# Patient Record
Sex: FEMALE | Race: WHITE | NOT HISPANIC OR LATINO | Employment: FULL TIME | URBAN - METROPOLITAN AREA
[De-identification: names, ages, dates, MRNs, and addresses within clinical notes are randomized per-mention and may not be internally consistent; named-entity substitution may affect disease eponyms.]

---

## 2017-07-11 ENCOUNTER — HOSPITAL ENCOUNTER (EMERGENCY)
Facility: HOSPITAL | Age: 37
Discharge: LEFT BEFORE DISCHARGE | End: 2017-07-11
Attending: EMERGENCY MEDICINE

## 2017-07-11 VITALS
RESPIRATION RATE: 20 BRPM | HEIGHT: 62 IN | SYSTOLIC BLOOD PRESSURE: 161 MMHG | HEART RATE: 86 BPM | WEIGHT: 105 LBS | TEMPERATURE: 98 F | DIASTOLIC BLOOD PRESSURE: 95 MMHG | BODY MASS INDEX: 19.32 KG/M2

## 2017-07-11 DIAGNOSIS — R11.2 NON-INTRACTABLE VOMITING WITH NAUSEA, UNSPECIFIED VOMITING TYPE: Primary | ICD-10-CM

## 2017-07-11 LAB
ALBUMIN SERPL BCP-MCNC: 4.1 G/DL
ALP SERPL-CCNC: 42 U/L
ALT SERPL W/O P-5'-P-CCNC: 15 U/L
ANION GAP SERPL CALC-SCNC: 10 MMOL/L
AST SERPL-CCNC: 18 U/L
B-HCG UR QL: NEGATIVE
BASOPHILS # BLD AUTO: 0.07 K/UL
BASOPHILS NFR BLD: 1.2 %
BILIRUB SERPL-MCNC: 0.4 MG/DL
BILIRUB UR QL STRIP: NEGATIVE
BUN SERPL-MCNC: 10 MG/DL
CALCIUM SERPL-MCNC: 9.5 MG/DL
CHLORIDE SERPL-SCNC: 103 MMOL/L
CLARITY UR: CLEAR
CO2 SERPL-SCNC: 29 MMOL/L
COLOR UR: YELLOW
CREAT SERPL-MCNC: 0.8 MG/DL
DIFFERENTIAL METHOD: ABNORMAL
EOSINOPHIL # BLD AUTO: 0.1 K/UL
EOSINOPHIL NFR BLD: 1 %
ERYTHROCYTE [DISTWIDTH] IN BLOOD BY AUTOMATED COUNT: 12.7 %
EST. GFR  (AFRICAN AMERICAN): >60 ML/MIN/1.73 M^2
EST. GFR  (NON AFRICAN AMERICAN): >60 ML/MIN/1.73 M^2
GLUCOSE SERPL-MCNC: 99 MG/DL
GLUCOSE UR QL STRIP: NEGATIVE
HCT VFR BLD AUTO: 40.1 %
HGB BLD-MCNC: 13.5 G/DL
HGB UR QL STRIP: ABNORMAL
KETONES UR QL STRIP: NEGATIVE
LEUKOCYTE ESTERASE UR QL STRIP: NEGATIVE
LIPASE SERPL-CCNC: 23 U/L
LYMPHOCYTES # BLD AUTO: 2.1 K/UL
LYMPHOCYTES NFR BLD: 35.3 %
MCH RBC QN AUTO: 33.3 PG
MCHC RBC AUTO-ENTMCNC: 33.7 %
MCV RBC AUTO: 99 FL
MONOCYTES # BLD AUTO: 0.6 K/UL
MONOCYTES NFR BLD: 10 %
NEUTROPHILS # BLD AUTO: 3.1 K/UL
NEUTROPHILS NFR BLD: 52.5 %
NITRITE UR QL STRIP: NEGATIVE
PH UR STRIP: 8 [PH] (ref 5–8)
PLATELET # BLD AUTO: 227 K/UL
PMV BLD AUTO: 10.7 FL
POTASSIUM SERPL-SCNC: 3 MMOL/L
PROT SERPL-MCNC: 7 G/DL
PROT UR QL STRIP: NEGATIVE
RBC # BLD AUTO: 4.06 M/UL
SODIUM SERPL-SCNC: 142 MMOL/L
SP GR UR STRIP: 1.01 (ref 1–1.03)
URN SPEC COLLECT METH UR: ABNORMAL
UROBILINOGEN UR STRIP-ACNC: NEGATIVE EU/DL
WBC # BLD AUTO: 5.92 K/UL

## 2017-07-11 PROCEDURE — 81003 URINALYSIS AUTO W/O SCOPE: CPT

## 2017-07-11 PROCEDURE — 99284 EMERGENCY DEPT VISIT MOD MDM: CPT

## 2017-07-11 PROCEDURE — 80053 COMPREHEN METABOLIC PANEL: CPT

## 2017-07-11 PROCEDURE — 85025 COMPLETE CBC W/AUTO DIFF WBC: CPT

## 2017-07-11 PROCEDURE — 25000003 PHARM REV CODE 250: Performed by: EMERGENCY MEDICINE

## 2017-07-11 PROCEDURE — 81025 URINE PREGNANCY TEST: CPT

## 2017-07-11 PROCEDURE — 36415 COLL VENOUS BLD VENIPUNCTURE: CPT

## 2017-07-11 PROCEDURE — 83690 ASSAY OF LIPASE: CPT

## 2017-07-11 RX ORDER — SODIUM CHLORIDE 9 MG/ML
1000 INJECTION, SOLUTION INTRAVENOUS
Status: DISCONTINUED | OUTPATIENT
Start: 2017-07-11 | End: 2017-07-11

## 2017-07-11 RX ORDER — ONDANSETRON 4 MG/1
4 TABLET, ORALLY DISINTEGRATING ORAL
Status: COMPLETED | OUTPATIENT
Start: 2017-07-11 | End: 2017-07-11

## 2017-07-11 RX ORDER — ONDANSETRON 2 MG/ML
4 INJECTION INTRAMUSCULAR; INTRAVENOUS
Status: DISCONTINUED | OUTPATIENT
Start: 2017-07-11 | End: 2017-07-11

## 2017-07-11 RX ADMIN — ONDANSETRON 4 MG: 4 TABLET, ORALLY DISINTEGRATING ORAL at 10:07

## 2017-07-12 NOTE — ED TRIAGE NOTES
Assumed care of 36 y.o. female presents to ER ED 02/ED 02B   Chief Complaint   Patient presents with    Vomiting     Vomiting and stomach cramps since yesterday.    as per triage nurse.  Used Tylenol OTC to try to help the cramping without any relief. Pt given a hospital gown with instructions to remove clothing and/or jewelry and to place gown on and voices understanding. Instructed to call if help is needed and agrees.Pt informed by NPO order placed by MD.  Pt verbalizes understanding.

## 2017-07-12 NOTE — ED PROVIDER NOTES
Encounter Date: 7/11/2017       History     Chief Complaint   Patient presents with    Vomiting     Vomiting and stomach cramps since yesterday.     Patient believe her IUD is misplaced.  States not having an OB/GYN for care      The history is provided by the patient.   GI Problem   The other symptoms of the illness include nausea and vomiting. The other symptoms of the illness do not include fever, fatigue, jaundice, melena, shortness of breath, diarrhea, dysuria, hematemesis, hematochezia, vaginal discharge or vaginal bleeding.   Nausea began today. The nausea is associated with eating. The nausea is exacerbated by stress.   The vomiting began today. Vomiting occurred once. The emesis contains stomach contents.   The patient states that she believes she is currently not pregnant. The patient has not had a change in bowel habit. Symptoms associated with the illness do not include chills, anorexia, diaphoresis, heartburn, constipation, urgency, hematuria, frequency or back pain.     Review of patient's allergies indicates:  No Known Allergies  Past Medical History:   Diagnosis Date    Depression     Heart murmur     Seizures      Past Surgical History:   Procedure Laterality Date    DILATION AND CURETTAGE OF UTERUS       Family History   Problem Relation Age of Onset    Hypertension Mother     Heart disease Father      pacemaker    Diabetes Father     Hypertension Father     Hyperlipidemia Father     Breast cancer Neg Hx     Ovarian cancer Neg Hx     Colon cancer Neg Hx      Social History   Substance Use Topics    Smoking status: Current Every Day Smoker     Packs/day: 2.00     Years: 20.00     Types: Cigarettes    Smokeless tobacco: Never Used      Comment: Informed about classes in smoking cessation. States will call.    Alcohol use Yes      Comment: Socially     Review of Systems   Constitutional: Negative for chills, diaphoresis, fatigue and fever.   HENT: Negative for ear discharge, ear pain  and facial swelling.    Eyes: Negative for pain, redness and itching.   Respiratory: Negative for cough, choking, shortness of breath, wheezing and stridor.    Cardiovascular: Negative for chest pain, palpitations and leg swelling.   Gastrointestinal: Positive for nausea and vomiting. Negative for abdominal pain, anorexia, constipation, diarrhea, heartburn, hematemesis, hematochezia, jaundice and melena.   Genitourinary: Negative for dysuria, frequency, hematuria, urgency, vaginal bleeding and vaginal discharge.   Musculoskeletal: Negative for back pain, neck pain and neck stiffness.   Skin: Negative for color change, pallor, rash and wound.   Neurological: Negative for weakness.       Physical Exam     Initial Vitals [07/11/17 2158]   BP Pulse Resp Temp SpO2   (!) 161/95 86 20 97.9 °F (36.6 °C) --      MAP       117         Physical Exam    Nursing note and vitals reviewed.  Constitutional: She appears well-developed and well-nourished. She is not diaphoretic. No distress.   HENT:   Head: Normocephalic and atraumatic.   Mouth/Throat: No oropharyngeal exudate.   Eyes: Conjunctivae and EOM are normal. Pupils are equal, round, and reactive to light.   Neck: Normal range of motion. Neck supple. No JVD present.   Pulmonary/Chest: Breath sounds normal. No stridor. No respiratory distress. She has no wheezes. She has no rhonchi. She has no rales. She exhibits no tenderness.   Abdominal: Soft. Bowel sounds are normal. She exhibits no distension. There is no tenderness. There is no rebound and no guarding.   Musculoskeletal: Normal range of motion. She exhibits no edema or tenderness.   Neurological: She is alert and oriented to person, place, and time.   Skin: No rash noted.         ED Course   Procedures  Labs Reviewed   COMPREHENSIVE METABOLIC PANEL   CBC W/ AUTO DIFFERENTIAL   LIPASE   URINALYSIS   PREGNANCY TEST, URINE RAPID                               ED Course     Clinical Impression:   The encounter diagnosis was  Non-intractable vomiting with nausea, unspecified vomiting type.    Disposition:   Disposition: AMA  Condition: Stable                        Fahad Cardona MD  07/11/17 1220

## 2017-07-12 NOTE — ED NOTES
Pt given urine speciman cup, yuliana soap towelettewipe, and instructions for [facility]; understanding verbalized

## 2018-04-28 ENCOUNTER — HOSPITAL ENCOUNTER (EMERGENCY)
Facility: HOSPITAL | Age: 38
Discharge: HOME | End: 2018-04-28
Attending: SURGERY

## 2018-04-28 VITALS
DIASTOLIC BLOOD PRESSURE: 80 MMHG | TEMPERATURE: 98 F | RESPIRATION RATE: 17 BRPM | HEART RATE: 80 BPM | WEIGHT: 111 LBS | BODY MASS INDEX: 20.3 KG/M2 | SYSTOLIC BLOOD PRESSURE: 130 MMHG

## 2018-04-28 DIAGNOSIS — M25.512 ACUTE PAIN OF LEFT SHOULDER: Primary | ICD-10-CM

## 2018-04-28 PROCEDURE — 25000003 PHARM REV CODE 250: Performed by: SURGERY

## 2018-04-28 PROCEDURE — 99283 EMERGENCY DEPT VISIT LOW MDM: CPT

## 2018-04-28 RX ORDER — KETOROLAC TROMETHAMINE 10 MG/1
10 TABLET, FILM COATED ORAL EVERY 6 HOURS PRN
Qty: 15 TABLET | Refills: 0 | Status: SHIPPED | OUTPATIENT
Start: 2018-04-28 | End: 2018-09-13

## 2018-04-28 RX ORDER — KETOROLAC TROMETHAMINE 10 MG/1
10 TABLET, FILM COATED ORAL EVERY 6 HOURS PRN
Qty: 15 TABLET | Refills: 0 | Status: SHIPPED | OUTPATIENT
Start: 2018-04-28 | End: 2018-04-28

## 2018-04-28 RX ORDER — CYCLOBENZAPRINE HCL 10 MG
10 TABLET ORAL 3 TIMES DAILY PRN
Qty: 10 TABLET | Refills: 0 | Status: SHIPPED | OUTPATIENT
Start: 2018-04-28 | End: 2018-04-28

## 2018-04-28 RX ORDER — IBUPROFEN 800 MG/1
800 TABLET ORAL
Status: COMPLETED | OUTPATIENT
Start: 2018-04-28 | End: 2018-04-28

## 2018-04-28 RX ORDER — CYCLOBENZAPRINE HCL 10 MG
10 TABLET ORAL 3 TIMES DAILY PRN
Qty: 10 TABLET | Refills: 0 | Status: SHIPPED | OUTPATIENT
Start: 2018-04-28 | End: 2018-05-03

## 2018-04-28 RX ADMIN — IBUPROFEN 800 MG: 800 TABLET ORAL at 08:04

## 2018-04-29 NOTE — ED PROVIDER NOTES
[facility]                                                 Chief Complaint  37 y.o. female with Shoulder Injury    History of Present Illness  Aditi Conway presents to the emergency room with left shoulder pain  Patient states she injured her left shoulder yesterday when a heavy object hit it  Patient on exam has a normal left shoulder without bruising or trauma noted  Patient states she has a hard time moving the shoulder, good range of motion  Pt has good distal pulses and capillary refill with normal tone in the ER exam  Patient's only complaint today is left shoulder pain, stable in ER presentation    The history is provided by the patient  Medical history: Depression, heart murmur and seizures  Surgeries: D&C of the uterus  No Known Allergies     Review of Systems and Physical Exam      Review of Systems  -- Constitution - no fever, denies fatigue, no weakness, no chills  -- Eyes - no tearing or redness, no visual disturbance  -- Ear, Nose - no tinnitus or earache, no nasal congestion or discharge  -- Mouth,Throat - no sore throat, no toothache, normal voice, normal swallowing  -- Respiratory - denies cough and congestion, no shortness of breath, no JENKINS  -- Cardiovascular - denies chest pain, no palpitations, denies claudication  -- Gastrointestinal - denies abdominal pain, nausea, vomiting, or diarrhea  -- Genitourinary - no dysuria, no denies flank pain, no hematuria or frequency   -- Musculoskeletal - left shoulder pain after injury yesterday  -- Neurological - no headache, denies weakness or seizure; no LOC  -- Skin - denies pallor, rash, or changes in skin. no hives or welts noted    /80  Pulse 80   Temp 98 °F (36.7 °C) (Oral)   Resp 17     Physical Exam  -- Nursing note and vitals reviewed  -- Constitutional: Appears well-developed and well-nourished  -- Head: Atraumatic. Normocephalic. No obvious abnormality  -- Eyes: Pupils are equal and reactive to light. Normal  conjunctiva and lids  -- Cardiac: Normal rate, regular rhythm and normal heart sounds  -- Pulmonary: Normal respiratory effort, breath sounds clear to auscultation  -- Abdominal: Soft, no tenderness. Normal bowel sounds. Normal liver edge  -- Musculoskeletal: Normal range of motion, no effusions. Joints stable   -- Neurological: No focal deficits. Showed good interaction with staff  -- Vascular: Posterior tibial, dorsalis pedis and radial pulses 2+ bilaterally    -- Lymphatics: No cervical or peripheral lymphadenopathy. No edema noted    Emergency Room Course      Treatment and Evaluation  -- Preliminary ER x-ray readings showed no evidence of fracture or dislocation  -- All x-rays are reviewed with a final disposition given by the radiologist   -- Sling on the affected limb by the CNA for comfort and decreased pain    --  mg Motrin given in today in the ER     Diagnosis  -- The encounter diagnosis was Acute pain of left shoulder.    Disposition and Plan  -- Disposition: home  -- Condition: stable  -- Follow-up: Patient to follow up with Ana Taylor NP in 1-2 days.  -- I advised the patient that we have found no life threatening condition today  -- At this time, I believe the patient is clinically stable for discharge.   -- The patient acknowledges that close follow up with a MD is required   -- Patient agrees to comply with all instruction and direction given in the ER    This note is dictated on Dragon Natural Speaking word recognition program.  There are word recognition mistakes that are occasionally missed on review.            Brady Johnson MD  04/28/18 2012
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
## 2018-09-13 ENCOUNTER — HOSPITAL ENCOUNTER (EMERGENCY)
Facility: HOSPITAL | Age: 38
Discharge: HOME | End: 2018-09-13
Attending: SURGERY

## 2018-09-13 VITALS
SYSTOLIC BLOOD PRESSURE: 167 MMHG | OXYGEN SATURATION: 98 % | BODY MASS INDEX: 18.46 KG/M2 | WEIGHT: 100.31 LBS | TEMPERATURE: 97 F | HEART RATE: 88 BPM | HEIGHT: 62 IN | RESPIRATION RATE: 18 BRPM | DIASTOLIC BLOOD PRESSURE: 92 MMHG

## 2018-09-13 DIAGNOSIS — M25.512 ACUTE PAIN OF LEFT SHOULDER: Primary | ICD-10-CM

## 2018-09-13 DIAGNOSIS — M79.603 ARM PAIN: ICD-10-CM

## 2018-09-13 PROCEDURE — 99284 EMERGENCY DEPT VISIT MOD MDM: CPT | Mod: 25

## 2018-09-13 PROCEDURE — 96372 THER/PROPH/DIAG INJ SC/IM: CPT

## 2018-09-13 PROCEDURE — 63600175 PHARM REV CODE 636 W HCPCS: Performed by: SURGERY

## 2018-09-13 RX ORDER — KETOROLAC TROMETHAMINE 30 MG/ML
60 INJECTION, SOLUTION INTRAMUSCULAR; INTRAVENOUS
Status: COMPLETED | OUTPATIENT
Start: 2018-09-13 | End: 2018-09-13

## 2018-09-13 RX ORDER — CYCLOBENZAPRINE HCL 10 MG
10 TABLET ORAL 3 TIMES DAILY PRN
Qty: 10 TABLET | Refills: 0 | Status: SHIPPED | OUTPATIENT
Start: 2018-09-13 | End: 2018-09-18

## 2018-09-13 RX ORDER — KETOROLAC TROMETHAMINE 10 MG/1
10 TABLET, FILM COATED ORAL EVERY 6 HOURS PRN
Qty: 15 TABLET | Refills: 0 | Status: SHIPPED | OUTPATIENT
Start: 2018-09-13 | End: 2018-10-07

## 2018-09-13 RX ORDER — METHYLPREDNISOLONE SOD SUCC 125 MG
125 VIAL (EA) INJECTION
Status: COMPLETED | OUTPATIENT
Start: 2018-09-13 | End: 2018-09-13

## 2018-09-13 RX ORDER — ORPHENADRINE CITRATE 30 MG/ML
60 INJECTION INTRAMUSCULAR; INTRAVENOUS
Status: COMPLETED | OUTPATIENT
Start: 2018-09-13 | End: 2018-09-13

## 2018-09-13 RX ORDER — METHYLPREDNISOLONE 4 MG/1
TABLET ORAL
Qty: 1 PACKAGE | Refills: 0 | OUTPATIENT
Start: 2018-09-13 | End: 2020-09-01

## 2018-09-13 RX ADMIN — ORPHENADRINE CITRATE 60 MG: 30 INJECTION INTRAMUSCULAR; INTRAVENOUS at 08:09

## 2018-09-13 RX ADMIN — METHYLPREDNISOLONE SODIUM SUCCINATE 125 MG: 125 INJECTION, POWDER, FOR SOLUTION INTRAMUSCULAR; INTRAVENOUS at 08:09

## 2018-09-13 RX ADMIN — KETOROLAC TROMETHAMINE 60 MG: 30 INJECTION, SOLUTION INTRAMUSCULAR at 08:09
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
## 2018-10-07 ENCOUNTER — HOSPITAL ENCOUNTER (EMERGENCY)
Facility: HOSPITAL | Age: 38
Discharge: HOME | End: 2018-10-07
Attending: SURGERY

## 2018-10-07 VITALS
WEIGHT: 99.63 LBS | HEART RATE: 74 BPM | DIASTOLIC BLOOD PRESSURE: 89 MMHG | SYSTOLIC BLOOD PRESSURE: 158 MMHG | OXYGEN SATURATION: 96 % | BODY MASS INDEX: 18.33 KG/M2 | RESPIRATION RATE: 18 BRPM | TEMPERATURE: 97 F | HEIGHT: 62 IN

## 2018-10-07 DIAGNOSIS — T83.9XXA COMPLICATION OF INTRAUTERINE DEVICE (IUD), UNSPECIFIED COMPLICATION, INITIAL ENCOUNTER: Primary | ICD-10-CM

## 2018-10-07 LAB
ALBUMIN SERPL BCP-MCNC: 4.1 G/DL
ALP SERPL-CCNC: 65 U/L
ALT SERPL W/O P-5'-P-CCNC: 22 U/L
AMPHET+METHAMPHET UR QL: NEGATIVE
ANION GAP SERPL CALC-SCNC: 9 MMOL/L
AST SERPL-CCNC: 20 U/L
B-HCG UR QL: NEGATIVE
BARBITURATES UR QL SCN>200 NG/ML: NEGATIVE
BASOPHILS # BLD AUTO: 0.03 K/UL
BASOPHILS NFR BLD: 0.5 %
BENZODIAZ UR QL SCN>200 NG/ML: NEGATIVE
BILIRUB SERPL-MCNC: 0.3 MG/DL
BILIRUB UR QL STRIP: NEGATIVE
BUN SERPL-MCNC: 7 MG/DL
BZE UR QL SCN: NEGATIVE
CALCIUM SERPL-MCNC: 9.8 MG/DL
CANNABINOIDS UR QL SCN: NEGATIVE
CHLORIDE SERPL-SCNC: 104 MMOL/L
CLARITY UR: CLEAR
CO2 SERPL-SCNC: 29 MMOL/L
COLOR UR: YELLOW
CREAT SERPL-MCNC: 0.6 MG/DL
CREAT UR-MCNC: 95.7 MG/DL
DIFFERENTIAL METHOD: ABNORMAL
EOSINOPHIL # BLD AUTO: 0.1 K/UL
EOSINOPHIL NFR BLD: 1 %
ERYTHROCYTE [DISTWIDTH] IN BLOOD BY AUTOMATED COUNT: 12.3 %
EST. GFR  (AFRICAN AMERICAN): >60 ML/MIN/1.73 M^2
EST. GFR  (NON AFRICAN AMERICAN): >60 ML/MIN/1.73 M^2
GLUCOSE SERPL-MCNC: 118 MG/DL
GLUCOSE UR QL STRIP: NEGATIVE
HCT VFR BLD AUTO: 40.4 %
HGB BLD-MCNC: 13.8 G/DL
HGB UR QL STRIP: ABNORMAL
KETONES UR QL STRIP: NEGATIVE
LEUKOCYTE ESTERASE UR QL STRIP: NEGATIVE
LIPASE SERPL-CCNC: 20 U/L
LYMPHOCYTES # BLD AUTO: 2.2 K/UL
LYMPHOCYTES NFR BLD: 36.3 %
MCH RBC QN AUTO: 31.8 PG
MCHC RBC AUTO-ENTMCNC: 34.2 G/DL
MCV RBC AUTO: 93 FL
METHADONE UR QL SCN>300 NG/ML: NEGATIVE
MONOCYTES # BLD AUTO: 0.6 K/UL
MONOCYTES NFR BLD: 9.9 %
NEUTROPHILS # BLD AUTO: 3.1 K/UL
NEUTROPHILS NFR BLD: 52.3 %
NITRITE UR QL STRIP: NEGATIVE
OPIATES UR QL SCN: NORMAL
PCP UR QL SCN>25 NG/ML: NEGATIVE
PH UR STRIP: 6 [PH] (ref 5–8)
PLATELET # BLD AUTO: 269 K/UL
PMV BLD AUTO: 11 FL
POTASSIUM SERPL-SCNC: 3.3 MMOL/L
PROT SERPL-MCNC: 6.9 G/DL
PROT UR QL STRIP: NEGATIVE
RBC # BLD AUTO: 4.34 M/UL
SODIUM SERPL-SCNC: 142 MMOL/L
SP GR UR STRIP: 1.02 (ref 1–1.03)
TOXICOLOGY INFORMATION: NORMAL
URN SPEC COLLECT METH UR: ABNORMAL
UROBILINOGEN UR STRIP-ACNC: NEGATIVE EU/DL
WBC # BLD AUTO: 5.97 K/UL

## 2018-10-07 PROCEDURE — 81025 URINE PREGNANCY TEST: CPT

## 2018-10-07 PROCEDURE — 81003 URINALYSIS AUTO W/O SCOPE: CPT | Mod: 59

## 2018-10-07 PROCEDURE — 80307 DRUG TEST PRSMV CHEM ANLYZR: CPT

## 2018-10-07 PROCEDURE — 99284 EMERGENCY DEPT VISIT MOD MDM: CPT | Mod: 25

## 2018-10-07 PROCEDURE — 85025 COMPLETE CBC W/AUTO DIFF WBC: CPT

## 2018-10-07 PROCEDURE — 36415 COLL VENOUS BLD VENIPUNCTURE: CPT

## 2018-10-07 PROCEDURE — 80053 COMPREHEN METABOLIC PANEL: CPT

## 2018-10-07 PROCEDURE — 63600175 PHARM REV CODE 636 W HCPCS: Performed by: SURGERY

## 2018-10-07 PROCEDURE — 83690 ASSAY OF LIPASE: CPT

## 2018-10-07 PROCEDURE — 96372 THER/PROPH/DIAG INJ SC/IM: CPT

## 2018-10-07 RX ORDER — IBUPROFEN 800 MG/1
800 TABLET ORAL EVERY 6 HOURS PRN
Qty: 20 TABLET | Refills: 0 | Status: SHIPPED | OUTPATIENT
Start: 2018-10-07 | End: 2019-02-27

## 2018-10-07 RX ORDER — KETOROLAC TROMETHAMINE 30 MG/ML
60 INJECTION, SOLUTION INTRAMUSCULAR; INTRAVENOUS
Status: COMPLETED | OUTPATIENT
Start: 2018-10-07 | End: 2018-10-07

## 2018-10-07 RX ADMIN — KETOROLAC TROMETHAMINE 60 MG: 30 INJECTION, SOLUTION INTRAMUSCULAR at 10:10

## 2018-10-08 NOTE — ED TRIAGE NOTES
Patient reports pelvic pain x 1 year, worsening the last 2 weeks. Patient reports having an IUD that is 2 years past due for removal.

## 2018-10-08 NOTE — ED NOTES
Discharged to home/self care.    - Condition at discharge: Good  - Mode of Discharge: Ambulatory  - The patient left the ED accompanied by self.  - The discharge instructions were discussed with the patient.  - They state an understanding of the discharge instructions.  - Walked pt to the discharge station.

## 2018-10-08 NOTE — ED PROVIDER NOTES
[facility]                                                 Chief Complaint  38 y.o. female with Pelvic Pain    History of Present Illness  Aditi Conway presents to the emergency room with IUD pain  Patient states she is having pain from an IUD placed last year by OBGYN  Patient has a soft abdomen normal bowel sounds in all 4 quadrants today  Patient has no hematuria or dysuria and denies any vaginal discharge now  Patient states that she has no risk for STD or PID, pregnancy test negative  Patient states she has come to the ER this evening to get her IUD removed    The history is provided by the patient   device was not used during this ER visit  Medical history: Depression, heart murmur and seizures  Surgeries: D&C of the uterus  No Known Allergies     Review of Systems and Physical Exam      Review of Systems  -- Constitution - no fever, denies fatigue, no weakness, no chills  -- Eyes - no tearing or redness, no visual disturbance  -- Ear, Nose - no tinnitus or earache, no nasal congestion or discharge  -- Mouth,Throat - no sore throat, no toothache, normal voice, normal swallowing  -- Respiratory - denies cough and congestion, no shortness of breath, no JENKINS  -- Cardiovascular - denies chest pain, no palpitations, denies claudication  -- Gastrointestinal - denies abdominal pain, nausea, vomiting, or diarrhea  -- Genitourinary - IUD related pain with no hematuria dysuria or discharge  -- Musculoskeletal - denies back pain, negative for myalgias and arthralgias   -- Neurological - no headache, denies weakness or seizure; no LOC  -- Skin - denies pallor, rash, or changes in skin. no hives or welts noted  -- Psychiatric - Denies SI or HI, no psychosis or fractured thought noted     Vital Signs  Her oral temperature is 97.9 °F (36.6 °C).   Her blood pressure is 153/89 and her pulse is 100.   Her respiration is 18 and oxygen saturation is 100%.     Physical Exam  --  Nursing note and vitals reviewed  -- Constitutional: Appears well-developed and well-nourished  -- Head: Atraumatic. Normocephalic. No obvious abnormality  -- Eyes: Pupils are equal and reactive to light. Normal conjunctiva and lids  -- Cardiac: Normal rate, regular rhythm and normal heart sounds  -- Pulmonary: Normal respiratory effort, breath sounds clear to auscultation  -- Abdominal: Soft, no tenderness. Normal bowel sounds. Normal liver edge  -- Genitourinary: no flank pain on exam, no suprapubic pain by palpation   -- Pelvic Exam: IUD with string was not visualized, no discharge or complication  -- Musculoskeletal: Normal range of motion, no effusions. Joints stable   -- Neurological: No focal deficits. Showed good interaction with staff  -- Vascular: Posterior tibial, dorsalis pedis and radial pulses 2+ bilaterally      Emergency Room Course      Treatment and Evaluation  -- The electrolytes drawn in the ER today were within normal limits  -- The CBC drawn in the ER today was within normal limits  -- Lipase drawn in the ER today was within normal limits   -- The urine pregnancy test today was negative; patient informed of the results  -- Urinalysis performed during this ER visit showed no signs of infection  -- Abdominal x-ray shows IUD present, nonspecific gas pattern  -- IM 60 mg Toradol given today in the ER    Medical Decision Making  -- Diagnosis management comments: 38 y.o. female with pelvic pain  -- patient wants her IUD removed, I could not visualize the string on exam  -- patient will follow up with SERGE Mason for IUD removal this week    Diagnosis  -- Complication of intrauterine device (IUD)    Disposition and Plan  -- Disposition: home  -- Condition: stable  -- Follow-up: Patient to follow up with OBGYN in 1-2 days.  -- I advised the patient that we have found no life threatening condition today  -- At this time, I believe the patient is clinically stable for discharge.   -- The patient  acknowledges that close follow up with a MD is required   -- Patient agrees to comply with all instruction and direction given in the ER    This note is dictated on Dragon Natural Speaking word recognition program.  There are word recognition mistakes that are occasionally missed on review.             Brady Johnson MD  10/07/18 5141

## 2019-02-27 ENCOUNTER — HOSPITAL ENCOUNTER (EMERGENCY)
Facility: HOSPITAL | Age: 39
Discharge: HOME | End: 2019-02-27
Attending: SURGERY
Payer: COMMERCIAL

## 2019-02-27 VITALS
HEART RATE: 115 BPM | WEIGHT: 102.38 LBS | TEMPERATURE: 98 F | DIASTOLIC BLOOD PRESSURE: 95 MMHG | OXYGEN SATURATION: 99 % | BODY MASS INDEX: 18.73 KG/M2 | SYSTOLIC BLOOD PRESSURE: 170 MMHG | RESPIRATION RATE: 20 BRPM

## 2019-02-27 DIAGNOSIS — G89.29 CHRONIC DENTAL PAIN: Primary | ICD-10-CM

## 2019-02-27 DIAGNOSIS — K08.9 CHRONIC DENTAL PAIN: Primary | ICD-10-CM

## 2019-02-27 PROCEDURE — 99284 EMERGENCY DEPT VISIT MOD MDM: CPT

## 2019-02-27 PROCEDURE — 25000003 PHARM REV CODE 250: Performed by: SURGERY

## 2019-02-27 RX ORDER — IBUPROFEN 800 MG/1
800 TABLET ORAL EVERY 6 HOURS PRN
Qty: 20 TABLET | Refills: 0 | Status: SHIPPED | OUTPATIENT
Start: 2019-02-27 | End: 2019-07-30

## 2019-02-27 RX ORDER — AMOXICILLIN 500 MG/1
1000 CAPSULE ORAL
Status: COMPLETED | OUTPATIENT
Start: 2019-02-27 | End: 2019-02-27

## 2019-02-27 RX ORDER — MORPHINE SULFATE 2 MG/ML
2 INJECTION, SOLUTION INTRAMUSCULAR; INTRAVENOUS
Status: DISCONTINUED | OUTPATIENT
Start: 2019-02-27 | End: 2019-02-27

## 2019-02-27 RX ORDER — HYDROCODONE BITARTRATE AND ACETAMINOPHEN 10; 325 MG/1; MG/1
1 TABLET ORAL
Status: COMPLETED | OUTPATIENT
Start: 2019-02-27 | End: 2019-02-27

## 2019-02-27 RX ORDER — AMOXICILLIN 875 MG/1
875 TABLET, FILM COATED ORAL 2 TIMES DAILY
Qty: 14 TABLET | Refills: 0 | Status: SHIPPED | OUTPATIENT
Start: 2019-02-27 | End: 2019-03-06

## 2019-02-27 RX ORDER — ONDANSETRON 2 MG/ML
4 INJECTION INTRAMUSCULAR; INTRAVENOUS
Status: DISCONTINUED | OUTPATIENT
Start: 2019-02-27 | End: 2019-02-27

## 2019-02-27 RX ADMIN — AMOXICILLIN 1000 MG: 500 CAPSULE ORAL at 07:02

## 2019-02-27 RX ADMIN — HYDROCODONE BITARTRATE AND ACETAMINOPHEN 1 TABLET: 10; 325 TABLET ORAL at 07:02

## 2019-02-28 NOTE — ED TRIAGE NOTES
38 y.o. female presents to ER   Chief Complaint   Patient presents with    Dental Pain   Dental pain and gum swelling for two days. No acute distress noted.

## 2019-02-28 NOTE — ED PROVIDER NOTES
[facility]                                                 Chief Complaint  38 y.o. female with Dental Pain    History of Present Illness  Aditi Conway presents to the emergency room with dental pain  Patient has chronic dental pain issues, worse the last 2-3 weeks per her history  Patient on exam has upper and lower molar cavities with no facial swelling now  Patient has no signs of fever or dental abscess, afebrile with good stable vitals    The history is provided by the patient   device was not used during this ER visit  Medical history: Depression, heart murmur and seizures  Surgeries: D&C of the uterus  No Known Allergies     Review of Systems and Physical Exam      Review of Systems  -- Constitution - no fever, denies fatigue, no weakness, no chills  -- Eyes - no tearing or redness, no visual disturbance  -- Ear, Nose - no tinnitus or earache, no nasal congestion or discharge  -- Mouth,Throat - toothache, normal voice, normal swallowing  -- Respiratory - denies cough and congestion, no shortness of breath, no JENKINS  -- Cardiovascular - denies chest pain, no palpitations, denies claudication  -- Gastrointestinal - denies abdominal pain, nausea, vomiting, or diarrhea  -- Musculoskeletal - denies back pain, negative for trauma or injury  -- Neurological - no headache, denies weakness or seizure; no LOC  -- Skin - denies pallor, rash, or changes in skin. no hives or welts noted    Vital Signs  Her pulse is 115  Her respiration is 20 and oxygen saturation is 99%.     Physical Exam  -- Nursing note and vitals reviewed  -- Constitutional: Appears well-developed and well-nourished  -- Head: Atraumatic. Normocephalic. No obvious abnormality  -- Eyes: Pupils are equal and reactive to light. Normal conjunctiva and lids  -- Nose: Nose normal in appearance, nares grossly normal. No discharge  -- Throat: several upper and lower bilateral molar cavities with no facial  swelling     -- Ears: External ears and TM normal bilaterally. Normal hearing and no drainage  -- Neck: Normal range of motion. Neck supple. No masses, trachea midline  -- Cardiac: Normal rate, regular rhythm and normal heart sounds  -- Pulmonary: Normal respiratory effort, breath sounds clear to auscultation  -- Abdominal: Soft, no tenderness. Normal bowel sounds. Normal liver edge  -- Musculoskeletal: Normal range of motion, no effusions. Joints stable   -- Neurological: No focal deficits. Showed good interaction with staff    Emergency Room Course      Medications Given  morphine injection 2 mg (not administered)   ondansetron injection 4 mg (not administered)   penicillin G benzathine (BICILLIN LA) injection 1.2 Million Units      Diagnosis  -- The encounter diagnosis was Chronic dental pain.    Disposition and Plan  -- Disposition: to dentist ASAP  -- Condition: stable  -- Pt given instructions; take all medications prescribed in the ER as directed.   -- Patient agrees to comply with all instructions- needs appropriate dental care  -- Pt agrees to return to ER if any symptoms reoccur; symptom-free on discharge  -- Patient to follow up with a dentist in 1-2 days. Has been given follow up information  -- The patient acknowledges that dental follow up is required for this issue     This note is dictated on M*Modal word recognition program.  There are word recognition mistakes that are occasionally missed on review.         Brady Johnson MD  02/27/19 1926

## 2019-06-03 ENCOUNTER — HOSPITAL ENCOUNTER (EMERGENCY)
Facility: HOSPITAL | Age: 39
Discharge: HOME | End: 2019-06-03
Attending: SURGERY
Payer: COMMERCIAL

## 2019-06-03 VITALS
RESPIRATION RATE: 20 BRPM | WEIGHT: 105 LBS | HEART RATE: 98 BPM | BODY MASS INDEX: 19.2 KG/M2 | DIASTOLIC BLOOD PRESSURE: 88 MMHG | TEMPERATURE: 98 F | SYSTOLIC BLOOD PRESSURE: 156 MMHG

## 2019-06-03 DIAGNOSIS — S61.209A FINGERTIP AVULSION, INITIAL ENCOUNTER: Primary | ICD-10-CM

## 2019-06-03 PROCEDURE — 25000003 PHARM REV CODE 250: Performed by: NURSE PRACTITIONER

## 2019-06-03 PROCEDURE — 90471 IMMUNIZATION ADMIN: CPT | Performed by: NURSE PRACTITIONER

## 2019-06-03 PROCEDURE — 99284 EMERGENCY DEPT VISIT MOD MDM: CPT

## 2019-06-03 PROCEDURE — 90715 TDAP VACCINE 7 YRS/> IM: CPT | Performed by: NURSE PRACTITIONER

## 2019-06-03 PROCEDURE — 63600175 PHARM REV CODE 636 W HCPCS: Performed by: NURSE PRACTITIONER

## 2019-06-03 RX ORDER — CEPHALEXIN 500 MG/1
500 CAPSULE ORAL 4 TIMES DAILY
Qty: 28 CAPSULE | Refills: 0 | Status: SHIPPED | OUTPATIENT
Start: 2019-06-03 | End: 2019-06-10

## 2019-06-03 RX ORDER — HYDROCODONE BITARTRATE AND ACETAMINOPHEN 5; 325 MG/1; MG/1
1 TABLET ORAL
Status: COMPLETED | OUTPATIENT
Start: 2019-06-03 | End: 2019-06-03

## 2019-06-03 RX ORDER — HYDROCODONE BITARTRATE AND ACETAMINOPHEN 5; 325 MG/1; MG/1
1 TABLET ORAL EVERY 6 HOURS PRN
Qty: 12 TABLET | Refills: 0 | OUTPATIENT
Start: 2019-06-03 | End: 2020-09-01

## 2019-06-03 RX ORDER — MUPIROCIN 20 MG/G
OINTMENT TOPICAL 3 TIMES DAILY
Qty: 15 G | Refills: 0 | Status: ON HOLD | OUTPATIENT
Start: 2019-06-03 | End: 2021-07-12 | Stop reason: HOSPADM

## 2019-06-03 RX ORDER — LIDOCAINE HYDROCHLORIDE 10 MG/ML
10 INJECTION, SOLUTION EPIDURAL; INFILTRATION; INTRACAUDAL; PERINEURAL
Status: COMPLETED | OUTPATIENT
Start: 2019-06-03 | End: 2019-06-03

## 2019-06-03 RX ADMIN — LIDOCAINE HYDROCHLORIDE 100 MG: 10 INJECTION, SOLUTION EPIDURAL; INFILTRATION; INTRACAUDAL; PERINEURAL at 08:06

## 2019-06-03 RX ADMIN — BACITRACIN, NEOMYCIN, POLYMYXIN B 1 EACH: 400; 3.5; 5 OINTMENT TOPICAL at 08:06

## 2019-06-03 RX ADMIN — HYDROCODONE BITARTRATE AND ACETAMINOPHEN 1 TABLET: 5; 325 TABLET ORAL at 08:06

## 2019-06-03 RX ADMIN — CLOSTRIDIUM TETANI TOXOID ANTIGEN (FORMALDEHYDE INACTIVATED), CORYNEBACTERIUM DIPHTHERIAE TOXOID ANTIGEN (FORMALDEHYDE INACTIVATED), BORDETELLA PERTUSSIS TOXOID ANTIGEN (GLUTARALDEHYDE INACTIVATED), BORDETELLA PERTUSSIS FILAMENTOUS HEMAGGLUTININ ANTIGEN (FORMALDEHYDE INACTIVATED), BORDETELLA PERTUSSIS PERTACTIN ANTIGEN, AND BORDETELLA PERTUSSIS FIMBRIAE 2/3 ANTIGEN 0.5 ML: 5; 2; 2.5; 5; 3; 5 INJECTION, SUSPENSION INTRAMUSCULAR at 09:06

## 2019-06-04 NOTE — ED NOTES
The patient is awake, alert and calm, family at bedside. Respirations are spontaneous, normal respiratory effort and rate noted, full ROM in all extremities, no distress noted, resting comfortably. No change from previous assessment. Bed in low, locked position. Pt able to change position independently. Will continue to monitor.

## 2019-06-04 NOTE — DISCHARGE INSTRUCTIONS
**Follow up with PCP in 24-48 hours. Return to ER with worsening of symptoms. Wash area twice a day with antibacterial soap and water. Apply antibiotic ointment three times a day. Keep area clean. Take all antibiotics. Monitor for signs of infection such as redness, swelling, purulent discharge, or fever.     **Our goal in the emergency department is to always give you outstanding care and exceptional service. You may receive a survey by mail or e-mail in the next week regarding your experience in our ED. We would greatly appreciate your completing and returning the survey. Your feedback provides us with a way to recognize our staff who give very good care and it helps us learn how to improve when your experience was below our aspiration of excellence.

## 2019-06-04 NOTE — ED TRIAGE NOTES
38 y.o. female presents to ER Room/bed info not found   Chief Complaint   Patient presents with    Laceration     right ring finger   pt c/o laceration from  at work. No acute distress noted.

## 2019-06-04 NOTE — ED PROVIDER NOTES
Encounter Date: 6/3/2019       History     Chief Complaint   Patient presents with    Laceration     right ring finger     The history is provided by the patient.   Laceration    The incident occurred just prior to arrival. Pain location: R ring finger. Size: avulsion <1cm. Injury mechanism: . She reports no foreign bodies present. Her tetanus status is unknown.     Review of patient's allergies indicates:  No Known Allergies  Past Medical History:   Diagnosis Date    Depression     Heart murmur     Seizures      Past Surgical History:   Procedure Laterality Date    DILATION AND CURETTAGE OF UTERUS       Family History   Problem Relation Age of Onset    Hypertension Mother     Heart disease Father         pacemaker    Diabetes Father     Hypertension Father     Hyperlipidemia Father     Breast cancer Neg Hx     Ovarian cancer Neg Hx     Colon cancer Neg Hx      Social History     Tobacco Use    Smoking status: Current Every Day Smoker     Packs/day: 2.00     Years: 20.00     Pack years: 40.00     Types: Cigarettes    Smokeless tobacco: Never Used    Tobacco comment: Informed about classes in smoking cessation. States will call.   Substance Use Topics    Alcohol use: Yes     Comment: Socially    Drug use: No     Review of Systems   Constitutional: Negative for fever.   HENT: Negative for congestion, ear pain, rhinorrhea, sore throat and trouble swallowing.    Eyes: Negative for pain, discharge, redness and visual disturbance.   Respiratory: Negative for cough, shortness of breath and wheezing.    Cardiovascular: Negative for chest pain and leg swelling.   Gastrointestinal: Negative for abdominal pain, constipation, diarrhea, nausea and vomiting.   Genitourinary: Negative for difficulty urinating, dysuria, flank pain, frequency and urgency.   Musculoskeletal: Negative for arthralgias, back pain, myalgias and neck pain.   Skin: Positive for wound. Negative for rash.   Neurological: Negative  for seizures, weakness and headaches.   Psychiatric/Behavioral: Negative.        Physical Exam     Initial Vitals [06/03/19 2008]   BP Pulse Resp Temp SpO2   (!) 162/110 104 20 98.3 °F (36.8 °C) --      MAP       --         Physical Exam    Nursing note and vitals reviewed.  Constitutional: No distress.   HENT:   Head: Normocephalic and atraumatic.   Right Ear: External ear normal.   Left Ear: External ear normal.   Nose: Nose normal.   Mouth/Throat: Oropharynx is clear and moist.   Eyes: Conjunctivae, EOM and lids are normal. Pupils are equal, round, and reactive to light.   Neck: Neck supple.   Cardiovascular: Normal rate, regular rhythm, S1 normal, S2 normal, normal heart sounds and intact distal pulses.   Pulmonary/Chest: Effort normal and breath sounds normal. No respiratory distress.   Abdominal: Soft. Bowel sounds are normal. There is no tenderness.   Musculoskeletal: Normal range of motion.   Neurological: She is alert and oriented to person, place, and time. She has normal strength. GCS eye subscore is 4. GCS verbal subscore is 5. GCS motor subscore is 6.   Skin: Skin is warm and dry. Capillary refill takes less than 2 seconds. Laceration (R ring finger) noted. No rash noted.   Psychiatric: She has a normal mood and affect. Her speech is normal and behavior is normal.         ED Course   Procedures  Labs Reviewed - No data to display       Imaging Results    None                 Medications   lidocaine (PF) 10 mg/ml (1%) injection 100 mg (has no administration in time range)   Tdap vaccine injection 0.5 mL (has no administration in time range)   neomycin-bacitracnZn-polymyxnB packet 1 each (has no administration in time range)   HYDROcodone-acetaminophen 5-325 mg per tablet 1 tablet (has no administration in time range)           --Plan of care discussed with collaborating provider. Agrees with plan of care today.              Clinical Impression:       ICD-10-CM ICD-9-CM   1. Fingertip avulsion, initial  encounter S61.209A 883.0     New Prescriptions    CEPHALEXIN (KEFLEX) 500 MG CAPSULE    Take 1 capsule (500 mg total) by mouth 4 (four) times daily. for 7 days    HYDROCODONE-ACETAMINOPHEN (NORCO) 5-325 MG PER TABLET    Take 1 tablet by mouth every 6 (six) hours as needed for Pain.    MUPIROCIN (BACTROBAN) 2 % OINTMENT    Apply topically 3 (three) times daily.       Disposition:   Disposition: Discharged  Condition: Stable    The patient acknowledges that close follow up with medical provider is required. Instructed to follow up with PCP within 2 days. Patient was given specific return precautions. The patient agrees to comply with all instruction and directions given in the ER.                         Carine Aguirre NP  06/03/19 [id number]

## 2019-07-30 ENCOUNTER — HOSPITAL ENCOUNTER (EMERGENCY)
Facility: HOSPITAL | Age: 39
Discharge: HOME | End: 2019-07-30
Attending: SURGERY

## 2019-07-30 VITALS
BODY MASS INDEX: 18.75 KG/M2 | DIASTOLIC BLOOD PRESSURE: 78 MMHG | WEIGHT: 102.5 LBS | OXYGEN SATURATION: 98 % | HEART RATE: 90 BPM | RESPIRATION RATE: 16 BRPM | SYSTOLIC BLOOD PRESSURE: 148 MMHG | TEMPERATURE: 99 F

## 2019-07-30 DIAGNOSIS — M25.511 RIGHT SHOULDER PAIN: Primary | ICD-10-CM

## 2019-07-30 DIAGNOSIS — M79.601 RIGHT ARM PAIN: ICD-10-CM

## 2019-07-30 LAB — B-HCG UR QL: NEGATIVE

## 2019-07-30 PROCEDURE — 25000003 PHARM REV CODE 250: Performed by: NURSE PRACTITIONER

## 2019-07-30 PROCEDURE — 63600175 PHARM REV CODE 636 W HCPCS: Performed by: NURSE PRACTITIONER

## 2019-07-30 PROCEDURE — 99284 EMERGENCY DEPT VISIT MOD MDM: CPT | Mod: 25

## 2019-07-30 PROCEDURE — 81025 URINE PREGNANCY TEST: CPT

## 2019-07-30 PROCEDURE — 96372 THER/PROPH/DIAG INJ SC/IM: CPT

## 2019-07-30 RX ORDER — CYCLOBENZAPRINE HCL 10 MG
10 TABLET ORAL 3 TIMES DAILY PRN
Qty: 15 TABLET | Refills: 0 | Status: SHIPPED | OUTPATIENT
Start: 2019-07-30 | End: 2019-08-04

## 2019-07-30 RX ORDER — NAPROXEN 500 MG/1
500 TABLET ORAL 2 TIMES DAILY PRN
Qty: 30 TABLET | Refills: 0 | OUTPATIENT
Start: 2019-07-30 | End: 2020-09-01

## 2019-07-30 RX ORDER — KETOROLAC TROMETHAMINE 30 MG/ML
60 INJECTION, SOLUTION INTRAMUSCULAR; INTRAVENOUS
Status: COMPLETED | OUTPATIENT
Start: 2019-07-30 | End: 2019-07-30

## 2019-07-30 RX ORDER — IBUPROFEN 600 MG/1
600 TABLET ORAL
Status: COMPLETED | OUTPATIENT
Start: 2019-07-30 | End: 2019-07-30

## 2019-07-30 RX ADMIN — KETOROLAC TROMETHAMINE 60 MG: 30 INJECTION, SOLUTION INTRAMUSCULAR at 08:07

## 2019-07-30 RX ADMIN — IBUPROFEN 600 MG: 600 TABLET ORAL at 08:07

## 2019-07-31 NOTE — ED TRIAGE NOTES
38 y.o. female presents to ER ED 01/ED 01A   Chief Complaint   Patient presents with    Shoulder Pain     right    Pain to right shoulder after fall last week. No acute distress noted.

## 2019-07-31 NOTE — ED PROVIDER NOTES
Encounter Date: 7/30/2019       History     Chief Complaint   Patient presents with    Shoulder Pain     right      Aditi Conway is a 38 y.o. Female who presents to the ED with reports of right shoulder and upper arm pain. Patient reports that she hurt arm while at work approximately 1 week ago and has had pain since this time. Pain is described as aching, increases with ROM; currently rated 8/10 on pain scale. She denies numbness or tingling to RUE. She reports taking tylenol at home PTA without relief of symptoms.     The history is provided by the patient.     Review of patient's allergies indicates:  No Known Allergies  Past Medical History:   Diagnosis Date    Depression     Heart murmur     Seizures      Past Surgical History:   Procedure Laterality Date    DILATION AND CURETTAGE OF UTERUS       Family History   Problem Relation Age of Onset    Hypertension Mother     Heart disease Father         pacemaker    Diabetes Father     Hypertension Father     Hyperlipidemia Father     Breast cancer Neg Hx     Ovarian cancer Neg Hx     Colon cancer Neg Hx      Social History     Tobacco Use    Smoking status: Current Every Day Smoker     Packs/day: 2.00     Years: 20.00     Pack years: 40.00     Types: Cigarettes    Smokeless tobacco: Never Used    Tobacco comment: Informed about classes in smoking cessation. States will call.   Substance Use Topics    Alcohol use: Yes     Comment: Socially    Drug use: No     Review of Systems   Constitutional: Negative for activity change, chills and fever.   HENT: Negative.  Negative for congestion, ear discharge, ear pain, postnasal drip, sinus pressure, sinus pain and sore throat.    Eyes: Negative.    Respiratory: Negative.  Negative for cough, chest tightness and shortness of breath.    Cardiovascular: Negative.  Negative for chest pain.   Gastrointestinal: Negative.  Negative for abdominal distention, abdominal pain and nausea.   Endocrine:  Negative.    Genitourinary: Negative.  Negative for dysuria, frequency and urgency.   Musculoskeletal: Positive for arthralgias. Negative for back pain.   Skin: Negative.  Negative for rash.   Allergic/Immunologic: Negative.    Neurological: Negative.  Negative for dizziness, weakness, light-headedness and numbness.   Hematological: Negative.  Does not bruise/bleed easily.   Psychiatric/Behavioral: Negative.        Physical Exam     Initial Vitals [07/30/19 1910]   BP Pulse Resp Temp SpO2   (!) 193/98 97 18 99.3 °F (37.4 °C) 98 %      MAP       --         Physical Exam    Nursing note and vitals reviewed.  Constitutional: She appears well-developed and well-nourished.   HENT:   Head: Normocephalic and atraumatic.   Right Ear: External ear normal.   Left Ear: External ear normal.   Nose: Nose normal.   Mouth/Throat: Oropharynx is clear and moist.   Eyes: Conjunctivae and EOM are normal. Pupils are equal, round, and reactive to light.   Neck: Normal range of motion. Neck supple.   Cardiovascular: Normal rate, regular rhythm, normal heart sounds and intact distal pulses.   Pulmonary/Chest: Effort normal and breath sounds normal. She has no decreased breath sounds. She has no wheezes. She has no rhonchi. She has no rales.   Abdominal: Soft. Normal appearance and bowel sounds are normal. There is no tenderness. There is no rigidity, no rebound, no guarding, no CVA tenderness, no tenderness at McBurney's point and negative Sheppard's sign. No hernia.   Musculoskeletal:        Right shoulder: She exhibits decreased range of motion, tenderness, pain and decreased strength. She exhibits no bony tenderness, no swelling, no effusion, no crepitus, no deformity, no laceration, no spasm and normal pulse.   Neurological: She is alert and oriented to person, place, and time. She has normal strength. She displays normal reflexes. No cranial nerve deficit or sensory deficit.   Skin: Skin is warm and dry. Capillary refill takes less  than 2 seconds. No rash noted.   Psychiatric: She has a normal mood and affect. Her behavior is normal. Judgment and thought content normal.         ED Course   Procedures  Labs Reviewed   PREGNANCY TEST, URINE RAPID          Imaging Results          X-Ray Shoulder Complete 2 View Right (Final result)  Result time 07/30/19 19:53:47    Final result by Felipe North MD (07/30/19 19:53:47)                 Impression:      No acute fracture or dislocation.      Electronically signed by: Felipe North MD  Date:    07/30/2019  Time:    19:53             Narrative:    EXAMINATION:  XR SHOULDER COMPLETE 2 OR MORE VIEWS RIGHT    CLINICAL HISTORY:  XR SHOULDER COMPLETE 2 OR MORE VIEWS RIGHTPain in right shoulder    COMPARISON:  None    FINDINGS:  Three views of the right shoulder were obtained.    No evidence of acute fracture or dislocation.  Bony mineralization is normal.  Soft tissues are unremarkable.   Lungs are clear.                               X-Ray Humerus 2 View Right (Final result)  Result time 07/30/19 19:53:21    Final result by Felipe North MD (07/30/19 19:53:21)                 Impression:      No acute fracture or dislocation.      Electronically signed by: Felipe North MD  Date:    07/30/2019  Time:    19:53             Narrative:    EXAMINATION:  XR HUMERUS 2 VIEW RIGHT    CLINICAL HISTORY:  XR HUMERUS 2 VIEW RIGHTPain in right arm    COMPARISON:  None    FINDINGS:  Two views of the right humerus were obtained.    No evidence of acute fracture or dislocation.  Bony mineralization is normal.  Soft tissues are unremarkable.                                  Medications   ketorolac injection 60 mg (60 mg Intramuscular Not Given 7/30/19 2006)   ibuprofen tablet 600 mg (600 mg Oral Given 7/30/19 2007)      Arm sling applied to right arm.  Ambulatory referral to orthopedist placed.                    Clinical Impression:       ICD-10-CM ICD-9-CM   1. Right shoulder pain M25.511 719.41   2. Right arm pain  M79.601 729.5         Disposition:   Disposition: Discharged  Condition: Stable    New Prescriptions    CYCLOBENZAPRINE (FLEXERIL) 10 MG TABLET    Take 1 tablet (10 mg total) by mouth 3 (three) times daily as needed for Muscle spasms.    NAPROXEN (NAPROSYN) 500 MG TABLET    Take 1 tablet (500 mg total) by mouth 2 (two) times daily as needed.        The patient acknowledges that close follow up with medical provider is required. Instructed to follow up with PCP within 2 days. Patient was given specific return precautions. The patient agrees to comply with all instruction and directions given in the ER.                Velma Garcia NP  07/30/19 9832

## 2019-09-27 ENCOUNTER — HOSPITAL ENCOUNTER (EMERGENCY)
Facility: HOSPITAL | Age: 39
Discharge: HOME | End: 2019-09-27
Attending: PAIN MEDICINE

## 2019-09-27 VITALS
SYSTOLIC BLOOD PRESSURE: 151 MMHG | OXYGEN SATURATION: 99 % | HEART RATE: 84 BPM | HEIGHT: 62 IN | WEIGHT: 102 LBS | BODY MASS INDEX: 18.77 KG/M2 | TEMPERATURE: 99 F | RESPIRATION RATE: 18 BRPM | DIASTOLIC BLOOD PRESSURE: 90 MMHG

## 2019-09-27 DIAGNOSIS — S92.334A NONDISPLACED FRACTURE OF THIRD METATARSAL BONE, RIGHT FOOT, INITIAL ENCOUNTER FOR CLOSED FRACTURE: Primary | ICD-10-CM

## 2019-09-27 DIAGNOSIS — M79.671 ACUTE PAIN OF RIGHT FOOT: ICD-10-CM

## 2019-09-27 DIAGNOSIS — T14.8XXA SPRAIN: ICD-10-CM

## 2019-09-27 LAB — B-HCG UR QL: NEGATIVE

## 2019-09-27 PROCEDURE — 25000003 PHARM REV CODE 250: Performed by: PAIN MEDICINE

## 2019-09-27 PROCEDURE — 81025 URINE PREGNANCY TEST: CPT

## 2019-09-27 PROCEDURE — 99283 EMERGENCY DEPT VISIT LOW MDM: CPT | Mod: 25

## 2019-09-27 RX ORDER — KETOROLAC TROMETHAMINE 10 MG/1
10 TABLET, FILM COATED ORAL EVERY 6 HOURS PRN
Qty: 15 TABLET | Refills: 0 | OUTPATIENT
Start: 2019-09-27 | End: 2020-09-01

## 2019-09-27 RX ORDER — KETOROLAC TROMETHAMINE 10 MG/1
10 TABLET, FILM COATED ORAL
Status: COMPLETED | OUTPATIENT
Start: 2019-09-27 | End: 2019-09-27

## 2019-09-27 RX ADMIN — KETOROLAC TROMETHAMINE 10 MG: 10 TABLET, FILM COATED ORAL at 07:09

## 2019-09-28 NOTE — ED NOTES
The patient is awake, alert, friend at bedside. Normal respiratory effort and rate noted, full ROM in all extremities, no change from previous assessment. Bed in low, locked position. Pt able to change position independently. Will continue to monitor.

## 2019-09-28 NOTE — ED NOTES
The patient is awake, alert, friend at bedside. Normal respiratory effort and rate noted, full ROM in all extremities, resting comfortably. No change from previous assessment.  Bed in low, locked position. Pt able to change position independently. Will continue to monitor.

## 2019-09-28 NOTE — ED PROVIDER NOTES
Encounter Date: 9/27/2019       History     Chief Complaint   Patient presents with    Toe Injury     Right 3rd and 4th toes      Disease 39-year-old white female was moving furniture around her house to stubbed her foot on a planks of of 2 by 4th with planks.  Patient has 2+ dorsalis pedis posterior tibialis she is able to have full range of motion of her ankle hyperextend extend and flex the toes metatarsal bones are nontender no signs of edema or fracture.  Patient is complaining of severe pain upon palpation. Patient denies any past medical history or surgical history patient in a not have any loss consciousness.        Review of patient's allergies indicates:  No Known Allergies  Past Medical History:   Diagnosis Date    Depression     Heart murmur     Seizures      Past Surgical History:   Procedure Laterality Date    DILATION AND CURETTAGE OF UTERUS       Family History   Problem Relation Age of Onset    Hypertension Mother     Heart disease Father         pacemaker    Diabetes Father     Hypertension Father     Hyperlipidemia Father     Breast cancer Neg Hx     Ovarian cancer Neg Hx     Colon cancer Neg Hx      Social History     Tobacco Use    Smoking status: Current Every Day Smoker     Packs/day: 2.00     Years: 20.00     Pack years: 40.00     Types: Cigarettes    Smokeless tobacco: Never Used    Tobacco comment: Informed about classes in smoking cessation. States will call.   Substance Use Topics    Alcohol use: Yes     Comment: Socially    Drug use: No     Review of Systems   Constitutional: Negative for fever.   HENT: Negative for sore throat.    Respiratory: Negative for shortness of breath.    Cardiovascular: Negative for chest pain.   Gastrointestinal: Negative for nausea.   Genitourinary: Negative for dysuria.   Musculoskeletal: Positive for joint swelling. Negative for back pain.   Skin: Negative for rash.   Neurological: Negative for weakness.   Hematological: Does not  bruise/bleed easily.       Physical Exam     Initial Vitals [09/27/19 1934]   BP Pulse Resp Temp SpO2   (!) 159/111 89 18 98.6 °F (37 °C) 99 %      MAP       --         Physical Exam    Nursing note and vitals reviewed.  Constitutional: She appears well-developed.   HENT:   Head: Normocephalic and atraumatic.   Eyes: Pupils are equal, round, and reactive to light.   Neck: Normal range of motion.   Cardiovascular: Normal rate.   Pulmonary/Chest: Breath sounds normal.   Abdominal: Soft.   Neurological: She is alert and oriented to person, place, and time. She has normal strength.   Psychiatric: She has a normal mood and affect.         ED Course   Procedures  Labs Reviewed   PREGNANCY TEST, URINE RAPID   PREGNANCY TEST, URINE RAPID          Imaging Results    None       X-Rays:   Independently Interpreted Readings:   Other Readings:  Right foot 3rd metatarsal fracture                         Clinical Impression:       ICD-10-CM ICD-9-CM   1. Nondisplaced fracture of third metatarsal bone, right foot, initial encounter for closed fracture S92.334A 825.25   2. Acute pain of right foot M79.671 729.5   3. Sprain T14.8XXA 848.9         Disposition:   Disposition: Discharged  Condition: Stable                        Mu Garcia MD  09/27/19 7691

## 2019-09-28 NOTE — ED NOTES
The patient is awake, alert, aware of environment. Airway is open and patent, respirations are spontaneous, normal respiratory effort and rate noted, full ROM in all extremities, no distress noted, resting comfortably. No change from previous assessment. Bed in low, locked position. Pt able to change position independently. Will continue to monitor.

## 2019-09-28 NOTE — ED TRIAGE NOTES
PT presents with C/O injury to right 3rd and 4th toes. Pt states she hit her toes on a stack of wood

## 2019-11-12 PROBLEM — N61.1 BREAST ABSCESS: Status: ACTIVE | Noted: 2019-11-12

## 2020-01-28 ENCOUNTER — TELEPHONE (OUTPATIENT)
Dept: ADMINISTRATIVE | Facility: HOSPITAL | Age: 40
End: 2020-01-28

## 2020-02-04 ENCOUNTER — TELEPHONE (OUTPATIENT)
Dept: ADMINISTRATIVE | Facility: HOSPITAL | Age: 40
End: 2020-02-04

## 2020-09-01 ENCOUNTER — HOSPITAL ENCOUNTER (EMERGENCY)
Facility: HOSPITAL | Age: 40
Discharge: HOME | End: 2020-09-01
Attending: SURGERY
Payer: OTHER GOVERNMENT

## 2020-09-01 VITALS
HEART RATE: 118 BPM | DIASTOLIC BLOOD PRESSURE: 125 MMHG | BODY MASS INDEX: 19.8 KG/M2 | TEMPERATURE: 98 F | SYSTOLIC BLOOD PRESSURE: 194 MMHG | RESPIRATION RATE: 20 BRPM | WEIGHT: 108.25 LBS | OXYGEN SATURATION: 99 %

## 2020-09-01 DIAGNOSIS — K08.89 PAIN, DENTAL: Primary | ICD-10-CM

## 2020-09-01 PROCEDURE — 63600175 PHARM REV CODE 636 W HCPCS: Performed by: SURGERY

## 2020-09-01 PROCEDURE — 96372 THER/PROPH/DIAG INJ SC/IM: CPT

## 2020-09-01 PROCEDURE — 99284 EMERGENCY DEPT VISIT MOD MDM: CPT | Mod: 25

## 2020-09-01 PROCEDURE — 25000003 PHARM REV CODE 250: Performed by: SURGERY

## 2020-09-01 RX ORDER — IBUPROFEN 800 MG/1
800 TABLET ORAL EVERY 6 HOURS PRN
Qty: 20 TABLET | Refills: 0 | Status: SHIPPED | OUTPATIENT
Start: 2020-09-01 | End: 2021-11-04

## 2020-09-01 RX ORDER — CLINDAMYCIN PHOSPHATE 150 MG/ML
600 INJECTION, SOLUTION INTRAVENOUS
Status: COMPLETED | OUTPATIENT
Start: 2020-09-01 | End: 2020-09-01

## 2020-09-01 RX ORDER — ONDANSETRON 2 MG/ML
4 INJECTION INTRAMUSCULAR; INTRAVENOUS
Status: COMPLETED | OUTPATIENT
Start: 2020-09-01 | End: 2020-09-01

## 2020-09-01 RX ORDER — AMOXICILLIN 875 MG/1
875 TABLET, FILM COATED ORAL 2 TIMES DAILY
Qty: 14 TABLET | Refills: 0 | Status: SHIPPED | OUTPATIENT
Start: 2020-09-01 | End: 2020-09-08

## 2020-09-01 RX ORDER — MORPHINE SULFATE 2 MG/ML
2 INJECTION, SOLUTION INTRAMUSCULAR; INTRAVENOUS
Status: COMPLETED | OUTPATIENT
Start: 2020-09-01 | End: 2020-09-01

## 2020-09-01 RX ADMIN — MORPHINE SULFATE 2 MG: 2 INJECTION, SOLUTION INTRAMUSCULAR; INTRAVENOUS at 07:09

## 2020-09-01 RX ADMIN — CLINDAMYCIN PHOSPHATE 600 MG: 150 INJECTION, SOLUTION INTRAMUSCULAR; INTRAVENOUS at 07:09

## 2020-09-01 RX ADMIN — ONDANSETRON 4 MG: 2 INJECTION INTRAMUSCULAR; INTRAVENOUS at 07:09

## 2020-09-02 NOTE — ED PROVIDER NOTES
[facility]                                                 Chief Complaint  40 y.o. female with Dental Pain      History of Present Illness  Aditi Conway presents to the ER with dental pain  Patient on exam has upper and lower molar cavities on evaluation  Patient states 7/10 dental pain with any chewing meals this past wk  Review of systems is negative for fever; no facial swelling noted now  Patient has not had the opportunity to see any dentist for this issue    The history is provided by the patient   device was not used during this ER visit  Medical history: Depression, heart murmur and seizures  Surgeries: D&C of the uterus  No Known Allergies     I have reviewed all of this patient's past medical, surgical, family, and social   histories as well as active allergies and medications documented in the  electronic medical record    Review of Systems and Physical Exam      Review of Systems  -- Constitution - no fever, denies fatigue, no weakness, no chills  -- Eyes - no tearing or redness, no visual disturbance  -- Ear, Nose - no tinnitus or earache, no nasal congestion or discharge  -- Mouth,Throat - toothache, normal voice, normal swallowing  -- Respiratory - denies cough and congestion, no shortness of breath, no JENKINS  -- Cardiovascular - denies chest pain, no palpitations, denies claudication  -- Gastrointestinal - denies abdominal pain, nausea, vomiting, or diarrhea  -- Musculoskeletal - denies back pain, negative for trauma or injury  -- Neurological - no headache, denies weakness or seizure; no LOC  -- Skin - denies pallor, rash, or changes in skin. no hives or welts noted    Vital Signs  Her temporal temperature is 97.6 °F (36.4 °C).   Her blood pressure is 194/125 and her pulse is 118   Her respiration is 18 and oxygen saturation is 99%.     Physical Exam  -- Nursing note and vitals reviewed  -- Constitutional: Appears well-developed and  well-nourished  -- Head: Atraumatic. Normocephalic. No obvious abnormality  -- Eyes: Pupils are equal and reactive to light. Normal conjunctiva and lids  -- Nose: Nose normal in appearance, nares grossly normal. No discharge  -- Throat: several upper and lower bilateral molar cavities with no facial swelling     -- Ears: External ears and TM normal bilaterally. Normal hearing and no drainage  -- Neck: Normal range of motion. Neck supple. No masses, trachea midline  -- Cardiac: Normal rate, regular rhythm and normal heart sounds  -- Pulmonary: Normal respiratory effort, breath sounds clear to auscultation  -- Abdominal: Soft, no tenderness. Normal bowel sounds. Normal liver edge  -- Musculoskeletal: Normal range of motion, no effusions. Joints stable   -- Neurological: No focal deficits. Showed good interaction with staff    Emergency Room Course      Medications Given  morphine injection 2 mg (has no administration in time range)   ondansetron injection 4 mg (has no administration in time range)   clindamycin injection 600 mg (has no administration in time range)     Diagnosis  [K08.89] Pain, dental (Primary)    Disposition and Plan  -- Disposition: to dentist ASAP  -- Condition: stable  -- Pt given instructions; take all medications prescribed in the ER as directed.   -- Patient agrees to comply with all instructions- needs appropriate dental care  -- Pt agrees to return to ER if any symptoms reoccur; symptom-free on discharge  -- Patient to follow up with a dentist in 1-2 days. Has been given follow up information  -- The patient acknowledges that dental follow up is required for this issue     This note is dictated on M*Modal word recognition program.  There are word recognition mistakes that are occasionally missed on review.         Brady Johnson MD  09/01/20 1942

## 2020-09-18 ENCOUNTER — HOSPITAL ENCOUNTER (EMERGENCY)
Facility: HOSPITAL | Age: 40
Discharge: HOME | End: 2020-09-18
Attending: SURGERY
Payer: OTHER GOVERNMENT

## 2020-09-18 VITALS
OXYGEN SATURATION: 98 % | SYSTOLIC BLOOD PRESSURE: 175 MMHG | HEART RATE: 97 BPM | TEMPERATURE: 98 F | DIASTOLIC BLOOD PRESSURE: 93 MMHG | RESPIRATION RATE: 18 BRPM

## 2020-09-18 DIAGNOSIS — L25.9 CONTACT DERMATITIS, UNSPECIFIED CONTACT DERMATITIS TYPE, UNSPECIFIED TRIGGER: Primary | ICD-10-CM

## 2020-09-18 PROCEDURE — 96372 THER/PROPH/DIAG INJ SC/IM: CPT

## 2020-09-18 PROCEDURE — 99284 EMERGENCY DEPT VISIT MOD MDM: CPT | Mod: 25

## 2020-09-18 PROCEDURE — 63600175 PHARM REV CODE 636 W HCPCS: Performed by: SURGERY

## 2020-09-18 RX ORDER — METHYLPREDNISOLONE 4 MG/1
TABLET ORAL
Qty: 1 PACKAGE | Refills: 0 | Status: SHIPPED | OUTPATIENT
Start: 2020-09-18 | End: 2021-11-04

## 2020-09-18 RX ORDER — DIPHENHYDRAMINE HCL 50 MG
50 CAPSULE ORAL EVERY 6 HOURS PRN
Qty: 20 CAPSULE | Refills: 0 | Status: SHIPPED | OUTPATIENT
Start: 2020-09-18 | End: 2021-11-09

## 2020-09-18 RX ORDER — DIPHENHYDRAMINE HYDROCHLORIDE 50 MG/ML
50 INJECTION INTRAMUSCULAR; INTRAVENOUS
Status: COMPLETED | OUTPATIENT
Start: 2020-09-18 | End: 2020-09-18

## 2020-09-18 RX ORDER — TRIAMCINOLONE ACETONIDE 1 MG/G
CREAM TOPICAL 3 TIMES DAILY
Qty: 1 TUBE | Refills: 0 | Status: ON HOLD | OUTPATIENT
Start: 2020-09-18 | End: 2021-07-12 | Stop reason: HOSPADM

## 2020-09-18 RX ORDER — FAMOTIDINE 20 MG/1
40 TABLET, FILM COATED ORAL 2 TIMES DAILY
Qty: 40 TABLET | Refills: 0 | Status: SHIPPED | OUTPATIENT
Start: 2020-09-18 | End: 2021-11-04

## 2020-09-18 RX ORDER — METHYLPREDNISOLONE SOD SUCC 125 MG
125 VIAL (EA) INJECTION
Status: COMPLETED | OUTPATIENT
Start: 2020-09-18 | End: 2020-09-18

## 2020-09-18 RX ADMIN — METHYLPREDNISOLONE SODIUM SUCCINATE 125 MG: 125 INJECTION, POWDER, FOR SOLUTION INTRAMUSCULAR; INTRAVENOUS at 01:09

## 2020-09-18 RX ADMIN — DIPHENHYDRAMINE HYDROCHLORIDE 50 MG: 50 INJECTION INTRAMUSCULAR; INTRAVENOUS at 01:09

## 2020-09-18 NOTE — ED PROVIDER NOTES
[facility]                                                 Chief Complaint  40 y.o. female with Poison Ivy    History of Present Illness  Aditi Conway presents to the emergency room with pruritus  Patient with poison ivy with contact dermatitis on ER evaluation noted today  Patient has contact dermatitis on the face trunk and extremities on evaluation  No angioedema, no signs of gross anaphylaxis on evaluation this afternoon  Patient has no hives or welts, only scratch marks with nonspecific rash now    The history is provided by the patient   device was not used during this ER visit  Medical history: Depression, heart murmur and seizures  Surgeries: D&C of the uterus  No Known Allergies     I have reviewed all of this patient's past medical, surgical, family, and social   histories as well as active allergies and medications documented in the  electronic medical record    Review of Systems and Physical Exam      Review of Systems  -- Constitution - no fever, denies fatigue, no weakness, no chills  -- Eyes - no tearing or redness, no visual disturbance  -- Ear, Nose - no tinnitus or earache, no nasal congestion or discharge  -- Mouth,Throat - no sore throat, no toothache, normal voice, normal swallowing  -- Respiratory - denies cough and congestion, no shortness of breath, no JENKINS  -- Cardiovascular - denies chest pain, no palpitations, denies claudication  -- Gastrointestinal - denies abdominal pain, nausea, vomiting, or diarrhea  -- Genitourinary - no dysuria, denies flank pain, no hematuria, no STD risk  -- Musculoskeletal - denies back pain, negative for trauma or injury  -- Neurological - no headache, denies weakness or seizure; no LOC  -- Skin - contact dermatitis after doing yard work 3 days ago    Physical Exam  -- Nursing note and vitals reviewed  -- Constitutional: Appears well-developed and well-nourished  -- Head: Atraumatic. Normocephalic. No obvious  abnormality  -- Eyes: Pupils are equal and reactive to light. Normal conjunctiva and lids  -- Nose: Nose normal in appearance, nares grossly normal. No discharge  -- Throat: Mucous membranes moist, pharynx normal, normal tonsils. No lesions   -- Ears: External ears and TM normal bilaterally. Normal hearing and no drainage  -- Neck: Normal range of motion. Neck supple. No masses, trachea midline  -- Cardiac: Normal rate, regular rhythm and normal heart sounds  -- Pulmonary: Normal respiratory effort, breath sounds clear to auscultation  -- Abdominal: Soft, no tenderness. Normal bowel sounds. Normal liver edge  -- Musculoskeletal: Normal range of motion, no effusions. Joints stable   -- Neurological: No focal deficits. Showed good interaction with staff  -- Vascular: Posterior tibial, dorsalis pedis and radial pulses 2+ bilaterally    -- Lymphatics: No cervical or peripheral lymphadenopathy. No edema noted  -- Skin: nonspecific rash with scratch marks on the trunk extremities    Emergency Room Course      Treatment and Evaluation  --  mg Solumedrol given today in the ER  -- IM 50 mg Benadryl given today in the ER    ED Physician Management  -- Diagnosis management comments: 40 y.o. female with contact dermatitis  -- patient was doing yard work with residual itching and rash after yard work  -- patient has no signs anaphylaxis, no shortness of breath, no airway issues  -- patient on exam has nonspecific rash, mild periorbital edema noted as well  -- patient has no previous history allergic reaction or rashes per interview  -- patient feels much better after Benadryl and steroids administered in ER  -- patient will be prescribed Benadryl and steroids, Pepcid and cream on DC  -- follow-up with PCP in 48 hours return to ER with any concerning symptoms     Diagnosis  [L25.9] Contact dermatitis    Disposition and Plan  -- Disposition: home  -- Condition: stable  -- Follow-up: Patient to follow up with Primary Doctor  in 1-2 days.  -- I advised the patient that we have found no life threatening condition today  -- At this time, I believe the patient is clinically stable for discharge.   -- The patient acknowledges that close follow up with a MD is required   -- Patient agrees to comply with all instruction and direction given in the ER    This note is dictated on M*Modal word recognition program.  There are word recognition mistakes that are occasionally missed on review.         Brady Johnson MD  09/18/20 9350

## 2020-11-17 ENCOUNTER — HOSPITAL ENCOUNTER (EMERGENCY)
Facility: HOSPITAL | Age: 40
Discharge: HOME | End: 2020-11-17
Attending: SURGERY
Payer: OTHER GOVERNMENT

## 2020-11-17 VITALS
TEMPERATURE: 99 F | SYSTOLIC BLOOD PRESSURE: 169 MMHG | OXYGEN SATURATION: 98 % | RESPIRATION RATE: 20 BRPM | HEART RATE: 107 BPM | DIASTOLIC BLOOD PRESSURE: 92 MMHG

## 2020-11-17 DIAGNOSIS — R45.89 ANXIETY ABOUT HEALTH: ICD-10-CM

## 2020-11-17 DIAGNOSIS — Z91.89 AT INCREASED RISK OF EXPOSURE TO COVID-19 VIRUS: Primary | ICD-10-CM

## 2020-11-17 LAB — SARS-COV-2 RDRP RESP QL NAA+PROBE: NEGATIVE

## 2020-11-17 PROCEDURE — U0002 COVID-19 LAB TEST NON-CDC: HCPCS

## 2020-11-17 PROCEDURE — 99282 EMERGENCY DEPT VISIT SF MDM: CPT

## 2020-11-17 NOTE — ED PROVIDER NOTES
Encounter Date: 11/17/2020       History     Chief Complaint   Patient presents with    COVID-19 Concerns     40-year-old female presents with COVID concerns.  Patient reports that 3 days ago she had a few episodes of vomiting, and now her  is vomiting.  Patient without vomiting in the last 2 days, reports that symptoms have completely resolved.  She has no complaints today and is asymptomatic.  Reports possible COVID exposure at work.  Requesting testing.    The history is provided by the patient.     Review of patient's allergies indicates:  No Known Allergies  Past Medical History:   Diagnosis Date    Depression     Heart murmur     Seizures      Past Surgical History:   Procedure Laterality Date    DILATION AND CURETTAGE OF UTERUS       Family History   Problem Relation Age of Onset    Hypertension Mother     Heart disease Father         pacemaker    Diabetes Father     Hypertension Father     Hyperlipidemia Father     Breast cancer Neg Hx     Ovarian cancer Neg Hx     Colon cancer Neg Hx      Social History     Tobacco Use    Smoking status: Current Every Day Smoker     Packs/day: 2.00     Years: 20.00     Pack years: 40.00     Types: Cigarettes    Smokeless tobacco: Never Used    Tobacco comment: Informed about classes in smoking cessation. States will call.   Substance Use Topics    Alcohol use: Yes     Comment: Socially    Drug use: No     Review of Systems   Constitutional: Negative for fever.   HENT: Negative for congestion, ear pain, rhinorrhea and sore throat.    Respiratory: Negative for cough and shortness of breath.    Cardiovascular: Negative for chest pain.   Gastrointestinal: Negative for abdominal pain, diarrhea, nausea and vomiting (Earlier this week, completely resolved).   Genitourinary: Negative for difficulty urinating and dysuria.   Musculoskeletal: Negative for arthralgias, back pain, myalgias and neck pain.   Skin: Negative for rash and wound.   Neurological:  Negative for weakness and headaches.   Psychiatric/Behavioral: Negative.        Physical Exam     Initial Vitals   BP Pulse Resp Temp SpO2   11/17/20 1408 11/17/20 1408 11/17/20 1405 11/17/20 1408 11/17/20 1408   (!) 169/92 107 16 98.9 °F (37.2 °C) 98 %      MAP       --                Physical Exam    Nursing note and vitals reviewed.  Constitutional: No distress.   HENT:   Head: Normocephalic and atraumatic.   Nose: Nose normal.   Mouth/Throat: Oropharynx is clear and moist and mucous membranes are normal.   Eyes: Conjunctivae, EOM and lids are normal. Right pupil is round. Left pupil is round. Pupils are equal.   Neck: Neck supple.   Cardiovascular: Normal rate, regular rhythm and normal heart sounds.   Pulmonary/Chest: Effort normal and breath sounds normal. No respiratory distress.   Abdominal: Normal appearance.   Musculoskeletal: Normal range of motion.   Neurological: She is alert and oriented to person, place, and time. She has normal strength. GCS eye subscore is 4. GCS verbal subscore is 5. GCS motor subscore is 6.   Skin: Skin is warm and dry. Capillary refill takes less than 2 seconds. No rash noted.   Psychiatric: Her speech is normal and behavior is normal. Thought content normal. Her mood appears anxious.         ED Course   Procedures  Labs Reviewed   SARS-COV-2 RNA AMPLIFICATION, QUAL                                            Clinical Impression:       ICD-10-CM ICD-9-CM   1. At increased risk of exposure to COVID-19 virus  Z91.89 V15.89   2. Anxiety about health  F41.8 300.09                      Disposition:   Disposition: Discharged  Condition: Stable    The patient acknowledges that close follow up with medical provider is required. Instructed to follow up with PCP within 2 days. Patient was given specific return precautions. The patient agrees to comply with all instruction and directions given in the ER.      ED Disposition Condition    Discharge Stable        ED Prescriptions     None         Follow-up Information     Follow up With Specialties Details Why Contact Info    Debi Linn, NP Internal Medicine Schedule an appointment as soon as possible for a visit in 2 days As needed [address] [address]  [phone]                                         Carine Aguirre NP  11/17/20 [id number]

## 2020-11-17 NOTE — DISCHARGE INSTRUCTIONS
**Follow up with PCP as needed. Return to ER with worsening of symptoms.     **Over the counter tylenol or motrin as needed for pain and/or fever as directed on package insert. Drink plenty fluids. Get plenty rest. Wash hands frequently.     **Our goal in the emergency department is to always give you outstanding care and exceptional service. You may receive a survey by mail or e-mail in the next week regarding your experience in our ED. We would greatly appreciate your completing and returning the survey. Your feedback provides us with a way to recognize our staff who give very good care and it helps us learn how to improve when your experience was below our aspiration of excellence.

## 2021-04-28 ENCOUNTER — HOSPITAL ENCOUNTER (EMERGENCY)
Facility: HOSPITAL | Age: 41
Discharge: HOME | End: 2021-04-28
Attending: SURGERY

## 2021-04-28 VITALS
HEART RATE: 72 BPM | OXYGEN SATURATION: 100 % | TEMPERATURE: 98 F | RESPIRATION RATE: 16 BRPM | DIASTOLIC BLOOD PRESSURE: 97 MMHG | BODY MASS INDEX: 20.32 KG/M2 | WEIGHT: 111.13 LBS | SYSTOLIC BLOOD PRESSURE: 143 MMHG

## 2021-04-28 DIAGNOSIS — R51.9 FRONTAL HEADACHE: ICD-10-CM

## 2021-04-28 DIAGNOSIS — J02.9 VIRAL PHARYNGITIS: Primary | ICD-10-CM

## 2021-04-28 PROCEDURE — 99283 EMERGENCY DEPT VISIT LOW MDM: CPT

## 2021-04-28 PROCEDURE — 25000003 PHARM REV CODE 250: Performed by: EMERGENCY MEDICINE

## 2021-04-28 RX ORDER — IBUPROFEN 600 MG/1
600 TABLET ORAL
Status: DISCONTINUED | OUTPATIENT
Start: 2021-04-28 | End: 2021-04-28 | Stop reason: HOSPADM

## 2021-04-28 RX ADMIN — LIDOCAINE HYDROCHLORIDE 50 ML: 20 SOLUTION ORAL; TOPICAL at 06:04

## 2021-08-24 ENCOUNTER — TELEPHONE (OUTPATIENT)
Dept: ADMINISTRATIVE | Facility: HOSPITAL | Age: 41
End: 2021-08-24

## 2021-10-19 ENCOUNTER — TELEPHONE (OUTPATIENT)
Dept: ADMINISTRATIVE | Facility: HOSPITAL | Age: 41
End: 2021-10-19

## 2021-11-03 ENCOUNTER — HOSPITAL ENCOUNTER (OUTPATIENT)
Dept: RADIOLOGY | Facility: HOSPITAL | Age: 41
Discharge: HOME | End: 2021-11-03
Attending: STUDENT IN AN ORGANIZED HEALTH CARE EDUCATION/TRAINING PROGRAM

## 2021-11-03 VITALS — HEIGHT: 62 IN | BODY MASS INDEX: 20.24 KG/M2 | WEIGHT: 110 LBS

## 2021-11-03 DIAGNOSIS — N61.1 BREAST ABSCESS: ICD-10-CM

## 2021-11-03 DIAGNOSIS — N61.1 ABSCESS OF LEFT BREAST: ICD-10-CM

## 2021-11-03 PROCEDURE — 76642 ULTRASOUND BREAST LIMITED: CPT | Mod: TC,LT

## 2021-11-03 PROCEDURE — 77062 BREAST TOMOSYNTHESIS BI: CPT | Mod: TC

## 2021-12-20 ENCOUNTER — HOSPITAL ENCOUNTER (EMERGENCY)
Facility: HOSPITAL | Age: 41
Discharge: HOME | End: 2021-12-20
Attending: SURGERY

## 2021-12-20 VITALS
TEMPERATURE: 99 F | HEART RATE: 91 BPM | DIASTOLIC BLOOD PRESSURE: 97 MMHG | OXYGEN SATURATION: 100 % | WEIGHT: 105.06 LBS | RESPIRATION RATE: 18 BRPM | SYSTOLIC BLOOD PRESSURE: 141 MMHG | BODY MASS INDEX: 19.21 KG/M2

## 2021-12-20 DIAGNOSIS — K04.7 DENTAL ABSCESS: Primary | ICD-10-CM

## 2021-12-20 PROCEDURE — 99284 EMERGENCY DEPT VISIT MOD MDM: CPT

## 2021-12-20 RX ORDER — KETOROLAC TROMETHAMINE 30 MG/ML
60 INJECTION, SOLUTION INTRAMUSCULAR; INTRAVENOUS
Status: DISCONTINUED | OUTPATIENT
Start: 2021-12-20 | End: 2021-12-21 | Stop reason: HOSPADM

## 2021-12-20 RX ORDER — CLINDAMYCIN PHOSPHATE 150 MG/ML
600 INJECTION, SOLUTION INTRAVENOUS
Status: DISCONTINUED | OUTPATIENT
Start: 2021-12-20 | End: 2021-12-21 | Stop reason: HOSPADM

## 2021-12-20 RX ORDER — CLINDAMYCIN HYDROCHLORIDE 300 MG/1
300 CAPSULE ORAL 4 TIMES DAILY
Qty: 28 CAPSULE | Refills: 0 | Status: SHIPPED | OUTPATIENT
Start: 2021-12-20 | End: 2021-12-27

## 2021-12-20 RX ORDER — IBUPROFEN 800 MG/1
800 TABLET ORAL EVERY 6 HOURS PRN
Qty: 20 TABLET | Refills: 0 | OUTPATIENT
Start: 2021-12-20 | End: 2022-06-07

## 2022-08-18 ENCOUNTER — HOSPITAL ENCOUNTER (EMERGENCY)
Facility: HOSPITAL | Age: 42
Discharge: HOME | End: 2022-08-18
Attending: STUDENT IN AN ORGANIZED HEALTH CARE EDUCATION/TRAINING PROGRAM

## 2022-08-18 VITALS
BODY MASS INDEX: 18.55 KG/M2 | HEART RATE: 97 BPM | RESPIRATION RATE: 18 BRPM | OXYGEN SATURATION: 100 % | SYSTOLIC BLOOD PRESSURE: 173 MMHG | WEIGHT: 101.44 LBS | DIASTOLIC BLOOD PRESSURE: 90 MMHG | TEMPERATURE: 97 F

## 2022-08-18 DIAGNOSIS — Z20.822 CLOSE EXPOSURE TO COVID-19 VIRUS: Primary | ICD-10-CM

## 2022-08-18 LAB
GROUP A STREP, MOLECULAR: NEGATIVE
INFLUENZA A, MOLECULAR: NEGATIVE
INFLUENZA B, MOLECULAR: NEGATIVE
SARS-COV-2 RDRP RESP QL NAA+PROBE: NEGATIVE
SPECIMEN SOURCE: NORMAL

## 2022-08-18 PROCEDURE — 99283 EMERGENCY DEPT VISIT LOW MDM: CPT

## 2022-08-18 PROCEDURE — 87502 INFLUENZA DNA AMP PROBE: CPT | Performed by: STUDENT IN AN ORGANIZED HEALTH CARE EDUCATION/TRAINING PROGRAM

## 2022-08-18 PROCEDURE — U0002 COVID-19 LAB TEST NON-CDC: HCPCS | Performed by: STUDENT IN AN ORGANIZED HEALTH CARE EDUCATION/TRAINING PROGRAM

## 2022-08-18 PROCEDURE — 87651 STREP A DNA AMP PROBE: CPT | Performed by: STUDENT IN AN ORGANIZED HEALTH CARE EDUCATION/TRAINING PROGRAM

## 2022-08-19 NOTE — ED PROVIDER NOTES
Encounter Date: 8/18/2022    This document was partially completed using speech recognition software and may contain misspellings, grammatical errors, and/or unexpected word substitutions.       History     Chief Complaint   Patient presents with    COVID-19 Concerns     Patient to ER CC of sore throat and body aches which started 2 days ago      42 year old COVID19 vaccinated female presents to the ED with 2 days of body aches, sore throat, dry cough, congestion. Coworker and her daughter as well as a few family members are COVID19+. No chest pain or shortness of breath.        Review of patient's allergies indicates:  No Known Allergies  Past Medical History:   Diagnosis Date    Depression     Heart murmur     Seizures      Past Surgical History:   Procedure Laterality Date    DEBRIDEMENT, BREAST Left 11/11/2021    Procedure: DEBRIDEMENT,  BREAST;  Surgeon: Yoan Sparks MD;  Location: [facility];  Service: General;  Laterality: Left;  DEBRIDEMENT, WOUND, LEFT BREAST    DILATION AND CURETTAGE OF UTERUS      INCISION AND DRAINAGE Left 7/12/2021    Procedure: INCISION AND DRAINAGE, HEMATOMA, SEROMA, OR FLUID COLLECTION ;  Surgeon: Yoan Sparks MD;  Location: [facility];  Service: General;  Laterality: Left;     Family History   Problem Relation Age of Onset    Hypertension Mother     Heart disease Father         pacemaker    Diabetes Father     Hypertension Father     Hyperlipidemia Father     Breast cancer Neg Hx     Ovarian cancer Neg Hx     Colon cancer Neg Hx      Social History     Tobacco Use    Smoking status: Current Every Day Smoker     Packs/day: 1.00     Years: 20.00     Pack years: 20.00     Types: Cigarettes    Smokeless tobacco: Never Used    Tobacco comment: Informed about classes in smoking cessation. States will call.   Substance Use Topics    Alcohol use: Yes     Comment: Socially    Drug use: No     Review of Systems   Constitutional: Negative for chills and fever.   HENT:  Positive for sore throat. Negative for congestion, rhinorrhea and sneezing.    Eyes: Negative for discharge and redness.   Respiratory: Positive for cough. Negative for shortness of breath.    Cardiovascular: Negative for chest pain and palpitations.   Gastrointestinal: Negative for abdominal pain, diarrhea, nausea and vomiting.   Genitourinary: Negative for dysuria, frequency, vaginal bleeding and vaginal discharge.   Musculoskeletal: Positive for myalgias. Negative for back pain and neck pain.   Skin: Negative for rash and wound.   Neurological: Negative for weakness, numbness and headaches.       Physical Exam     Initial Vitals [08/18/22 1940]   BP Pulse Resp Temp SpO2   (!) 173/90 97 18 97.3 °F (36.3 °C) 100 %      MAP       --         Physical Exam    Nursing note and vitals reviewed.  Constitutional: She appears well-developed. She is not diaphoretic. No distress.   HENT:   Head: Normocephalic and atraumatic.   Right Ear: External ear normal.   Left Ear: External ear normal.   Eyes: Right eye exhibits no discharge. Left eye exhibits no discharge. No scleral icterus.   Neck: Neck supple.   Cardiovascular: Normal rate and regular rhythm.   Pulmonary/Chest: Breath sounds normal. No stridor. No respiratory distress. She has no wheezes. She has no rhonchi. She has no rales.   Abdominal: Abdomen is soft. There is no abdominal tenderness. There is no guarding.   Musculoskeletal:         General: No edema.      Cervical back: Neck supple.     Neurological: She is alert and oriented to person, place, and time.   Skin: Skin is warm and dry. Capillary refill takes less than 2 seconds.   Psychiatric: She has a normal mood and affect.         ED Course   Procedures  Labs Reviewed   INFLUENZA A & B BY MOLECULAR   GROUP A STREP, MOLECULAR   SARS-COV-2 RNA AMPLIFICATION, QUAL          Imaging Results    None          Medications - No data to display  Medical Decision Making:   Differential Diagnosis:   DDx: COVID19, flu,  strep pharyngitis, viral pharyngitis, viral URI, PNA  ED Management:  Based on the patient's evaluation - patient appears well for discharge home. Swabs negative. However, may end up testing positive in a few days given close exposure to known COVID19+ coworkers and family members. Recommend quarantine per CDC guidelines and retesting. Patient is in agreement with the plan.                      Clinical Impression:   Final diagnoses:  [Z20.822] Close exposure to COVID-19 virus (Primary)          ED Disposition Condition    Discharge Stable        ED Prescriptions     None        Follow-up Information     Follow up With Specialties Details Why Contact Info    Your primary care provider  Schedule an appointment as soon as possible for a visit in 1 week As needed            Onur Martinez DO  08/18/22 8299

## 2022-08-19 NOTE — DISCHARGE INSTRUCTIONS
Place Your Order for Free At-Home COVID-19 Tests  Residential households in the [place] are now eligible for another order of free at-home tests on [employer]. Here's what you need to know about your order:    Each order now includes #8 rapid antigen COVID-19 tests.  Your order of #8 tests will come in #2 separate packages (#4 tests in each package), each with its own tracking number.  Packages will ship free.    [email or web address]  If you have a COVID Test PENDING:  Please access [facility] to review the results of your test. Until the results of your COVID test return, you should isolate yourself so as not to potentially spread illness to others.   If your COVID test returns positive, you should isolate yourself so as not to spread illness to others. After five full days, if you are feeling better and you have not had fever for 24 hours, you can return to your typical daily activities, but you must wear a mask around others for an additional 5 days.   If your COVID test returns negative and you are either unvaccinated or more than six months out from your two-dose vaccine and are not yet boosted, you should still quarantine for 5 full days followed by strict mask use for an additional 5 full days.   If your COVID test returns negative and you have received your 2-dose initial vaccine as well as a booster, you should continue strict mask use for 10 full days after the exposure.  For all those exposed, best practice includes a test at day 5 after the exposure. This can be a home test or a test through one of the many testing centers throughout our community.   Masking is always advised to limit the spread of COVID. Cdc.gov is an excellent site to obtain the latest up to date recommendations regarding COVID and COVID testing.     After your evaluation today, we ruled out any emergent condition. However, if you develop shortness of breath, chest pain, or ANY OTHER CONCERNS please return to the emergency  department for further care.      CDC Testing and Quarantine Guidelines for patients with exposure to a known-positive COVID-19 person:  A close exposure is defined as anyone who has had an exposure (masked or unmasked) to a known COVID -19 positive person within 6 feet of someone for a cumulative total of 15 minutes or more over a 24-hour period.   Vaccinated and/or if you recently had a positive covid test within 90 days do NOT need to quarantine after contact with someone who had COVID-19 unless you develop symptoms.   Fully vaccinated people who have not had a positive test within 90 days, should get tested 3-5 days after their exposure, even if they don't have symptoms and wear a mask indoors in public for 14 days following exposure or until their test result is negative.      Unvaccinated and/or NOT had a positive test within 90 days and meet close exposure  You are required by CDC guidelines to quarantine for at least 5 days from time of exposure followed by 5 days of strict masking. It is recommended, but not required to test after 5 days, unless you develop symptoms, in which case you should test at that time.  If you get tested after 5 days and your test is positive, your 5 day period of isolation starts the day of the positive test.    If your exposure does not meet the above definition, you can return to your normal daily activities to include social distancing, wearing a mask and frequent handwashing.      Here is a link to guidance from the CDC:  https://www.cdc.gov/media/releases/2021/s1227-isolation-quarantine-guidance.html      [facility] Testing Sites:  https://ldh.la.gov/page/3934      [facility] website with testing locations and guidance:  [email or web address]

## 2023-06-07 ENCOUNTER — TELEPHONE (OUTPATIENT)
Dept: FAMILY MEDICINE | Facility: CLINIC | Age: 43
End: 2023-06-07
Payer: COMMERCIAL

## 2023-06-07 PROBLEM — N61.1 BREAST ABSCESS: Status: RESOLVED | Noted: 2019-11-12 | Resolved: 2023-06-07

## 2023-06-07 PROBLEM — L03.221 CELLULITIS OF NECK: Status: ACTIVE | Noted: 2023-06-07

## 2023-06-07 NOTE — TELEPHONE ENCOUNTER
Patient's  contacted clinic to try to schedule wife to follow up for a neck infection. Patient advised that there's no current openings at this time for a new patient appt. Patient advised to contact [facility] in [place]. Informed patient that I will take down their number and contact them if an available spot opens. Patient advised to call clinic for any further assistance, patient verbalized understaning.

## 2023-06-08 ENCOUNTER — ANESTHESIA (OUTPATIENT)
Dept: SURGERY | Facility: HOSPITAL | Age: 43
DRG: 501 | End: 2023-06-08
Payer: MEDICAID

## 2023-06-08 ENCOUNTER — ANESTHESIA EVENT (OUTPATIENT)
Dept: SURGERY | Facility: HOSPITAL | Age: 43
DRG: 501 | End: 2023-06-08
Payer: MEDICAID

## 2023-06-08 ENCOUNTER — HOSPITAL ENCOUNTER (INPATIENT)
Facility: HOSPITAL | Age: 43
LOS: 13 days | Discharge: LEFT BEFORE DISCHARGE | DRG: 501 | End: 2023-06-21
Attending: EMERGENCY MEDICINE | Admitting: OTOLARYNGOLOGY
Payer: MEDICAID

## 2023-06-08 DIAGNOSIS — R94.31 QT PROLONGATION: ICD-10-CM

## 2023-06-08 DIAGNOSIS — E87.8 ELECTROLYTE ABNORMALITY: ICD-10-CM

## 2023-06-08 DIAGNOSIS — L03.221 CELLULITIS OF NECK: ICD-10-CM

## 2023-06-08 DIAGNOSIS — E43 SEVERE PROTEIN-CALORIE MALNUTRITION: ICD-10-CM

## 2023-06-08 DIAGNOSIS — E44.1 MALNUTRITION OF MILD DEGREE: ICD-10-CM

## 2023-06-08 DIAGNOSIS — Z91.89 AT HIGH RISK FOR CARDIAC ARRHYTHMIA: ICD-10-CM

## 2023-06-08 DIAGNOSIS — L08.9 NECK INFECTION: ICD-10-CM

## 2023-06-08 DIAGNOSIS — M60.9 MYOSITIS, UNSPECIFIED MYOSITIS TYPE, UNSPECIFIED SITE: Primary | ICD-10-CM

## 2023-06-08 DIAGNOSIS — Z99.11 ENCOUNTER FOR WEANING FROM VENTILATOR: ICD-10-CM

## 2023-06-08 DIAGNOSIS — M72.6 NECROTIZING FASCIITIS: ICD-10-CM

## 2023-06-08 LAB
BASOPHILS # BLD AUTO: 0.04 K/UL (ref 0–0.2)
BASOPHILS NFR BLD: 0.2 % (ref 0–1.9)
DIFFERENTIAL METHOD: ABNORMAL
EOSINOPHIL # BLD AUTO: 0 K/UL (ref 0–0.5)
EOSINOPHIL NFR BLD: 0.2 % (ref 0–8)
ERYTHROCYTE [DISTWIDTH] IN BLOOD BY AUTOMATED COUNT: 12.4 % (ref 11.5–14.5)
HCT VFR BLD AUTO: 31.7 % (ref 37–48.5)
HGB BLD-MCNC: 10.9 G/DL (ref 12–16)
IMM GRANULOCYTES # BLD AUTO: 0.12 K/UL (ref 0–0.04)
IMM GRANULOCYTES NFR BLD AUTO: 0.7 % (ref 0–0.5)
LYMPHOCYTES # BLD AUTO: 0.5 K/UL (ref 1–4.8)
LYMPHOCYTES NFR BLD: 2.9 % (ref 18–48)
MCH RBC QN AUTO: 30.9 PG (ref 27–31)
MCHC RBC AUTO-ENTMCNC: 34.4 G/DL (ref 32–36)
MCV RBC AUTO: 90 FL (ref 82–98)
MONOCYTES # BLD AUTO: 0.8 K/UL (ref 0.3–1)
MONOCYTES NFR BLD: 4.2 % (ref 4–15)
NEUTROPHILS # BLD AUTO: 16.5 K/UL (ref 1.8–7.7)
NEUTROPHILS NFR BLD: 91.8 % (ref 38–73)
NRBC BLD-RTO: 0 /100 WBC
PLATELET # BLD AUTO: 210 K/UL (ref 150–450)
PMV BLD AUTO: 10.4 FL (ref 9.2–12.9)
POCT GLUCOSE: 141 MG/DL (ref 70–110)
RBC # BLD AUTO: 3.53 M/UL (ref 4–5.4)
WBC # BLD AUTO: 18 K/UL (ref 3.9–12.7)

## 2023-06-08 PROCEDURE — 99291 CRITICAL CARE FIRST HOUR: CPT | Mod: ,,, | Performed by: EMERGENCY MEDICINE

## 2023-06-08 PROCEDURE — 63600175 PHARM REV CODE 636 W HCPCS: Performed by: EMERGENCY MEDICINE

## 2023-06-08 PROCEDURE — 99285 EMERGENCY DEPT VISIT HI MDM: CPT | Mod: 25

## 2023-06-08 PROCEDURE — 87070 CULTURE OTHR SPECIMN AEROBIC: CPT | Performed by: OTOLARYNGOLOGY

## 2023-06-08 PROCEDURE — 37000008 HC ANESTHESIA 1ST 15 MINUTES: Performed by: OTOLARYNGOLOGY

## 2023-06-08 PROCEDURE — 80053 COMPREHEN METABOLIC PANEL: CPT

## 2023-06-08 PROCEDURE — 27201423 OPTIME MED/SURG SUP & DEVICES STERILE SUPPLY: Performed by: OTOLARYNGOLOGY

## 2023-06-08 PROCEDURE — 99223 1ST HOSP IP/OBS HIGH 75: CPT | Mod: 57,25,, | Performed by: OTOLARYNGOLOGY

## 2023-06-08 PROCEDURE — 96374 THER/PROPH/DIAG INJ IV PUSH: CPT

## 2023-06-08 PROCEDURE — D9220A PRA ANESTHESIA: Mod: ANES,,, | Performed by: ANESTHESIOLOGY

## 2023-06-08 PROCEDURE — 84100 ASSAY OF PHOSPHORUS: CPT

## 2023-06-08 PROCEDURE — 63600175 PHARM REV CODE 636 W HCPCS

## 2023-06-08 PROCEDURE — 11043 DBRDMT MUSC&/FSCA 1ST 20/<: CPT | Mod: ,,, | Performed by: OTOLARYNGOLOGY

## 2023-06-08 PROCEDURE — D9220A PRA ANESTHESIA: ICD-10-PCS | Mod: ANES,,, | Performed by: ANESTHESIOLOGY

## 2023-06-08 PROCEDURE — 25000003 PHARM REV CODE 250

## 2023-06-08 PROCEDURE — 63600175 PHARM REV CODE 636 W HCPCS: Performed by: NURSE ANESTHETIST, CERTIFIED REGISTERED

## 2023-06-08 PROCEDURE — D9220A PRA ANESTHESIA: ICD-10-PCS | Mod: CRNA,,, | Performed by: NURSE ANESTHETIST, CERTIFIED REGISTERED

## 2023-06-08 PROCEDURE — 87186 SC STD MICRODIL/AGAR DIL: CPT | Performed by: OTOLARYNGOLOGY

## 2023-06-08 PROCEDURE — 99900026 HC AIRWAY MAINTENANCE (STAT)

## 2023-06-08 PROCEDURE — 25000003 PHARM REV CODE 250: Performed by: NURSE ANESTHETIST, CERTIFIED REGISTERED

## 2023-06-08 PROCEDURE — 85730 THROMBOPLASTIN TIME PARTIAL: CPT

## 2023-06-08 PROCEDURE — 11043 PR DEBRIDEMENT, SKIN, SUB-Q TISSUE,MUSCLE,=<20 SQ CM: ICD-10-PCS | Mod: ,,, | Performed by: OTOLARYNGOLOGY

## 2023-06-08 PROCEDURE — 99900035 HC TECH TIME PER 15 MIN (STAT)

## 2023-06-08 PROCEDURE — 20000000 HC ICU ROOM

## 2023-06-08 PROCEDURE — 36600 WITHDRAWAL OF ARTERIAL BLOOD: CPT

## 2023-06-08 PROCEDURE — 87116 MYCOBACTERIA CULTURE: CPT | Performed by: OTOLARYNGOLOGY

## 2023-06-08 PROCEDURE — 87077 CULTURE AEROBIC IDENTIFY: CPT | Performed by: OTOLARYNGOLOGY

## 2023-06-08 PROCEDURE — 94002 VENT MGMT INPAT INIT DAY: CPT

## 2023-06-08 PROCEDURE — 36000706: Performed by: OTOLARYNGOLOGY

## 2023-06-08 PROCEDURE — 99223 PR INITIAL HOSPITAL CARE,LEVL III: ICD-10-PCS | Mod: 57,25,, | Performed by: OTOLARYNGOLOGY

## 2023-06-08 PROCEDURE — D9220A PRA ANESTHESIA: Mod: CRNA,,, | Performed by: NURSE ANESTHETIST, CERTIFIED REGISTERED

## 2023-06-08 PROCEDURE — 27000221 HC OXYGEN, UP TO 24 HOURS

## 2023-06-08 PROCEDURE — 11046 PR DEB MUSC/FASCIA ADD-ON: ICD-10-PCS | Mod: ,,, | Performed by: OTOLARYNGOLOGY

## 2023-06-08 PROCEDURE — 87205 SMEAR GRAM STAIN: CPT | Performed by: OTOLARYNGOLOGY

## 2023-06-08 PROCEDURE — 85025 COMPLETE CBC W/AUTO DIFF WBC: CPT | Mod: 91

## 2023-06-08 PROCEDURE — 99291 PR CRITICAL CARE, E/M 30-74 MINUTES: ICD-10-PCS | Mod: ,,, | Performed by: EMERGENCY MEDICINE

## 2023-06-08 PROCEDURE — 83735 ASSAY OF MAGNESIUM: CPT

## 2023-06-08 PROCEDURE — 87075 CULTR BACTERIA EXCEPT BLOOD: CPT | Performed by: OTOLARYNGOLOGY

## 2023-06-08 PROCEDURE — 87206 SMEAR FLUORESCENT/ACID STAI: CPT | Performed by: OTOLARYNGOLOGY

## 2023-06-08 PROCEDURE — 85610 PROTHROMBIN TIME: CPT | Mod: 91

## 2023-06-08 PROCEDURE — 82803 BLOOD GASES ANY COMBINATION: CPT

## 2023-06-08 PROCEDURE — 96361 HYDRATE IV INFUSION ADD-ON: CPT

## 2023-06-08 PROCEDURE — 11046 DBRDMT MUSC&/FSCA EA ADDL: CPT | Mod: ,,, | Performed by: OTOLARYNGOLOGY

## 2023-06-08 PROCEDURE — 87102 FUNGUS ISOLATION CULTURE: CPT | Performed by: OTOLARYNGOLOGY

## 2023-06-08 PROCEDURE — 87015 SPECIMEN INFECT AGNT CONCNTJ: CPT | Performed by: OTOLARYNGOLOGY

## 2023-06-08 PROCEDURE — 36000707: Performed by: OTOLARYNGOLOGY

## 2023-06-08 PROCEDURE — 37000009 HC ANESTHESIA EA ADD 15 MINS: Performed by: OTOLARYNGOLOGY

## 2023-06-08 RX ORDER — SODIUM,POTASSIUM PHOSPHATES 280-250MG
2 POWDER IN PACKET (EA) ORAL
Status: DISCONTINUED | OUTPATIENT
Start: 2023-06-09 | End: 2023-06-21 | Stop reason: HOSPADM

## 2023-06-08 RX ORDER — DEXMEDETOMIDINE HYDROCHLORIDE 100 UG/ML
INJECTION, SOLUTION INTRAVENOUS
Status: DISCONTINUED | OUTPATIENT
Start: 2023-06-08 | End: 2023-06-08

## 2023-06-08 RX ORDER — DEXAMETHASONE SODIUM PHOSPHATE 4 MG/ML
INJECTION, SOLUTION INTRA-ARTICULAR; INTRALESIONAL; INTRAMUSCULAR; INTRAVENOUS; SOFT TISSUE
Status: DISCONTINUED | OUTPATIENT
Start: 2023-06-08 | End: 2023-06-08

## 2023-06-08 RX ORDER — DROPERIDOL 2.5 MG/ML
0.62 INJECTION, SOLUTION INTRAMUSCULAR; INTRAVENOUS ONCE AS NEEDED
Status: CANCELLED | OUTPATIENT
Start: 2023-06-08 | End: 2034-11-04

## 2023-06-08 RX ORDER — SODIUM CHLORIDE 0.9 % (FLUSH) 0.9 %
10 SYRINGE (ML) INJECTION
Status: DISCONTINUED | OUTPATIENT
Start: 2023-06-08 | End: 2023-06-21 | Stop reason: HOSPADM

## 2023-06-08 RX ORDER — ONDANSETRON 2 MG/ML
4 INJECTION INTRAMUSCULAR; INTRAVENOUS ONCE
Status: COMPLETED | OUTPATIENT
Start: 2023-06-08 | End: 2023-06-08

## 2023-06-08 RX ORDER — DEXMEDETOMIDINE HYDROCHLORIDE 4 UG/ML
INJECTION, SOLUTION INTRAVENOUS
Status: COMPLETED
Start: 2023-06-08 | End: 2023-06-08

## 2023-06-08 RX ORDER — MIDAZOLAM HYDROCHLORIDE 1 MG/ML
INJECTION, SOLUTION INTRAMUSCULAR; INTRAVENOUS
Status: DISCONTINUED | OUTPATIENT
Start: 2023-06-08 | End: 2023-06-08

## 2023-06-08 RX ORDER — NOREPINEPHRINE BITARTRATE/D5W 4MG/250ML
0-3 PLASTIC BAG, INJECTION (ML) INTRAVENOUS CONTINUOUS
Status: DISCONTINUED | OUTPATIENT
Start: 2023-06-08 | End: 2023-06-09

## 2023-06-08 RX ORDER — FAMOTIDINE 10 MG/ML
20 INJECTION INTRAVENOUS 2 TIMES DAILY
Status: DISCONTINUED | OUTPATIENT
Start: 2023-06-08 | End: 2023-06-10

## 2023-06-08 RX ORDER — PROPOFOL 10 MG/ML
VIAL (ML) INTRAVENOUS
Status: DISCONTINUED | OUTPATIENT
Start: 2023-06-08 | End: 2023-06-08

## 2023-06-08 RX ORDER — FENTANYL CITRATE 50 UG/ML
50 INJECTION, SOLUTION INTRAMUSCULAR; INTRAVENOUS
Status: DISCONTINUED | OUTPATIENT
Start: 2023-06-09 | End: 2023-06-09

## 2023-06-08 RX ORDER — SODIUM CHLORIDE 0.9 % (FLUSH) 0.9 %
10 SYRINGE (ML) INJECTION
Status: DISCONTINUED | OUTPATIENT
Start: 2023-06-09 | End: 2023-06-21 | Stop reason: HOSPADM

## 2023-06-08 RX ORDER — PROPOFOL 10 MG/ML
INJECTION, EMULSION INTRAVENOUS
Status: DISPENSED
Start: 2023-06-08 | End: 2023-06-09

## 2023-06-08 RX ORDER — PHENYLEPHRINE HYDROCHLORIDE 10 MG/ML
INJECTION INTRAVENOUS
Status: DISCONTINUED | OUTPATIENT
Start: 2023-06-08 | End: 2023-06-08

## 2023-06-08 RX ORDER — MORPHINE SULFATE 4 MG/ML
4 INJECTION, SOLUTION INTRAMUSCULAR; INTRAVENOUS
Status: COMPLETED | OUTPATIENT
Start: 2023-06-08 | End: 2023-06-08

## 2023-06-08 RX ORDER — PROPOFOL 10 MG/ML
0-50 INJECTION, EMULSION INTRAVENOUS CONTINUOUS
Status: DISCONTINUED | OUTPATIENT
Start: 2023-06-09 | End: 2023-06-15

## 2023-06-08 RX ORDER — NOREPINEPHRINE BITARTRATE/D5W 4MG/250ML
PLASTIC BAG, INJECTION (ML) INTRAVENOUS
Status: DISPENSED
Start: 2023-06-08 | End: 2023-06-09

## 2023-06-08 RX ORDER — ONDANSETRON 2 MG/ML
INJECTION INTRAMUSCULAR; INTRAVENOUS
Status: COMPLETED
Start: 2023-06-08 | End: 2023-06-08

## 2023-06-08 RX ORDER — PROPOFOL 10 MG/ML
0-50 INJECTION, EMULSION INTRAVENOUS CONTINUOUS
Status: DISCONTINUED | OUTPATIENT
Start: 2023-06-08 | End: 2023-06-08 | Stop reason: SDUPTHER

## 2023-06-08 RX ORDER — LIDOCAINE HYDROCHLORIDE 20 MG/ML
INJECTION, SOLUTION EPIDURAL; INFILTRATION; INTRACAUDAL; PERINEURAL
Status: DISCONTINUED | OUTPATIENT
Start: 2023-06-08 | End: 2023-06-08

## 2023-06-08 RX ORDER — HYDROMORPHONE HYDROCHLORIDE 1 MG/ML
0.2 INJECTION, SOLUTION INTRAMUSCULAR; INTRAVENOUS; SUBCUTANEOUS EVERY 5 MIN PRN
Status: CANCELLED | OUTPATIENT
Start: 2023-06-08

## 2023-06-08 RX ORDER — ROCURONIUM BROMIDE 10 MG/ML
INJECTION, SOLUTION INTRAVENOUS
Status: DISCONTINUED | OUTPATIENT
Start: 2023-06-08 | End: 2023-06-08

## 2023-06-08 RX ORDER — DEXMEDETOMIDINE HYDROCHLORIDE 4 UG/ML
0-1.4 INJECTION, SOLUTION INTRAVENOUS CONTINUOUS
Status: DISCONTINUED | OUTPATIENT
Start: 2023-06-09 | End: 2023-06-08

## 2023-06-08 RX ORDER — LANOLIN ALCOHOL/MO/W.PET/CERES
800 CREAM (GRAM) TOPICAL
Status: DISCONTINUED | OUTPATIENT
Start: 2023-06-09 | End: 2023-06-21 | Stop reason: HOSPADM

## 2023-06-08 RX ORDER — SODIUM CHLORIDE, SODIUM LACTATE, POTASSIUM CHLORIDE, CALCIUM CHLORIDE 600; 310; 30; 20 MG/100ML; MG/100ML; MG/100ML; MG/100ML
INJECTION, SOLUTION INTRAVENOUS CONTINUOUS
Status: DISCONTINUED | OUTPATIENT
Start: 2023-06-08 | End: 2023-06-10

## 2023-06-08 RX ORDER — KETAMINE HCL IN 0.9 % NACL 50 MG/5 ML
SYRINGE (ML) INTRAVENOUS
Status: DISCONTINUED | OUTPATIENT
Start: 2023-06-08 | End: 2023-06-08

## 2023-06-08 RX ORDER — CEFAZOLIN SODIUM 1 G/3ML
INJECTION, POWDER, FOR SOLUTION INTRAMUSCULAR; INTRAVENOUS
Status: DISCONTINUED | OUTPATIENT
Start: 2023-06-08 | End: 2023-06-08

## 2023-06-08 RX ORDER — ONDANSETRON 2 MG/ML
INJECTION INTRAMUSCULAR; INTRAVENOUS
Status: DISCONTINUED | OUTPATIENT
Start: 2023-06-08 | End: 2023-06-08

## 2023-06-08 RX ORDER — FENTANYL CITRATE 50 UG/ML
INJECTION, SOLUTION INTRAMUSCULAR; INTRAVENOUS
Status: DISCONTINUED | OUTPATIENT
Start: 2023-06-08 | End: 2023-06-08

## 2023-06-08 RX ORDER — ONDANSETRON 2 MG/ML
4 INJECTION INTRAMUSCULAR; INTRAVENOUS ONCE AS NEEDED
Status: CANCELLED | OUTPATIENT
Start: 2023-06-08 | End: 2034-11-04

## 2023-06-08 RX ADMIN — ONDANSETRON 4 MG: 2 INJECTION INTRAMUSCULAR; INTRAVENOUS at 11:06

## 2023-06-08 RX ADMIN — PHENYLEPHRINE HYDROCHLORIDE 100 MCG: 10 INJECTION INTRAVENOUS at 09:06

## 2023-06-08 RX ADMIN — DEXMEDETOMIDINE HYDROCHLORIDE 10 MCG: 100 INJECTION, SOLUTION INTRAVENOUS at 10:06

## 2023-06-08 RX ADMIN — CEFAZOLIN 2 G: 330 INJECTION, POWDER, FOR SOLUTION INTRAMUSCULAR; INTRAVENOUS at 09:06

## 2023-06-08 RX ADMIN — FENTANYL CITRATE 50 MCG: 50 INJECTION, SOLUTION INTRAMUSCULAR; INTRAVENOUS at 09:06

## 2023-06-08 RX ADMIN — ROCURONIUM BROMIDE 50 MG: 10 INJECTION INTRAVENOUS at 09:06

## 2023-06-08 RX ADMIN — PROPOFOL 150 MG: 10 INJECTION, EMULSION INTRAVENOUS at 09:06

## 2023-06-08 RX ADMIN — LIDOCAINE HYDROCHLORIDE 60 MG: 20 INJECTION, SOLUTION EPIDURAL; INFILTRATION; INTRACAUDAL; PERINEURAL at 09:06

## 2023-06-08 RX ADMIN — MORPHINE SULFATE 4 MG: 4 INJECTION INTRAVENOUS at 09:06

## 2023-06-08 RX ADMIN — DEXMEDETOMIDINE HYDROCHLORIDE 0.2 MCG/KG/HR: 4 INJECTION, SOLUTION INTRAVENOUS at 11:06

## 2023-06-08 RX ADMIN — PHENYLEPHRINE HYDROCHLORIDE 200 MCG: 10 INJECTION INTRAVENOUS at 09:06

## 2023-06-08 RX ADMIN — Medication 10 MG: at 09:06

## 2023-06-08 RX ADMIN — SODIUM CHLORIDE: 0.9 INJECTION, SOLUTION INTRAVENOUS at 10:06

## 2023-06-08 RX ADMIN — FENTANYL CITRATE 100 MCG: 50 INJECTION, SOLUTION INTRAMUSCULAR; INTRAVENOUS at 11:06

## 2023-06-08 RX ADMIN — Medication 20 MG: at 10:06

## 2023-06-08 RX ADMIN — SODIUM CHLORIDE 0.5 MCG/KG/MIN: 9 INJECTION, SOLUTION INTRAVENOUS at 09:06

## 2023-06-08 RX ADMIN — MIDAZOLAM HYDROCHLORIDE 2 MG: 1 INJECTION, SOLUTION INTRAMUSCULAR; INTRAVENOUS at 09:06

## 2023-06-08 RX ADMIN — SODIUM CHLORIDE, POTASSIUM CHLORIDE, SODIUM LACTATE AND CALCIUM CHLORIDE: 600; 310; 30; 20 INJECTION, SOLUTION INTRAVENOUS at 11:06

## 2023-06-08 RX ADMIN — FENTANYL CITRATE 50 MCG: 0.05 INJECTION, SOLUTION INTRAMUSCULAR; INTRAVENOUS at 11:06

## 2023-06-08 RX ADMIN — SODIUM CHLORIDE, POTASSIUM CHLORIDE, SODIUM LACTATE AND CALCIUM CHLORIDE 1000 ML: 600; 310; 30; 20 INJECTION, SOLUTION INTRAVENOUS at 08:06

## 2023-06-08 RX ADMIN — DEXMEDETOMIDINE HYDROCHLORIDE 12 MCG: 100 INJECTION, SOLUTION INTRAVENOUS at 10:06

## 2023-06-08 RX ADMIN — FAMOTIDINE 20 MG: 10 INJECTION, SOLUTION INTRAVENOUS at 11:06

## 2023-06-08 RX ADMIN — DEXAMETHASONE SODIUM PHOSPHATE 8 MG: 4 INJECTION, SOLUTION INTRAMUSCULAR; INTRAVENOUS at 09:06

## 2023-06-08 RX ADMIN — CEFEPIME 2 G: 2 INJECTION, POWDER, FOR SOLUTION INTRAVENOUS at 11:06

## 2023-06-08 RX ADMIN — ONDANSETRON 4 MG: 2 INJECTION INTRAMUSCULAR; INTRAVENOUS at 10:06

## 2023-06-08 RX ADMIN — SODIUM CHLORIDE: 0.9 INJECTION, SOLUTION INTRAVENOUS at 09:06

## 2023-06-09 LAB
ALBUMIN SERPL BCP-MCNC: 2.3 G/DL (ref 3.5–5.2)
ALBUMIN SERPL BCP-MCNC: 2.4 G/DL (ref 3.5–5.2)
ALLENS TEST: ABNORMAL
ALP SERPL-CCNC: 61 U/L (ref 55–135)
ALP SERPL-CCNC: 64 U/L (ref 55–135)
ALT SERPL W/O P-5'-P-CCNC: 29 U/L (ref 10–44)
ALT SERPL W/O P-5'-P-CCNC: 33 U/L (ref 10–44)
ANION GAP SERPL CALC-SCNC: 5 MMOL/L (ref 8–16)
ANION GAP SERPL CALC-SCNC: 9 MMOL/L (ref 8–16)
APTT PPP: 40 SEC (ref 21–32)
AST SERPL-CCNC: 27 U/L (ref 10–40)
AST SERPL-CCNC: 28 U/L (ref 10–40)
BASOPHILS # BLD AUTO: 0.01 K/UL (ref 0–0.2)
BASOPHILS NFR BLD: 0.1 % (ref 0–1.9)
BILIRUB SERPL-MCNC: 0.4 MG/DL (ref 0.1–1)
BILIRUB SERPL-MCNC: 0.5 MG/DL (ref 0.1–1)
BUN SERPL-MCNC: 8 MG/DL (ref 6–20)
BUN SERPL-MCNC: 8 MG/DL (ref 6–20)
CALCIUM SERPL-MCNC: 8.1 MG/DL (ref 8.7–10.5)
CALCIUM SERPL-MCNC: 8.8 MG/DL (ref 8.7–10.5)
CHLORIDE SERPL-SCNC: 103 MMOL/L (ref 95–110)
CHLORIDE SERPL-SCNC: 99 MMOL/L (ref 95–110)
CO2 SERPL-SCNC: 23 MMOL/L (ref 23–29)
CO2 SERPL-SCNC: 26 MMOL/L (ref 23–29)
CREAT SERPL-MCNC: 0.5 MG/DL (ref 0.5–1.4)
CREAT SERPL-MCNC: 0.6 MG/DL (ref 0.5–1.4)
DIFFERENTIAL METHOD: ABNORMAL
EOSINOPHIL # BLD AUTO: 0 K/UL (ref 0–0.5)
EOSINOPHIL NFR BLD: 0.1 % (ref 0–8)
ERYTHROCYTE [DISTWIDTH] IN BLOOD BY AUTOMATED COUNT: 12.5 % (ref 11.5–14.5)
EST. GFR  (NO RACE VARIABLE): >60 ML/MIN/1.73 M^2
EST. GFR  (NO RACE VARIABLE): >60 ML/MIN/1.73 M^2
GLUCOSE SERPL-MCNC: 135 MG/DL (ref 70–110)
GLUCOSE SERPL-MCNC: 201 MG/DL (ref 70–110)
GRAM STN SPEC: NORMAL
GRAM STN SPEC: NORMAL
HCO3 UR-SCNC: 26.2 MMOL/L (ref 24–28)
HCT VFR BLD AUTO: 32 % (ref 37–48.5)
HGB BLD-MCNC: 10.8 G/DL (ref 12–16)
IMM GRANULOCYTES # BLD AUTO: 0.16 K/UL (ref 0–0.04)
IMM GRANULOCYTES NFR BLD AUTO: 0.9 % (ref 0–0.5)
INR PPP: 1.2 (ref 0.8–1.2)
LYMPHOCYTES # BLD AUTO: 0.6 K/UL (ref 1–4.8)
LYMPHOCYTES NFR BLD: 3.6 % (ref 18–48)
MAGNESIUM SERPL-MCNC: 1.6 MG/DL (ref 1.6–2.6)
MAGNESIUM SERPL-MCNC: 1.7 MG/DL (ref 1.6–2.6)
MAGNESIUM SERPL-MCNC: 1.8 MG/DL (ref 1.6–2.6)
MAGNESIUM SERPL-MCNC: 2.1 MG/DL (ref 1.6–2.6)
MCH RBC QN AUTO: 30.4 PG (ref 27–31)
MCHC RBC AUTO-ENTMCNC: 33.8 G/DL (ref 32–36)
MCV RBC AUTO: 90 FL (ref 82–98)
MONOCYTES # BLD AUTO: 0.8 K/UL (ref 0.3–1)
MONOCYTES NFR BLD: 4.4 % (ref 4–15)
NEUTROPHILS # BLD AUTO: 15.8 K/UL (ref 1.8–7.7)
NEUTROPHILS NFR BLD: 90.9 % (ref 38–73)
NRBC BLD-RTO: 0 /100 WBC
PCO2 BLDA: 43.9 MMHG (ref 35–45)
PH SMN: 7.38 [PH] (ref 7.35–7.45)
PHOSPHATE SERPL-MCNC: 2.3 MG/DL (ref 2.7–4.5)
PHOSPHATE SERPL-MCNC: 2.4 MG/DL (ref 2.7–4.5)
PHOSPHATE SERPL-MCNC: 3.2 MG/DL (ref 2.7–4.5)
PHOSPHATE SERPL-MCNC: 3.4 MG/DL (ref 2.7–4.5)
PLATELET # BLD AUTO: 204 K/UL (ref 150–450)
PMV BLD AUTO: 10.8 FL (ref 9.2–12.9)
PO2 BLDA: 493 MMHG (ref 80–100)
POC BE: 1 MMOL/L
POC SATURATED O2: 100 % (ref 95–100)
POC TCO2: 28 MMOL/L (ref 23–27)
POCT GLUCOSE: 174 MG/DL (ref 70–110)
POCT GLUCOSE: >500 MG/DL (ref 70–110)
POTASSIUM SERPL-SCNC: 3.6 MMOL/L (ref 3.5–5.1)
POTASSIUM SERPL-SCNC: 4 MMOL/L (ref 3.5–5.1)
PROT SERPL-MCNC: 5 G/DL (ref 6–8.4)
PROT SERPL-MCNC: 5.3 G/DL (ref 6–8.4)
PROTHROMBIN TIME: 12.9 SEC (ref 9–12.5)
RBC # BLD AUTO: 3.55 M/UL (ref 4–5.4)
SAMPLE: ABNORMAL
SITE: ABNORMAL
SODIUM SERPL-SCNC: 131 MMOL/L (ref 136–145)
SODIUM SERPL-SCNC: 134 MMOL/L (ref 136–145)
WBC # BLD AUTO: 17.35 K/UL (ref 3.9–12.7)

## 2023-06-09 PROCEDURE — 85025 COMPLETE CBC W/AUTO DIFF WBC: CPT | Performed by: OTOLARYNGOLOGY

## 2023-06-09 PROCEDURE — 63600175 PHARM REV CODE 636 W HCPCS: Performed by: OTOLARYNGOLOGY

## 2023-06-09 PROCEDURE — 25000003 PHARM REV CODE 250: Performed by: STUDENT IN AN ORGANIZED HEALTH CARE EDUCATION/TRAINING PROGRAM

## 2023-06-09 PROCEDURE — 25000003 PHARM REV CODE 250

## 2023-06-09 PROCEDURE — 25000003 PHARM REV CODE 250: Performed by: OTOLARYNGOLOGY

## 2023-06-09 PROCEDURE — 88305 TISSUE EXAM BY PATHOLOGIST: CPT | Mod: 26,,, | Performed by: PATHOLOGY

## 2023-06-09 PROCEDURE — 63600175 PHARM REV CODE 636 W HCPCS

## 2023-06-09 PROCEDURE — 99291 CRITICAL CARE FIRST HOUR: CPT | Mod: ,,, | Performed by: STUDENT IN AN ORGANIZED HEALTH CARE EDUCATION/TRAINING PROGRAM

## 2023-06-09 PROCEDURE — C1751 CATH, INF, PER/CENT/MIDLINE: HCPCS

## 2023-06-09 PROCEDURE — A4216 STERILE WATER/SALINE, 10 ML: HCPCS | Performed by: OTOLARYNGOLOGY

## 2023-06-09 PROCEDURE — 20000000 HC ICU ROOM

## 2023-06-09 PROCEDURE — 36573 INSJ PICC RS&I 5 YR+: CPT

## 2023-06-09 PROCEDURE — 83735 ASSAY OF MAGNESIUM: CPT

## 2023-06-09 PROCEDURE — 99900035 HC TECH TIME PER 15 MIN (STAT)

## 2023-06-09 PROCEDURE — 63600175 PHARM REV CODE 636 W HCPCS: Performed by: STUDENT IN AN ORGANIZED HEALTH CARE EDUCATION/TRAINING PROGRAM

## 2023-06-09 PROCEDURE — 80053 COMPREHEN METABOLIC PANEL: CPT

## 2023-06-09 PROCEDURE — 94003 VENT MGMT INPAT SUBQ DAY: CPT

## 2023-06-09 PROCEDURE — 76937 US GUIDE VASCULAR ACCESS: CPT

## 2023-06-09 PROCEDURE — 87040 BLOOD CULTURE FOR BACTERIA: CPT | Mod: 59 | Performed by: STUDENT IN AN ORGANIZED HEALTH CARE EDUCATION/TRAINING PROGRAM

## 2023-06-09 PROCEDURE — 99291 PR CRITICAL CARE, E/M 30-74 MINUTES: ICD-10-PCS | Mod: ,,, | Performed by: STUDENT IN AN ORGANIZED HEALTH CARE EDUCATION/TRAINING PROGRAM

## 2023-06-09 PROCEDURE — 84100 ASSAY OF PHOSPHORUS: CPT | Mod: 91

## 2023-06-09 PROCEDURE — 88305 TISSUE EXAM BY PATHOLOGIST: ICD-10-PCS | Mod: 26,,, | Performed by: PATHOLOGY

## 2023-06-09 PROCEDURE — 99291 CRITICAL CARE FIRST HOUR: CPT | Mod: ,,, | Performed by: INTERNAL MEDICINE

## 2023-06-09 PROCEDURE — 99900026 HC AIRWAY MAINTENANCE (STAT)

## 2023-06-09 PROCEDURE — 88305 TISSUE EXAM BY PATHOLOGIST: CPT | Performed by: PATHOLOGY

## 2023-06-09 PROCEDURE — S0030 INJECTION, METRONIDAZOLE: HCPCS

## 2023-06-09 PROCEDURE — 99291 PR CRITICAL CARE, E/M 30-74 MINUTES: ICD-10-PCS | Mod: ,,, | Performed by: INTERNAL MEDICINE

## 2023-06-09 PROCEDURE — 94761 N-INVAS EAR/PLS OXIMETRY MLT: CPT

## 2023-06-09 PROCEDURE — 27000221 HC OXYGEN, UP TO 24 HOURS

## 2023-06-09 RX ORDER — LINEZOLID 2 MG/ML
600 INJECTION, SOLUTION INTRAVENOUS
Status: DISCONTINUED | OUTPATIENT
Start: 2023-06-09 | End: 2023-06-13

## 2023-06-09 RX ORDER — ENOXAPARIN SODIUM 100 MG/ML
40 INJECTION SUBCUTANEOUS EVERY 24 HOURS
Status: DISCONTINUED | OUTPATIENT
Start: 2023-06-09 | End: 2023-06-21 | Stop reason: HOSPADM

## 2023-06-09 RX ORDER — SODIUM CHLORIDE 0.9 % (FLUSH) 0.9 %
10 SYRINGE (ML) INJECTION
Status: DISCONTINUED | OUTPATIENT
Start: 2023-06-09 | End: 2023-06-21 | Stop reason: HOSPADM

## 2023-06-09 RX ORDER — FENTANYL CITRATE 50 UG/ML
25 INJECTION, SOLUTION INTRAMUSCULAR; INTRAVENOUS
Status: DISCONTINUED | OUTPATIENT
Start: 2023-06-09 | End: 2023-06-09

## 2023-06-09 RX ORDER — MAGNESIUM SULFATE HEPTAHYDRATE 40 MG/ML
2 INJECTION, SOLUTION INTRAVENOUS ONCE
Status: COMPLETED | OUTPATIENT
Start: 2023-06-09 | End: 2023-06-09

## 2023-06-09 RX ORDER — DEXMEDETOMIDINE HYDROCHLORIDE 4 UG/ML
0-1.4 INJECTION, SOLUTION INTRAVENOUS CONTINUOUS
Status: DISCONTINUED | OUTPATIENT
Start: 2023-06-09 | End: 2023-06-11

## 2023-06-09 RX ORDER — OXYCODONE HYDROCHLORIDE 10 MG/1
10 TABLET ORAL EVERY 4 HOURS PRN
Status: CANCELLED | OUTPATIENT
Start: 2023-06-09

## 2023-06-09 RX ORDER — OXYCODONE HYDROCHLORIDE 10 MG/1
10 TABLET ORAL EVERY 4 HOURS PRN
Status: DISCONTINUED | OUTPATIENT
Start: 2023-06-09 | End: 2023-06-09

## 2023-06-09 RX ORDER — ACETAMINOPHEN 650 MG/20.3ML
650 LIQUID ORAL EVERY 6 HOURS PRN
Status: DISCONTINUED | OUTPATIENT
Start: 2023-06-09 | End: 2023-06-15

## 2023-06-09 RX ORDER — FENTANYL CITRATE-0.9 % NACL/PF 10 MCG/ML
0-200 PLASTIC BAG, INJECTION (ML) INTRAVENOUS CONTINUOUS
Status: DISCONTINUED | OUTPATIENT
Start: 2023-06-09 | End: 2023-06-09

## 2023-06-09 RX ORDER — METRONIDAZOLE 500 MG/100ML
500 INJECTION, SOLUTION INTRAVENOUS
Status: DISCONTINUED | OUTPATIENT
Start: 2023-06-09 | End: 2023-06-13

## 2023-06-09 RX ORDER — HYDROMORPHONE HYDROCHLORIDE 1 MG/ML
1 INJECTION, SOLUTION INTRAMUSCULAR; INTRAVENOUS; SUBCUTANEOUS EVERY 4 HOURS PRN
Status: CANCELLED | OUTPATIENT
Start: 2023-06-09

## 2023-06-09 RX ORDER — OXYCODONE HYDROCHLORIDE 10 MG/1
10 TABLET ORAL EVERY 4 HOURS PRN
Status: DISCONTINUED | OUTPATIENT
Start: 2023-06-09 | End: 2023-06-12

## 2023-06-09 RX ORDER — HYDROMORPHONE HYDROCHLORIDE 1 MG/ML
1 INJECTION, SOLUTION INTRAMUSCULAR; INTRAVENOUS; SUBCUTANEOUS EVERY 4 HOURS PRN
Status: DISCONTINUED | OUTPATIENT
Start: 2023-06-09 | End: 2023-06-12

## 2023-06-09 RX ORDER — SODIUM CHLORIDE 0.9 % (FLUSH) 0.9 %
10 SYRINGE (ML) INJECTION EVERY 6 HOURS
Status: DISCONTINUED | OUTPATIENT
Start: 2023-06-09 | End: 2023-06-21 | Stop reason: HOSPADM

## 2023-06-09 RX ADMIN — POTASSIUM BICARBONATE 50 MEQ: 978 TABLET, EFFERVESCENT ORAL at 12:06

## 2023-06-09 RX ADMIN — HYDROMORPHONE HYDROCHLORIDE 1 MG: 1 INJECTION, SOLUTION INTRAMUSCULAR; INTRAVENOUS; SUBCUTANEOUS at 11:06

## 2023-06-09 RX ADMIN — FENTANYL CITRATE 25 MCG: 50 INJECTION, SOLUTION INTRAMUSCULAR; INTRAVENOUS at 07:06

## 2023-06-09 RX ADMIN — CEFEPIME 2 G: 2 INJECTION, POWDER, FOR SOLUTION INTRAVENOUS at 04:06

## 2023-06-09 RX ADMIN — DOXYCYCLINE 100 MG: 100 INJECTION, POWDER, LYOPHILIZED, FOR SOLUTION INTRAVENOUS at 09:06

## 2023-06-09 RX ADMIN — PROPOFOL 45 MCG/KG/MIN: 10 INJECTION, EMULSION INTRAVENOUS at 11:06

## 2023-06-09 RX ADMIN — Medication 800 MG: at 04:06

## 2023-06-09 RX ADMIN — FAMOTIDINE 20 MG: 10 INJECTION, SOLUTION INTRAVENOUS at 09:06

## 2023-06-09 RX ADMIN — MAGNESIUM SULFATE 2 G: 2 INJECTION INTRAVENOUS at 09:06

## 2023-06-09 RX ADMIN — METRONIDAZOLE 500 MG: 500 INJECTION, SOLUTION INTRAVENOUS at 06:06

## 2023-06-09 RX ADMIN — OXYCODONE HYDROCHLORIDE 10 MG: 10 TABLET ORAL at 10:06

## 2023-06-09 RX ADMIN — METRONIDAZOLE 500 MG: 500 INJECTION, SOLUTION INTRAVENOUS at 10:06

## 2023-06-09 RX ADMIN — Medication 800 MG: at 12:06

## 2023-06-09 RX ADMIN — HYDROMORPHONE HYDROCHLORIDE 1 MG: 1 INJECTION, SOLUTION INTRAMUSCULAR; INTRAVENOUS; SUBCUTANEOUS at 04:06

## 2023-06-09 RX ADMIN — FENTANYL CITRATE 50 MCG: 50 INJECTION, SOLUTION INTRAMUSCULAR; INTRAVENOUS at 12:06

## 2023-06-09 RX ADMIN — Medication 25 MCG/HR: at 04:06

## 2023-06-09 RX ADMIN — PROPOFOL 45 MCG/KG/MIN: 10 INJECTION, EMULSION INTRAVENOUS at 04:06

## 2023-06-09 RX ADMIN — CEFEPIME 2 G: 2 INJECTION, POWDER, FOR SOLUTION INTRAVENOUS at 11:06

## 2023-06-09 RX ADMIN — PROPOFOL 50 MCG/KG/MIN: 10 INJECTION, EMULSION INTRAVENOUS at 09:06

## 2023-06-09 RX ADMIN — VANCOMYCIN HYDROCHLORIDE 750 MG: 750 INJECTION, POWDER, LYOPHILIZED, FOR SOLUTION INTRAVENOUS at 12:06

## 2023-06-09 RX ADMIN — DEXMEDETOMIDINE HYDROCHLORIDE 0.5 MCG/KG/HR: 4 INJECTION, SOLUTION INTRAVENOUS at 07:06

## 2023-06-09 RX ADMIN — FENTANYL CITRATE 50 MCG: 0.05 INJECTION, SOLUTION INTRAMUSCULAR; INTRAVENOUS at 04:06

## 2023-06-09 RX ADMIN — SODIUM PHOSPHATE, MONOBASIC, MONOHYDRATE AND SODIUM PHOSPHATE, DIBASIC, ANHYDROUS 15 MMOL: 142; 276 INJECTION, SOLUTION INTRAVENOUS at 12:06

## 2023-06-09 RX ADMIN — Medication 10 ML: at 12:06

## 2023-06-09 RX ADMIN — LINEZOLID 600 MG: 600 INJECTION, SOLUTION INTRAVENOUS at 12:06

## 2023-06-09 RX ADMIN — Medication 10 ML: at 06:06

## 2023-06-09 RX ADMIN — PROPOFOL 50 MCG/KG/MIN: 10 INJECTION, EMULSION INTRAVENOUS at 03:06

## 2023-06-09 RX ADMIN — ENOXAPARIN SODIUM 40 MG: 40 INJECTION SUBCUTANEOUS at 04:06

## 2023-06-09 RX ADMIN — SODIUM CHLORIDE, POTASSIUM CHLORIDE, SODIUM LACTATE AND CALCIUM CHLORIDE: 600; 310; 30; 20 INJECTION, SOLUTION INTRAVENOUS at 07:06

## 2023-06-09 RX ADMIN — CEFEPIME 2 G: 2 INJECTION, POWDER, FOR SOLUTION INTRAVENOUS at 08:06

## 2023-06-09 RX ADMIN — ACETAMINOPHEN 650 MG: 650 SOLUTION ORAL at 10:06

## 2023-06-09 RX ADMIN — FENTANYL CITRATE 50 MCG: 0.05 INJECTION, SOLUTION INTRAMUSCULAR; INTRAVENOUS at 03:06

## 2023-06-09 NOTE — PLAN OF CARE
Problem: Device-Related Complication Risk (Mechanical Ventilation, Invasive)  Goal: Optimal Device Function  Outcome: Ongoing, Progressing  Intervention: Optimize Device Care and Function  Flowsheets (Taken 6/9/2023 1552)  Aspiration Precautions:   oral hygiene care promoted   respiratory status monitored   tube feeding placement verified   NPO pending swallow screening/evaluation   medication route adjusted  Airway Safety Measures:   suction at bedside   mask valve resuscitator at bedside   oxygen flowmeter at bedside  Oral care q2h      Problem: Skin Injury Risk Increased  Goal: Skin Health and Integrity  Outcome: Ongoing, Progressing  Intervention: Optimize Skin Protection  Flowsheets (Taken 6/9/2023 1552)  Pressure Reduction Techniques: (pt turned q2h) weight shift assistance provided  Pressure Reduction Devices:   specialty bed utilized   feet on footrest/footstool   heel offloading device utilized   positioning supports utilized   pressure-redistributing mattress utilized  Skin Protection:   transparent dressing maintained   tubing/devices free from skin contact   skin-to-skin areas padded   skin-to-device areas padded   pulse oximeter probe site changed   skin sealant/moisture barrier applied   silicone foam dressing in place   adhesive use limited  Head of Bed (HOB) Positioning: HOB at 30-45 degrees

## 2023-06-09 NOTE — ASSESSMENT & PLAN NOTE
42F presenting with right neck necrotizing fasciitis with surround cellulitis s/p debridement of multiple levels of right neck. Necrotic skin, soft tissue and muscle removed. Open neck wound packed with saline soaked kerlix. Patient remains intubated.     -admit to SICU  -remain intubated  -BID dressing changes per ENT  -ID consult placed   -Started on vanc and cefepime pending ID recommendations  -will need repeat debridement, likely Monday

## 2023-06-09 NOTE — SUBJECTIVE & OBJECTIVE
Past Medical History:   Diagnosis Date    Depression     Heart murmur     Seizures        Past Surgical History:   Procedure Laterality Date    DEBRIDEMENT, BREAST Left 11/11/2021    Procedure: DEBRIDEMENT,  BREAST;  Surgeon: Yoan Sparks MD;  Location: [facility];  Service: General;  Laterality: Left;  DEBRIDEMENT, WOUND, LEFT BREAST    DILATION AND CURETTAGE OF UTERUS      INCISION AND DRAINAGE Left 7/12/2021    Procedure: INCISION AND DRAINAGE, HEMATOMA, SEROMA, OR FLUID COLLECTION ;  Surgeon: Yoan Sparks MD;  Location: [facility];  Service: General;  Laterality: Left;    NECK EXPLORATION Bilateral 6/8/2023    Procedure: EXPLORATION and debridement, NECK;  Surgeon: Jose Jules MD;  Location: [address];  Service: ENT;  Laterality: Bilateral;       Review of patient's allergies indicates:   Allergen Reactions    Clindamycin        Medications:  Medications Prior to Admission   Medication Sig    HYDROcodone-acetaminophen (NORCO) 5-325 mg per tablet Take 1 tablet by mouth every 4 (four) hours as needed for Pain. (Patient not taking: Reported on 1/18/2023)    HYDROcodone-acetaminophen (NORCO) 5-325 mg per tablet Take 1 tablet by mouth every 8 (eight) hours as needed for Pain.    ibuprofen (ADVIL,MOTRIN) 600 MG tablet Take 1 tablet (600 mg total) by mouth every 6 (six) hours as needed for Pain.    ibuprofen (ADVIL,MOTRIN) 800 MG tablet Take 1 tablet (800 mg total) by mouth every 6 (six) hours as needed for Pain. (Patient not taking: Reported on 1/18/2023)     Antibiotics (From admission, onward)      Start     Stop Route Frequency Ordered    06/09/23 1245  linezolid 600 mg/300 mL IVPB 600 mg         -- IV Every 12 hours (non-standard times) 06/09/23 1142    06/09/23 1000  metronidazole IVPB 500 mg         -- IV Every 8 hours (non-standard times) 06/09/23 0748    06/09/23 0900  doxycycline (VIBRAMYCIN) 100 mg in dextrose 5 % in water (D5W) 5 % 100 mL IVPB (MB+)         -- IV Every 12 hours (non-standard  times) 06/09/23 0748    06/09/23 0000  ceFEPIme (MAXIPIME) 2 g in dextrose 5 % in water (D5W) 5 % 100 mL IVPB (MB+)         -- IV Every 8 hours (non-standard times) 06/08/23 2323          Antifungals (From admission, onward)      None          Antivirals (From admission, onward)      None             Immunization History   Administered Date(s) Administered    COVID-19, vector-nr, rS-Ad26, PF (Ana) 04/07/2021    DTP 03/10/1983, 05/12/1983, 07/07/1983, 02/16/1984, 04/02/1987    MMR 02/16/1984    OPV 03/10/1983, 05/12/1983, 07/07/1983, 02/16/1984, 04/02/1987    Td (Adult), Unspecified Formulation 12/07/2004    Tdap 06/03/2019       Family History       Problem Relation (Age of Onset)    Diabetes Father    Heart disease Father    Hyperlipidemia Father    Hypertension Mother, Father          Social History     Socioeconomic History    Marital status:     Years of education: 9th   Occupational History     Employer: [id number]   Tobacco Use    Smoking status: Every Day     Packs/day: 1.00     Years: 20.00     Pack years: 20.00     Types: Cigarettes    Smokeless tobacco: Never    Tobacco comments:     Informed about classes in smoking cessation. States will call.   Substance and Sexual Activity    Alcohol use: Yes     Comment: Socially    Drug use: No    Sexual activity: Yes     Partners: Male     Birth control/protection: None     Social Determinants of Health     Financial Resource Strain: Low Risk     Difficulty of Paying Living Expenses: Not hard at all   Food Insecurity: No Food Insecurity    Worried About Running Out of Food in the Last Year: Never true    Ran Out of Food in the Last Year: Never true   Transportation Needs: Unknown    Lack of Transportation (Non-Medical): No   Physical Activity: Sufficiently Active    Days of Exercise per Week: 6 days    Minutes of Exercise per Session: 30 min   Stress: No Stress Concern Present    Feeling of Stress : Not at all   Social Connections: Moderately  Isolated    Frequency of Communication with Friends and Family: More than three times a week    Frequency of Social Gatherings with Friends and Family: More than three times a week    Attends Mu-ism Services: Never    Active Member of Clubs or Organizations: No    Attends Club or Organization Meetings: Never    Marital Status:    Housing Stability: Unknown    Unable to Pay for Housing in the Last Year: No    Unstable Housing in the Last Year: No     Review of Systems   Unable to perform ROS: Intubated   Constitutional:  Positive for chills and fever.   Skin:  Positive for wound.   Objective:     Vital Signs (Most Recent):  Temp: (!) 100.9 °F (38.3 °C) (06/09/23 1500)  Pulse: 86 (06/09/23 1555)  Resp: (!) 28 (06/09/23 1626)  BP: 97/61 (06/09/23 1501)  SpO2: 100 % (06/09/23 1501) Vital Signs (24h Range):  Temp:  [98.7 °F (37.1 °C)-101.5 °F (38.6 °C)] 100.9 °F (38.3 °C)  Pulse:  [] 86  Resp:  [14-42] 28  SpO2:  [97 %-100 %] 100 %  BP: ()/(49-96) 97/61     Weight: 48.9 kg (107 lb 12.9 oz)  Body mass index is 19.72 kg/m².    Estimated Creatinine Clearance: 115.5 mL/min (based on SCr of 0.5 mg/dL).     Physical Exam  Constitutional:       Appearance: She is ill-appearing. She is not toxic-appearing or diaphoretic.   HENT:      Head:      Comments: intubated  Eyes:      General: No scleral icterus.        Right eye: No discharge.         Left eye: No discharge.   Neck:      Comments: Erythema/swelling from neck down to upper chest with some crepitus.  Surgical site dressed  Pulmonary:      Comments: Vent Mode: A/C  Oxygen Concentration (%):  [40] 40  Resp Rate Total:  [15 br/min-30 br/min] 22 br/min  Vt Set:  [360 mL] 360 mL  PEEP/CPAP:  [5 cmH20] 5 cmH20  Mean Airway Pressure:  [6.5 cmH20-9.8 cmH20] 9.8 cmH20     Skin:     Coloration: Skin is not jaundiced.      Findings: Erythema present.        Significant Labs: All pertinent labs within the past 24 hours have been reviewed.    Significant Imaging:  I have reviewed all pertinent imaging results/findings within the past 24 hours.

## 2023-06-09 NOTE — ED PROVIDER NOTES
Encounter Date: 6/8/2023       History     Chief Complaint   Patient presents with    ENT Consult     Popped a pimple behind her left ear[place] Noticed area started getting black and swollen, today significantly swollen around neck and behind ear. Per visitor, voice sounds different. Pt does not report any SOB, airway complaints.      42-year-old female with pmhx of depression and seizures presents as a transfer from [facility] Emergency Department for neck cellulitis and myositis starting 4-5 days ago.  It initially started the day prior as a pimple.  She popped it and it drained clear fluid.  The next day she went swimming in brackish water.  By that afternoon she noted swelling to her neck.  She went a [facility] and was diagnosed with an skin infection.  She was given oral antibiotics and discharged home.  She presented to [facility] ED the next day as infection had worsened.  Her symptoms include neck swelling, facial swelling, right ear pain, chest wall pain.  She has associated erythema, significant pain, fever, malaise.  Denies difficulty swallowing. throat swelling or difficulty breathing.        The history is provided by the patient.   Review of patient's allergies indicates:   Allergen Reactions    Clindamycin      Past Medical History:   Diagnosis Date    Depression     Heart murmur     Seizures      Past Surgical History:   Procedure Laterality Date    DEBRIDEMENT, BREAST Left 11/11/2021    Procedure: DEBRIDEMENT,  BREAST;  Surgeon: Yoan Sparks MD;  Location: [facility];  Service: General;  Laterality: Left;  DEBRIDEMENT, WOUND, LEFT BREAST    DILATION AND CURETTAGE OF UTERUS      INCISION AND DRAINAGE Left 7/12/2021    Procedure: INCISION AND DRAINAGE, HEMATOMA, SEROMA, OR FLUID COLLECTION ;  Surgeon: Yoan Sparks MD;  Location: [facility];  Service: General;  Laterality: Left;     Family History   Problem Relation Age of Onset    Hypertension Mother     Heart disease Father          pacemaker    Diabetes Father     Hypertension Father     Hyperlipidemia Father     Breast cancer Neg Hx     Ovarian cancer Neg Hx     Colon cancer Neg Hx      Social History     Tobacco Use    Smoking status: Every Day     Packs/day: 1.00     Years: 20.00     Pack years: 20.00     Types: Cigarettes    Smokeless tobacco: Never    Tobacco comments:     Informed about classes in smoking cessation. States will call.   Substance Use Topics    Alcohol use: Yes     Comment: Socially    Drug use: No     Review of Systems   Constitutional:  Positive for fatigue and fever.   HENT:  Positive for ear pain. Negative for sore throat and trouble swallowing.    Respiratory:  Negative for shortness of breath.    Cardiovascular:  Negative for chest pain.   Gastrointestinal:  Negative for abdominal pain and nausea.   Genitourinary:  Negative for dysuria.   Musculoskeletal:  Positive for myalgias and neck pain.   Skin:  Positive for color change and wound.   Hematological:  Does not bruise/bleed easily.     Physical Exam     Initial Vitals [06/08/23 1817]   BP Pulse Resp Temp SpO2   (!) 155/96 110 20 100.1 °F (37.8 °C) 98 %      MAP       --         Physical Exam    Nursing note and vitals reviewed.  Constitutional: She appears well-developed and well-nourished. She appears ill.   HENT:   Head: Normocephalic and atraumatic.   Right Ear: Tympanic membrane normal.   Left Ear: Tympanic membrane normal.   Nose: Nose normal.   Mouth/Throat: No oropharyngeal exudate or posterior oropharyngeal edema.   Airway intact.  No posterior oropharyngeal edema   Eyes: Conjunctivae and EOM are normal.   Neck: Neck supple.   Cardiovascular:    Tachycardia present.         Pulmonary/Chest: No respiratory distress.   Musculoskeletal:      Cervical back: Neck supple.     Neurological: She is alert and oriented to person, place, and time. Gait normal.   Skin: No rash noted.   Wound noted posterior to right ear along hairline. Swelling and erythema  noted posterior to right ear extending downward into her neck, submandibular/submental area, anterior chest wall.  Significant tenderness without crepitus or fluctuance noted.  Warm to touch.   Psychiatric: She has a normal mood and affect. Thought content normal.       ED Course   Procedures  Labs Reviewed   HIV 1 / 2 ANTIBODY   HEPATITIS C ANTIBODY          Imaging Results    None          Medications   lactated ringers bolus 1,000 mL (1,000 mLs Intravenous New Bag 6/8/23 2001)   morphine injection 4 mg (4 mg Intravenous Given 6/8/23 2108)     Medical Decision Making:   Initial Assessment:   42-year-old female with pmhx of depression and seizures presents as a transfer from [facility] Emergency Department for neck cellulitis and myositis starting 4-5 days ago.  She is ill-appearing.  Significant tenderness to palpation along the right side of her face, neck, chest wall.  Airway intact. ENT consulted for prompt evaluation.  Differential Diagnosis:   Necrotizing fasciitis, Daniel's angina, cellulitis, myositis, abscess  Clinical Tests:   Lab Tests: Reviewed  Radiological Study: Reviewed  ED Management:  ENT at bedside and plans to admit at this time for surgery in further management                        Clinical Impression:   Final diagnoses:  [M60.9] Myositis, unspecified myositis type, unspecified site (Primary)  [L08.9] Neck infection        ED Disposition Condition    Admit Stable                Mago Faria PA-C  06/08/23 2133

## 2023-06-09 NOTE — NURSING
Nurses Note -- 4 Eyes      6/9/2023   2:04 AM      Skin assessed during: Admit      [x] No Altered Skin Integrity Present    [x]Prevention Measures Documented      [] Yes- Altered Skin Integrity Present or Discovered   [] LDA Added if Not in Epic (Describe Wound)   [] New Altered Skin Integrity was Present on Admit and Documented in LDA   [] Wound Image Taken    Wound Care Consulted? No     Attending Nurse:  Elizabeth Kyle RN     Second RN/Staff Member:  Chantale JONES RN     Foams to heels and sacrum place. Heel boots in use as well.

## 2023-06-09 NOTE — ED NOTES
Aditi Conway, a 42 y.o. female presents to the ED w/ complaint of ENT consult. Pt reports popping pimple behind left ear and noted swelling and bruising in area. Pt states that chest reddening is also a new symptom.  Denies SOB.    Triage note:  Chief Complaint   Patient presents with    ENT Consult     Popped a pimple behind her left ear[place] Noticed area started getting black and swollen, today significantly swollen around neck and behind ear. Per visitor, voice sounds different. Pt does not report any SOB, airway complaints.      Review of patient's allergies indicates:   Allergen Reactions    Clindamycin      Past Medical History:   Diagnosis Date    Depression     Heart murmur     Seizures

## 2023-06-09 NOTE — NURSING
Patient admitted to SICU [address]00. Dr. Torres at bedside. Patient connected to Bedside monitoring. VSS. Orders received. ENT Resident to bedside as well. Patient to remain intubated and sedated at this time for airway safety. ENT to do Right Neck DSG BID, ok if saturation present. Restraints placed for safety.

## 2023-06-09 NOTE — CONSULTS
[facility]  Infectious Disease  Consult Note    Patient Name: Aditi Conway  MRN: [id number]  Admission Date: 6/8/2023  Hospital Length of Stay: 1 days  Attending Physician: Gerardo Seymour MD  Primary Care Provider: Primary Doctor No     Isolation Status: No active isolations    Patient information was obtained from ER records and primary team.      Inpatient consult to Infectious Diseases  Consult performed by: Javier Lim MD  Consult ordered by: Walter De La Cruz MD        Assessment/Plan:     ID  * Necrotizing fasciitis  42-year-old female with h/o depression, seizure (no meds), tobacco use emergently transferred to [facility] for necrotizing fasciitis of her neck after swimming in the [facility] 6/4/23 with an open neck wound.  Exposure to brackish water, will need coverage to waterborne pathogens.  Reported clindamycin allergy, can use linezolid, discussed sedation adjustments with primary team.    Recommendations:  -continue cefepime, metronidazole, doxycycline  -can change vancomycin to linezolid  -from ID standpoint ok for PICC line for access  -pending OR takeback by ENT      Discussed with primary team.    Thank you for your consult. I will follow-up with patient. Please contact us if you have any additional questions.    Javier Lim MD  Infectious Disease  [facility]    Subjective:     Principal Problem: Necrotizing fasciitis    HPI: 42-year-old female with h/o depression, seizure (no meds), tobacco use went swimming in the [facility] on 6/4/23 while having an open wound on the right side of her neck.  Subsequently developed fevers, chills, and erythema throughout her neck and upper chest.  Initially presented to [facility] 6/6/23 and was given Bactrim.  Then went to [place] ED 6/7/23 and started on vancomycin/pip-tazo/doxycycline.  Emergently transferred to [facility] after continued rapid spread of infection and acute decompensation.      CT showed  fairly extensive asymmetric soft tissue swelling and subcutaneous stranding involving the right anterior, lateral and posterior neck continuing inferiorly to the anterior chest wall consistent with changes of cellulitis and possible phlegmon without clearly identified abscess formation; associated myositis suspected right sternocleidomastoid and right posterolateral musculature.  6/8/23 s/p wide debridement by ENT.    Consult: Nectrotizing fasciitis management      Past Medical History:   Diagnosis Date    Depression     Heart murmur     Seizures        Past Surgical History:   Procedure Laterality Date    DEBRIDEMENT, BREAST Left 11/11/2021    Procedure: DEBRIDEMENT,  BREAST;  Surgeon: Yoan Sparks MD;  Location: [facility];  Service: General;  Laterality: Left;  DEBRIDEMENT, WOUND, LEFT BREAST    DILATION AND CURETTAGE OF UTERUS      INCISION AND DRAINAGE Left 7/12/2021    Procedure: INCISION AND DRAINAGE, HEMATOMA, SEROMA, OR FLUID COLLECTION ;  Surgeon: Yoan Sparks MD;  Location: [facility];  Service: General;  Laterality: Left;    NECK EXPLORATION Bilateral 6/8/2023    Procedure: EXPLORATION and debridement, NECK;  Surgeon: Jose Jules MD;  Location: [address];  Service: ENT;  Laterality: Bilateral;       Review of patient's allergies indicates:   Allergen Reactions    Clindamycin        Medications:  Medications Prior to Admission   Medication Sig    HYDROcodone-acetaminophen (NORCO) 5-325 mg per tablet Take 1 tablet by mouth every 4 (four) hours as needed for Pain. (Patient not taking: Reported on 1/18/2023)    HYDROcodone-acetaminophen (NORCO) 5-325 mg per tablet Take 1 tablet by mouth every 8 (eight) hours as needed for Pain.    ibuprofen (ADVIL,MOTRIN) 600 MG tablet Take 1 tablet (600 mg total) by mouth every 6 (six) hours as needed for Pain.    ibuprofen (ADVIL,MOTRIN) 800 MG tablet Take 1 tablet (800 mg total) by mouth every 6 (six) hours as needed for Pain. (Patient not taking:  Reported on 1/18/2023)     Antibiotics (From admission, onward)      Start     Stop Route Frequency Ordered    06/09/23 1245  linezolid 600 mg/300 mL IVPB 600 mg         -- IV Every 12 hours (non-standard times) 06/09/23 1142    06/09/23 1000  metronidazole IVPB 500 mg         -- IV Every 8 hours (non-standard times) 06/09/23 0748    06/09/23 0900  doxycycline (VIBRAMYCIN) 100 mg in dextrose 5 % in water (D5W) 5 % 100 mL IVPB (MB+)         -- IV Every 12 hours (non-standard times) 06/09/23 0748    06/09/23 0000  ceFEPIme (MAXIPIME) 2 g in dextrose 5 % in water (D5W) 5 % 100 mL IVPB (MB+)         -- IV Every 8 hours (non-standard times) 06/08/23 2323          Antifungals (From admission, onward)      None          Antivirals (From admission, onward)      None             Immunization History   Administered Date(s) Administered    COVID-19, vector-nr, rS-Ad26, PF (Ana) 04/07/2021    DTP 03/10/1983, 05/12/1983, 07/07/1983, 02/16/1984, 04/02/1987    MMR 02/16/1984    OPV 03/10/1983, 05/12/1983, 07/07/1983, 02/16/1984, 04/02/1987    Td (Adult), Unspecified Formulation 12/07/2004    Tdap 06/03/2019       Family History       Problem Relation (Age of Onset)    Diabetes Father    Heart disease Father    Hyperlipidemia Father    Hypertension Mother, Father          Social History     Socioeconomic History    Marital status:     Years of education: 9th   Occupational History     Employer: [id number]   Tobacco Use    Smoking status: Every Day     Packs/day: 1.00     Years: 20.00     Pack years: 20.00     Types: Cigarettes    Smokeless tobacco: Never    Tobacco comments:     Informed about classes in smoking cessation. States will call.   Substance and Sexual Activity    Alcohol use: Yes     Comment: Socially    Drug use: No    Sexual activity: Yes     Partners: Male     Birth control/protection: None     Social Determinants of Health     Financial Resource Strain: Low Risk     Difficulty of  Paying Living Expenses: Not hard at all   Food Insecurity: No Food Insecurity    Worried About Running Out of Food in the Last Year: Never true    Ran Out of Food in the Last Year: Never true   Transportation Needs: Unknown    Lack of Transportation (Non-Medical): No   Physical Activity: Sufficiently Active    Days of Exercise per Week: 6 days    Minutes of Exercise per Session: 30 min   Stress: No Stress Concern Present    Feeling of Stress : Not at all   Social Connections: Moderately Isolated    Frequency of Communication with Friends and Family: More than three times a week    Frequency of Social Gatherings with Friends and Family: More than three times a week    Attends Hinduism Services: Never    Active Member of Clubs or Organizations: No    Attends Club or Organization Meetings: Never    Marital Status:    Housing Stability: Unknown    Unable to Pay for Housing in the Last Year: No    Unstable Housing in the Last Year: No     Review of Systems   Unable to perform ROS: Intubated   Constitutional:  Positive for chills and fever.   Skin:  Positive for wound.   Objective:     Vital Signs (Most Recent):  Temp: (!) 100.9 °F (38.3 °C) (06/09/23 1500)  Pulse: 86 (06/09/23 1555)  Resp: (!) 28 (06/09/23 1626)  BP: 97/61 (06/09/23 1501)  SpO2: 100 % (06/09/23 1501) Vital Signs (24h Range):  Temp:  [98.7 °F (37.1 °C)-101.5 °F (38.6 °C)] 100.9 °F (38.3 °C)  Pulse:  [] 86  Resp:  [14-42] 28  SpO2:  [97 %-100 %] 100 %  BP: ()/(49-96) 97/61     Weight: 48.9 kg (107 lb 12.9 oz)  Body mass index is 19.72 kg/m².    Estimated Creatinine Clearance: 115.5 mL/min (based on SCr of 0.5 mg/dL).     Physical Exam  Constitutional:       Appearance: She is ill-appearing. She is not toxic-appearing or diaphoretic.   HENT:      Head:      Comments: intubated  Eyes:      General: No scleral icterus.        Right eye: No discharge.         Left eye: No discharge.   Neck:      Comments: Erythema/swelling  from neck down to upper chest with some crepitus.  Surgical site dressed  Pulmonary:      Comments: Vent Mode: A/C  Oxygen Concentration (%):  [40] 40  Resp Rate Total:  [15 br/min-30 br/min] 22 br/min  Vt Set:  [360 mL] 360 mL  PEEP/CPAP:  [5 cmH20] 5 cmH20  Mean Airway Pressure:  [6.5 cmH20-9.8 cmH20] 9.8 cmH20     Skin:     Coloration: Skin is not jaundiced.      Findings: Erythema present.        Significant Labs: All pertinent labs within the past 24 hours have been reviewed.    Significant Imaging: I have reviewed all pertinent imaging results/findings within the past 24 hours.

## 2023-06-09 NOTE — ANESTHESIA PREPROCEDURE EVALUATION
[facility]  Anesthesia Pre-Operative Evaluation         Patient Name: Aditi Conway  YOB: 1980  MRN: [id number]    SUBJECTIVE:     Pre-operative evaluation for Procedure(s) (LRB):  EXPLORATION and debridement, NECK (Bilateral)     06/08/2023    Aditi Conway is a 42 y.o. female w/ a significant PMHx of seizures and tobacco abuse. She presented for evaluation of neck pain and swelling after an injury with marine exposure. Pt febrile w/ severe diffuse cellulitis. ENT consulted for further management.    She has been NPO for food since prior to midnight. She has been drinking sips of water since arriving in the ED.    Patient now presents for the above procedure(s).      LDA:        Peripheral IV - Single Lumen 06/08/23 2000 20 G Right Antecubital (Active)   Number of days: 0            Peripheral IV - Single Lumen 06/08/23 2002 20 G Left Antecubital (Active)   Number of days: 0       Prev airway: None documented.    Drips: None documented.      Patient Active Problem List   Diagnosis    Epilepsy    Elevated BP    Tobacco abuse    Systolic murmur    Dental caries    Pharyngitis    Tooth abscess    Cellulitis of neck       Review of patient's allergies indicates:   Allergen Reactions    Clindamycin        Current Inpatient Medications:   lactated ringers  1,000 mL Intravenous ED 1 Time       No current facility-administered medications on file prior to encounter.     Current Outpatient Medications on File Prior to Encounter   Medication Sig Dispense Refill    HYDROcodone-acetaminophen (NORCO) 5-325 mg per tablet Take 1 tablet by mouth every 4 (four) hours as needed for Pain. (Patient not taking: Reported on 1/18/2023) 10 tablet 0    HYDROcodone-acetaminophen (NORCO) 5-325 mg per tablet Take 1 tablet by mouth every 8 (eight) hours as needed for Pain. 12 tablet 0    ibuprofen (ADVIL,MOTRIN) 600 MG tablet Take 1 tablet (600 mg total) by mouth every 6  (six) hours as needed for Pain. 20 tablet 0    ibuprofen (ADVIL,MOTRIN) 800 MG tablet Take 1 tablet (800 mg total) by mouth every 6 (six) hours as needed for Pain. (Patient not taking: Reported on 1/18/2023) 20 tablet 0       Past Surgical History:   Procedure Laterality Date    DEBRIDEMENT, BREAST Left 11/11/2021    Procedure: DEBRIDEMENT,  BREAST;  Surgeon: Yoan Sparks MD;  Location: [facility] beds at [facility];  Service: General;  Laterality: Left;  DEBRIDEMENT, WOUND, LEFT BREAST    DILATION AND CURETTAGE OF UTERUS      INCISION AND DRAINAGE Left 7/12/2021    Procedure: INCISION AND DRAINAGE, HEMATOMA, SEROMA, OR FLUID COLLECTION ;  Surgeon: Yoan Sparks MD;  Location: [facility] beds at the [facility];  Service: General;  Laterality: Left;       Social History:  Tobacco Use: High Risk    Smoking Tobacco Use: Every Day    Smokeless Tobacco Use: Never    Passive Exposure: Not on file      Alcohol Use: Not on file        OBJECTIVE:     Vital Signs Range (Last 24H):  Temp:  [36.5 °C (97.7 °F)-39.7 °C (103.5 °F)]   Pulse:  []   Resp:  [18-34]   BP: (117-159)/(64-98)   SpO2:  [96 %-100 %]       Significant Labs:  Lab Results   Component Value Date    WBC 15.89 (H) 06/08/2023    HGB 13.7 06/08/2023    HCT 40.4 06/08/2023     06/08/2023    ALT 28 06/07/2023    AST 19 06/07/2023     (L) 06/08/2023    K 3.2 (L) 06/08/2023     06/08/2023    CREATININE 0.6 06/08/2023    BUN 6 06/08/2023    CO2 22 (L) 06/08/2023    INR 1.1 06/08/2023       Diagnostic Studies: No relevant studies.    EKG:   Results for orders placed or performed during the hospital encounter of 06/07/23   EKG 12-lead    Collection Time: 06/07/23  8:29 PM    Narrative    Test Reason : RO7.9    Vent. Rate : 095 BPM     Atrial Rate : 095 BPM     P-R Int : 138 ms          QRS Dur : 084 ms      QT Int : 326 ms       P-R-T Axes : 041 038 043 degrees     QTc Int : 409 ms    Normal sinus rhythm  Within normal limits  No previous ECGs available  Confirmed by Toney BENAVIDEZ,  Germain ([id number]) on 6/8/2023 8:13:05 AM    Referred By: CHRISTIAN   SELF           Confirmed By:Germain Ziegler MD       2D ECHO:  TTE (4/8/2013):    1 - Normal left ventricular function (EF 65%).     2 - Normal diastolic function.     3 - Trivial mitral regurgitation.     4 - Trivial pulmonic regurgitation.     KIKE:  No results found for this or any previous visit.    ASSESSMENT/PLAN:           Pre-op Assessment    I have reviewed the Patient Summary Reports.     I have reviewed the Nursing Notes.    I have reviewed the Medications.     Review of Systems  Anesthesia Hx:  No problems with previous Anesthesia   Denies Personal Hx of Anesthesia complications.   Social:  Smoker    Hematology/Oncology:  Hematology Normal        EENT/Dental:EENT/Dental Normal   Cardiovascular:  Cardiovascular Normal Exercise tolerance: good     Pulmonary:  Pulmonary Normal    Renal/:  Renal/ Normal     Hepatic/GI:  Hepatic/GI Normal    Musculoskeletal:  Musculoskeletal Normal    Neurological:   Seizures    Endocrine:  Endocrine Normal    Psych:   depression          Physical Exam  General: Well nourished, Alert and Oriented    Airway:  Mallampati: II   Mouth Opening: Normal  TM Distance: Normal  Tongue: Normal  Neck ROM: Normal ROM    Dental:  Intact    Chest/Lungs:  Normal Respiratory Rate    Heart:  Rate: Normal  Rhythm: Regular Rhythm        Anesthesia Plan  Type of Anesthesia, risks & benefits discussed:    Anesthesia Type: Gen ETT  Intra-op Monitoring Plan: Standard ASA Monitors  Post Op Pain Control Plan: multimodal analgesia and IV/PO Opioids PRN  Induction:  IV  Airway Plan: Direct, Post-Induction  Informed Consent: Informed consent signed with the Patient and all parties understand the risks and agree with anesthesia plan.  All questions answered.   ASA Score: 2  Day of Surgery Review of History & Physical: H&P Update referred to the surgeon/provider.    Ready For Surgery From Anesthesia Perspective.     .

## 2023-06-09 NOTE — SUBJECTIVE & OBJECTIVE
Medications:  Continuous Infusions:  Scheduled Meds:  PRN Meds:     Current Facility-Administered Medications on File Prior to Encounter   Medication    [COMPLETED] ketorolac injection 15 mg    [COMPLETED] lactated ringers bolus 1,000 mL    [COMPLETED] oxyCODONE immediate release tablet 15 mg    [COMPLETED] potassium bicarbonate disintegrating tablet 60 mEq    [COMPLETED] sodium chloride 0.9% bolus 2,000 mL 2,000 mL    [DISCONTINUED] acetaminophen tablet 650 mg    [DISCONTINUED] aluminum-magnesium hydroxide-simethicone 200-200-20 mg/5 mL suspension 30 mL    [DISCONTINUED] doxycycline tablet 100 mg    [DISCONTINUED] enoxaparin injection 40 mg    [DISCONTINUED] HYDROmorphone (PF) injection 1 mg    [DISCONTINUED] HYDROmorphone (PF) injection 1 mg    [DISCONTINUED] ibuprofen tablet 400 mg    [DISCONTINUED] naloxone 0.4 mg/mL injection 0.02 mg    [DISCONTINUED] nicotine 21 mg/24 hr 1 patch    [DISCONTINUED] ondansetron injection 4 mg    [DISCONTINUED] oxyCODONE immediate release tablet 10 mg    [DISCONTINUED] oxyCODONE immediate release tablet 10 mg    [DISCONTINUED] oxyCODONE immediate release tablet 5 mg    [DISCONTINUED] oxyCODONE immediate release tablet 5 mg    [DISCONTINUED] piperacillin-tazobactam (ZOSYN) 4.5 g in dextrose 5 % in water (D5W) 5 % 100 mL IVPB (MB+)    [DISCONTINUED] prochlorperazine injection Soln 5 mg    [DISCONTINUED] senna-docusate 8.6-50 mg per tablet 1 tablet    [DISCONTINUED] sodium chloride 0.9% flush 10 mL    [DISCONTINUED] vancomycin - pharmacy to dose    [DISCONTINUED] vancomycin 750 mg in dextrose 5 % (D5W) 250 mL IVPB (Vial-Mate)     Current Outpatient Medications on File Prior to Encounter   Medication Sig    HYDROcodone-acetaminophen (NORCO) 5-325 mg per tablet Take 1 tablet by mouth every 4 (four) hours as needed for Pain. (Patient not taking: Reported on 1/18/2023)    HYDROcodone-acetaminophen (NORCO) 5-325 mg per tablet Take 1 tablet by mouth every 8 (eight) hours as needed for  Pain.    ibuprofen (ADVIL,MOTRIN) 600 MG tablet Take 1 tablet (600 mg total) by mouth every 6 (six) hours as needed for Pain.    ibuprofen (ADVIL,MOTRIN) 800 MG tablet Take 1 tablet (800 mg total) by mouth every 6 (six) hours as needed for Pain. (Patient not taking: Reported on 1/18/2023)       Review of patient's allergies indicates:   Allergen Reactions    Clindamycin        Past Medical History:   Diagnosis Date    Depression     Heart murmur     Seizures      Past Surgical History:   Procedure Laterality Date    DEBRIDEMENT, BREAST Left 11/11/2021    Procedure: DEBRIDEMENT,  BREAST;  Surgeon: Yoan Sparks MD;  Location: [facility];  Service: General;  Laterality: Left;  DEBRIDEMENT, WOUND, LEFT BREAST    DILATION AND CURETTAGE OF UTERUS      INCISION AND DRAINAGE Left 7/12/2021    Procedure: INCISION AND DRAINAGE, HEMATOMA, SEROMA, OR FLUID COLLECTION ;  Surgeon: Yoan Sparks MD;  Location: [facility];  Service: General;  Laterality: Left;     Family History       Problem Relation (Age of Onset)    Diabetes Father    Heart disease Father    Hyperlipidemia Father    Hypertension Mother, Father          Tobacco Use    Smoking status: Every Day     Packs/day: 1.00     Years: 20.00     Pack years: 20.00     Types: Cigarettes    Smokeless tobacco: Never    Tobacco comments:     Informed about classes in smoking cessation. States will call.   Substance and Sexual Activity    Alcohol use: Yes     Comment: Socially    Drug use: No    Sexual activity: Yes     Partners: Male     Birth control/protection: None     Review of Systems as above  Objective:     Vital Signs (Most Recent):  Temp: 100.1 °F (37.8 °C) (06/08/23 1945)  Pulse: (!) 128 (06/08/23 1945)  Resp: 18 (06/08/23 2108)  BP: 136/81 (06/08/23 1945)  SpO2: 97 % (06/08/23 1945) Vital Signs (24h Range):  Temp:  [97.7 °F (36.5 °C)-103.4 °F (39.7 °C)] 100.1 °F (37.8 °C)  Pulse:  [] 128  Resp:  [18-34] 18  SpO2:  [96 %-100 %] 97 %  BP: (117-159)/(64-98) 136/81      Weight: 49.9 kg (110 lb)  Body mass index is 20.12 kg/m².    Date 06/08/23 0700 - 06/09/23 0659   Shift 0284-2821 2972-5632 0316-7379 24 Hour Total   INTAKE   IV Piggyback  1000  1000   Shift Total(mL/kg)  1000(20)  1000(20)   OUTPUT   Shift Total(mL/kg)       Weight (kg)  49.9 49.9 49.9        Physical Exam     Wincing in pain  Significant R>L neck edema, erythema extending to superior chest and right shoulder. With area of ecchymosis on right neck. Extremely tender. Irregular borders  No stridor or stertor on room air    Significant Labs:  CBC:   Recent Labs   Lab 06/08/23  0604   WBC 15.89*   RBC 4.47   HGB 13.7   HCT 40.4      MCV 90   MCH 30.6   MCHC 33.9       Significant Diagnostics:  I have reviewed all pertinent imaging results/findings within the past 24 hours.

## 2023-06-09 NOTE — ASSESSMENT & PLAN NOTE
42-year-old female with h/o depression, seizure (no meds), tobacco use emergently transferred to [facility] for necrotizing fasciitis of [place] neck [place] with an open neck wound.  Exposure to brackish water, will need coverage to waterborne pathogens.  Reported clindamycin allergy, can use linezolid, discussed sedation adjustments with primary team.    Recommendations:  -continue cefepime, metronidazole, doxycycline  -can change vancomycin to linezolid  -from ID standpoint ok for PICC line for access  -pending OR takeback by ENT

## 2023-06-09 NOTE — PLAN OF CARE
SICU PLAN OF CARE NOTE    Dx: Necrotizing fasciitis    Shift Events: Patient admitted overnight. NS - sinus tach on monitor. 100% sats on vent 40% 5 peep. Fentanly drip and propofol for sedation and comfort, tolerating well. Plan of care discussed with patient. Reassurance provided when patient waking up and restless.    X2 fentanyl  bolus given via pump after 6am while ENT Mds in room changing neck DSG, given as verbally ordered by Dr. De La Cruz.  Dr. Torres called and updated for PRN order as well.     Goals of Care: MAP >65; sedation and decreased stimuli for protection of carotid.     Neuro: Sedated, Arouses to Voice, Follows Commands, and Moves All Extremities        Respiratory: Ventilator    Diet: NPO    Gtts: Propfol, Fentanyl, MIVF, and Abx    Urine Output: Urinary Catheter 1745 cc/shift    Drains: NG/OG Tube 0 cc /  shift    Restraints In place for safety. Patient very restless and agitated when stimulated, reaching for ET tube. Patient educated on rational and safety of restraints.       Labs/Accuchecks: Daily labs.    Skin: No breakdown noted. Foams in place. Waffle over mattress inflated. ENT to do Dsg changes to neck BID, rounded this AM and Did first since OR.

## 2023-06-09 NOTE — TRANSFER OF CARE
Anesthesia Transfer of Care Note    Patient: Aditi Conway    Procedure(s) Performed: Procedure(s) (LRB):  EXPLORATION and debridement, NECK (Bilateral)    Patient location: ICU    Anesthesia Type: general    Transport from OR: Transported from OR intubated on 100% O2 by AMBU with adequate controlled ventilation. Upon arrival to PACU/ICU, patient attached to ventilator and auscultated to confirm bilateral breath sounds and adequate TV. Continuous ECG monitoring in transport. Continuous SpO2 monitoring in transport    Post pain: adequate analgesia    Post assessment: no apparent anesthetic complications and tolerated procedure well    Post vital signs: stable    Level of consciousness: sedated    Nausea/Vomiting: no nausea/vomiting    Complications: none    Transfer of care protocol was followed      Last vitals:   Visit Vitals  /81   Pulse (!) 128   Temp 37.8 °C (100.1 °F) (Oral)   Resp 18   Wt 49.9 kg (110 lb)   SpO2 97%   BMI 20.12 kg/m²

## 2023-06-09 NOTE — HPI
42F with history of breast abscess presents with acutely worsening neck edema, erythema, and severe pain. It started last Saturday after popping a pimple on her posterior right neck. There was localized pain and swelling in the area that night. [place], and after that her pain, swelling and redness worsened significantly daily. This prompted her to go to the outside ED yesterday and she was admitted to the Medicine service and was started on vanc and zosyn. This morning, the medicine team noticed significant worsening of her exam therefore she was transferred to the [facility] ED for ENT evaluation. Her exam was concerning for necrotizing fasciitis, therefore she was taken to the OR emergently for debridement.

## 2023-06-09 NOTE — H&P
[facility] - [facility] ([facility])  Critical Care - Surgery  History & Physical    Patient Name: Aditi Conway  MRN: [id number]  Admission Date: 6/8/2023  Code Status: Full Code  Attending Physician: Gerardo Seymour MD   Primary Care Provider: Primary Doctor No   Principal Problem: Necrotizing fasciitis    Subjective:     HPI: 42F PMH depression, seizures (no meds), tobacco use, presenting with concern for toshia's angina / nec fasc of nec s/p exploration and debridement with ENT 6/8/23. Patient had open wound on R side neck and went swimming in [facility] on 6/4. Subsequently began experiencing fevers/chills, spreading erythema over chest and neck. Went to [facility] 6/6, given bactrim. On 6/7 presented to ED in [place]. Started on vanc/zosyn/doxy, concern for vibrio infection, cultures from 6/7 NGTD. [place]gently [place] [facility] [place] continued rapid spreading of infection with acute decompensation.     On arrival to SICU patient remains intubated, on minimal settings. Arrived on 0.5 josue, able to be turned off w/o issue. Sedated with propofol gtt and fent pushes prn. Clark, OGT placed. Planning to remain intubated until return to OR latest by Monday per ENT. Wet to dry dressings over R neck incision, spreading erythema to anterior chest and L side of neck but not requiring intervention during OR. Majority sternocleidomastoid removed, overlying muscle superior to IJ but if requires central access will approach via femoral      Follow-up For: Procedure(s) (LRB):  EXPLORATION and debridement, NECK (Bilateral)    Post-Operative Day: Day of Surgery     Past Medical History:   Diagnosis Date    Depression     Heart murmur     Seizures        Past Surgical History:   Procedure Laterality Date    DEBRIDEMENT, BREAST Left 11/11/2021    Procedure: DEBRIDEMENT,  BREAST;  Surgeon: Yoan Sparks MD;  Location: [facility];  Service: General;  Laterality: Left;  DEBRIDEMENT, WOUND, LEFT BREAST    DILATION AND CURETTAGE  OF UTERUS      INCISION AND DRAINAGE Left 7/12/2021    Procedure: INCISION AND DRAINAGE, HEMATOMA, SEROMA, OR FLUID COLLECTION ;  Surgeon: Yoan Sparks MD;  Location: [facility];  Service: General;  Laterality: Left;       Review of patient's allergies indicates:   Allergen Reactions    Clindamycin        Family History       Problem Relation (Age of Onset)    Diabetes Father    Heart disease Father    Hyperlipidemia Father    Hypertension Mother, Father          Tobacco Use    Smoking status: Every Day     Packs/day: 1.00     Years: 20.00     Pack years: 20.00     Types: Cigarettes    Smokeless tobacco: Never    Tobacco comments:     Informed about classes in smoking cessation. States will call.   Substance and Sexual Activity    Alcohol use: Yes     Comment: Socially    Drug use: No    Sexual activity: Yes     Partners: Male     Birth control/protection: None      Review of Systems   Constitutional:  Positive for chills and fever.   Musculoskeletal:  Positive for neck pain.   All other systems reviewed and are negative.  Objective:     Vital Signs (Most Recent):  Temp: 100.1 °F (37.8 °C) (06/08/23 1945)  Pulse: (!) 128 (06/08/23 1945)  Resp: 18 (06/08/23 2108)  BP: 136/81 (06/08/23 1945)  SpO2: 97 % (06/08/23 1945) Vital Signs (24h Range):  Temp:  [97.7 °F (36.5 °C)-103.4 °F (39.7 °C)] 100.1 °F (37.8 °C)  Pulse:  [] 128  Resp:  [18-34] 18  SpO2:  [96 %-100 %] 97 %  BP: (117-159)/(64-98) 136/81     Weight: 49.9 kg (110 lb)  Body mass index is 20.12 kg/m².      Intake/Output Summary (Last 24 hours) at 6/8/2023 2339  Last data filed at 6/8/2023 2324  Gross per 24 hour   Intake 1500 ml   Output --   Net 1500 ml       Physical Exam  Vitals and nursing note reviewed.   Constitutional:       Comments: Sedated, intubated   HENT:      Head: Normocephalic.      Comments: R neck incision open with dressing packed. No strikethrough visible  Spreading erythema to superior aspect chest and L neck  Eyes:      Pupils: Pupils  are equal, round, and reactive to light.   Cardiovascular:      Rate and Rhythm: Tachycardia present.   Pulmonary:      Comments: Intubated on rate control 40/5  Abdominal:      Palpations: Abdomen is soft.   Musculoskeletal:         General: Normal range of motion.      Cervical back: Neck supple.   Neurological:      Comments: sedated   Psychiatric:      Comments: Unable to assess       Vents:       Lines/Drains/Airways       Peripheral Intravenous Line  Duration                  Peripheral IV - Single Lumen 06/08/23 2000 20 G Right Antecubital <1 day         Peripheral IV - Single Lumen 06/08/23 2002 20 G Left Antecubital <1 day                    Significant Labs:    CBC/Anemia Profile:  Recent Labs   Lab 06/07/23 1941 06/08/23  0604   WBC 13.44* 15.89*   HGB 13.5 13.7   HCT 40.2 40.4    242   MCV 91 90   RDW 12.2 12.0        Chemistries:  Recent Labs   Lab 06/07/23 1941 06/08/23  0604   * 135*   K 3.6 3.2*   CL 99 103   CO2 26 22*   BUN 9 6   CREATININE 0.6 0.6   CALCIUM 10.0 8.9   ALBUMIN 3.8  --    PROT 7.3  --    BILITOT 0.4  --    ALKPHOS 79  --    ALT 28  --    AST 19  --        All pertinent labs within the past 24 hours have been reviewed.    Significant Imaging: I have reviewed all pertinent imaging results/findings within the past 24 hours.    Assessment/Plan:     Active Diagnoses:    Diagnosis Date Noted POA    PRINCIPAL PROBLEM:  Necrotizing fasciitis [M72.6] 06/08/2023 Unknown      Problems Resolved During this Admission:       42F PMH depression, seizures (no meds), tobacco use, presenting with concern for toshia's angina / nec fasc of nec s/p exploration and debridement with ENT 6/8/23        Neuro/Psych:   -- Sedation: propofol gtt   -- Pain: fent 50mcg pushes prn             Cards:   -- HDS  -- currently not requiring pressors      Pulm:   -- Goal O2 sat > 90%  -- Intubated on rate control 40/5, wean as able      Renal:  -- Keep murdock for strict I/O  -- BUN/Cr 6/0.6      FEN / GI:    -- Net +1.5L   -- Replace lytes as needed  -- Nutrition: NPO  -- OGT in place  -- LR @ 125cc/hr      ID:   -- Tm: 103.5; WBC 16  -- Abx: Vanc/cefepime  -- Following OR cultures 6/8  -- Blood cultures OSH 6/7 NGTD  -- ID consulted  -- ENT planning to take back to OR at latest Monday      Heme/Onc:   -- H/H stable 13.7/40.4  -- Daily CBC      Endo:   -- Gluc goal 140-180      PPx:   Feeding: NPO  Analgesia/Sedation: adequate  Thromboembolic prevention: lovenox beginning 6/9  HOB >30: Yes  Stress Ulcer ppx: pepcid while intubated  Glucose control: Critical care goal 140-180 g/dl    Lines/Drains/Airway: pIVs, OGT, Clark, ETT      Dispo/Code Status/Palliative:   -- SICU / Full Code        Critical care was time spent personally by me on the following activities: development of treatment plan with patient or surrogate and bedside caregivers, discussions with consultants, evaluation of patient's response to treatment, examination of patient, ordering and performing treatments and interventions, ordering and review of laboratory studies, ordering and review of radiographic studies, pulse oximetry, re-evaluation of patient's condition.  This critical care time did not overlap with that of any other provider or involve time for any procedures.     Clement Torres MD  Critical Care - Surgery  [facility] - Surgery (2nd Fl)

## 2023-06-09 NOTE — PLAN OF CARE
Pt intubated and no family present at bedside. Will continue to follow.    Maggy Chandler RN     Ext [phone]

## 2023-06-09 NOTE — CONSULTS
"  [facility] - Surgical Intensive Care  Adult Nutrition  Consult Note    SUMMARY     Recommendations    If propofol continue at current rate, rec'd Impact peptide @ 35ml/hr to provide 1260 kcal, 79g protein, 647ml FW. Propofol @15ml/hr provide additional 396 kcal    If propofol d/c, rec'd Impact peptide 1.5 @ 45ml/hr to provide 1620 kcal, 101g protein, 832ml FW    When medically able, ADAT Regular diet with texture per SLP    RD to monitor and follow    Goals: Meey % EEN, EPN by RD f/u  Nutrition Goal Status: new  Communication of RD Recs:  (POC)    Assessment and Plan    Nutrition Problem:  Inadequate energy intake     Related to (etiology):   Inability to consume sufficient energy      Signs and Symptoms (as evidenced by):   NPO     Interventions/Recommendations (treatment strategy):  Collaboration with providers  EN     Nutrition Diagnosis Status:   New      Reason for Assessment    Reason For Assessment: consult  Diagnosis:  (necrotizing fasciitis)  Interdisciplinary Rounds: did not attend    General Information Comments:   Pt is intubate/sedated at this time. Plan to intubate until return to OR latest by Monday per ENT. Propofol @ 15ml/hr providing 396 kcal. Wt stable per wt hx. NFPE not warranted at this time. Unable to determine nutritional status.     Nutrition Discharge Planning: Pending medical course    Anthropometrics    Temp: 100.2 °F (37.9 °C)  Height: 5' 2" (157.5 cm)  Height (inches): 62 in  Weight Method: Bed Scale  Weight: 48.9 kg (107 lb 12.9 oz)  Weight (lb): 107.81 lb  Ideal Body Weight (IBW), Female: 110 lb  BMI (Calculated): 19.7     Lab/Procedures/Meds    Pertinent Labs Reviewed: reviewed  Pertinent Labs Comments: P 2.3, Na 134, glucose 201, albumin 2.3  Pertinent Medications Reviewed: reviewed  Pertinent Medications Comments: Na phosphate, famotidine, precedex, propofol, lactated ringers    Estimated/Assessed Needs    Weight Used For Calorie Calculations: 48.5 kg (107 lb)  Energy Calorie " Requirements (kcal): 1508 kcal  Energy Need Method: [facility]  Protein Requirements: 72g (1.5g/kg)  Weight Used For Protein Calculations: 48.5 kg (107 lb)  Fluid Requirements (mL): 1ml/kcal or per MD  Estimated Fluid Requirement Method: RDA Method  RDA Method (mL): 1508     Evaluation of Received Nutrient/Fluid Intake    I/O: + 2.0 L since admit  Energy Calories Required: not meeting needs  Protein Required: not meeting needs    Nutrition Risk    Level of Risk/Frequency of Follow-up:  (1x/week)     Monitor and Evaluation    Food and Nutrient Intake: energy intake, food and beverage intake, enteral nutrition intake, parenteral nutrition intake  Food and Nutrient Adminstration: diet order, enteral and parenteral nutrition administration  Physical Activity and Function: nutrition-related ADLs and IADLs  Anthropometric Measurements: height/length, weight, weight change, body mass index  Biochemical Data, Medical Tests and Procedures: electrolyte and renal panel, glucose/endocrine profile, inflammatory profile, gastrointestinal profile, lipid profile  Nutrition-Focused Physical Findings: overall appearance     Nutrition Follow-Up    RD Follow-up?: Yes

## 2023-06-09 NOTE — CONSULTS
Double lumen PICC to right brachial vein.  35 cm in length, 23 cm arm circumference and 0 cm exposed.   Lot # [id number].

## 2023-06-09 NOTE — PROGRESS NOTES
Pharmacokinetic Initial Assessment: IV Vancomycin    Assessment/Plan:    Ms. Conway received vancomycin with loading dose of 1000 mg once at the OSH.  - She was started on 750 mg Q12H schedule before transferring.  - Will continue a maintenance dose of vancomycin 750 mg IV every 12 hours.  - Ms. Conway's renal function appears stable and at baseline.   - Desired empiric serum trough concentration is 10 to 20 mcg/mL.  - Draw vancomycin trough level 60 min prior to fourth dose on 6/9 at approximately 1100.  - Please draw random level sooner than scheduled trough if renal function changes significantly.    Pharmacy will continue to follow and monitor vancomycin.      Please contact pharmacy at extension [phone] with any questions regarding this assessment.     Thank you for the consult,   Janett Arroyo       Patient brief summary:  Aditi Conway is a 42 y.o. female initiated on antimicrobial therapy with IV Vancomycin for treatment of suspected skin & soft tissue infection    Actual Body Weight:   48.9 kg    Renal Function:   Estimated Creatinine Clearance: 96.2 mL/min (based on SCr of 0.6 mg/dL).    Dialysis Method (if applicable):  N/A

## 2023-06-09 NOTE — HPI
42-year-old female with h/o depression, seizure (no meds), tobacco use went swimming in the [facility] on 6/4/23 while having an open wound on the right side of her neck.  Subsequently developed fevers, chills, and erythema throughout her neck and upper chest.  Initially presented to [facility] 6/6/23 and was given Bactrim.  Then went to [place] ED 6/7/23 and started on vancomycin/pip-tazo/doxycycline.  Emergently transferred to [facility] after continued rapid spread of infection and acute decompensation.      CT showed fairly extensive asymmetric soft tissue swelling and subcutaneous stranding involving the right anterior, lateral and posterior neck continuing inferiorly to the anterior chest wall consistent with changes of cellulitis and possible phlegmon without clearly identified abscess formation; associated myositis suspected right sternocleidomastoid and right posterolateral musculature.  6/8/23 s/p wide debridement by ENT.    Consult: Nectrotizing fasciitis management

## 2023-06-09 NOTE — CONSULTS
Hern[facility] Johnson - Surgery ([facility])  Otorhinolaryngology-Head & Neck Surgery  Consult Note    Patient Name: Aditi Conway  MRN: [id number]  Code Status: Prior  Admission Date: 6/8/2023  Hospital Length of Stay: 0 days  Attending Physician: Gerardo Seymour MD  Primary Care Provider: Primary Doctor No    Patient information was obtained from patient, spouse/SO and past medical records.     Inpatient consult to ENT  Consult performed by: Walter De La Cruz MD  Consult ordered by: Mago Faria PA-C        Subjective:     Chief Complaint/Reason for Admission: neck infection    History of Present Illness: 42F with history of breast abscess presents with acutely worsening neck edema, erythema, and severe pain. It started last Saturday after popping a pimple on her posterior right neck. There was localized pain and swelling in the area that night. She went swimming in the ocean Sunday, and after that her pain, swelling and redness worsened significantly daily. This prompted her to go to the outside ED yesterday and she was admitted to the Medicine service and was started on vanc and zosyn. This morning, the medicine team noticed significant worsening of her exam therefore she was transferred to the [facility] ED for ENT evaluation. Her exam was concerning for necrotizing fasciitis, therefore she was taken to the OR emergently for debridement.       Medications:  Continuous Infusions:  Scheduled Meds:  PRN Meds:     Current Facility-Administered Medications on File Prior to Encounter   Medication    [COMPLETED] ketorolac injection 15 mg    [COMPLETED] lactated ringers bolus 1,000 mL    [COMPLETED] oxyCODONE immediate release tablet 15 mg    [COMPLETED] potassium bicarbonate disintegrating tablet 60 mEq    [COMPLETED] sodium chloride 0.9% bolus 2,000 mL 2,000 mL    [DISCONTINUED] acetaminophen tablet 650 mg    [DISCONTINUED] aluminum-magnesium hydroxide-simethicone 200-200-20 mg/5 mL suspension 30 mL     [DISCONTINUED] doxycycline tablet 100 mg    [DISCONTINUED] enoxaparin injection 40 mg    [DISCONTINUED] HYDROmorphone (PF) injection 1 mg    [DISCONTINUED] HYDROmorphone (PF) injection 1 mg    [DISCONTINUED] ibuprofen tablet 400 mg    [DISCONTINUED] naloxone 0.4 mg/mL injection 0.02 mg    [DISCONTINUED] nicotine 21 mg/24 hr 1 patch    [DISCONTINUED] ondansetron injection 4 mg    [DISCONTINUED] oxyCODONE immediate release tablet 10 mg    [DISCONTINUED] oxyCODONE immediate release tablet 10 mg    [DISCONTINUED] oxyCODONE immediate release tablet 5 mg    [DISCONTINUED] oxyCODONE immediate release tablet 5 mg    [DISCONTINUED] piperacillin-tazobactam (ZOSYN) 4.5 g in dextrose 5 % in water (D5W) 5 % 100 mL IVPB (MB+)    [DISCONTINUED] prochlorperazine injection Soln 5 mg    [DISCONTINUED] senna-docusate 8.6-50 mg per tablet 1 tablet    [DISCONTINUED] sodium chloride 0.9% flush 10 mL    [DISCONTINUED] vancomycin - pharmacy to dose    [DISCONTINUED] vancomycin 750 mg in dextrose 5 % (D5W) 250 mL IVPB (Vial-Mate)     Current Outpatient Medications on File Prior to Encounter   Medication Sig    HYDROcodone-acetaminophen (NORCO) 5-325 mg per tablet Take 1 tablet by mouth every 4 (four) hours as needed for Pain. (Patient not taking: Reported on 1/18/2023)    HYDROcodone-acetaminophen (NORCO) 5-325 mg per tablet Take 1 tablet by mouth every 8 (eight) hours as needed for Pain.    ibuprofen (ADVIL,MOTRIN) 600 MG tablet Take 1 tablet (600 mg total) by mouth every 6 (six) hours as needed for Pain.    ibuprofen (ADVIL,MOTRIN) 800 MG tablet Take 1 tablet (800 mg total) by mouth every 6 (six) hours as needed for Pain. (Patient not taking: Reported on 1/18/2023)       Review of patient's allergies indicates:   Allergen Reactions    Clindamycin        Past Medical History:   Diagnosis Date    Depression     Heart murmur     Seizures      Past Surgical History:   Procedure Laterality Date    DEBRIDEMENT, BREAST  Left 11/11/2021    Procedure: DEBRIDEMENT,  BREAST;  Surgeon: Yoan Sparks MD;  Location: [facility] OR;  Service: General;  Laterality: Left;  DEBRIDEMENT, WOUND, LEFT BREAST    DILATION AND CURETTAGE OF UTERUS      INCISION AND DRAINAGE Left 7/12/2021    Procedure: INCISION AND DRAINAGE, HEMATOMA, SEROMA, OR FLUID COLLECTION ;  Surgeon: Yoan Sparks MD;  Location: [facility] OR;  Service: General;  Laterality: Left;     Family History       Problem Relation (Age of Onset)    Diabetes Father    Heart disease Father    Hyperlipidemia Father    Hypertension Mother, Father          Tobacco Use    Smoking status: Every Day     Packs/day: 1.00     Years: 20.00     Pack years: 20.00     Types: Cigarettes    Smokeless tobacco: Never    Tobacco comments:     Informed about classes in smoking cessation. States will call.   Substance and Sexual Activity    Alcohol use: Yes     Comment: Socially    Drug use: No    Sexual activity: Yes     Partners: Male     Birth control/protection: None     Review of Systems as above  Objective:     Vital Signs (Most Recent):  Temp: 100.1 °F (37.8 °C) (06/08/23 1945)  Pulse: (!) 128 (06/08/23 1945)  Resp: 18 (06/08/23 2108)  BP: 136/81 (06/08/23 1945)  SpO2: 97 % (06/08/23 1945) Vital Signs (24h Range):  Temp:  [97.7 °F (36.5 °C)-103.4 °F (39.7 °C)] 100.1 °F (37.8 °C)  Pulse:  [] 128  Resp:  [18-34] 18  SpO2:  [96 %-100 %] 97 %  BP: (117-159)/(64-98) 136/81     Weight: 49.9 kg (110 lb)  Body mass index is 20.12 kg/m².    Date 06/08/23 0700 - 06/09/23 0659   Shift 3454-0144 4363-3569 6915-0343 24 Hour Total   INTAKE   IV Piggyback  1000  1000   Shift Total(mL/kg)  1000(20)  1000(20)   OUTPUT   Shift Total(mL/kg)       Weight (kg)  49.9 49.9 49.9        Physical Exam     Wincing in pain  Significant R>L neck edema, erythema extending to superior chest and right shoulder. With area of ecchymosis on right neck. Extremely tender. Irregular borders  No stridor or stertor on room  air    Significant Labs:  CBC:   Recent Labs   Lab 06/08/23  0604   WBC 15.89*   RBC 4.47   HGB 13.7   HCT 40.4      MCV 90   MCH 30.6   MCHC 33.9       Significant Diagnostics:  I have reviewed all pertinent imaging results/findings within the past 24 hours.      Assessment/Plan:     Necrotizing fasciitis  42F presenting with right neck necrotizing fasciitis with surround cellulitis s/p debridement of multiple levels of right neck. Necrotic skin, soft tissue and muscle removed. Open neck wound packed with saline soaked kerlix. Patient remains intubated.     -admit to SICU  -remain intubated  -BID dressing changes per ENT  -ID consult placed   -Started on vanc and cefepime pending ID recommendations  -will need repeat debridement, likely Monday      VTE Risk Mitigation (From admission, onward)    None          Thank you for your consult. I will follow-up with patient. Please contact us if you have any additional questions.    Walter De La Cruz MD  Otorhinolaryngology-Head & Neck Surgery  [facility]ern[facility] Johnson - Surgery ([facility])

## 2023-06-09 NOTE — PROCEDURES
"Aditi Conway is a 42 y.o. female patient.    Temp: 100.2 °F (37.9 °C) (06/09/23 0700)  Pulse: 91 (06/09/23 1113)  Resp: 14 (06/09/23 1113)  BP: (!) 100/51 (06/09/23 1113)  SpO2: 100 % (06/09/23 1113)  Weight: 48.9 kg (107 lb 12.9 oz) (06/08/23 2345)  Height: 5' 2" (157.5 cm) (06/09/23 1113)    PICC  Date/Time: 6/9/2023 11:53 AM  Performed by: Kavya Martinez RN  Assisting provider: Jhon Khan RN  Consent Done: Yes  Time out: Immediately prior to procedure a time out was called to verify the correct patient, procedure, equipment, support staff and site/side marked as required  Indications: med administration and vascular access  Anesthesia: local infiltration  Local anesthetic: lidocaine 1% without epinephrine  Anesthetic Total (mL): 3  Description of findings: PICC  Preparation: skin prepped with ChloraPrep  Skin prep agent dried: skin prep agent completely dried prior to procedure  Sterile barriers: all five maximum sterile barriers used - cap, mask, sterile gown, sterile gloves, and large sterile sheet  Hand hygiene: hand hygiene performed prior to central venous catheter insertion  Location details: right brachial  Catheter type: double lumen  Catheter size: 5 Fr  Catheter Length: 35cm    Ultrasound guidance: yes  Vessel Caliber: medium and patent, compressibility normal  Vascular Doppler: not done  Needle advanced into vessel with real time Ultrasound guidance.  Guidewire confirmed in vessel.  Image recorded and saved.  Sterile sheath used.  Number of attempts: 1  Post-procedure: blood return through all ports, chlorhexidine patch and sterile dressing applied  Technical procedures used: 3CG  Specimens: No  Implants: No  Assessment: placement verified by x-ray  Complications: none        Name   6/9/2023    " CHIEF COMPLAINT:  Maria Teresa Mackey with a 1 day history of otalgia in the left ear, patient reports suspected fevers but not measured at home, nasal congestion, post nasal drip, sinus and nasal congestion and sneezing.  Patient did have a suspected fever at home. She denies any ill contacts.  She has seasonal allergies but does not take anything for her symptoms. She denies any ill contacts. She does not smoke and does not have any second hand exposure    Past Medical History:   Diagnosis Date   • Abnormal Pap smear of cervix 3/25/10    LGSIL   • Anxiety    • Depression    • Disorder of bone and cartilage, unspecified 07/08/2010    Just with pregnancy, resolved after delivery       Current Outpatient Prescriptions   Medication Sig Dispense Refill   • acetaminophen (TYLENOL) 325 MG tablet Take 650 mg by mouth every 4 hours as needed for Pain.     • fluoxetine (PROZAC) 40 MG capsule Take 1 capsule by mouth daily. 30 capsule 2   • Multiple Vitamins-Minerals (MULTIVITAMIN GUMMIES ADULT) Chew Tab      • VITAMIN D, ERGOCALCIFEROL, PO Take 2,000 Int'l Units by mouth daily.     • hydrOXYzine (ATARAX) 50 MG tablet Take 1 tablet by mouth 3 times daily as needed for Anxiety. 30 tablet 0   • levonorgestrel-ethinyl estradiol (JOLESSA) 0.15-0.03 MG per tablet Take 1 tablet by mouth daily. 91 tablet 3   • ALPRAZolam (XANAX) 0.25 MG tablet May take 1-2 tablets as needed up to 3 times daily for flight anxiety. 6 tablet 0   • ferrous sulfate 325 (65 FE) MG tablet Take 325 mg by mouth daily (with breakfast).       No current facility-administered medications for this visit.        ALLERGIES:   Allergen Reactions   • Dust    • Seasonal Other (See Comments)       REVIEW OF SYSTEMS:  Patient denies nausea and vomiting, tolerating oral intake  No fevers overnight  No chest pain or shortness of breath    OBJECTIVE:  Visit Vitals  Pulse 78   Temp 97.9 °F (36.6 °C) (Oral)   Resp 18   Ht 5' 3.5\" (1.613 m)   Wt 65.8 kg   SpO2 100%   BMI 25.28  kg/m²     General appearance: Healthy, alert, in no distress, cooperative, audible congestions with frequent sniffling and throat clearing  HEENT: Ears: normal tympanic membrane and external ear canal right ear and abnormal tympanic membrane left ear - retracted and decreased mobility with positive pressure exercise  Nose: no polyps, no sinus tenderness, significant swelling to left nares nasal congestion, clear rhinorrhea, mucosa swollen, pale, and boggy and mouth breathing  Oropharynx: significant cobblestoning with copious clear post nasal drainage.  Lungs: clear throughout , no wheeze  Heart : regular rate and rhythm    ASSESSMENT:   1. Dysfunction of eustachian tube, left        PLAN:  Orders Placed This Encounter   • acetaminophen (TYLENOL) 325 MG tablet       Push fluids, Rest.  May use Tylenol or ibuprofen for pain or fever.   She was encouraged to take Claritin regularly and start nasal saline to rinse nasal passages. She was taught positive pressure exercises and encouraged to check her temperature when she is feeling feverish or chilled.  She reports she is sensitive to Sudafed so she was encouraged to consider flonase.   Explained medications given and side effects.  Patient given written copies of discharge instructions.  All questions answered and patient is agreeable to this plan. Refer to after visit summary.      Bernadine Willis PA-C

## 2023-06-09 NOTE — PLAN OF CARE
Recommendations    If propofol continue at current rate, rec'd Impact peptide @ 35ml/hr to provide 1260 kcal, 79g protein, 647ml FW. Propofol @15ml/hr provide additional 396 kcal    If propofol d/c, rec'd Impact peptide 1.5 @ 45ml/hr to provide 1620 kcal, 101g protein, 832ml FW    When medically able, ADAT Regular diet with texture per SLP    RD to monitor and follow    Goals: Tatyana % EEN, EPN by RD f/u  Nutrition Goal Status: new  Communication of SREE Recs:  (POC)

## 2023-06-09 NOTE — BRIEF OP NOTE
1930: Bedside shift change report given to Dorys Ruano RN (oncoming nurse) by Maria Antonia Arshad RN (offgoing nurse). Report included the following information SBAR, Kardex, Intake/Output, MAR, and Recent Results. 0730: Bedside shift change report given to LONNIE Brenner (oncoming nurse) by Dorys Ruano RN (offgoing nurse). Report included the following information SBAR, Kardex, Intake/Output, MAR and Recent Results. Problem: Falls - Risk of  Goal: *Absence of Falls  Description: Document Darlen Columbus Fall Risk and appropriate interventions in the flowsheet.   Note: Fall Risk Interventions:  Mobility Interventions: Patient to call before getting OOB    Mentation Interventions: Bed/chair exit alarm    Medication Interventions: Teach patient to arise slowly,Patient to call before getting OOB,Bed/chair exit alarm    Elimination Interventions: Bed/chair exit alarm    History of Falls Interventions: Bed/chair exit alarm Hern[facility] Johnson - Surgical Intensive Care  Brief Operative Note    SUMMARY     Surgery Date: 6/8/2023     Surgeon(s) and Role:     * Jose Jules MD - Primary     * Mark Ramirez MD - Resident - Assisting     * Walter De La Cruz MD - Resident - Assisting     * Gerardo Seymour MD        Pre-op Diagnosis:  Myositis, unspecified myositis type, unspecified site [M60.9]  Cellulitis of neck [L03.221]    Post-op Diagnosis:  Post-Op Diagnosis Codes:     * Myositis, unspecified myositis type, unspecified site [M60.9]     * Cellulitis of neck [L03.221]    Procedure(s) (LRB):  EXPLORATION and debridement, NECK (Bilateral)    Anesthesia: General    Operative Findings: nectrotizing fasciitis involving right neck skin, soft tissue, and muscle - predominanetly the SCM. Parts of the SCM were debrided. Open neck wound packed with saline soaked kerlix. Patient remains intubated. See operative note for full details    Estimated Blood Loss: * No values recorded between 6/8/2023  9:48 PM and 6/8/2023 11:15 PM *    Estimated Blood Loss has been documented.         Specimens:   Specimen (24h ago, onward)       Start     Ordered    06/08/23 2252  Specimen to Pathology, Surgery ENT  Once        Comments: Pre-op Diagnosis: Myositis, unspecified myositis type, unspecified site [M60.9]Cellulitis of neck [L03.221]Procedure(s):EXPLORATION and debridement, NECK Number of specimens: 1Name of specimens: 1. Right Neck Soft Tissue- permanent     References:    Click here for ordering Quick Tip   Question Answer Comment   Procedure Type: ENT    Which provider would you like to cc? GERARDO SEYMOUR    Release to patient Immediate        06/08/23 2253                    [id number]

## 2023-06-09 NOTE — OP NOTE
DATE OF OPERATION: 6/8/2023     SURGEON:  Jose Jules MD     ASSISTANT SURGEON:  Gerardo Seymour MD; Mark Ramirez MD; Walter De La Cruz MD     OPERATION:     Wide debridement of infected soft tissue of right neck.     PREOPERATIVE DIAGNOSIS:     Necrotizing fasciitis.     POSTOPERATIVE DIAGNOSIS:     Necrotizing fasciitis.     ANESTHESIA: General.     COMPLICATIONS: None.     ESTIMATED BLOOD LOSS: 50 mL     SPECIMENS: Right neck skin and soft tissue; tissue cultures for bacteria, fungus and AFB     FINDINGS: Focal nonviable soft tissue of the right lateral neck extending from the mastoid to slightly superior to the clavicle.  Deep extension to the superficial deep fascial layer with characteristic dishwater exudate.  Partial involvement of underlying sternocleidomastoid muscles.  Resulting wound measuring 5 cm by 15 cm.     INDICATIONS: 5-day history of localized soft tissue infection with rapid expansion despite broad-spectrum IV antibiotics, transferred from an outside facility with concern for necrotizing fasciitis.  Emergent surgical debridement needed to halt spread of infection and prevent sepsis and/or mortality.     I discussed the risks, benefits and alternatives of the procedure with the patient, as well as the expected postoperative course.  I gave her the opportunity to ask questions and I answered all of them.  Prior to surgery I again met with the patient and reviewed the indications for surgery and she consented to proceed.     DESCRIPTION OF PROCEDURE: The patient was brought into the operating room and placed supine on the operating table. The patient was placed under general anesthesia and intubated. A shoulder roll and a donut were placed under the patient's head. A time-out was called to confirm the proper patient, site and procedure. The patient was prepped and draped in the usual fashion.     A large fusiform area was demarcated extending from the mastoid tip superiorly to the  clavicle inferiorly, centered around a 4-cm area of courtney ecchymosis.  The skin was incised to the superficial layer of the deep investing fascia, where dishwater-like fluid was encountered amid ischemic-appearing tissue.  Several thrombosed veins were encountered and ligated with hemoclips.  A portion of the medial sternocleidomatoid muscle was noted to be ischemic and was resected.  The spinal accessory muscle was within the nonviable tissue and was divided inferiorly.  A portion of the tail of the parotid gland was resected.  Debridement continued until viable bleeding tissue was encountered circumferentially.  The entire devitalized specimen was removed and sent to the pathologist.  A small portion was excised for tissue culture.  Hemostasis was achieved with bipolar cautery.  The final wound measured 5 cm by 15 cm.  The wound was then packed with wet kerlex  gauze and a clean dressing was applied.  The patient tolerated the procedure well.     The patient was turned back toward the anesthesiologist and transferred to the surgical intensive care unit in stable condition.

## 2023-06-09 NOTE — PLAN OF CARE
[facility]  Initial Discharge Assessment       Primary Care Provider: Primary Doctor No    Admission Diagnosis: Cellulitis of neck [L03.221]  Myositis, unspecified myositis type, unspecified site [M60.9]  Neck infection [L08.9]  Necrotizing fasciitis [M72.6]    Admission Date: 6/8/2023  Expected Discharge Date:     Transition of Care Barriers: (P) Underinsured    Payor: MEDICAID / Plan: PENDING MEDICAID / Product Type: Government /     Extended Emergency Contact Information  Primary Emergency Contact: Bienvenido Conway  Mobile Phone: [phone]  Relation: Spouse    Discharge Plan A: (P)  (TBD)  Discharge Plan B: (P)  (TBD)      [facility], [place] - [place] - [address]  [address]  [facility] [address]  Phone: [phone] Fax: [phone]    [id number] - [place] - [address]  [address]  [facility] [address]  Phone: [phone] Fax: [phone]    [facility] - [place] [address]  [facility] [address] [address]  Phone: [phone] Fax: [phone]      Initial Assessment (most recent)       Adult Discharge Assessment - 06/09/23 1149          Discharge Assessment    Assessment Type Discharge Planning Assessment     Confirmed/corrected address, phone number and insurance Yes     Confirmed Demographics Correct on Facesheet     Source of Information family     If unable to respond/provide information was family/caregiver contacted? Yes     Contact Name/Number Bienvenido Conway () [phone]     When was your last doctors appointment? --    states that pt has not had medical care since 2017    Communicated DAMIEN with patient/caregiver Yes     Reason For Admission necrotizing fasciitis     People in Home significant other;child(romi), dependent     Facility Arrived From: [facility] (P)      Do you expect to return to your current living situation? Yes (P)      Do you have help at  home or someone to help you manage your care at home? Yes (P)      Who are your caregiver(s) and their phone number(s)? Bienvenido Conway [phone] (P)      Prior to hospitilization cognitive status: Alert/Oriented (P)      Current cognitive status: Coma/Sedated/Intubated (P)      Home Layout Able to live on 1st floor (P)    pt lives on 1st floor of a two story home    Equipment Currently Used at Home none (P)      Readmission within 30 days? No (P)      Patient currently being followed by outpatient case management? No (P)      Do you currently have service(s) that help you manage your care at home? No (P)      Do you take prescription medications? No (P)      Do you have prescription coverage? No (P)      Do you have any problems affording any of your prescribed medications? TBD (P)     states they do not take medications    Who is going to help you get home at discharge?  (P)      How do you get to doctors appointments? car, drives self;family or friend will provide (P)      Are you on dialysis? No (P)      Do you take coumadin? No (P)      Discharge Plan A -- (P)    TBD    Discharge Plan B -- (P)    TBD    DME Needed Upon Discharge  other (see comments) (P)    TBD    Discharge Plan discussed with: Spouse/sig other (P)      Name(s) and Number(s) Bienvenido Conway [phone] (P)      Transition of Care Barriers Underinsured (P)         Physical Activity    On average, how many days per week do you engage in moderate to strenuous exercise (like a brisk walk)? 6 days (P)      On average, how many minutes do you engage in exercise at this level? 30 min (P)         Financial Resource Strain    How hard is it for you to pay for the very basics like food, housing, medical care, and heating? Not hard at all (P)         Housing Stability    In the last 12 months, was there a time when you were not able to pay the mortgage or rent on time? No (P)      In the last 12 months, was there a time when you did not  have a steady place to sleep or slept in a shelter (including now)? No (P)         Transportation Needs    In the past 12 months, has lack of transportation kept you from medical appointments or from getting medications? -- (P)    pt does not go to any appts    In the past 12 months, has lack of transportation kept you from meetings, work, or from getting things needed for daily living? No (P)         Food Insecurity    Within the past 12 months, you worried that your food would run out before you got the money to buy more. Never true (P)      Within the past 12 months, the food you bought just didn't last and you didn't have money to get more. Never true (P)         Stress    Do you feel stress - tense, restless, nervous, or anxious, or unable to sleep at night because your mind is troubled all the time - these days? Not at all (P)         Social Connections    In a typical week, how many times do you talk on the phone with family, friends, or neighbors? More than three times a week (P)      How often do you get together with friends or relatives? More than three times a week (P)      How often do you attend [employer] or [employer] services? Never (P)      Do you belong to any clubs or organizations such as [employer] groups, unions, fraternal or athletic groups, or school groups? No (P)      How often do you attend meetings of the clubs or organizations you belong to? Never (P)      Are you , , , , never , or living with a partner?  (P)         Alcohol Use    Q1: How often do you have a drink containing alcohol? Patient refused (P)      Q2: How many drinks containing alcohol do you have on a typical day when you are drinking? Patient refused (P)      Q3: How often do you have six or more drinks on one occasion? Patient refused (P)         OTHER    Name(s) of People in Home Bienvenido Conway () and daughter (P)                         Pt is intubated and unable to complete  DPA. Spoke with pt's  Bienvenido via phone call. Questions answered/contact numbers provided. Use BEDSIDE DELIVERY for any necessary medications at time of discharge. The patient is very independent with all ADLs - does not use a walker, is not on HD, BTs or home oxygen.The patient's  Bienvenido will be assisting with help upon discharge and he or a friend will provide transportation home at discharge. Pt does not have a PCP,  states that pt has not received any form of medical care since 2017. Medicaid coverage is pending. Will continue to follow through course of hospitalization.    Maggy Chandler RN     Ext [phone]

## 2023-06-10 LAB
ALBUMIN SERPL BCP-MCNC: 2 G/DL (ref 3.5–5.2)
ALLENS TEST: ABNORMAL
ALP SERPL-CCNC: 78 U/L (ref 55–135)
ALT SERPL W/O P-5'-P-CCNC: 27 U/L (ref 10–44)
ANION GAP SERPL CALC-SCNC: 6 MMOL/L (ref 8–16)
AST SERPL-CCNC: 33 U/L (ref 10–40)
BASOPHILS # BLD AUTO: 0.04 K/UL (ref 0–0.2)
BASOPHILS NFR BLD: 0.4 % (ref 0–1.9)
BILIRUB SERPL-MCNC: 0.3 MG/DL (ref 0.1–1)
BUN SERPL-MCNC: 11 MG/DL (ref 6–20)
CALCIUM SERPL-MCNC: 9.3 MG/DL (ref 8.7–10.5)
CHLORIDE SERPL-SCNC: 105 MMOL/L (ref 95–110)
CO2 SERPL-SCNC: 26 MMOL/L (ref 23–29)
CREAT SERPL-MCNC: 0.5 MG/DL (ref 0.5–1.4)
DIFFERENTIAL METHOD: ABNORMAL
EOSINOPHIL # BLD AUTO: 0 K/UL (ref 0–0.5)
EOSINOPHIL NFR BLD: 0.2 % (ref 0–8)
ERYTHROCYTE [DISTWIDTH] IN BLOOD BY AUTOMATED COUNT: 12.7 % (ref 11.5–14.5)
EST. GFR  (NO RACE VARIABLE): >60 ML/MIN/1.73 M^2
GLUCOSE SERPL-MCNC: 170 MG/DL (ref 70–110)
HCO3 UR-SCNC: 26.9 MMOL/L (ref 24–28)
HCT VFR BLD AUTO: 27.5 % (ref 37–48.5)
HCT VFR BLD CALC: 28 %PCV (ref 36–54)
HCV AB SERPL QL IA: NORMAL
HGB BLD-MCNC: 9.3 G/DL (ref 12–16)
HIV 1+2 AB+HIV1 P24 AG SERPL QL IA: NORMAL
IMM GRANULOCYTES # BLD AUTO: 0.06 K/UL (ref 0–0.04)
IMM GRANULOCYTES NFR BLD AUTO: 0.6 % (ref 0–0.5)
LYMPHOCYTES # BLD AUTO: 1.1 K/UL (ref 1–4.8)
LYMPHOCYTES NFR BLD: 11.6 % (ref 18–48)
MAGNESIUM SERPL-MCNC: 1.9 MG/DL (ref 1.6–2.6)
MAGNESIUM SERPL-MCNC: 1.9 MG/DL (ref 1.6–2.6)
MCH RBC QN AUTO: 30.4 PG (ref 27–31)
MCHC RBC AUTO-ENTMCNC: 33.8 G/DL (ref 32–36)
MCV RBC AUTO: 90 FL (ref 82–98)
MONOCYTES # BLD AUTO: 0.8 K/UL (ref 0.3–1)
MONOCYTES NFR BLD: 7.9 % (ref 4–15)
NEUTROPHILS # BLD AUTO: 7.8 K/UL (ref 1.8–7.7)
NEUTROPHILS NFR BLD: 79.3 % (ref 38–73)
NRBC BLD-RTO: 0 /100 WBC
PCO2 BLDA: 42.8 MMHG (ref 35–45)
PH SMN: 7.41 [PH] (ref 7.35–7.45)
PHOSPHATE SERPL-MCNC: 2.4 MG/DL (ref 2.7–4.5)
PHOSPHATE SERPL-MCNC: 3.1 MG/DL (ref 2.7–4.5)
PLATELET # BLD AUTO: 198 K/UL (ref 150–450)
PMV BLD AUTO: 11.9 FL (ref 9.2–12.9)
PO2 BLDA: 194 MMHG (ref 80–100)
POC BE: 2 MMOL/L
POC IONIZED CALCIUM: 1.33 MMOL/L (ref 1.06–1.42)
POC SATURATED O2: 100 % (ref 95–100)
POC TCO2: 28 MMOL/L (ref 23–27)
POTASSIUM BLD-SCNC: 3.6 MMOL/L (ref 3.5–5.1)
POTASSIUM SERPL-SCNC: 3.8 MMOL/L (ref 3.5–5.1)
PROT SERPL-MCNC: 5.1 G/DL (ref 6–8.4)
RBC # BLD AUTO: 3.06 M/UL (ref 4–5.4)
SAMPLE: ABNORMAL
SITE: ABNORMAL
SODIUM BLD-SCNC: 137 MMOL/L (ref 136–145)
SODIUM SERPL-SCNC: 137 MMOL/L (ref 136–145)
WBC # BLD AUTO: 9.82 K/UL (ref 3.9–12.7)

## 2023-06-10 PROCEDURE — 82803 BLOOD GASES ANY COMBINATION: CPT

## 2023-06-10 PROCEDURE — 20000000 HC ICU ROOM

## 2023-06-10 PROCEDURE — S0030 INJECTION, METRONIDAZOLE: HCPCS

## 2023-06-10 PROCEDURE — 25000003 PHARM REV CODE 250: Performed by: STUDENT IN AN ORGANIZED HEALTH CARE EDUCATION/TRAINING PROGRAM

## 2023-06-10 PROCEDURE — 80053 COMPREHEN METABOLIC PANEL: CPT

## 2023-06-10 PROCEDURE — 63600175 PHARM REV CODE 636 W HCPCS: Performed by: STUDENT IN AN ORGANIZED HEALTH CARE EDUCATION/TRAINING PROGRAM

## 2023-06-10 PROCEDURE — 25000003 PHARM REV CODE 250

## 2023-06-10 PROCEDURE — 99900035 HC TECH TIME PER 15 MIN (STAT)

## 2023-06-10 PROCEDURE — A4216 STERILE WATER/SALINE, 10 ML: HCPCS | Performed by: OTOLARYNGOLOGY

## 2023-06-10 PROCEDURE — 99233 PR SUBSEQUENT HOSPITAL CARE,LEVL III: ICD-10-PCS | Mod: ,,, | Performed by: INTERNAL MEDICINE

## 2023-06-10 PROCEDURE — 99900026 HC AIRWAY MAINTENANCE (STAT)

## 2023-06-10 PROCEDURE — 83735 ASSAY OF MAGNESIUM: CPT

## 2023-06-10 PROCEDURE — 99233 SBSQ HOSP IP/OBS HIGH 50: CPT | Mod: ,,, | Performed by: INTERNAL MEDICINE

## 2023-06-10 PROCEDURE — 85014 HEMATOCRIT: CPT

## 2023-06-10 PROCEDURE — 36600 WITHDRAWAL OF ARTERIAL BLOOD: CPT

## 2023-06-10 PROCEDURE — 86803 HEPATITIS C AB TEST: CPT | Performed by: STUDENT IN AN ORGANIZED HEALTH CARE EDUCATION/TRAINING PROGRAM

## 2023-06-10 PROCEDURE — 82565 ASSAY OF CREATININE: CPT

## 2023-06-10 PROCEDURE — 82800 BLOOD PH: CPT

## 2023-06-10 PROCEDURE — 84132 ASSAY OF SERUM POTASSIUM: CPT

## 2023-06-10 PROCEDURE — 84295 ASSAY OF SERUM SODIUM: CPT

## 2023-06-10 PROCEDURE — 25000003 PHARM REV CODE 250: Performed by: OTOLARYNGOLOGY

## 2023-06-10 PROCEDURE — 27000221 HC OXYGEN, UP TO 24 HOURS

## 2023-06-10 PROCEDURE — 94761 N-INVAS EAR/PLS OXIMETRY MLT: CPT

## 2023-06-10 PROCEDURE — 99291 PR CRITICAL CARE, E/M 30-74 MINUTES: ICD-10-PCS | Mod: ,,, | Performed by: SURGERY

## 2023-06-10 PROCEDURE — 63600175 PHARM REV CODE 636 W HCPCS

## 2023-06-10 PROCEDURE — 94003 VENT MGMT INPAT SUBQ DAY: CPT

## 2023-06-10 PROCEDURE — 83735 ASSAY OF MAGNESIUM: CPT | Mod: 91

## 2023-06-10 PROCEDURE — 84100 ASSAY OF PHOSPHORUS: CPT

## 2023-06-10 PROCEDURE — 99291 CRITICAL CARE FIRST HOUR: CPT | Mod: ,,, | Performed by: SURGERY

## 2023-06-10 PROCEDURE — 85025 COMPLETE CBC W/AUTO DIFF WBC: CPT

## 2023-06-10 PROCEDURE — 82330 ASSAY OF CALCIUM: CPT

## 2023-06-10 PROCEDURE — 84100 ASSAY OF PHOSPHORUS: CPT | Mod: 91

## 2023-06-10 PROCEDURE — 87389 HIV-1 AG W/HIV-1&-2 AB AG IA: CPT | Performed by: STUDENT IN AN ORGANIZED HEALTH CARE EDUCATION/TRAINING PROGRAM

## 2023-06-10 RX ADMIN — PROPOFOL 50 MCG/KG/MIN: 10 INJECTION, EMULSION INTRAVENOUS at 05:06

## 2023-06-10 RX ADMIN — Medication 10 ML: at 12:06

## 2023-06-10 RX ADMIN — SODIUM PHOSPHATE, MONOBASIC, MONOHYDRATE AND SODIUM PHOSPHATE, DIBASIC, ANHYDROUS 20.01 MMOL: 142; 276 INJECTION, SOLUTION INTRAVENOUS at 02:06

## 2023-06-10 RX ADMIN — METRONIDAZOLE 500 MG: 500 INJECTION, SOLUTION INTRAVENOUS at 09:06

## 2023-06-10 RX ADMIN — CEFEPIME 2 G: 2 INJECTION, POWDER, FOR SOLUTION INTRAVENOUS at 04:06

## 2023-06-10 RX ADMIN — DEXMEDETOMIDINE HYDROCHLORIDE 1 MCG/KG/HR: 4 INJECTION, SOLUTION INTRAVENOUS at 12:06

## 2023-06-10 RX ADMIN — OXYCODONE HYDROCHLORIDE 10 MG: 10 TABLET ORAL at 04:06

## 2023-06-10 RX ADMIN — Medication 10 ML: at 06:06

## 2023-06-10 RX ADMIN — LINEZOLID 600 MG: 600 INJECTION, SOLUTION INTRAVENOUS at 12:06

## 2023-06-10 RX ADMIN — SODIUM CHLORIDE, POTASSIUM CHLORIDE, SODIUM LACTATE AND CALCIUM CHLORIDE: 600; 310; 30; 20 INJECTION, SOLUTION INTRAVENOUS at 04:06

## 2023-06-10 RX ADMIN — CEFEPIME 2 G: 2 INJECTION, POWDER, FOR SOLUTION INTRAVENOUS at 11:06

## 2023-06-10 RX ADMIN — PROPOFOL 50 MCG/KG/MIN: 10 INJECTION, EMULSION INTRAVENOUS at 12:06

## 2023-06-10 RX ADMIN — METRONIDAZOLE 500 MG: 500 INJECTION, SOLUTION INTRAVENOUS at 01:06

## 2023-06-10 RX ADMIN — POTASSIUM BICARBONATE 50 MEQ: 978 TABLET, EFFERVESCENT ORAL at 06:06

## 2023-06-10 RX ADMIN — HYDROMORPHONE HYDROCHLORIDE 1 MG: 1 INJECTION, SOLUTION INTRAMUSCULAR; INTRAVENOUS; SUBCUTANEOUS at 11:06

## 2023-06-10 RX ADMIN — CEFEPIME 2 G: 2 INJECTION, POWDER, FOR SOLUTION INTRAVENOUS at 09:06

## 2023-06-10 RX ADMIN — DOXYCYCLINE 100 MG: 100 INJECTION, POWDER, LYOPHILIZED, FOR SOLUTION INTRAVENOUS at 08:06

## 2023-06-10 RX ADMIN — Medication 10 ML: at 05:06

## 2023-06-10 RX ADMIN — DEXMEDETOMIDINE HYDROCHLORIDE 1 MCG/KG/HR: 4 INJECTION, SOLUTION INTRAVENOUS at 05:06

## 2023-06-10 RX ADMIN — HYDROMORPHONE HYDROCHLORIDE 1 MG: 1 INJECTION, SOLUTION INTRAMUSCULAR; INTRAVENOUS; SUBCUTANEOUS at 01:06

## 2023-06-10 RX ADMIN — FAMOTIDINE 20 MG: 10 INJECTION, SOLUTION INTRAVENOUS at 09:06

## 2023-06-10 RX ADMIN — ENOXAPARIN SODIUM 40 MG: 40 INJECTION SUBCUTANEOUS at 04:06

## 2023-06-10 RX ADMIN — METRONIDAZOLE 500 MG: 500 INJECTION, SOLUTION INTRAVENOUS at 05:06

## 2023-06-10 RX ADMIN — HYDROMORPHONE HYDROCHLORIDE 1 MG: 1 INJECTION, SOLUTION INTRAMUSCULAR; INTRAVENOUS; SUBCUTANEOUS at 08:06

## 2023-06-10 RX ADMIN — HYDROMORPHONE HYDROCHLORIDE 1 MG: 1 INJECTION, SOLUTION INTRAMUSCULAR; INTRAVENOUS; SUBCUTANEOUS at 03:06

## 2023-06-10 RX ADMIN — HYDROMORPHONE HYDROCHLORIDE 1 MG: 1 INJECTION, SOLUTION INTRAMUSCULAR; INTRAVENOUS; SUBCUTANEOUS at 06:06

## 2023-06-10 RX ADMIN — DOXYCYCLINE 100 MG: 100 INJECTION, POWDER, LYOPHILIZED, FOR SOLUTION INTRAVENOUS at 09:06

## 2023-06-10 NOTE — PROGRESS NOTES
[facility]  Otorhinolaryngology-Head & Neck Surgery  Progress Note    Subjective:     Post-Op Info:  Procedure(s) (LRB):  EXPLORATION and debridement, NECK (Bilateral)   2 Days Post-Op  Hospital Day: 3     Interval History: NAEON.  at bedside.    Medications:  Continuous Infusions:   dexmedeTOMIDine (Precedex) infusion (titrating) 1 mcg/kg/hr (06/10/23 0800)    lactated ringers 125 mL/hr at 06/10/23 0800    propofoL 50 mcg/kg/min (06/10/23 0800)     Scheduled Meds:   ceFEPime (MAXIPIME) IVPB  2 g Intravenous Q8H    doxycycline (VIBRAMYCIN) IVPB  100 mg Intravenous Q12H    enoxparin  40 mg Subcutaneous Q24H (prophylaxis, 1700)    famotidine (PF)  20 mg Intravenous BID    linezolid  600 mg Intravenous Q12H    metronidazole  500 mg Intravenous Q8H    sodium chloride 0.9%  10 mL Intravenous Q6H     PRN Meds:acetaminophen, HYDROmorphone, magnesium oxide, magnesium oxide, oxyCODONE, potassium bicarbonate, potassium bicarbonate, potassium bicarbonate, potassium, sodium phosphates, potassium, sodium phosphates, potassium, sodium phosphates, sodium chloride 0.9%, sodium chloride 0.9%, Flushing PICC Protocol **AND** sodium chloride 0.9% **AND** sodium chloride 0.9%     Review of patient's allergies indicates:   Allergen Reactions    Clindamycin      Objective:     Vital Signs (24h Range):  Temp:  [97.6 °F (36.4 °C)-101.7 °F (38.7 °C)] 97.7 °F (36.5 °C)  Pulse:  [61-97] 61  Resp:  [14-29] 20  SpO2:  [100 %] 100 %  BP: ()/(50-64) 98/62     Date 06/10/23 0700 - 06/11/23 0659   Shift 6993-8602 0044-4303 6738-1752 24 Hour Total   INTAKE   I.V.(mL/kg) 305.1(6.2)   305.1(6.2)   NG/GT 20   20   IV Piggyback 7.3   7.3   Shift Total(mL/kg) 332.4(6.8)   332.4(6.8)   OUTPUT   Urine(mL/kg/hr) 200   200   Shift Total(mL/kg) 200(4.1)   200(4.1)   Weight (kg) 48.9 48.9 48.9 48.9     Lines/Drains/Airways       Peripherally Inserted Central Catheter Line  Duration             PICC Double Lumen  06/09/23 1152 right brachial <1 day              Drain  Duration                  NG/OG Tube 06/08/23 2343 [facility] mouth 1 day         Urethral Catheter 06/08/23 2343 Latex 1 day              Airway  Duration                  Airway - Non-Surgical 06/08/23 2300 Endotracheal Tube 1 day              Peripheral Intravenous Line  Duration                  Peripheral IV - Single Lumen 06/08/23 2000 20 G Right Antecubital 1 day         Peripheral IV - Single Lumen 06/08/23 2002 20 G Left Antecubital 1 day         Peripheral IV - Single Lumen 06/08/23 2346 20 G Left;Posterior Hand 1 day                   Physical Exam  Intubated and partially sedated  Wincing in pain  Right neck open wound with good granulation tissue, no evidence of necrosis, spanning from mastoid tip to below mandible, covered with saline-soaked Kirlex  Regression of anterior chest cellulitis    Significant Labs:  BMP:   Recent Labs   Lab 06/10/23  0334   *      CO2 26   BUN 11   CREATININE 0.5   CALCIUM 9.3   MG 1.9     CBC:   Recent Labs   Lab 06/10/23  0334 06/10/23  0358   WBC 9.82  --    RBC 3.06*  --    HGB 9.3*  --    HCT 27.5* 28*     --    MCV 90  --    MCH 30.4  --    MCHC 33.8  --      Significant Diagnostics:  I have reviewed all pertinent imaging results/findings within the past 24 hours.    Assessment/Plan:     * Necrotizing fasciitis  42F presenting with right neck necrotizing fasciitis with surround cellulitis s/p debridement of multiple levels of right neck on 6/8. Necrotic skin, soft tissue and muscle removed. Open neck wound packed with saline soaked kerlix. Patient remains intubated.     -- Will plan for repeat right neck wound exploration on Monday  -- Keep intubated until Monday  -- Continue BID dressing changes with saline-soaked Kirlex, ENT-driven  -- Appreciate ID recs regarding antibiotics  -- Please Secure Chat me for any questions, page ENT on-call if unresponsive or urgent        Jhon Horn,  MD  Otorhinolaryngology-Head & Neck Surgery  [facility]

## 2023-06-10 NOTE — PLAN OF CARE
"SICU PLAN OF CARE NOTE    Dx: Necrotizing fasciitis    Goals of Care:  MAP >65    Vital Signs:  BP (!) 93/59 (BP Location: Left arm, Patient Position: Lying)   Pulse 64   Temp 97.6 °F (36.4 °C) (Axillary)   Resp (!) 21   Ht 5' 2" (1.575 m)   Wt 48.9 kg (107 lb 12.9 oz)   SpO2 100%   BMI 19.72 kg/m²     Cardiac:  NSR    Resp:  SpO2 100% on vent, 35%, 5 PEEP    Neuro:  Sedated, Arouses to Voice, Follows Commands, and Moves All Extremities    Gtts:  Propfol and Precedex    Urine Output:  Urinary Catheter 1,585 cc/shift    Drains:  NG/OG Tube 0 cc /  shift    Diet:  NPO and Tube Feeds     Labs/Accuchecks:  daily labs     Skin:  All skin remains free from new injury, neck wound dressing-CDI, ENT to do dressing change to neck wound, patient turned Q2, waffle mattress inflated, ICU bed working correctly.    Shift Events:  See flowsheet for further assessment/details.  Family updated on current condition/plan of care, questions answered, and emotional support provided. MD updated on current condition, vitals, labs, and gtts. No new orders received, will continue to monitor.    "

## 2023-06-10 NOTE — PLAN OF CARE
Problem: Adult Inpatient Plan of Care  Goal: Plan of Care Review  Outcome: Ongoing, Progressing  Goal: Patient-Specific Goal (Individualized)  Outcome: Ongoing, Progressing  Goal: Absence of Hospital-Acquired Illness or Injury  Outcome: Ongoing, Progressing  Goal: Optimal Comfort and Wellbeing  Outcome: Ongoing, Progressing  Goal: Readiness for Transition of Care  Outcome: Ongoing, Progressing     Problem: Fall Injury Risk  Goal: Absence of Fall and Fall-Related Injury  Outcome: Ongoing, Progressing     Problem: Restraint, Nonbehavioral (Nonviolent)  Goal: Absence of Harm or Injury  Outcome: Ongoing, Progressing     Problem: Communication Impairment (Mechanical Ventilation, Invasive)  Goal: Effective Communication  Outcome: Ongoing, Progressing     Problem: Device-Related Complication Risk (Mechanical Ventilation, Invasive)  Goal: Optimal Device Function  Outcome: Ongoing, Progressing     Problem: Inability to Wean (Mechanical Ventilation, Invasive)  Goal: Mechanical Ventilation Liberation  Outcome: Ongoing, Progressing

## 2023-06-10 NOTE — ANESTHESIA POSTPROCEDURE EVALUATION
Anesthesia Post Evaluation    Patient: Aditi Conway    Procedure(s) Performed: Procedure(s) (LRB):  EXPLORATION and debridement, NECK (Bilateral)    Final Anesthesia Type: general      Patient location during evaluation: PACU  Patient participation: Yes- Able to Participate  Level of consciousness: awake  Post-procedure vital signs: reviewed and stable  Pain management: adequate  Airway patency: patent    PONV status at discharge: No PONV  Anesthetic complications: no      Cardiovascular status: blood pressure returned to baseline  Respiratory status: unassisted  Hydration status: euvolemic  Follow-up not needed.          Vitals Value Taken Time   BP 99/55 06/09/23 2016   Temp 38.7 °C (101.7 °F) 06/09/23 1915   Pulse 83 06/09/23 2024   Resp 23 06/09/23 2024   SpO2 100 % 06/09/23 2024   Vitals shown include unvalidated device data.      No case tracking events are documented in the log.      Pain/Oliverio Score: Pain Rating Prior to Med Admin: 8 (6/9/2023  4:26 PM)  Pain Rating Post Med Admin: 0 (6/9/2023  4:56 PM)

## 2023-06-10 NOTE — PROGRESS NOTES
[facility]  Critical Care - Surgery  Progress Note    Patient Name: Aditi Conway  MRN: [id number]  Admission Date: 6/8/2023  Hospital Length of Stay: 2 days  Code Status: Full Code  Attending Provider: Gerardo Seymour MD  Primary Care Provider: Primary Doctor No   Principal Problem: Necrotizing fasciitis    Subjective:     Hospital/ICU Course:  No notes on file    Interval History/Significant Events:   NAEON. Tmax 101.7. HDS. Remains intubated and sedated.  ID saw patient, agree with cefepime, flagyl and doxy. Recommend switching vanc to linezolid  Bcx+ with staph aureus  Will follow up on ENT for any plans of OR    Follow-up For: Procedure(s) (LRB):  EXPLORATION and debridement, NECK (Bilateral)    Post-Operative Day: 2 Days Post-Op    Objective:     Vital Signs (Most Recent):  Temp: 97.7 °F (36.5 °C) (06/10/23 0700)  Pulse: 61 (06/10/23 0838)  Resp: 20 (06/10/23 0838)  BP: 98/62 (06/10/23 0800)  SpO2: 100 % (06/10/23 0838) Vital Signs (24h Range):  Temp:  [97.6 °F (36.4 °C)-101.7 °F (38.7 °C)] 97.7 °F (36.5 °C)  Pulse:  [61-99] 61  Resp:  [14-29] 20  SpO2:  [100 %] 100 %  BP: ()/(50-64) 98/62     Weight: 48.9 kg (107 lb 12.9 oz)  Body mass index is 19.72 kg/m².      Intake/Output Summary (Last 24 hours) at 6/10/2023 0858  Last data filed at 6/10/2023 0800  Gross per 24 hour   Intake 5041.48 ml   Output 2920 ml   Net 2121.48 ml          Physical Exam  Vitals reviewed.   Constitutional:       General: She is not in acute distress.     Appearance: She is ill-appearing.   HENT:      Head: Normocephalic.      Mouth/Throat:      Mouth: Mucous membranes are moist.      Comments: OGT  Neck:      Comments: R neck incision open with dressing packed. No strikethrough visible  Spreading erythema to superior aspect chest and L neck  Cardiovascular:      Rate and Rhythm: Normal rate and regular rhythm.   Pulmonary:      Effort: Pulmonary effort is normal.      Comments: Vent Mode:  A/C  Oxygen Concentration (%):  [35-40] 35  Resp Rate Total:  [16 br/min-44 br/min] 16 br/min  Vt Set:  [360 mL] 360 mL  PEEP/CPAP:  [5 cmH20] 5 cmH20  Mean Airway Pressure:  [6.5 cmH20-9.8 cmH20] 8 cmH20      Abdominal:      General: Abdomen is flat. There is no distension.      Palpations: Abdomen is soft.      Tenderness: There is no guarding.   Genitourinary:     Comments: murdock  Skin:     General: Skin is warm and dry.   Neurological:      Comments: sedated   Psychiatric:      Comments: sedated          Vents:  Vent Mode: A/C (06/10/23 0838)  Set Rate: 15 BPM (06/10/23 0838)  Vt Set: 360 mL (06/10/23 0838)  PEEP/CPAP: 5 cmH20 (06/10/23 0838)  Oxygen Concentration (%): 35 (06/10/23 0838)  Peak Airway Pressure: 18 cmH20 (06/10/23 0838)  Plateau Pressure: 11 cmH20 (06/10/23 0838)  Total Ve: 6.09 L/m (06/10/23 0838)  Negative Inspiratory Force (cm H2O): 0 (06/10/23 0838)  F/VT Ratio<105 (RSBI): (!) 53.48 (06/10/23 0838)    Lines/Drains/Airways       Peripherally Inserted Central Catheter Line  Duration             PICC Double Lumen 06/09/23 1152 right brachial <1 day              Drain  Duration                  NG/OG Tube 06/08/23 2343 [facility] mouth 1 day         Urethral Catheter 06/08/23 2343 Latex 1 day              Airway  Duration                  Airway - Non-Surgical 06/08/23 2300 Endotracheal Tube 1 day              Peripheral Intravenous Line  Duration                  Peripheral IV - Single Lumen 06/08/23 2000 20 G Right Antecubital 1 day         Peripheral IV - Single Lumen 06/08/23 2002 20 G Left Antecubital 1 day         Peripheral IV - Single Lumen 06/08/23 2346 20 G Left;Posterior Hand 1 day                    Significant Labs:    CBC/Anemia Profile:  Recent Labs   Lab 06/08/23  2337 06/09/23  0609 06/10/23  0334 06/10/23  0358   WBC 18.00* 17.35* 9.82  --    HGB 10.9* 10.8* 9.3*  --    HCT 31.7* 32.0* 27.5* 28*    204 198  --    MCV 90 90 90  --    RDW 12.4 12.5 12.7  --          Chemistries:  Recent Labs   Lab 06/08/23  2337 06/09/23  0509 06/09/23  0909 06/09/23  1634 06/10/23  0002 06/10/23  0334   * 134*  --   --   --  137   K 3.6 4.0  --   --   --  3.8   CL 99 103  --   --   --  105   CO2 23 26  --   --   --  26   BUN 8 8  --   --   --  11   CREATININE 0.6 0.5  --   --   --  0.5   CALCIUM 8.1* 8.8  --   --   --  9.3   ALBUMIN 2.4* 2.3*  --   --   --  2.0*   PROT 5.0* 5.3*  --   --   --  5.1*   BILITOT 0.4 0.5  --   --   --  0.3   ALKPHOS 61 64  --   --   --  78   ALT 29 33  --   --   --  27   AST 28 27  --   --   --  33   MG 1.6 1.8   < > 2.1 1.9 1.9   PHOS 3.4 2.4*   < > 3.2 2.4* 3.1    < > = values in this interval not displayed.       ABGs:   Recent Labs   Lab 06/10/23  0358   PH 7.406   PCO2 42.8   HCO3 26.9   POCSATURATED 100   BE 2     Blood Culture:   Recent Labs   Lab 06/09/23  1824 06/09/23 1826   LABBLOO No Growth to date No Growth to date     CMP:   Recent Labs   Lab 06/08/23 2337 06/09/23  0509 06/10/23  0334   * 134* 137   K 3.6 4.0 3.8   CL 99 103 105   CO2 23 26 26   * 201* 170*   BUN 8 8 11   CREATININE 0.6 0.5 0.5   CALCIUM 8.1* 8.8 9.3   PROT 5.0* 5.3* 5.1*   ALBUMIN 2.4* 2.3* 2.0*   BILITOT 0.4 0.5 0.3   ALKPHOS 61 64 78   AST 28 27 33   ALT 29 33 27   ANIONGAP 9 5* 6*     Coagulation:   Recent Labs   Lab 06/08/23 2337   INR 1.2   APTT 40.0*     Lactic Acid: No results for input(s): LACTATE in the last 48 hours.  All pertinent labs within the past 24 hours have been reviewed.    Significant Imaging:  I have reviewed all pertinent imaging results/findings within the past 24 hours.    Assessment/Plan:     ID  * Necrotizing fasciitis  42F PMH depression, seizures (no meds), tobacco use, presenting with concern for toshia's angina / nec fasc of neck s/p exploration and debridement with ENT 6/8/23          Neuro/Psych:   -- Sedation: propofol gtt and precedex ggt, will get her to only one  -- Pain: fent 50mcg pushes prn             Cards:   --  HDS  -- currently not requiring pressors  -- MAP > 65      Pulm:   -- Goal O2 sat > 90%  -- Intubated on rate control 40/5  -- Will remain intubated, plans for OR no later than Monday. Will reassess after  -- Daily SBT, SAT      Renal:  -- Keep murdock for strict I/O  -- BUN/Cr 11/0.5      FEN / GI:   -- Net +2 L   -- Replace lytes as needed  -- Nutrition: NPO. TF advance to goal  -- OGT in place  -- LR @ 125cc/hr      ID:   -- Tm: 101.7; WBC 9.8  -- Abx: linezolid, cefepime, flagyl, doxy  -- ID following  -- Following OR cultures 6/8  -- OR Cx OSH 6/7 + Staph aureus pending susceptibility  -- ID consulted  -- ENT planning to take back to OR at latest Monday      Heme/Onc:   -- H/H stable 9.3/27.5  -- Daily CBC      Endo:   -- Gluc goal 140-180      PPx:   Feeding: NPO, trickle TF, will advance to goal  Analgesia/Sedation: adequate  Thromboembolic prevention: lovenox  HOB >30: Yes  Stress Ulcer ppx: n/a  Glucose control: Critical care goal 140-180 g/dl     Lines/Drains/Airway: pIVs, OGT, Murdock, ETT      Dispo/Code Status/Palliative:   -- SICU / Full Code       Critical care was time spent personally by me on the following activities: development of treatment plan with patient or surrogate and bedside caregivers, discussions with consultants, evaluation of patient's response to treatment, examination of patient, ordering and performing treatments and interventions, ordering and review of laboratory studies, ordering and review of radiographic studies, pulse oximetry, re-evaluation of patient's condition.  This critical care time did not overlap with that of any other provider or involve time for any procedures.     Martinez John MD  Critical Care - Surgery  [facility]

## 2023-06-10 NOTE — SUBJECTIVE & OBJECTIVE
Interval History/Significant Events:   NAEON. Tmax 101.7. HDS. Remains intubated and sedated.  ID saw patient, agree with cefepime, flagyl and doxy. Recommend switching vanc to linezolid  Bcx+ with staph aureus  Will follow up on ENT for any plans of OR    Follow-up For: Procedure(s) (LRB):  EXPLORATION and debridement, NECK (Bilateral)    Post-Operative Day: 2 Days Post-Op    Objective:     Vital Signs (Most Recent):  Temp: 97.7 °F (36.5 °C) (06/10/23 0700)  Pulse: 61 (06/10/23 0838)  Resp: 20 (06/10/23 0838)  BP: 98/62 (06/10/23 0800)  SpO2: 100 % (06/10/23 0838) Vital Signs (24h Range):  Temp:  [97.6 °F (36.4 °C)-101.7 °F (38.7 °C)] 97.7 °F (36.5 °C)  Pulse:  [61-99] 61  Resp:  [14-29] 20  SpO2:  [100 %] 100 %  BP: ()/(50-64) 98/62     Weight: 48.9 kg (107 lb 12.9 oz)  Body mass index is 19.72 kg/m².      Intake/Output Summary (Last 24 hours) at 6/10/2023 0858  Last data filed at 6/10/2023 0800  Gross per 24 hour   Intake 5041.48 ml   Output 2920 ml   Net 2121.48 ml          Physical Exam  Vitals reviewed.   Constitutional:       General: She is not in acute distress.     Appearance: She is ill-appearing.   HENT:      Head: Normocephalic.      Mouth/Throat:      Mouth: Mucous membranes are moist.      Comments: OGT  Neck:      Comments: R neck incision open with dressing packed. No strikethrough visible  Spreading erythema to superior aspect chest and L neck  Cardiovascular:      Rate and Rhythm: Normal rate and regular rhythm.   Pulmonary:      Effort: Pulmonary effort is normal.      Comments: Vent Mode: A/C  Oxygen Concentration (%):  [35-40] 35  Resp Rate Total:  [16 br/min-44 br/min] 16 br/min  Vt Set:  [360 mL] 360 mL  PEEP/CPAP:  [5 cmH20] 5 cmH20  Mean Airway Pressure:  [6.5 cmH20-9.8 cmH20] 8 cmH20      Abdominal:      General: Abdomen is flat. There is no distension.      Palpations: Abdomen is soft.      Tenderness: There is no guarding.   Genitourinary:     Comments: murdock  Skin:     General:  Skin is warm and dry.   Neurological:      Comments: sedated   Psychiatric:      Comments: sedated          Vents:  Vent Mode: A/C (06/10/23 0838)  Set Rate: 15 BPM (06/10/23 0838)  Vt Set: 360 mL (06/10/23 0838)  PEEP/CPAP: 5 cmH20 (06/10/23 0838)  Oxygen Concentration (%): 35 (06/10/23 0838)  Peak Airway Pressure: 18 cmH20 (06/10/23 0838)  Plateau Pressure: 11 cmH20 (06/10/23 0838)  Total Ve: 6.09 L/m (06/10/23 0838)  Negative Inspiratory Force (cm H2O): 0 (06/10/23 0838)  F/VT Ratio<105 (RSBI): (!) 53.48 (06/10/23 0838)    Lines/Drains/Airways       Peripherally Inserted Central Catheter Line  Duration             PICC Double Lumen 06/09/23 1152 right brachial <1 day              Drain  Duration                  NG/OG Tube 06/08/23 2343 [facility] mouth 1 day         Urethral Catheter 06/08/23 2343 Latex 1 day              Airway  Duration                  Airway - Non-Surgical 06/08/23 2300 Endotracheal Tube 1 day              Peripheral Intravenous Line  Duration                  Peripheral IV - Single Lumen 06/08/23 2000 20 G Right Antecubital 1 day         Peripheral IV - Single Lumen 06/08/23 2002 20 G Left Antecubital 1 day         Peripheral IV - Single Lumen 06/08/23 2346 20 G Left;Posterior Hand 1 day                    Significant Labs:    CBC/Anemia Profile:  Recent Labs   Lab 06/08/23 2337 06/09/23  0609 06/10/23  0334 06/10/23  0358   WBC 18.00* 17.35* 9.82  --    HGB 10.9* 10.8* 9.3*  --    HCT 31.7* 32.0* 27.5* 28*    204 198  --    MCV 90 90 90  --    RDW 12.4 12.5 12.7  --         Chemistries:  Recent Labs   Lab 06/08/23 2337 06/09/23  0509 06/09/23  0909 06/09/23  1634 06/10/23  0002 06/10/23  0334   * 134*  --   --   --  137   K 3.6 4.0  --   --   --  3.8   CL 99 103  --   --   --  105   CO2 23 26  --   --   --  26   BUN 8 8  --   --   --  11   CREATININE 0.6 0.5  --   --   --  0.5   CALCIUM 8.1* 8.8  --   --   --  9.3   ALBUMIN 2.4* 2.3*  --   --   --  2.0*   PROT 5.0*  5.3*  --   --   --  5.1*   BILITOT 0.4 0.5  --   --   --  0.3   ALKPHOS 61 64  --   --   --  78   ALT 29 33  --   --   --  27   AST 28 27  --   --   --  33   MG 1.6 1.8   < > 2.1 1.9 1.9   PHOS 3.4 2.4*   < > 3.2 2.4* 3.1    < > = values in this interval not displayed.       ABGs:   Recent Labs   Lab 06/10/23  0358   PH 7.406   PCO2 42.8   HCO3 26.9   POCSATURATED 100   BE 2     Blood Culture:   Recent Labs   Lab 06/09/23  1824 06/09/23  1826   LABBLOO No Growth to date No Growth to date     CMP:   Recent Labs   Lab 06/08/23  2337 06/09/23  0509 06/10/23  0334   * 134* 137   K 3.6 4.0 3.8   CL 99 103 105   CO2 23 26 26   * 201* 170*   BUN 8 8 11   CREATININE 0.6 0.5 0.5   CALCIUM 8.1* 8.8 9.3   PROT 5.0* 5.3* 5.1*   ALBUMIN 2.4* 2.3* 2.0*   BILITOT 0.4 0.5 0.3   ALKPHOS 61 64 78   AST 28 27 33   ALT 29 33 27   ANIONGAP 9 5* 6*     Coagulation:   Recent Labs   Lab 06/08/23 2337   INR 1.2   APTT 40.0*     Lactic Acid: No results for input(s): LACTATE in the last 48 hours.  All pertinent labs within the past 24 hours have been reviewed.    Significant Imaging:  I have reviewed all pertinent imaging results/findings within the past 24 hours.

## 2023-06-10 NOTE — ASSESSMENT & PLAN NOTE
42F PMH depression, seizures (no meds), tobacco use, presenting with concern for toshia's angina / nec fasc of neck s/p exploration and debridement with ENT 6/8/23          Neuro/Psych:   -- Sedation: propofol gtt and precedex ggt, will get her to only one  -- Pain: fent 50mcg pushes prn             Cards:   -- HDS  -- currently not requiring pressors  -- MAP > 65      Pulm:   -- Goal O2 sat > 90%  -- Intubated on rate control 40/5  -- Will remain intubated, plans for OR no later than Monday. Will reassess after  -- Daily SBT, SAT      Renal:  -- Keep murdock for strict I/O  -- BUN/Cr 11/0.5      FEN / GI:   -- Net +2 L   -- Replace lytes as needed  -- Nutrition: NPO. TF advance to goal  -- OGT in place  -- LR @ 125cc/hr      ID:   -- Tm: 101.7; WBC 9.8  -- Abx: linezolid, cefepime, flagyl, doxy  -- ID following  -- Following OR cultures 6/8  -- OR Cx OSH 6/7 + Staph aureus pending susceptibility  -- ID consulted  -- ENT planning to take back to OR at latest Monday      Heme/Onc:   -- H/H stable 9.3/27.5  -- Daily CBC      Endo:   -- Gluc goal 140-180      PPx:   Feeding: NPO, trickle TF, will advance to goal  Analgesia/Sedation: adequate  Thromboembolic prevention: lovenox  HOB >30: Yes  Stress Ulcer ppx: n/a  Glucose control: Critical care goal 140-180 g/dl     Lines/Drains/Airway: pIVs, OGT, Murdock, ETT      Dispo/Code Status/Palliative:   -- SICU / Full Code

## 2023-06-10 NOTE — SUBJECTIVE & OBJECTIVE
Interval History: NAEON.  at bedside.    Medications:  Continuous Infusions:   dexmedeTOMIDine (Precedex) infusion (titrating) 1 mcg/kg/hr (06/10/23 0800)    lactated ringers 125 mL/hr at 06/10/23 0800    propofoL 50 mcg/kg/min (06/10/23 0800)     Scheduled Meds:   ceFEPime (MAXIPIME) IVPB  2 g Intravenous Q8H    doxycycline (VIBRAMYCIN) IVPB  100 mg Intravenous Q12H    enoxparin  40 mg Subcutaneous Q24H (prophylaxis, 1700)    famotidine (PF)  20 mg Intravenous BID    linezolid  600 mg Intravenous Q12H    metronidazole  500 mg Intravenous Q8H    sodium chloride 0.9%  10 mL Intravenous Q6H     PRN Meds:acetaminophen, HYDROmorphone, magnesium oxide, magnesium oxide, oxyCODONE, potassium bicarbonate, potassium bicarbonate, potassium bicarbonate, potassium, sodium phosphates, potassium, sodium phosphates, potassium, sodium phosphates, sodium chloride 0.9%, sodium chloride 0.9%, Flushing PICC Protocol **AND** sodium chloride 0.9% **AND** sodium chloride 0.9%     Review of patient's allergies indicates:   Allergen Reactions    Clindamycin      Objective:     Vital Signs (24h Range):  Temp:  [97.6 °F (36.4 °C)-101.7 °F (38.7 °C)] 97.7 °F (36.5 °C)  Pulse:  [61-97] 61  Resp:  [14-29] 20  SpO2:  [100 %] 100 %  BP: ()/(50-64) 98/62     Date 06/10/23 0700 - 06/11/23 0659   Shift 0554-9640 7510-5807 2459-4780 24 Hour Total   INTAKE   I.V.(mL/kg) 305.1(6.2)   305.1(6.2)   NG/GT 20   20   IV Piggyback 7.3   7.3   Shift Total(mL/kg) 332.4(6.8)   332.4(6.8)   OUTPUT   Urine(mL/kg/hr) 200   200   Shift Total(mL/kg) 200(4.1)   200(4.1)   Weight (kg) 48.9 48.9 48.9 48.9     Lines/Drains/Airways       Peripherally Inserted Central Catheter Line  Duration             PICC Double Lumen 06/09/23 1152 right brachial <1 day              Drain  Duration                  NG/OG Tube 06/08/23 2343 [facility] mouth 1 day         Urethral Catheter 06/08/23 2343 Latex 1 day              Airway  Duration                  Airway  - Non-Surgical 06/08/23 2300 Endotracheal Tube 1 day              Peripheral Intravenous Line  Duration                  Peripheral IV - Single Lumen 06/08/23 2000 20 G Right Antecubital 1 day         Peripheral IV - Single Lumen 06/08/23 2002 20 G Left Antecubital 1 day         Peripheral IV - Single Lumen 06/08/23 2346 20 G Left;Posterior Hand 1 day                   Physical Exam  Intubated and partially sedated  Wincing in pain  Right neck open wound with good granulation tissue, no evidence of necrosis, spanning from mastoid tip to below mandible, covered with saline-soaked Kirlex  Regression of anterior chest cellulitis    Significant Labs:  BMP:   Recent Labs   Lab 06/10/23  0334   *      CO2 26   BUN 11   CREATININE 0.5   CALCIUM 9.3   MG 1.9     CBC:   Recent Labs   Lab 06/10/23  0334 06/10/23  0358   WBC 9.82  --    RBC 3.06*  --    HGB 9.3*  --    HCT 27.5* 28*     --    MCV 90  --    MCH 30.4  --    MCHC 33.8  --      Significant Diagnostics:  I have reviewed all pertinent imaging results/findings within the past 24 hours.

## 2023-06-10 NOTE — ASSESSMENT & PLAN NOTE
42F presenting with right neck necrotizing fasciitis with surround cellulitis s/p debridement of multiple levels of right neck on 6/8. Necrotic skin, soft tissue and muscle removed. Open neck wound packed with saline soaked kerlix. Patient remains intubated.     -- Will plan for repeat right neck wound exploration on Monday  -- Keep intubated until Monday  -- Continue BID dressing changes with saline-soaked Kirlex, ENT-driven  -- Appreciate ID recs regarding antibiotics  -- Please Secure Chat me for any questions, page ENT on-call if unresponsive or urgent

## 2023-06-11 ENCOUNTER — ANESTHESIA EVENT (OUTPATIENT)
Dept: SURGERY | Facility: HOSPITAL | Age: 43
DRG: 501 | End: 2023-06-11
Payer: MEDICAID

## 2023-06-11 LAB
ALBUMIN SERPL BCP-MCNC: 1.9 G/DL (ref 3.5–5.2)
ALP SERPL-CCNC: 129 U/L (ref 55–135)
ALT SERPL W/O P-5'-P-CCNC: 44 U/L (ref 10–44)
ANION GAP SERPL CALC-SCNC: 8 MMOL/L (ref 8–16)
ANISOCYTOSIS BLD QL SMEAR: SLIGHT
AST SERPL-CCNC: 55 U/L (ref 10–40)
BASOPHILS # BLD AUTO: 0.04 K/UL (ref 0–0.2)
BASOPHILS NFR BLD: 0.6 % (ref 0–1.9)
BILIRUB SERPL-MCNC: 0.3 MG/DL (ref 0.1–1)
BUN SERPL-MCNC: 11 MG/DL (ref 6–20)
CALCIUM SERPL-MCNC: 8.4 MG/DL (ref 8.7–10.5)
CHLORIDE SERPL-SCNC: 106 MMOL/L (ref 95–110)
CO2 SERPL-SCNC: 26 MMOL/L (ref 23–29)
CREAT SERPL-MCNC: 0.4 MG/DL (ref 0.5–1.4)
DACRYOCYTES BLD QL SMEAR: ABNORMAL
DIFFERENTIAL METHOD: ABNORMAL
EOSINOPHIL # BLD AUTO: 0 K/UL (ref 0–0.5)
EOSINOPHIL NFR BLD: 0.6 % (ref 0–8)
ERYTHROCYTE [DISTWIDTH] IN BLOOD BY AUTOMATED COUNT: 12.7 % (ref 11.5–14.5)
EST. GFR  (NO RACE VARIABLE): >60 ML/MIN/1.73 M^2
GLUCOSE SERPL-MCNC: 120 MG/DL (ref 70–110)
HCT VFR BLD AUTO: 28.3 % (ref 37–48.5)
HGB BLD-MCNC: 9.6 G/DL (ref 12–16)
IMM GRANULOCYTES # BLD AUTO: 0.05 K/UL (ref 0–0.04)
IMM GRANULOCYTES NFR BLD AUTO: 0.7 % (ref 0–0.5)
LYMPHOCYTES # BLD AUTO: 1.2 K/UL (ref 1–4.8)
LYMPHOCYTES NFR BLD: 16.9 % (ref 18–48)
MAGNESIUM SERPL-MCNC: 1.6 MG/DL (ref 1.6–2.6)
MCH RBC QN AUTO: 31.3 PG (ref 27–31)
MCHC RBC AUTO-ENTMCNC: 33.9 G/DL (ref 32–36)
MCV RBC AUTO: 92 FL (ref 82–98)
MONOCYTES # BLD AUTO: 0.8 K/UL (ref 0.3–1)
MONOCYTES NFR BLD: 10.5 % (ref 4–15)
NEUTROPHILS # BLD AUTO: 5.1 K/UL (ref 1.8–7.7)
NEUTROPHILS NFR BLD: 70.7 % (ref 38–73)
NRBC BLD-RTO: 0 /100 WBC
PHOSPHATE SERPL-MCNC: 3.5 MG/DL (ref 2.7–4.5)
PLATELET # BLD AUTO: 247 K/UL (ref 150–450)
PLATELET BLD QL SMEAR: ABNORMAL
PMV BLD AUTO: 11.8 FL (ref 9.2–12.9)
POIKILOCYTOSIS BLD QL SMEAR: SLIGHT
POTASSIUM SERPL-SCNC: 3.9 MMOL/L (ref 3.5–5.1)
PROT SERPL-MCNC: 4.7 G/DL (ref 6–8.4)
RBC # BLD AUTO: 3.07 M/UL (ref 4–5.4)
SMUDGE CELLS BLD QL SMEAR: PRESENT
SODIUM SERPL-SCNC: 140 MMOL/L (ref 136–145)
WBC # BLD AUTO: 7.14 K/UL (ref 3.9–12.7)

## 2023-06-11 PROCEDURE — 25000003 PHARM REV CODE 250

## 2023-06-11 PROCEDURE — 85025 COMPLETE CBC W/AUTO DIFF WBC: CPT

## 2023-06-11 PROCEDURE — 84100 ASSAY OF PHOSPHORUS: CPT

## 2023-06-11 PROCEDURE — 99291 PR CRITICAL CARE, E/M 30-74 MINUTES: ICD-10-PCS | Mod: ,,, | Performed by: SURGERY

## 2023-06-11 PROCEDURE — 99233 PR SUBSEQUENT HOSPITAL CARE,LEVL III: ICD-10-PCS | Mod: ,,, | Performed by: INTERNAL MEDICINE

## 2023-06-11 PROCEDURE — 25000003 PHARM REV CODE 250: Performed by: STUDENT IN AN ORGANIZED HEALTH CARE EDUCATION/TRAINING PROGRAM

## 2023-06-11 PROCEDURE — 99900035 HC TECH TIME PER 15 MIN (STAT)

## 2023-06-11 PROCEDURE — 25000003 PHARM REV CODE 250: Performed by: OTOLARYNGOLOGY

## 2023-06-11 PROCEDURE — 27000221 HC OXYGEN, UP TO 24 HOURS

## 2023-06-11 PROCEDURE — A4216 STERILE WATER/SALINE, 10 ML: HCPCS | Performed by: OTOLARYNGOLOGY

## 2023-06-11 PROCEDURE — 94003 VENT MGMT INPAT SUBQ DAY: CPT

## 2023-06-11 PROCEDURE — 80053 COMPREHEN METABOLIC PANEL: CPT

## 2023-06-11 PROCEDURE — 99233 SBSQ HOSP IP/OBS HIGH 50: CPT | Mod: ,,, | Performed by: INTERNAL MEDICINE

## 2023-06-11 PROCEDURE — 63600175 PHARM REV CODE 636 W HCPCS: Performed by: STUDENT IN AN ORGANIZED HEALTH CARE EDUCATION/TRAINING PROGRAM

## 2023-06-11 PROCEDURE — 83735 ASSAY OF MAGNESIUM: CPT

## 2023-06-11 PROCEDURE — 94761 N-INVAS EAR/PLS OXIMETRY MLT: CPT

## 2023-06-11 PROCEDURE — 99291 CRITICAL CARE FIRST HOUR: CPT | Mod: ,,, | Performed by: SURGERY

## 2023-06-11 PROCEDURE — S0030 INJECTION, METRONIDAZOLE: HCPCS

## 2023-06-11 PROCEDURE — 99900026 HC AIRWAY MAINTENANCE (STAT)

## 2023-06-11 PROCEDURE — 63600175 PHARM REV CODE 636 W HCPCS

## 2023-06-11 PROCEDURE — 20000000 HC ICU ROOM

## 2023-06-11 RX ORDER — DEXMEDETOMIDINE HYDROCHLORIDE 4 UG/ML
0-1.4 INJECTION, SOLUTION INTRAVENOUS CONTINUOUS
Status: DISCONTINUED | OUTPATIENT
Start: 2023-06-11 | End: 2023-06-12

## 2023-06-11 RX ORDER — FENTANYL CITRATE-0.9 % NACL/PF 10 MCG/ML
0-250 PLASTIC BAG, INJECTION (ML) INTRAVENOUS CONTINUOUS
Status: DISCONTINUED | OUTPATIENT
Start: 2023-06-11 | End: 2023-06-15

## 2023-06-11 RX ORDER — MIDAZOLAM HYDROCHLORIDE 1 MG/ML
INJECTION INTRAMUSCULAR; INTRAVENOUS
Status: COMPLETED
Start: 2023-06-11 | End: 2023-06-11

## 2023-06-11 RX ORDER — MIDAZOLAM HYDROCHLORIDE 1 MG/ML
2 INJECTION INTRAMUSCULAR; INTRAVENOUS ONCE
Status: DISCONTINUED | OUTPATIENT
Start: 2023-06-11 | End: 2023-06-12

## 2023-06-11 RX ADMIN — Medication 10 ML: at 06:06

## 2023-06-11 RX ADMIN — Medication 25 MCG/HR: at 12:06

## 2023-06-11 RX ADMIN — ENOXAPARIN SODIUM 40 MG: 40 INJECTION SUBCUTANEOUS at 05:06

## 2023-06-11 RX ADMIN — PROPOFOL 50 MCG/KG/MIN: 10 INJECTION, EMULSION INTRAVENOUS at 06:06

## 2023-06-11 RX ADMIN — DEXMEDETOMIDINE HYDROCHLORIDE 1.4 MCG/KG/HR: 4 INJECTION, SOLUTION INTRAVENOUS at 12:06

## 2023-06-11 RX ADMIN — DEXMEDETOMIDINE HYDROCHLORIDE 1.4 MCG/KG/HR: 4 INJECTION, SOLUTION INTRAVENOUS at 11:06

## 2023-06-11 RX ADMIN — METRONIDAZOLE 500 MG: 500 INJECTION, SOLUTION INTRAVENOUS at 10:06

## 2023-06-11 RX ADMIN — DOXYCYCLINE 100 MG: 100 INJECTION, POWDER, LYOPHILIZED, FOR SOLUTION INTRAVENOUS at 08:06

## 2023-06-11 RX ADMIN — MIDAZOLAM 2 MG: 1 INJECTION INTRAMUSCULAR; INTRAVENOUS at 12:06

## 2023-06-11 RX ADMIN — DEXMEDETOMIDINE HYDROCHLORIDE 1.4 MCG/KG/HR: 4 INJECTION, SOLUTION INTRAVENOUS at 05:06

## 2023-06-11 RX ADMIN — LINEZOLID 600 MG: 600 INJECTION, SOLUTION INTRAVENOUS at 01:06

## 2023-06-11 RX ADMIN — Medication 10 ML: at 12:06

## 2023-06-11 RX ADMIN — CEFEPIME 2 G: 2 INJECTION, POWDER, FOR SOLUTION INTRAVENOUS at 03:06

## 2023-06-11 RX ADMIN — PROPOFOL 50 MCG/KG/MIN: 10 INJECTION, EMULSION INTRAVENOUS at 11:06

## 2023-06-11 RX ADMIN — METRONIDAZOLE 500 MG: 500 INJECTION, SOLUTION INTRAVENOUS at 05:06

## 2023-06-11 RX ADMIN — PROPOFOL 50 MCG/KG/MIN: 10 INJECTION, EMULSION INTRAVENOUS at 05:06

## 2023-06-11 RX ADMIN — Medication 175 MCG/HR: at 08:06

## 2023-06-11 RX ADMIN — Medication 10 ML: at 11:06

## 2023-06-11 RX ADMIN — HYDROMORPHONE HYDROCHLORIDE 1 MG: 1 INJECTION, SOLUTION INTRAMUSCULAR; INTRAVENOUS; SUBCUTANEOUS at 11:06

## 2023-06-11 RX ADMIN — ACETAMINOPHEN 650 MG: 650 SOLUTION ORAL at 10:06

## 2023-06-11 RX ADMIN — DEXMEDETOMIDINE HYDROCHLORIDE 1.4 MCG/KG/HR: 4 INJECTION, SOLUTION INTRAVENOUS at 10:06

## 2023-06-11 RX ADMIN — LINEZOLID 600 MG: 600 INJECTION, SOLUTION INTRAVENOUS at 12:06

## 2023-06-11 RX ADMIN — METRONIDAZOLE 500 MG: 500 INJECTION, SOLUTION INTRAVENOUS at 01:06

## 2023-06-11 RX ADMIN — Medication 800 MG: at 05:06

## 2023-06-11 RX ADMIN — PROPOFOL 50 MCG/KG/MIN: 10 INJECTION, EMULSION INTRAVENOUS at 12:06

## 2023-06-11 RX ADMIN — Medication 10 ML: at 05:06

## 2023-06-11 RX ADMIN — DEXMEDETOMIDINE HYDROCHLORIDE 1.4 MCG/KG/HR: 4 INJECTION, SOLUTION INTRAVENOUS at 06:06

## 2023-06-11 RX ADMIN — CEFEPIME 2 G: 2 INJECTION, POWDER, FOR SOLUTION INTRAVENOUS at 08:06

## 2023-06-11 RX ADMIN — CEFEPIME 2 G: 2 INJECTION, POWDER, FOR SOLUTION INTRAVENOUS at 11:06

## 2023-06-11 RX ADMIN — Medication 800 MG: at 11:06

## 2023-06-11 NOTE — SUBJECTIVE & OBJECTIVE
Interval History: remains critically ill    Review of Systems   Unable to perform ROS: Acuity of condition   Objective:     Vital Signs (Most Recent):  Temp: 98.4 °F (36.9 °C) (06/10/23 1500)  Pulse: 85 (06/10/23 1900)  Resp: 20 (06/10/23 1900)  BP: (!) 101/57 (06/10/23 1900)  SpO2: 100 % (06/10/23 1900) Vital Signs (24h Range):  Temp:  [97.6 °F (36.4 °C)-101.4 °F (38.6 °C)] 98.4 °F (36.9 °C)  Pulse:  [61-91] 85  Resp:  [15-28] 20  SpO2:  [100 %] 100 %  BP: ()/(52-68) 101/57     Weight: 48.9 kg (107 lb 12.9 oz)  Body mass index is 19.72 kg/m².    Estimated Creatinine Clearance: 115.5 mL/min (based on SCr of 0.5 mg/dL).     Physical Exam  Constitutional:       Appearance: She is ill-appearing. She is not toxic-appearing or diaphoretic.   HENT:      Head:      Comments: intubated  Eyes:      General: No scleral icterus.        Right eye: No discharge.         Left eye: No discharge.   Neck:      Comments: Erythema/swelling from neck down to upper chest with some crepitus.  Surgical site dressed  Pulmonary:      Comments: Vent Mode: A/C  Oxygen Concentration (%):  [40] 40  Resp Rate Total:  [15 br/min-30 br/min] 22 br/min  Vt Set:  [360 mL] 360 mL  PEEP/CPAP:  [5 cmH20] 5 cmH20  Mean Airway Pressure:  [6.5 cmH20-9.8 cmH20] 9.8 cmH20     Skin:     Coloration: Skin is not jaundiced.      Findings: Erythema present.        Significant Labs:   Microbiology Results (last 7 days)       Procedure Component Value Units Date/Time    Culture, Anaerobe [[id number]] Collected: 06/08/23 2239    Order Status: Completed Specimen: Wound from Neck Updated: 06/10/23 1152     Anaerobic Culture Culture in progress    AFB Culture & Smear [[id number]] Collected: 06/08/23 2239    Order Status: Completed Specimen: Wound from Neck Updated: 06/10/23 0927     AFB Culture & Smear Culture in progress     AFB CULTURE STAIN No acid fast bacilli seen.    Aerobic culture [[id number]]  (Abnormal) Collected: 06/08/23 8071    Order Status: Completed  Specimen: Wound from Neck Updated: 06/10/23 0750     Aerobic Bacterial Culture STAPHYLOCOCCUS AUREUS  Moderate  Susceptibility pending      Narrative:      Right Neck Soft Tissue Culture    Blood culture [[id number]] Collected: 06/09/23 1824    Order Status: Completed Specimen: Blood from Peripheral, Wrist, Right Updated: 06/10/23 0115     Blood Culture, Routine No Growth to date    Blood culture [[id number]] Collected: 06/09/23 1826    Order Status: Completed Specimen: Blood from Peripheral, Hand, Right Updated: 06/10/23 0115     Blood Culture, Routine No Growth to date    Gram stain [[id number]] Collected: 06/08/23 2239    Order Status: Completed Specimen: Wound from Neck Updated: 06/09/23 0053     Gram Stain Result Many WBC's      Rare Gram positive cocci    Fungus culture [[id number]] Collected: 06/08/23 2239    Order Status: Sent Specimen: Wound from Neck Updated: 06/08/23 2310            Significant Imaging: I have reviewed all pertinent imaging results/findings within the past 24 hours.

## 2023-06-11 NOTE — PROGRESS NOTES
[facility]  Infectious Disease  Progress Note    Patient Name: Aditi Conway  MRN: [id number]  Admission Date: 6/8/2023  Length of Stay: 2 days  Attending Physician: Gerardo Seymour MD  Primary Care Provider: Primary Doctor No    Isolation Status: No active isolations  Assessment/Plan:      ID  * Necrotizing fasciitis  42-year-old female with h/o depression, seizure (no meds), tobacco use [place] [facility] of her [place] after [place] the [facility] 6/4/23 with an open neck wound.  Exposure to brackish water, will need coverage to waterborne pathogens.   Surgical cultures are positive for Staph aureus with sensitivities pending.    Recommendations:  -continue cefepime, metronidazole, doxycycline, linezolid.  -follow up cultures and narrow antibiotics as needed.        Anticipated Disposition: TBD    Thank you for your consult. I will follow-up with patient. Please contact us if you have any additional questions.    Manoj Bowman MD  Infectious Disease  [facility]    Subjective:     Principal Problem:Necrotizing fasciitis    HPI: 42-year-old female with h/o depression, seizure (no meds), tobacco use went swimming in the [facility] on 6/4/23 while having an open wound on the right side of her neck.  Subsequently developed fevers, chills, and erythema throughout her neck and upper chest.  Initially presented to [facility] 6/6/23 and was given Bactrim.  Then went to [place] [facility] 6/7/23 and started on vancomycin/pip-tazo/doxycycline.  [place]gently [place] [facility] [place] continued rapid spread of infection and acute decompensation.      CT showed fairly extensive asymmetric soft tissue swelling and subcutaneous stranding involving the right anterior, lateral and posterior neck continuing inferiorly to the anterior chest wall consistent with changes of cellulitis and possible phlegmon without clearly identified abscess  formation; associated myositis suspected right sternocleidomastoid and right posterolateral musculature.  6/8/23 s/p wide debridement by ENT.    Consult: Nectrotizing fasciitis management    Interval History: remains critically ill    Review of Systems   Unable to perform ROS: Acuity of condition   Objective:     Vital Signs (Most Recent):  Temp: 98.4 °F (36.9 °C) (06/10/23 1500)  Pulse: 85 (06/10/23 1900)  Resp: 20 (06/10/23 1900)  BP: (!) 101/57 (06/10/23 1900)  SpO2: 100 % (06/10/23 1900) Vital Signs (24h Range):  Temp:  [97.6 °F (36.4 °C)-101.4 °F (38.6 °C)] 98.4 °F (36.9 °C)  Pulse:  [61-91] 85  Resp:  [15-28] 20  SpO2:  [100 %] 100 %  BP: ()/(52-68) 101/57     Weight: 48.9 kg (107 lb 12.9 oz)  Body mass index is 19.72 kg/m².    Estimated Creatinine Clearance: 115.5 mL/min (based on SCr of 0.5 mg/dL).     Physical Exam  Constitutional:       Appearance: She is ill-appearing. She is not toxic-appearing or diaphoretic.   HENT:      Head:      Comments: intubated  Eyes:      General: No scleral icterus.        Right eye: No discharge.         Left eye: No discharge.   Neck:      Comments: Erythema/swelling from neck down to upper chest with some crepitus.  Surgical site dressed  Pulmonary:      Comments: Vent Mode: A/C  Oxygen Concentration (%):  [40] 40  Resp Rate Total:  [15 br/min-30 br/min] 22 br/min  Vt Set:  [360 mL] 360 mL  PEEP/CPAP:  [5 cmH20] 5 cmH20  Mean Airway Pressure:  [6.5 cmH20-9.8 cmH20] 9.8 cmH20     Skin:     Coloration: Skin is not jaundiced.      Findings: Erythema present.        Significant Labs:   Microbiology Results (last 7 days)       Procedure Component Value Units Date/Time    Culture, Anaerobe [[id number]] Collected: 06/08/23 2239    Order Status: Completed Specimen: Wound from Neck Updated: 06/10/23 1152     Anaerobic Culture Culture in progress    AFB Culture & Smear [[id number]] Collected: 06/08/23 2239    Order Status: Completed Specimen: Wound from Neck Updated: 06/10/23  0927     AFB Culture & Smear Culture in progress     AFB CULTURE STAIN No acid fast bacilli seen.    Aerobic culture [[id number]]  (Abnormal) Collected: 06/08/23 2239    Order Status: Completed Specimen: Wound from Neck Updated: 06/10/23 0750     Aerobic Bacterial Culture STAPHYLOCOCCUS AUREUS  Moderate  Susceptibility pending      Narrative:      Right Neck Soft Tissue Culture    Blood culture [[id number]] Collected: 06/09/23 1824    Order Status: Completed Specimen: Blood from Peripheral, Wrist, Right Updated: 06/10/23 0115     Blood Culture, Routine No Growth to date    Blood culture [[id number]] Collected: 06/09/23 1826    Order Status: Completed Specimen: Blood from Peripheral, Hand, Right Updated: 06/10/23 0115     Blood Culture, Routine No Growth to date    Gram stain [[id number]] Collected: 06/08/23 2239    Order Status: Completed Specimen: Wound from Neck Updated: 06/09/23 0053     Gram Stain Result Many WBC's      Rare Gram positive cocci    Fungus culture [[id number]] Collected: 06/08/23 2239    Order Status: Sent Specimen: Wound from Neck Updated: 06/08/23 2310            Significant Imaging: I have reviewed all pertinent imaging results/findings within the past 24 hours.

## 2023-06-11 NOTE — PLAN OF CARE
Problem: Adult Inpatient Plan of Care  Goal: Plan of Care Review  Outcome: Ongoing, Progressing  Goal: Patient-Specific Goal (Individualized)  Outcome: Ongoing, Progressing  Goal: Absence of Hospital-Acquired Illness or Injury  Outcome: Ongoing, Progressing  Goal: Optimal Comfort and Wellbeing  Outcome: Ongoing, Progressing  Goal: Readiness for Transition of Care  Outcome: Ongoing, Progressing     Problem: Fall Injury Risk  Goal: Absence of Fall and Fall-Related Injury  Outcome: Ongoing, Progressing     Problem: Restraint, Nonbehavioral (Nonviolent)  Goal: Absence of Harm or Injury  Outcome: Ongoing, Progressing     Problem: Communication Impairment (Mechanical Ventilation, Invasive)  Goal: Effective Communication  Outcome: Ongoing, Progressing     Problem: Device-Related Complication Risk (Mechanical Ventilation, Invasive)  Goal: Optimal Device Function  Outcome: Ongoing, Progressing

## 2023-06-11 NOTE — NURSING
Mago Block MD called to bedside to assess pt, noted to be restless and agitated in bed, fighting vent. Current sedation plan assessed. 2mg versed ordered and given, fentanyl gtt initiated.

## 2023-06-11 NOTE — ASSESSMENT & PLAN NOTE
42-year-old female with h/o depression, seizure (no meds), tobacco use [place] [facility] [place] [facility] [place] with an open neck wound.  Admitted for necrotizing fasciitis of her neck.  Intraoperative cultures growing staph aureus.  Given exposure to brackish water, covering waterborne pathogens pending final culture results.    Recommendations:  -continue cefepime, metronidazole, doxycycline, linezolid.  -follow up final culture results  -planned for OR 6/12/23

## 2023-06-11 NOTE — ASSESSMENT & PLAN NOTE
42-year-old female with h/o depression, seizure (no meds), tobacco use emergently transferred to [facility] for necrotizing fasciitis of her neck [place] with an open neck wound.  Exposure to brackish water, will need coverage to waterborne pathogens.   Surgical cultures are positive for Staph aureus with sensitivities pending.    Recommendations:  -continue cefepime, metronidazole, doxycycline, linezolid.  -follow up cultures and narrow antibiotics as needed.

## 2023-06-11 NOTE — PROGRESS NOTES
[facility] - Surgical Intensive Care  Otorhinolaryngology-Head & Neck Surgery  Progress Note    Subjective:     Post-Op Info:  Procedure(s) (LRB):  EXPLORATION and debridement, NECK (Bilateral)   3 Days Post-Op  Hospital Day: 4     Interval History: NAEON. Intermittent agitation requiring deeper sedation.    Medications:  Continuous Infusions:   dexmedeTOMIDine (Precedex) infusion (titrating) 1.4 mcg/kg/hr (06/11/23 0900)    fentanyl 150 mcg/hr (06/11/23 0900)    propofoL 50 mcg/kg/min (06/11/23 0900)     Scheduled Meds:   ceFEPime (MAXIPIME) IVPB  2 g Intravenous Q8H    doxycycline (VIBRAMYCIN) IVPB  100 mg Intravenous Q12H    enoxparin  40 mg Subcutaneous Q24H (prophylaxis, 1700)    linezolid  600 mg Intravenous Q12H    metronidazole  500 mg Intravenous Q8H    midazolam  2 mg Intravenous Once    sodium chloride 0.9%  10 mL Intravenous Q6H     PRN Meds:acetaminophen, HYDROmorphone, magnesium oxide, magnesium oxide, oxyCODONE, potassium bicarbonate, potassium bicarbonate, potassium bicarbonate, potassium, sodium phosphates, potassium, sodium phosphates, potassium, sodium phosphates, sodium chloride 0.9%, sodium chloride 0.9%, Flushing PICC Protocol **AND** sodium chloride 0.9% **AND** sodium chloride 0.9%     Review of patient's allergies indicates:   Allergen Reactions    Clindamycin      Objective:     Vital Signs (24h Range):  Temp:  [98.1 °F (36.7 °C)-99.8 °F (37.7 °C)] 99.8 °F (37.7 °C)  Pulse:  [] 73  Resp:  [17-45] 20  SpO2:  [96 %-100 %] 100 %  BP: ()/(51-86) 118/77     Date 06/11/23 0700 - 06/12/23 0659   Shift 6190-1141 8719-0761 9340-0534 24 Hour Total   INTAKE   I.V.(mL/kg) 127.1(2.6)   127.1(2.6)   IV Piggyback 113   113   Shift Total(mL/kg) 240.1(4.9)   240.1(4.9)   OUTPUT   Urine(mL/kg/hr) 90   90   Shift Total(mL/kg) 90(1.8)   90(1.8)   Weight (kg) 48.9 48.9 48.9 48.9     Lines/Drains/Airways       Peripherally Inserted Central Catheter Line  Duration             PICC Double  Lumen 06/09/23 1152 right brachial 1 day              Drain  Duration                  NG/OG Tube 06/08/23 2343 [facility] mouth 2 days         Urethral Catheter 06/08/23 2343 Latex 2 days              Airway  Duration                  Airway - Non-Surgical 06/08/23 2300 Endotracheal Tube 2 days              Peripheral Intravenous Line  Duration                  Peripheral IV - Single Lumen 06/08/23 2000 20 G Right Antecubital 2 days         Peripheral IV - Single Lumen 06/08/23 2002 20 G Left Antecubital 2 days         Peripheral IV - Single Lumen 06/08/23 2346 20 G Left;Posterior Hand 2 days                   Physical Exam  Intubated and partially sedated  Right neck open wound with good granulation tissue, no evidence of necrosis, spanning from mastoid tip to below mandible, covered with saline-soaked Kirlex  Regression of anterior chest cellulitis    Significant Labs:  BMP:   Recent Labs   Lab 06/11/23  0332   *      CO2 26   BUN 11   CREATININE 0.4*   CALCIUM 8.4*   MG 1.6     CBC:   Recent Labs   Lab 06/11/23  0332   WBC 7.14   RBC 3.07*   HGB 9.6*   HCT 28.3*      MCV 92   MCH 31.3*   MCHC 33.9     Significant Diagnostics:  I have reviewed all pertinent imaging results/findings within the past 24 hours.    Assessment/Plan:     * Necrotizing fasciitis  42F presenting with right neck necrotizing fasciitis with surround cellulitis s/p debridement of multiple levels of right neck on 6/8. Necrotic skin, soft tissue, and muscle removed. Open neck wound packed with saline soaked kerlix. Patient remains intubated.     -- To OR on Monday, 6/12 for right neck wound exploration, possible rotational flap, possible wound vac, possible tracheostomy  --  consented yesterday at bedside; witnessed by SICU RN  -- Hold tube feeding at midnight tonight  -- Continue BID dressing changes with saline-soaked Kirlex, ENT-driven  -- Appreciate ID recs regarding antibiotics  -- Please Secure Chat me  for any questions, page ENT on-call if unresponsive or urgent        Jhon Horn MD  Otorhinolaryngology-Head & Neck Surgery  [facility] - Surgical Intensive Care

## 2023-06-11 NOTE — ASSESSMENT & PLAN NOTE
42F presenting with right neck necrotizing fasciitis with surround cellulitis s/p debridement of multiple levels of right neck on 6/8. Necrotic skin, soft tissue, and muscle removed. Open neck wound packed with saline soaked kerlix. Patient remains intubated.     -- To OR on Monday, 6/12 for right neck wound exploration, possible rotational flap, possible wound vac, possible tracheostomy  --  consented yesterday at bedside; witnessed by SICU RN  -- Hold tube feeding at midnight tonight  -- Continue BID dressing changes with saline-soaked Kirlex, ENT-driven  -- Appreciate ID recs regarding antibiotics  -- Please Secure Chat me for any questions, page ENT on-call if unresponsive or urgent

## 2023-06-11 NOTE — ANESTHESIA PREPROCEDURE EVALUATION
[facility]  Anesthesia Pre-Operative Evaluation         Patient Name: Aditi Conway  YOB: 1980  MRN: [id number]    SUBJECTIVE:     Pre-operative evaluation for Procedure(s) (LRB):  RIGHT NECK WOUND EXPLORATION, POSSIBLE WOUND VAC, POSSIBLE TRACHEOSTOMY, POSSIBLE ROTATIONAL FLAP (Right)     06/11/2023    Aditi Conway is a 42 y.o. female w/ a significant PMHx of Epilepsy (on AEDs), tobacco use admitted with necrotizing fascitis of neck s/p debridement of multiple levels of right neck on 6/8.    Patient now presents for the above procedure(s).    Telephone consent obtained from  [facility] [phone]    TTE 4/8/2013:   1 - Normal left ventricular function (EF 65%).     2 - Normal diastolic function.     3 - Trivial mitral regurgitation.     4 - Trivial pulmonic regurgitation.     LDA:  PICC Double Lumen 06/09/23 1152 right brachial (Active)   Line Necessity Review Longterm central access required 06/11/23 1500   Verification by X-ray Yes 06/11/23 1500   Site Assessment No drainage;No redness;No swelling;No warmth 06/11/23 1500   Extremity Assessment Distal to IV No abnormal discoloration 06/11/23 1500   Line Securement Device Secured with sutures 06/11/23 1500   Dressing Type CHG impregnated dressing/sponge;Central line dressing 06/11/23 1500   Dressing Status Clean;Dry;Intact 06/11/23 1500   Dressing Intervention Integrity maintained 06/11/23 1500   Date on Dressing 06/09/23 06/11/23 1500   Dressing Due to be Changed 06/16/23 06/11/23 1500   Distal Patency/Care infusing 06/11/23 1500   Proximal 1 Patency/Care infusing 06/11/23 1500   Current Insertion Depth (cm) 35 cm 06/09/23 1145   Current Exposed Catheter (cm) 0 cm 06/09/23 1145   Extremity Circumference (cm) 23 cm 06/09/23 1145   Waveform Not being transduced 06/11/23 1500   Number of days: 2            Peripheral IV - Single Lumen 06/08/23 2000 20 G Right Antecubital (Active)   Site Assessment  Clean;Dry;Intact 06/11/23 1500   Extremity Assessment Distal to IV No abnormal discoloration 06/11/23 1500   Line Status Infusing 06/11/23 1500   Dressing Status Clean;Dry;Intact 06/11/23 1500   Dressing Intervention Integrity maintained 06/11/23 1500   Dressing Change Due 06/12/23 06/11/23 1500   Site Change Due 06/12/23 06/11/23 1500   Reason Not Rotated Not due 06/11/23 1500   Number of days: 2            Peripheral IV - Single Lumen 06/08/23 2002 20 G Left Antecubital (Active)   Site Assessment Clean;Dry;Intact 06/11/23 1500   Extremity Assessment Distal to IV No abnormal discoloration 06/11/23 1500   Line Status Flushed;Saline locked 06/11/23 1500   Dressing Status Clean;Dry;Intact 06/11/23 1500   Dressing Intervention Integrity maintained 06/11/23 1500   Dressing Change Due 06/12/23 06/11/23 1500   Site Change Due 06/12/23 06/11/23 1500   Reason Not Rotated Not due 06/11/23 1500   Number of days: 2            Peripheral IV - Single Lumen 06/08/23 2346 20 G Left;Posterior Hand (Active)   Site Assessment Clean;Dry;Intact 06/11/23 1500   Extremity Assessment Distal to IV No abnormal discoloration 06/11/23 1500   Line Status Flushed;Saline locked 06/11/23 1500   Dressing Status Clean;Dry;Intact 06/11/23 1500   Dressing Intervention Integrity maintained 06/11/23 1500   Dressing Change Due 06/12/23 06/11/23 1500   Site Change Due 06/12/23 06/11/23 1500   Reason Not Rotated Not due 06/11/23 1500   Number of days: 2            NG/OG Tube 06/08/23 2343 [facility] mouth (Active)   Placement Check placement verified by aspirate characteristics 06/11/23 1500   Tolerance no signs/symptoms of discomfort 06/11/23 1500   Securement secured to commercial device 06/11/23 1500   Clamp Status/Tolerance unclamped;no abdominal distention 06/11/23 1500   Suction Setting/Drainage Method suction at the bedside 06/11/23 1500   Insertion Site Appearance no redness, warmth, tenderness, skin breakdown, drainage 06/11/23 1500  "  Drainage Yellow 06/11/23 0305   Flush/Irrigation flushed w/;water;no resistance met 06/10/23 0305   Feeding Type continuous;by pump 06/11/23 1500   Feeding Action feeding restarted 06/11/23 1500   Current Rate (mL/hr) 25 mL/hr 06/11/23 1500   Goal Rate (mL/hr) 45 mL/hr 06/11/23 1500   Intake (mL) 30 mL 06/09/23 0705   Water Bolus (mL) 50 mL 06/11/23 1100   Tube Output(mL)(Include Discarded Residual) 50 mL 06/11/23 1100   Rate Formula Tube Feeding (mL/hr) 25 mL/hr 06/11/23 1500   Formula Name Impact Peptide 1.5 06/11/23 1500   Intake (mL) - Formula Tube Feeding 25 06/11/23 1500   Number of days: 2            Urethral Catheter 06/08/23 2343 Latex (Active)   Site Assessment Clean;Intact 06/11/23 1500   Collection Container Urimeter 06/11/23 1500   Securement Method secured to top of thigh w/ adhesive device 06/11/23 1500   Catheter Care Performed yes 06/11/23 1500   Reason for Continuing Urinary Catheterization Critically ill in ICU and requiring hourly monitoring of intake/output 06/11/23 1500   CAUTI Prevention Bundle Securement Device in place with 1" slack;Intact seal between catheter & drainage tubing;Sheeting clip in use;Drainage bag/urimeter off the floor;No dependent loops or kinks;Drainage bag/urimeter not overfilled (<2/3 full);Drainage bag/urimeter below bladder 06/11/23 1500   Output (mL) 60 mL 06/11/23 1500   Number of days: 2       Prev airway:   Present Prior to Hospital Arrival?: No; Placement Date: 11/11/21; Placement Time: 1008; Airway Device: Mask; Complications: None; Breath Sounds: Equal Bilateral; Removal Date: 11/11/21;  Removal Time: 1051    Drips:   dexmedeTOMIDine (Precedex) infusion (titrating) 1.4 mcg/kg/hr (06/11/23 1500)    fentanyl 150 mcg/hr (06/11/23 1500)    propofoL 50 mcg/kg/min (06/11/23 1500)       Patient Active Problem List   Diagnosis    Epilepsy    Elevated BP    Tobacco abuse    Systolic murmur    Dental caries    Pharyngitis    Tooth abscess    Cellulitis of neck "    Necrotizing fasciitis       Review of patient's allergies indicates:   Allergen Reactions    Clindamycin        Current Inpatient Medications:   ceFEPime (MAXIPIME) IVPB  2 g Intravenous Q8H    doxycycline (VIBRAMYCIN) IVPB  100 mg Intravenous Q12H    enoxparin  40 mg Subcutaneous Q24H (prophylaxis, 1700)    linezolid  600 mg Intravenous Q12H    metronidazole  500 mg Intravenous Q8H    midazolam  2 mg Intravenous Once    sodium chloride 0.9%  10 mL Intravenous Q6H       No current facility-administered medications on file prior to encounter.     Current Outpatient Medications on File Prior to Encounter   Medication Sig Dispense Refill    HYDROcodone-acetaminophen (NORCO) 5-325 mg per tablet Take 1 tablet by mouth every 4 (four) hours as needed for Pain. (Patient not taking: Reported on 1/18/2023) 10 tablet 0    HYDROcodone-acetaminophen (NORCO) 5-325 mg per tablet Take 1 tablet by mouth every 8 (eight) hours as needed for Pain. 12 tablet 0    ibuprofen (ADVIL,MOTRIN) 600 MG tablet Take 1 tablet (600 mg total) by mouth every 6 (six) hours as needed for Pain. 20 tablet 0    ibuprofen (ADVIL,MOTRIN) 800 MG tablet Take 1 tablet (800 mg total) by mouth every 6 (six) hours as needed for Pain. (Patient not taking: Reported on 1/18/2023) 20 tablet 0       Past Surgical History:   Procedure Laterality Date    DEBRIDEMENT, BREAST Left 11/11/2021    Procedure: DEBRIDEMENT,  BREAST;  Surgeon: Yoan Sparks MD;  Location: [facility];  Service: General;  Laterality: Left;  DEBRIDEMENT, WOUND, LEFT BREAST    DILATION AND CURETTAGE OF UTERUS      INCISION AND DRAINAGE Left 7/12/2021    Procedure: INCISION AND DRAINAGE, HEMATOMA, SEROMA, OR FLUID COLLECTION ;  Surgeon: Yoan Sparks MD;  Location: [facility];  Service: General;  Laterality: Left;    NECK EXPLORATION Bilateral 6/8/2023    Procedure: EXPLORATION and debridement, NECK;  Surgeon: Jose Jules MD;  Location: [address];  Service: ENT;   Laterality: Bilateral;       Social History     Socioeconomic History    Marital status:     Years of education: 9th   Occupational History     Employer: [id number]   Tobacco Use    Smoking status: Every Day     Packs/day: 1.00     Years: 20.00     Pack years: 20.00     Types: Cigarettes    Smokeless tobacco: Never    Tobacco comments:     Informed about classes in smoking cessation. States will call.   Substance and Sexual Activity    Alcohol use: Yes     Comment: Socially    Drug use: No    Sexual activity: Yes     Partners: Male     Birth control/protection: None     Social Determinants of Health     Financial Resource Strain: Low Risk     Difficulty of Paying Living Expenses: Not hard at all   Food Insecurity: No Food Insecurity    Worried About Running Out of Food in the Last Year: Never true    Ran Out of Food in the Last Year: Never true   Transportation Needs: Unknown    Lack of Transportation (Non-Medical): No   Physical Activity: Sufficiently Active    Days of Exercise per Week: 6 days    Minutes of Exercise per Session: 30 min   Stress: No Stress Concern Present    Feeling of Stress : Not at all   Social Connections: Moderately Isolated    Frequency of Communication with Friends and Family: More than three times a week    Frequency of Social Gatherings with Friends and Family: More than three times a week    Attends [employer] Services: Never    Active Member of Clubs or Organizations: No    Attends Club or Organization Meetings: Never    Marital Status:    Housing Stability: Unknown    Unable to Pay for Housing in the Last Year: No    Unstable Housing in the Last Year: No       OBJECTIVE:     Vital Signs Range (Last 24H):  Temp:  [37.2 °C (98.9 °F)-37.7 °C (99.8 °F)]   Pulse:  []   Resp:  [18-45]   BP: ()/(51-86)   SpO2:  [96 %-100 %]       Significant Labs:  Lab Results   Component Value Date    WBC 7.14 06/11/2023    HGB 9.6 (L) 06/11/2023    HCT  28.3 (L) 06/11/2023     06/11/2023    ALT 44 06/11/2023    AST 55 (H) 06/11/2023     06/11/2023    K 3.9 06/11/2023     06/11/2023    CREATININE 0.4 (L) 06/11/2023    BUN 11 06/11/2023    CO2 26 06/11/2023    INR 1.2 06/08/2023       Diagnostic Studies: No relevant studies.    EKG:   Results for orders placed or performed during the hospital encounter of 06/07/23   EKG 12-lead    Collection Time: 06/07/23  8:29 PM    Narrative    Test Reason : RO7.9    Vent. Rate : 095 BPM     Atrial Rate : 095 BPM     P-R Int : 138 ms          QRS Dur : 084 ms      QT Int : 326 ms       P-R-T Axes : 041 038 043 degrees     QTc Int : 409 ms    Normal sinus rhythm  Within normal limits  No previous ECGs available  Confirmed by Germain Ziegler MD ([id number]) on 6/8/2023 8:13:05 AM    Referred By: [email or web address]   SELF           Confirmed By:Germain Ziegler MD       2D ECHO:  TTE:  No results found for this or any previous visit.    KIKE:  No results found for this or any previous visit.    ASSESSMENT/PLAN:                                                                                                                  06/11/2023  Aditi Conway is a 42 y.o., female.      Pre-op Assessment    I have reviewed the Patient Summary Reports.     I have reviewed the Nursing Notes. I have reviewed the NPO Status.   I have reviewed the Medications.     Review of Systems  Anesthesia Hx:  No problems with previous Anesthesia  History of prior surgery of interest to airway management or planning: Denies Family Hx of Anesthesia complications.   Denies Personal Hx of Anesthesia complications.   Social:  Smoker    Hematology/Oncology:  Hematology Normal        EENT/Dental:EENT/Dental Normal   Cardiovascular:  Cardiovascular Normal Exercise tolerance: good  Denies Hypertension.  Denies CAD.    Denies CABG/stent.   Denies CHF. no hyperlipidemia    Pulmonary:  Pulmonary Normal  Denies COPD.  Denies Asthma.  Denies Sleep Apnea.     Renal/:  Renal/ Normal  Denies Chronic Renal Disease.     Hepatic/GI:  Hepatic/GI Normal  Denies GERD.    Musculoskeletal:  Musculoskeletal Normal    Neurological:   Denies CVA.  Denies Headaches. Seizures    Endocrine:  Endocrine Normal Denies Diabetes.  Denies Obesity / BMI > 30  Psych:   Denies Psychiatric History. depression          Physical Exam  General: Intubated/sedated  Airway:  Mallampati: unable to assess   TM Distance: Normal  Pre-Existing Airway: Oral Endotracheal tube        Anesthesia Plan  Type of Anesthesia, risks & benefits discussed:    Anesthesia Type: Gen ETT  Intra-op Monitoring Plan: Standard ASA Monitors and Art Line  Post Op Pain Control Plan: multimodal analgesia and IV/PO Opioids PRN  Induction:  IV  Airway Plan: Direct and Video, Post-Induction  Informed Consent: Informed consent signed with the Patient representative and all parties understand the risks and agree with anesthesia plan.  All questions answered. Patient consented to blood products? Yes  ASA Score: 3  Day of Surgery Review of History & Physical: H&P Update referred to the surgeon/provider.    Ready For Surgery From Anesthesia Perspective.     .

## 2023-06-11 NOTE — ASSESSMENT & PLAN NOTE
42F PMH depression, seizures (no meds), tobacco use, presenting with concern for toshia's angina / nec fasc of neck s/p exploration and debridement with ENT 6/8/23          Neuro/Psych:   -- Sedation: propofol gtt and precedex ggt  -- Pain: fentanyl gtt             Cards:   -- HDS  -- currently not requiring pressors  -- MAP > 65      Pulm:   -- Goal O2 sat > 90%  -- Intubated on rate control 50/5  -- Will remain intubated, plans for OR no later than Monday. Will reassess after  -- Daily SBT, SAT      Renal:  -- Keep murdock for strict I/O  -- BUN/Cr 11/0.4  -- 2L UOP / 24 hr      FEN / GI:   -- Net +1 L / 24 hr  -- Replace lytes as needed  -- Nutrition: NPO. TF advance to goal, hold at midnight for OR monday  -- OGT in place      ID:   -- Afebrile, WBC 7  -- Abx: linezolid, cefepime, flagyl, doxy  -- ID following  -- Following OR cultures 6/8  -- OR Cx OSH 6/7 + Staph aureus pending susceptibility  -- ID consulted  -- ENT planning to take back to OR at latest Monday      Heme/Onc:   -- H/H stable 9.6/28  -- Daily CBC      Endo:   -- Gluc goal 140-180      PPx:   Feeding: NPO, TF to goal  Analgesia/Sedation: adequate  Thromboembolic prevention: lovenox  HOB >30: Yes  Stress Ulcer ppx: n/a  Glucose control: Critical care goal 140-180 g/dl     Lines/Drains/Airway: pIVs, OGT, Murdock, ETT, PICC      Dispo/Code Status/Palliative:   -- SICU / Full Code

## 2023-06-11 NOTE — PROGRESS NOTES
[facility]  Critical Care - Surgery  Progress Note    Patient Name: Aditi Conway  MRN: [id number]  Admission Date: 6/8/2023  Hospital Length of Stay: 3 days  Code Status: Full Code  Attending Provider: Gerardo Seymour MD  Primary Care Provider: Primary Doctor No   Principal Problem: Necrotizing fasciitis    Subjective:     Hospital/ICU Course:  No notes on file    Interval History/Significant Events:   NAEON. AFVSS. On cefepime, doxy, linezolid, flagyl. Fentanyl gtt started for continued agitation on sedation. Will touch base with ID reg: fentanyl / linezolid and serotonin syndrome as this was prior concern. ENT planning to take to OR again tomorrow    Follow-up For: Procedure(s) (LRB):  EXPLORATION and debridement, NECK (Bilateral)    Post-Operative Day: 3 Days Post-Op    Objective:     Vital Signs (Most Recent):  Temp: 99.8 °F (37.7 °C) (06/11/23 0700)  Pulse: 74 (06/11/23 0800)  Resp: 20 (06/11/23 0800)  BP: 128/80 (06/11/23 0800)  SpO2: 100 % (06/11/23 0800) Vital Signs (24h Range):  Temp:  [98.1 °F (36.7 °C)-99.8 °F (37.7 °C)] 99.8 °F (37.7 °C)  Pulse:  [] 74  Resp:  [15-45] 20  SpO2:  [96 %-100 %] 100 %  BP: ()/(51-86) 128/80     Weight: 48.9 kg (107 lb 12.9 oz)  Body mass index is 19.72 kg/m².      Intake/Output Summary (Last 24 hours) at 6/11/2023 0807  Last data filed at 6/11/2023 0701  Gross per 24 hour   Intake 2696.26 ml   Output 1810 ml   Net 886.26 ml          Physical Exam   Vitals reviewed.   Constitutional:       General: She is not in acute distress.     Appearance: She is ill-appearing.   HENT:      Head: Normocephalic.      Mouth/Throat:      Mouth: Mucous membranes are moist.      Comments: OGT  Neck:      Comments: R neck incision open with dressing packed. No strikethrough visible  Spreading erythema to superior aspect chest and L neck  Cardiovascular:      Rate and Rhythm: Normal rate and regular rhythm.   Pulmonary:      Effort: Pulmonary  effort is normal. On [facility] vent  Abdominal:      General: Abdomen is flat. There is no distension.      Palpations: Abdomen is soft.      Tenderness: There is no guarding.   Genitourinary:     Comments: murdock  Skin:     General: Skin is warm and dry.   Neurological:      Comments: sedated   Psychiatric:      Comments: sedated     Vents:  Vent Mode: A/C (06/11/23 0800)  Set Rate: 15 BPM (06/11/23 0800)  Vt Set: 360 mL (06/11/23 0800)  PEEP/CPAP: 5 cmH20 (06/11/23 0800)  Oxygen Concentration (%): 50 (06/11/23 0800)  Peak Airway Pressure: 24 cmH20 (06/11/23 0800)  Plateau Pressure: 11 cmH20 (06/11/23 0800)  Total Ve: 7.34 L/m (06/11/23 0800)  Negative Inspiratory Force (cm H2O): 0 (06/11/23 0800)  F/VT Ratio<105 (RSBI): (!) 53.05 (06/11/23 0800)    Lines/Drains/Airways       Peripherally Inserted Central Catheter Line  Duration             PICC Double Lumen 06/09/23 1152 right brachial 1 day              Drain  Duration                  NG/OG Tube 06/08/23 2343 [place] [facility] mouth 2 days         Urethral Catheter 06/08/23 2343 Latex 2 days              Airway  Duration                  Airway - Non-Surgical 06/08/23 2300 Endotracheal Tube 2 days              Peripheral Intravenous Line  Duration                  Peripheral IV - Single Lumen 06/08/23 2000 20 G Right Antecubital 2 days         Peripheral IV - Single Lumen 06/08/23 2002 20 G Left Antecubital 2 days         Peripheral IV - Single Lumen 06/08/23 2346 20 G Left;Posterior Hand 2 days                    Significant Labs:    CBC/Anemia Profile:  Recent Labs   Lab 06/10/23  0334 06/10/23  0358 06/11/23  0332   WBC 9.82  --  7.14   HGB 9.3*  --  9.6*   HCT 27.5* 28* 28.3*     --  247   MCV 90  --  92   RDW 12.7  --  12.7        Chemistries:  Recent Labs   Lab 06/10/23  0002 06/10/23  0334 06/11/23  0332   NA  --  137 140   K  --  3.8 3.9   CL  --  105 106   CO2  --  26 26   BUN  --  11 11   CREATININE  --  0.5 0.4*   CALCIUM  --  9.3 8.4*   ALBUMIN  --   2.0* 1.9*   PROT  --  5.1* 4.7*   BILITOT  --  0.3 0.3   ALKPHOS  --  78 129   ALT  --  27 44   AST  --  33 55*   MG 1.9 1.9 1.6   PHOS 2.4* 3.1 3.5       All pertinent labs within the past 24 hours have been reviewed.    Significant Imaging:  I have reviewed all pertinent imaging results/findings within the past 24 hours.  X-Ray Chest 1 View    Result Date: 6/11/2023  Since Coco 10, 2023, no significant change or new abnormality. Electronically signed by: Maxwell Fay MD Date:    06/11/2023 Time:    07:38       Assessment/Plan:     ID  * Necrotizing fasciitis  42F PMH depression, seizures (no meds), tobacco use, presenting with concern for toshia's angina / nec fasc of neck s/p exploration and debridement with ENT 6/8/23          Neuro/Psych:   -- Sedation: propofol gtt and precedex ggt  -- Pain: fentanyl gtt             Cards:   -- HDS  -- currently not requiring pressors  -- MAP > 65      Pulm:   -- Goal O2 sat > 90%  -- Intubated on rate control 50/5  -- Will remain intubated, plans for OR no later than Monday. Will reassess after  -- Daily SBT, SAT      Renal:  -- Keep murdock for strict I/O  -- BUN/Cr 11/0.4  -- 2L UOP / 24 hr      FEN / GI:   -- Net +1 L / 24 hr  -- Replace lytes as needed  -- Nutrition: NPO. TF advance to goal, hold at midnight for OR monday  -- OGT in place      ID:   -- Afebrile, WBC 7  -- Abx: linezolid, cefepime, flagyl, doxy  -- ID following  -- Following OR cultures 6/8  -- OR Cx OSH 6/7 + Staph aureus pending susceptibility  -- ID consulted  -- ENT planning to take back to OR at latest Monday      Heme/Onc:   -- H/H stable 9.6/28  -- Daily CBC      Endo:   -- Gluc goal 140-180      PPx:   Feeding: NPO, TF to goal  Analgesia/Sedation: adequate  Thromboembolic prevention: lovenox  HOB >30: Yes  Stress Ulcer ppx: n/a  Glucose control: Critical care goal 140-180 g/dl     Lines/Drains/Airway: pIVs, OGT, Murdock, ETT, PICC      Dispo/Code Status/Palliative:   -- SICU / Full  Code        Critical care was time spent personally by me on the following activities: development of treatment plan with patient or surrogate and bedside caregivers, discussions with consultants, evaluation of patient's response to treatment, examination of patient, ordering and performing treatments and interventions, ordering and review of laboratory studies, ordering and review of radiographic studies, pulse oximetry, re-evaluation of patient's condition.  This critical care time did not overlap with that of any other provider or involve time for any procedures.     Clement Torres MD  Critical Care - Surgery  [facility]

## 2023-06-11 NOTE — SUBJECTIVE & OBJECTIVE
Interval History: NAEON. Intermittent agitation requiring deeper sedation.    Medications:  Continuous Infusions:   dexmedeTOMIDine (Precedex) infusion (titrating) 1.4 mcg/kg/hr (06/11/23 0900)    fentanyl 150 mcg/hr (06/11/23 0900)    propofoL 50 mcg/kg/min (06/11/23 0900)     Scheduled Meds:   ceFEPime (MAXIPIME) IVPB  2 g Intravenous Q8H    doxycycline (VIBRAMYCIN) IVPB  100 mg Intravenous Q12H    enoxparin  40 mg Subcutaneous Q24H (prophylaxis, 1700)    linezolid  600 mg Intravenous Q12H    metronidazole  500 mg Intravenous Q8H    midazolam  2 mg Intravenous Once    sodium chloride 0.9%  10 mL Intravenous Q6H     PRN Meds:acetaminophen, HYDROmorphone, magnesium oxide, magnesium oxide, oxyCODONE, potassium bicarbonate, potassium bicarbonate, potassium bicarbonate, potassium, sodium phosphates, potassium, sodium phosphates, potassium, sodium phosphates, sodium chloride 0.9%, sodium chloride 0.9%, Flushing PICC Protocol **AND** sodium chloride 0.9% **AND** sodium chloride 0.9%     Review of patient's allergies indicates:   Allergen Reactions    Clindamycin      Objective:     Vital Signs (24h Range):  Temp:  [98.1 °F (36.7 °C)-99.8 °F (37.7 °C)] 99.8 °F (37.7 °C)  Pulse:  [] 73  Resp:  [17-45] 20  SpO2:  [96 %-100 %] 100 %  BP: ()/(51-86) 118/77     Date 06/11/23 0700 - 06/12/23 0659   Shift 3150-8881 3821-9651 6349-5314 24 Hour Total   INTAKE   I.V.(mL/kg) 127.1(2.6)   127.1(2.6)   IV Piggyback 113   113   Shift Total(mL/kg) 240.1(4.9)   240.1(4.9)   OUTPUT   Urine(mL/kg/hr) 90   90   Shift Total(mL/kg) 90(1.8)   90(1.8)   Weight (kg) 48.9 48.9 48.9 48.9     Lines/Drains/Airways       Peripherally Inserted Central Catheter Line  Duration             PICC Double Lumen 06/09/23 1152 right brachial 1 day              Drain  Duration                  NG/OG Tube 06/08/23 2343 [facility] mouth 2 days         Urethral Catheter 06/08/23 2343 Latex 2 days              Airway  Duration                   Airway - Non-Surgical 06/08/23 2300 Endotracheal Tube 2 days              Peripheral Intravenous Line  Duration                  Peripheral IV - Single Lumen 06/08/23 2000 20 G Right Antecubital 2 days         Peripheral IV - Single Lumen 06/08/23 2002 20 G Left Antecubital 2 days         Peripheral IV - Single Lumen 06/08/23 2346 20 G Left;Posterior Hand 2 days                   Physical Exam  Intubated and partially sedated  Right neck open wound with good granulation tissue, no evidence of necrosis, spanning from mastoid tip to below mandible, covered with saline-soaked Kirlex  Regression of anterior chest cellulitis    Significant Labs:  BMP:   Recent Labs   Lab 06/11/23  0332   *      CO2 26   BUN 11   CREATININE 0.4*   CALCIUM 8.4*   MG 1.6     CBC:   Recent Labs   Lab 06/11/23  0332   WBC 7.14   RBC 3.07*   HGB 9.6*   HCT 28.3*      MCV 92   MCH 31.3*   MCHC 33.9     Significant Diagnostics:  I have reviewed all pertinent imaging results/findings within the past 24 hours.

## 2023-06-11 NOTE — SUBJECTIVE & OBJECTIVE
Interval History/Significant Events:   NAEON. AFVSS. On cefepime, doxy, linezolid, flagyl. Fentanyl gtt started for continued agitation on sedation. Will touch base with ID reg: fentanyl / linezolid and serotonin syndrome as this was prior concern. ENT planning to take to OR again tomorrow    Follow-up For: Procedure(s) (LRB):  EXPLORATION and debridement, NECK (Bilateral)    Post-Operative Day: 3 Days Post-Op    Objective:     Vital Signs (Most Recent):  Temp: 99.8 °F (37.7 °C) (06/11/23 0700)  Pulse: 74 (06/11/23 0800)  Resp: 20 (06/11/23 0800)  BP: 128/80 (06/11/23 0800)  SpO2: 100 % (06/11/23 0800) Vital Signs (24h Range):  Temp:  [98.1 °F (36.7 °C)-99.8 °F (37.7 °C)] 99.8 °F (37.7 °C)  Pulse:  [] 74  Resp:  [15-45] 20  SpO2:  [96 %-100 %] 100 %  BP: ()/(51-86) 128/80     Weight: 48.9 kg (107 lb 12.9 oz)  Body mass index is 19.72 kg/m².      Intake/Output Summary (Last 24 hours) at 6/11/2023 0807  Last data filed at 6/11/2023 0701  Gross per 24 hour   Intake 2696.26 ml   Output 1810 ml   Net 886.26 ml          Physical Exam   Vitals reviewed.   Constitutional:       General: She is not in acute distress.     Appearance: She is ill-appearing.   HENT:      Head: Normocephalic.      Mouth/Throat:      Mouth: Mucous membranes are moist.      Comments: OGT  Neck:      Comments: R neck incision open with dressing packed. No strikethrough visible  Spreading erythema to superior aspect chest and L neck  Cardiovascular:      Rate and Rhythm: Normal rate and regular rhythm.   Pulmonary:      Effort: [facility] normal[facility]  Abdominal:      General: Abdomen is flat. There is no distension.      Palpations: Abdomen is soft.      Tenderness: There is no guarding.   Genitourinary:     Comments: murdock  Skin:     General: Skin is warm and dry.   Neurological:      Comments: sedated   Psychiatric:      Comments: sedated     Vents:  Vent Mode: A/C (06/11/23 0800)  Set Rate: 15 BPM (06/11/23 0800)  Vt  Set: 360 mL (06/11/23 0800)  PEEP/CPAP: 5 cmH20 (06/11/23 0800)  Oxygen Concentration (%): 50 (06/11/23 0800)  Peak Airway Pressure: 24 cmH20 (06/11/23 0800)  Plateau Pressure: 11 cmH20 (06/11/23 0800)  Total Ve: 7.34 L/m (06/11/23 0800)  Negative Inspiratory Force (cm H2O): 0 (06/11/23 0800)  F/VT Ratio<105 (RSBI): (!) 53.05 (06/11/23 0800)    Lines/Drains/Airways       Peripherally Inserted Central Catheter Line  Duration             PICC Double Lumen 06/09/23 1152 right brachial 1 day              Drain  Duration                  NG/OG Tube 06/08/23 2343 [place] [facility] mouth 2 days         Urethral Catheter 06/08/23 2343 Latex 2 days              Airway  Duration                  Airway - Non-Surgical 06/08/23 2300 Endotracheal Tube 2 days              Peripheral Intravenous Line  Duration                  Peripheral IV - Single Lumen 06/08/23 2000 20 G Right Antecubital 2 days         Peripheral IV - Single Lumen 06/08/23 2002 20 G Left Antecubital 2 days         Peripheral IV - Single Lumen 06/08/23 2346 20 G Left;Posterior Hand 2 days                    Significant Labs:    CBC/Anemia Profile:  Recent Labs   Lab 06/10/23  0334 06/10/23  0358 06/11/23 0332   WBC 9.82  --  7.14   HGB 9.3*  --  9.6*   HCT 27.5* 28* 28.3*     --  247   MCV 90  --  92   RDW 12.7  --  12.7        Chemistries:  Recent Labs   Lab 06/10/23  0002 06/10/23  0334 06/11/23  0332   NA  --  137 140   K  --  3.8 3.9   CL  --  105 106   CO2  --  26 26   BUN  --  11 11   CREATININE  --  0.5 0.4*   CALCIUM  --  9.3 8.4*   ALBUMIN  --  2.0* 1.9*   PROT  --  5.1* 4.7*   BILITOT  --  0.3 0.3   ALKPHOS  --  78 129   ALT  --  27 44   AST  --  33 55*   MG 1.9 1.9 1.6   PHOS 2.4* 3.1 3.5       All pertinent labs within the past 24 hours have been reviewed.    Significant Imaging:  I have reviewed all pertinent imaging results/findings within the past 24 hours.  X-Ray Chest 1 View    Result Date: 6/11/2023  Since Coco 10, 2023, no significant  change or new abnormality. Electronically signed by: Maxwell Fay MD Date:    06/11/2023 Time:    07:38

## 2023-06-11 NOTE — SUBJECTIVE & OBJECTIVE
Interval History: Afebrile.  OR planned for 6/12/23.    Review of Systems   Unable to perform ROS: Intubated   Objective:     Vital Signs (Most Recent):  Temp: 99.8 °F (37.7 °C) (06/11/23 0700)  Pulse: 73 (06/11/23 0930)  Resp: 20 (06/11/23 0930)  BP: 118/77 (06/11/23 0930)  SpO2: 100 % (06/11/23 0930) Vital Signs (24h Range):  Temp:  [98.1 °F (36.7 °C)-99.8 °F (37.7 °C)] 99.8 °F (37.7 °C)  Pulse:  [] 73  Resp:  [17-45] 20  SpO2:  [96 %-100 %] 100 %  BP: ()/(51-86) 118/77     Weight: 48.9 kg (107 lb 12.9 oz)  Body mass index is 19.72 kg/m².    Estimated Creatinine Clearance: 144.3 mL/min (A) (based on SCr of 0.4 mg/dL (L)).     Physical Exam  Constitutional:       Appearance: She is ill-appearing. She is not toxic-appearing or diaphoretic.   HENT:      Head:      Comments: intubated  Eyes:      General: No scleral icterus.        Right eye: No discharge.         Left eye: No discharge.   Neck:      Comments: Erythema/swelling from neck down to upper chest with some crepitus.  Surgical site dressed  Pulmonary:      Comments: Vent Mode: A/C  Oxygen Concentration (%):  [35-50] 50  Resp Rate Total:  [18 br/min-39 br/min] 22 br/min  Vt Set:  [360 mL] 360 mL  PEEP/CPAP:  [5 cmH20] 5 cmH20  Mean Airway Pressure:  [8.3 cmH20-9.7 cmH20] 9.7 cmH20     Skin:     Coloration: Skin is not jaundiced.      Findings: Erythema present.        Significant Labs: All pertinent labs within the past 24 hours have been reviewed.    Significant Imaging: I have reviewed all pertinent imaging results/findings within the past 24 hours.

## 2023-06-11 NOTE — NURSING
SICU PLAN OF CARE NOTE    Dx: Necrotizing fasciitis    Shift Events: sedation and pain controlled with PRNs and gtts. Plan of care reviewed with patient and spouse, all questions answered, emotional support provided    Goals of Care: back to OR on Monday 6/12    Neuro: Follows Commands and Moves All Extremities    Cardiac: NSR HR 70s-80s    Respiratory: Ventilator 50% peep 5    Diet: NPO and Tube Feeds    Gtts: Propfol, Fentanyl, and Precedex    Urine Output: Urinary Catheter 1035 cc/shift    Restraints: bilat soft wrist restraints ordered and charted per policy    Labs/Accuchecks: all labs trended and reviewed, replaced as needed.    Skin: all skin monitored for redness and breakdown. Patient repositioned q2 hours, foams in place, heel boots applied, waffle inflated.

## 2023-06-12 ENCOUNTER — ANESTHESIA (OUTPATIENT)
Dept: SURGERY | Facility: HOSPITAL | Age: 43
DRG: 501 | End: 2023-06-12
Payer: MEDICAID

## 2023-06-12 LAB
ABO + RH BLD: NORMAL
ALBUMIN SERPL BCP-MCNC: 1.8 G/DL (ref 3.5–5.2)
ALLENS TEST: ABNORMAL
ALP SERPL-CCNC: 149 U/L (ref 55–135)
ALT SERPL W/O P-5'-P-CCNC: 35 U/L (ref 10–44)
ANION GAP SERPL CALC-SCNC: 6 MMOL/L (ref 8–16)
AST SERPL-CCNC: 30 U/L (ref 10–40)
BACTERIA #/AREA URNS AUTO: ABNORMAL /HPF
BACTERIA SPEC AEROBE CULT: ABNORMAL
BASOPHILS # BLD AUTO: 0.03 K/UL (ref 0–0.2)
BASOPHILS NFR BLD: 0.4 % (ref 0–1.9)
BILIRUB SERPL-MCNC: 0.3 MG/DL (ref 0.1–1)
BILIRUB UR QL STRIP: NEGATIVE
BLD GP AB SCN CELLS X3 SERPL QL: NORMAL
BUN SERPL-MCNC: 11 MG/DL (ref 6–20)
CALCIUM SERPL-MCNC: 8.3 MG/DL (ref 8.7–10.5)
CHLORIDE SERPL-SCNC: 104 MMOL/L (ref 95–110)
CLARITY UR REFRACT.AUTO: ABNORMAL
CO2 SERPL-SCNC: 27 MMOL/L (ref 23–29)
COLOR UR AUTO: YELLOW
CREAT SERPL-MCNC: 0.4 MG/DL (ref 0.5–1.4)
DELSYS: ABNORMAL
DIFFERENTIAL METHOD: ABNORMAL
EOSINOPHIL # BLD AUTO: 0.1 K/UL (ref 0–0.5)
EOSINOPHIL NFR BLD: 1.2 % (ref 0–8)
ERYTHROCYTE [DISTWIDTH] IN BLOOD BY AUTOMATED COUNT: 12.7 % (ref 11.5–14.5)
ERYTHROCYTE [SEDIMENTATION RATE] IN BLOOD BY WESTERGREN METHOD: 15 MM/H
EST. GFR  (NO RACE VARIABLE): >60 ML/MIN/1.73 M^2
FIO2: 50
GLUCOSE SERPL-MCNC: 140 MG/DL (ref 70–110)
GLUCOSE UR QL STRIP: NEGATIVE
HCO3 UR-SCNC: 29.4 MMOL/L (ref 24–28)
HCT VFR BLD AUTO: 26.8 % (ref 37–48.5)
HGB BLD-MCNC: 9 G/DL (ref 12–16)
HGB UR QL STRIP: ABNORMAL
IMM GRANULOCYTES # BLD AUTO: 0.09 K/UL (ref 0–0.04)
IMM GRANULOCYTES NFR BLD AUTO: 1.1 % (ref 0–0.5)
KETONES UR QL STRIP: NEGATIVE
LEUKOCYTE ESTERASE UR QL STRIP: ABNORMAL
LYMPHOCYTES # BLD AUTO: 1.4 K/UL (ref 1–4.8)
LYMPHOCYTES NFR BLD: 16.1 % (ref 18–48)
MAGNESIUM SERPL-MCNC: 1.8 MG/DL (ref 1.6–2.6)
MCH RBC QN AUTO: 30.2 PG (ref 27–31)
MCHC RBC AUTO-ENTMCNC: 33.6 G/DL (ref 32–36)
MCV RBC AUTO: 90 FL (ref 82–98)
MICROSCOPIC COMMENT: ABNORMAL
MODE: ABNORMAL
MONOCYTES # BLD AUTO: 1.2 K/UL (ref 0.3–1)
MONOCYTES NFR BLD: 14 % (ref 4–15)
NEUTROPHILS # BLD AUTO: 5.7 K/UL (ref 1.8–7.7)
NEUTROPHILS NFR BLD: 67.2 % (ref 38–73)
NITRITE UR QL STRIP: NEGATIVE
NRBC BLD-RTO: 0 /100 WBC
PCO2 BLDA: 50.3 MMHG (ref 35–45)
PEEP: 5
PH SMN: 7.38 [PH] (ref 7.35–7.45)
PH UR STRIP: 6 [PH] (ref 5–8)
PHOSPHATE SERPL-MCNC: 4.1 MG/DL (ref 2.7–4.5)
PLATELET # BLD AUTO: 272 K/UL (ref 150–450)
PMV BLD AUTO: 10.7 FL (ref 9.2–12.9)
PO2 BLDA: 153 MMHG (ref 80–100)
POC BE: 4 MMOL/L
POC SATURATED O2: 99 % (ref 95–100)
POC TCO2: 31 MMOL/L (ref 23–27)
POTASSIUM SERPL-SCNC: 3.9 MMOL/L (ref 3.5–5.1)
PROT SERPL-MCNC: 4.3 G/DL (ref 6–8.4)
PROT UR QL STRIP: NEGATIVE
RBC # BLD AUTO: 2.98 M/UL (ref 4–5.4)
RBC #/AREA URNS AUTO: 40 /HPF (ref 0–4)
SAMPLE: ABNORMAL
SITE: ABNORMAL
SODIUM SERPL-SCNC: 137 MMOL/L (ref 136–145)
SP GR UR STRIP: 1.02 (ref 1–1.03)
SPECIMEN OUTDATE: NORMAL
SQUAMOUS #/AREA URNS AUTO: 5 /HPF
URN SPEC COLLECT METH UR: ABNORMAL
VT: 360
WBC # BLD AUTO: 8.47 K/UL (ref 3.9–12.7)
WBC #/AREA URNS AUTO: 7 /HPF (ref 0–5)

## 2023-06-12 PROCEDURE — 63600175 PHARM REV CODE 636 W HCPCS: Performed by: STUDENT IN AN ORGANIZED HEALTH CARE EDUCATION/TRAINING PROGRAM

## 2023-06-12 PROCEDURE — 93010 EKG 12-LEAD: ICD-10-PCS | Mod: ,,, | Performed by: INTERNAL MEDICINE

## 2023-06-12 PROCEDURE — A4216 STERILE WATER/SALINE, 10 ML: HCPCS | Performed by: OTOLARYNGOLOGY

## 2023-06-12 PROCEDURE — 99233 PR SUBSEQUENT HOSPITAL CARE,LEVL III: ICD-10-PCS | Mod: ,,, | Performed by: INTERNAL MEDICINE

## 2023-06-12 PROCEDURE — D9220A PRA ANESTHESIA: Mod: ANES,,, | Performed by: ANESTHESIOLOGY

## 2023-06-12 PROCEDURE — 25000003 PHARM REV CODE 250: Performed by: OTOLARYNGOLOGY

## 2023-06-12 PROCEDURE — 11046 PR DEB MUSC/FASCIA ADD-ON: ICD-10-PCS | Mod: ,,, | Performed by: OTOLARYNGOLOGY

## 2023-06-12 PROCEDURE — 11043 PR DEBRIDEMENT, SKIN, SUB-Q TISSUE,MUSCLE,=<20 SQ CM: ICD-10-PCS | Mod: ,,, | Performed by: OTOLARYNGOLOGY

## 2023-06-12 PROCEDURE — 25000003 PHARM REV CODE 250: Performed by: NURSE ANESTHETIST, CERTIFIED REGISTERED

## 2023-06-12 PROCEDURE — 93005 ELECTROCARDIOGRAM TRACING: CPT

## 2023-06-12 PROCEDURE — 27000221 HC OXYGEN, UP TO 24 HOURS

## 2023-06-12 PROCEDURE — 25000003 PHARM REV CODE 250: Performed by: STUDENT IN AN ORGANIZED HEALTH CARE EDUCATION/TRAINING PROGRAM

## 2023-06-12 PROCEDURE — 99900035 HC TECH TIME PER 15 MIN (STAT)

## 2023-06-12 PROCEDURE — 81001 URINALYSIS AUTO W/SCOPE: CPT

## 2023-06-12 PROCEDURE — 99233 SBSQ HOSP IP/OBS HIGH 50: CPT | Mod: ,,, | Performed by: INTERNAL MEDICINE

## 2023-06-12 PROCEDURE — 94761 N-INVAS EAR/PLS OXIMETRY MLT: CPT

## 2023-06-12 PROCEDURE — 83735 ASSAY OF MAGNESIUM: CPT

## 2023-06-12 PROCEDURE — 37000009 HC ANESTHESIA EA ADD 15 MINS: Performed by: OTOLARYNGOLOGY

## 2023-06-12 PROCEDURE — 99291 CRITICAL CARE FIRST HOUR: CPT | Mod: ,,, | Performed by: STUDENT IN AN ORGANIZED HEALTH CARE EDUCATION/TRAINING PROGRAM

## 2023-06-12 PROCEDURE — 86900 BLOOD TYPING SEROLOGIC ABO: CPT | Performed by: STUDENT IN AN ORGANIZED HEALTH CARE EDUCATION/TRAINING PROGRAM

## 2023-06-12 PROCEDURE — 94003 VENT MGMT INPAT SUBQ DAY: CPT

## 2023-06-12 PROCEDURE — 11046 DBRDMT MUSC&/FSCA EA ADDL: CPT | Mod: ,,, | Performed by: OTOLARYNGOLOGY

## 2023-06-12 PROCEDURE — 36000707: Performed by: OTOLARYNGOLOGY

## 2023-06-12 PROCEDURE — D9220A PRA ANESTHESIA: Mod: CRNA,,, | Performed by: NURSE ANESTHETIST, CERTIFIED REGISTERED

## 2023-06-12 PROCEDURE — 82803 BLOOD GASES ANY COMBINATION: CPT

## 2023-06-12 PROCEDURE — S0030 INJECTION, METRONIDAZOLE: HCPCS

## 2023-06-12 PROCEDURE — D9220A PRA ANESTHESIA: ICD-10-PCS | Mod: CRNA,,, | Performed by: NURSE ANESTHETIST, CERTIFIED REGISTERED

## 2023-06-12 PROCEDURE — 80053 COMPREHEN METABOLIC PANEL: CPT

## 2023-06-12 PROCEDURE — 93010 ELECTROCARDIOGRAM REPORT: CPT | Mod: ,,, | Performed by: INTERNAL MEDICINE

## 2023-06-12 PROCEDURE — 63600175 PHARM REV CODE 636 W HCPCS: Performed by: NURSE ANESTHETIST, CERTIFIED REGISTERED

## 2023-06-12 PROCEDURE — 99291 PR CRITICAL CARE, E/M 30-74 MINUTES: ICD-10-PCS | Mod: ,,, | Performed by: STUDENT IN AN ORGANIZED HEALTH CARE EDUCATION/TRAINING PROGRAM

## 2023-06-12 PROCEDURE — 99900026 HC AIRWAY MAINTENANCE (STAT)

## 2023-06-12 PROCEDURE — 25000003 PHARM REV CODE 250

## 2023-06-12 PROCEDURE — 36600 WITHDRAWAL OF ARTERIAL BLOOD: CPT

## 2023-06-12 PROCEDURE — 36000706: Performed by: OTOLARYNGOLOGY

## 2023-06-12 PROCEDURE — 84100 ASSAY OF PHOSPHORUS: CPT

## 2023-06-12 PROCEDURE — 37000008 HC ANESTHESIA 1ST 15 MINUTES: Performed by: OTOLARYNGOLOGY

## 2023-06-12 PROCEDURE — 20000000 HC ICU ROOM

## 2023-06-12 PROCEDURE — 36415 COLL VENOUS BLD VENIPUNCTURE: CPT | Performed by: OTOLARYNGOLOGY

## 2023-06-12 PROCEDURE — D9220A PRA ANESTHESIA: ICD-10-PCS | Mod: ANES,,, | Performed by: ANESTHESIOLOGY

## 2023-06-12 PROCEDURE — 11043 DBRDMT MUSC&/FSCA 1ST 20/<: CPT | Mod: ,,, | Performed by: OTOLARYNGOLOGY

## 2023-06-12 PROCEDURE — 63600175 PHARM REV CODE 636 W HCPCS

## 2023-06-12 PROCEDURE — 27201423 OPTIME MED/SURG SUP & DEVICES STERILE SUPPLY: Performed by: OTOLARYNGOLOGY

## 2023-06-12 PROCEDURE — 85025 COMPLETE CBC W/AUTO DIFF WBC: CPT

## 2023-06-12 RX ORDER — KETAMINE HCL IN 0.9 % NACL 50 MG/5 ML
SYRINGE (ML) INTRAVENOUS
Status: DISCONTINUED | OUTPATIENT
Start: 2023-06-12 | End: 2023-06-12

## 2023-06-12 RX ORDER — DEXMEDETOMIDINE HYDROCHLORIDE 4 UG/ML
0-1.4 INJECTION, SOLUTION INTRAVENOUS CONTINUOUS
Status: DISCONTINUED | OUTPATIENT
Start: 2023-06-12 | End: 2023-06-16

## 2023-06-12 RX ORDER — ONDANSETRON 2 MG/ML
INJECTION INTRAMUSCULAR; INTRAVENOUS
Status: DISCONTINUED | OUTPATIENT
Start: 2023-06-12 | End: 2023-06-12

## 2023-06-12 RX ORDER — FAMOTIDINE 10 MG/ML
20 INJECTION INTRAVENOUS DAILY
Status: COMPLETED | OUTPATIENT
Start: 2023-06-12 | End: 2023-06-12

## 2023-06-12 RX ORDER — QUETIAPINE FUMARATE 25 MG/1
50 TABLET, FILM COATED ORAL 2 TIMES DAILY
Status: DISCONTINUED | OUTPATIENT
Start: 2023-06-12 | End: 2023-06-13

## 2023-06-12 RX ORDER — POLYETHYLENE GLYCOL 3350 17 G/17G
17 POWDER, FOR SOLUTION ORAL DAILY
Status: DISCONTINUED | OUTPATIENT
Start: 2023-06-12 | End: 2023-06-16

## 2023-06-12 RX ORDER — MIDAZOLAM HYDROCHLORIDE 1 MG/ML
INJECTION, SOLUTION INTRAMUSCULAR; INTRAVENOUS
Status: DISCONTINUED | OUTPATIENT
Start: 2023-06-12 | End: 2023-06-12

## 2023-06-12 RX ORDER — OLANZAPINE 5 MG/1
5 TABLET, ORALLY DISINTEGRATING ORAL EVERY 8 HOURS PRN
Status: DISCONTINUED | OUTPATIENT
Start: 2023-06-12 | End: 2023-06-13

## 2023-06-12 RX ORDER — MUPIROCIN 20 MG/G
OINTMENT TOPICAL 2 TIMES DAILY
Status: DISPENSED | OUTPATIENT
Start: 2023-06-12 | End: 2023-06-17

## 2023-06-12 RX ORDER — ROCURONIUM BROMIDE 10 MG/ML
INJECTION, SOLUTION INTRAVENOUS
Status: DISCONTINUED | OUTPATIENT
Start: 2023-06-12 | End: 2023-06-12

## 2023-06-12 RX ORDER — DEXMEDETOMIDINE HYDROCHLORIDE 4 UG/ML
0-1.4 INJECTION, SOLUTION INTRAVENOUS CONTINUOUS
Status: DISCONTINUED | OUTPATIENT
Start: 2023-06-12 | End: 2023-06-12

## 2023-06-12 RX ADMIN — MIDAZOLAM 2 MG: 1 INJECTION INTRAMUSCULAR; INTRAVENOUS at 09:06

## 2023-06-12 RX ADMIN — ROCURONIUM BROMIDE 20 MG: 10 INJECTION, SOLUTION INTRAVENOUS at 09:06

## 2023-06-12 RX ADMIN — QUETIAPINE FUMARATE 50 MG: 25 TABLET ORAL at 10:06

## 2023-06-12 RX ADMIN — QUETIAPINE FUMARATE 50 MG: 25 TABLET ORAL at 09:06

## 2023-06-12 RX ADMIN — OLANZAPINE 5 MG: 5 TABLET, ORALLY DISINTEGRATING ORAL at 06:06

## 2023-06-12 RX ADMIN — PROPOFOL 50 MCG/KG/MIN: 10 INJECTION, EMULSION INTRAVENOUS at 04:06

## 2023-06-12 RX ADMIN — METRONIDAZOLE 500 MG: 500 INJECTION, SOLUTION INTRAVENOUS at 05:06

## 2023-06-12 RX ADMIN — SUGAMMADEX 100 MG: 100 INJECTION, SOLUTION INTRAVENOUS at 10:06

## 2023-06-12 RX ADMIN — LINEZOLID 600 MG: 600 INJECTION, SOLUTION INTRAVENOUS at 01:06

## 2023-06-12 RX ADMIN — Medication 175 MCG/HR: at 11:06

## 2023-06-12 RX ADMIN — DOXYCYCLINE 100 MG: 100 INJECTION, POWDER, LYOPHILIZED, FOR SOLUTION INTRAVENOUS at 09:06

## 2023-06-12 RX ADMIN — DEXMEDETOMIDINE HYDROCHLORIDE 1.4 MCG/KG/HR: 4 INJECTION, SOLUTION INTRAVENOUS at 11:06

## 2023-06-12 RX ADMIN — Medication 200 MCG/HR: at 11:06

## 2023-06-12 RX ADMIN — CEFEPIME 2 G: 2 INJECTION, POWDER, FOR SOLUTION INTRAVENOUS at 07:06

## 2023-06-12 RX ADMIN — Medication 800 MG: at 05:06

## 2023-06-12 RX ADMIN — POLYETHYLENE GLYCOL 3350 17 G: 17 POWDER, FOR SOLUTION ORAL at 09:06

## 2023-06-12 RX ADMIN — Medication 10 ML: at 06:06

## 2023-06-12 RX ADMIN — FAMOTIDINE 20 MG: 10 INJECTION, SOLUTION INTRAVENOUS at 09:06

## 2023-06-12 RX ADMIN — METRONIDAZOLE 500 MG: 500 INJECTION, SOLUTION INTRAVENOUS at 09:06

## 2023-06-12 RX ADMIN — ONDANSETRON 4 MG: 2 INJECTION INTRAMUSCULAR; INTRAVENOUS at 10:06

## 2023-06-12 RX ADMIN — CEFEPIME 2 G: 2 INJECTION, POWDER, FOR SOLUTION INTRAVENOUS at 11:06

## 2023-06-12 RX ADMIN — Medication 10 ML: at 05:06

## 2023-06-12 RX ADMIN — ACETAMINOPHEN 650 MG: 650 SOLUTION ORAL at 07:06

## 2023-06-12 RX ADMIN — ROCURONIUM BROMIDE 30 MG: 10 INJECTION, SOLUTION INTRAVENOUS at 09:06

## 2023-06-12 RX ADMIN — Medication 10 ML: at 12:06

## 2023-06-12 RX ADMIN — ENOXAPARIN SODIUM 40 MG: 40 INJECTION SUBCUTANEOUS at 04:06

## 2023-06-12 RX ADMIN — DOXYCYCLINE 100 MG: 100 INJECTION, POWDER, LYOPHILIZED, FOR SOLUTION INTRAVENOUS at 10:06

## 2023-06-12 RX ADMIN — SODIUM CHLORIDE: 9 INJECTION, SOLUTION INTRAVENOUS at 09:06

## 2023-06-12 RX ADMIN — MUPIROCIN: 20 OINTMENT TOPICAL at 10:06

## 2023-06-12 RX ADMIN — CEFEPIME 2 G: 2 INJECTION, POWDER, FOR SOLUTION INTRAVENOUS at 04:06

## 2023-06-12 RX ADMIN — DEXMEDETOMIDINE HYDROCHLORIDE 1.4 MCG/KG/HR: 4 INJECTION, SOLUTION INTRAVENOUS at 05:06

## 2023-06-12 RX ADMIN — DEXMEDETOMIDINE HYDROCHLORIDE 1.4 MCG/KG/HR: 4 INJECTION, SOLUTION INTRAVENOUS at 04:06

## 2023-06-12 RX ADMIN — Medication 30 MG: at 09:06

## 2023-06-12 RX ADMIN — Medication 10 ML: at 01:06

## 2023-06-12 RX ADMIN — SODIUM CHLORIDE, SODIUM ACETATE ANHYDROUS, SODIUM GLUCONATE, POTASSIUM CHLORIDE, AND MAGNESIUM CHLORIDE: 526; 222; 502; 37; 30 INJECTION, SOLUTION INTRAVENOUS at 09:06

## 2023-06-12 RX ADMIN — METRONIDAZOLE 500 MG: 500 INJECTION, SOLUTION INTRAVENOUS at 03:06

## 2023-06-12 NOTE — SUBJECTIVE & OBJECTIVE
Interval History/Significant Events:   NAEON. Tm 101.8, VSS. To OR for R neck wound exploration, possible flap, possible wound vac, possible trach. Tube feeds held since midnight. Remains on cefepime, flagyl, doxy, linezolid. Closely monitoring for serotinin syndrome signs given linezolid and fentanyl gtt.    Follow-up For: Procedure(s) (LRB):  RIGHT NECK WOUND EXPLORATION, POSSIBLE WOUND VAC, POSSIBLE TRACHEOSTOMY, POSSIBLE ROTATIONAL FLAP (Right)    Post-Operative Day: Day of Surgery    Objective:     Vital Signs (Most Recent):  Temp: 98.8 °F (37.1 °C) (06/12/23 0315)  Pulse: 75 (06/12/23 0547)  Resp: 16 (06/12/23 0547)  BP: (!) 95/51 (06/12/23 0547)  SpO2: 100 % (06/12/23 0547) Vital Signs (24h Range):  Temp:  [98.8 °F (37.1 °C)-101.8 °F (38.8 °C)] 98.8 °F (37.1 °C)  Pulse:  [70-85] 75  Resp:  [14-32] 16  SpO2:  [94 %-100 %] 100 %  BP: ()/(50-80) 95/51     Weight: 55.8 kg (123 lb 0.3 oz)  Body mass index is 22.5 kg/m².      Intake/Output Summary (Last 24 hours) at 6/12/2023 0704  Last data filed at 6/12/2023 0500  Gross per 24 hour   Intake 2869.72 ml   Output 1285 ml   Net 1584.72 ml          Physical Exam    Vitals reviewed.   Constitutional:       General: She is not in acute distress.     Appearance: She is ill-appearing.   HENT:      Head: Normocephalic.      Mouth/Throat:      Mouth: Mucous membranes are moist.      Comments: OGT  Neck:      Comments: R neck incision open with dressing packed. No strikethrough visible  Spreading erythema to superior aspect chest and L neck  Cardiovascular:      Rate and Rhythm: Normal rate and regular rhythm.   Pulmonary:      Effort: Pulmonary effort is normal. On mech vent  Abdominal:      General: Abdomen is flat. There is no distension.      Palpations: Abdomen is soft.      Tenderness: There is no guarding.   Genitourinary:     Comments: murdock  Skin:     General: Skin is warm and dry.   Neurological:      Comments: sedated   Psychiatric:      Comments: sedated      Vents:  Vent Mode: A/C (06/12/23 0547)  Set Rate: 18 BPM (06/12/23 0547)  Vt Set: 360 mL (06/12/23 0547)  PEEP/CPAP: 5 cmH20 (06/12/23 0547)  Oxygen Concentration (%): 50 (06/12/23 0547)  Peak Airway Pressure: 17 cmH20 (06/12/23 0547)  Plateau Pressure: 11 cmH20 (06/12/23 0547)  Total Ve: 7.45 L/m (06/12/23 0547)  Negative Inspiratory Force (cm H2O): 0 (06/12/23 0547)  F/VT Ratio<105 (RSBI): (!) 46.38 (06/12/23 0547)    Lines/Drains/Airways       Peripherally Inserted Central Catheter Line  Duration             PICC Double Lumen 06/09/23 1152 right brachial 2 days              Drain  Duration                  NG/OG Tube 06/08/23 2343 [place] [facility] mouth 3 days         Urethral Catheter 06/08/23 2343 Latex 3 days              Airway  Duration                  Airway - Non-Surgical 06/08/23 2300 Endotracheal Tube 3 days              Peripheral Intravenous Line  Duration                  Peripheral IV - Single Lumen 06/08/23 2000 20 G Right Antecubital 3 days         Peripheral IV - Single Lumen 06/08/23 2002 20 G Left Antecubital 3 days         Peripheral IV - Single Lumen 06/08/23 2346 20 G Left;Posterior Hand 3 days                    Significant Labs:    CBC/Anemia Profile:  Recent Labs   Lab 06/11/23  0332 06/12/23 0318   WBC 7.14 8.47   HGB 9.6* 9.0*   HCT 28.3* 26.8*    272   MCV 92 90   RDW 12.7 12.7        Chemistries:  Recent Labs   Lab 06/11/23  0332 06/12/23  0318    137   K 3.9 3.9    104   CO2 26 27   BUN 11 11   CREATININE 0.4* 0.4*   CALCIUM 8.4* 8.3*   ALBUMIN 1.9* 1.8*   PROT 4.7* 4.3*   BILITOT 0.3 0.3   ALKPHOS 129 149*   ALT 44 35   AST 55* 30   MG 1.6 1.8   PHOS 3.5 4.1       All pertinent labs within the past 24 hours have been reviewed.    Significant Imaging:  I have reviewed all pertinent imaging results/findings within the past 24 hours.  X-Ray Chest 1 View    Result Date: 6/12/2023  No detrimental change when compared with 06/11/2023. Electronically signed by  resident: Justo Parra Date:    06/12/2023 Time:    05:41 Electronically signed by: Natan Sultana MD Date:    06/12/2023 Time:    06:11

## 2023-06-12 NOTE — PLAN OF CARE
"      SICU PLAN OF CARE NOTE    Dx: Necrotizing fasciitis    Vital Signs: BP (!) 97/56   Pulse 75   Temp 98.8 °F (37.1 °C) (Oral)   Resp 16   Ht 5' 2" (1.575 m)   Wt 55.8 kg (123 lb 0.3 oz)   SpO2 100%   BMI 22.50 kg/m²     SHIFT EVENTS:  VSS / No acute events this shift. Plans for OR this AM. TF held since midnight. CHG bath complete    Neuro: Sedated, Arouses to Voice, Follows Commands, and Moves All Extremities    Respiratory: Ventilator    Cardiac: NSR; HR 70s    Diet: NPO    Gtts: Propofol, Fentanyl, and Precedex     Urine Output: Urinary Catheter 865 ml/shift    Restraints good until 6/12 2100. No sign of injury at this time.     Labs: Daily    SKIN NOTE:  Skin: No new skin tear or skin breakdown noted at this time.    Skin precautions maintained including:  Sacrum and heels with foam dressing in place for pressure protection. Frequent weight shift assistance provided Q2 hr to prevent breakdown. Bed plugged in and mattress inflated; Adhesive use limited. Heels elevated off bed. Pressure points protected and positioning supports utilized.  Skin-to-device areas padded. Skin-to-skin areas padded    POC reviewed with patient and family; questions and concerns addressed. See flow sheets for full assessment details.     "

## 2023-06-12 NOTE — PROGRESS NOTES
[facility]  Critical Care - Surgery  Progress Note    Patient Name: Aditi Conway  MRN: [id number]  Admission Date: 6/8/2023  Hospital Length of Stay: 4 days  Code Status: Full Code  Attending Provider: Gerardo Seymour MD  Primary Care Provider: Primary Doctor No   Principal Problem: Necrotizing fasciitis    Subjective:     Hospital/ICU Course:  No notes on file    Interval History/Significant Events:   NAEON. Tm 101.8, VSS. To OR for R neck wound exploration, possible flap, possible wound vac, possible trach. Tube feeds held since midnight. Remains on cefepime, flagyl, doxy, linezolid. Closely monitoring for serotinin syndrome signs given linezolid and fentanyl gtt.    Follow-up For: Procedure(s) (LRB):  RIGHT NECK WOUND EXPLORATION, POSSIBLE WOUND VAC, POSSIBLE TRACHEOSTOMY, POSSIBLE ROTATIONAL FLAP (Right)    Post-Operative Day: Day of Surgery    Objective:     Vital Signs (Most Recent):  Temp: 98.8 °F (37.1 °C) (06/12/23 0315)  Pulse: 75 (06/12/23 0547)  Resp: 16 (06/12/23 0547)  BP: (!) 95/51 (06/12/23 0547)  SpO2: 100 % (06/12/23 0547) Vital Signs (24h Range):  Temp:  [98.8 °F (37.1 °C)-101.8 °F (38.8 °C)] 98.8 °F (37.1 °C)  Pulse:  [70-85] 75  Resp:  [14-32] 16  SpO2:  [94 %-100 %] 100 %  BP: ()/(50-80) 95/51     Weight: 55.8 kg (123 lb 0.3 oz)  Body mass index is 22.5 kg/m².      Intake/Output Summary (Last 24 hours) at 6/12/2023 0704  Last data filed at 6/12/2023 0500  Gross per 24 hour   Intake 2869.72 ml   Output 1285 ml   Net 1584.72 ml          Physical Exam    Vitals reviewed.   Constitutional:       General: She is not in acute distress.     Appearance: She is ill-appearing.   HENT:      Head: Normocephalic.      Mouth/Throat:      Mouth: Mucous membranes are moist.      Comments: OGT  Neck:      Comments: R neck incision open with dressing packed. No strikethrough visible  Spreading erythema to superior aspect chest and L neck  Cardiovascular:      Rate and  Rhythm: Normal rate and regular rhythm.   Pulmonary:      Effort: Pulmonary effort is normal. [facility]  Abdominal:      General: Abdomen is flat. There is no distension.      Palpations: Abdomen is soft.      Tenderness: There is no guarding.   Genitourinary:     Comments: murdock  Skin:     General: Skin is warm and dry.   Neurological:      Comments: sedated   Psychiatric:      Comments: sedated     Vents:  Vent Mode: A/C (06/12/23 0547)  Set Rate: 18 BPM (06/12/23 0547)  Vt Set: 360 mL (06/12/23 0547)  PEEP/CPAP: 5 cmH20 (06/12/23 0547)  Oxygen Concentration (%): 50 (06/12/23 0547)  Peak Airway Pressure: 17 cmH20 (06/12/23 0547)  Plateau Pressure: 11 cmH20 (06/12/23 0547)  Total Ve: 7.45 L/m (06/12/23 0547)  Negative Inspiratory Force (cm H2O): 0 (06/12/23 0547)  F/VT Ratio<105 (RSBI): (!) 46.38 (06/12/23 0547)    Lines/Drains/Airways       Peripherally Inserted Central Catheter Line  Duration             PICC Double Lumen 06/09/23 1152 right brachial 2 days              Drain  Duration                  NG/OG Tube 06/08/23 2343 [place] sump Center mouth 3 days         Urethral Catheter 06/08/23 2343 Latex 3 days              Airway  Duration                  Airway - Non-Surgical 06/08/23 2300 Endotracheal Tube 3 days              Peripheral Intravenous Line  Duration                  Peripheral IV - Single Lumen 06/08/23 2000 20 G Right Antecubital 3 days         Peripheral IV - Single Lumen 06/08/23 2002 20 G Left Antecubital 3 days         Peripheral IV - Single Lumen 06/08/23 2346 20 G Left;Posterior Hand 3 days                    Significant Labs:    CBC/Anemia Profile:  Recent Labs   Lab 06/11/23  0332 06/12/23 0318   WBC 7.14 8.47   HGB 9.6* 9.0*   HCT 28.3* 26.8*    272   MCV 92 90   RDW 12.7 12.7        Chemistries:  Recent Labs   Lab 06/11/23  0332 06/12/23 0318    137   K 3.9 3.9    104   CO2 26 27   BUN 11 11   CREATININE 0.4* 0.4*   CALCIUM 8.4* 8.3*   ALBUMIN 1.9* 1.8*   PROT  4.7* 4.3*   BILITOT 0.3 0.3   ALKPHOS 129 149*   ALT 44 35   AST 55* 30   MG 1.6 1.8   PHOS 3.5 4.1       All pertinent labs within the past 24 hours have been reviewed.    Significant Imaging:  I have reviewed all pertinent imaging results/findings within the past 24 hours.  X-Ray Chest 1 View    Result Date: 6/12/2023  No detrimental change when compared with 06/11/2023. Electronically signed by resident: Jsuto Parra Date:    06/12/2023 Time:    05:41 Electronically signed by: Natan Sultana MD Date:    06/12/2023 Time:    06:11       Assessment/Plan:     ID  * Necrotizing fasciitis  42F PMH depression, seizures (no meds), tobacco use, presenting with concern for toshia's angina / nec fasc of neck s/p exploration and debridement with ENT 6/8/23          Neuro/Psych:   -- Sedation: propofol gtt and precedex ggt  -- Pain: fentanyl gtt             Cards:   -- HDS  -- currently not requiring pressors  -- MAP > 65      Pulm:   -- Goal O2 sat > 90%  -- Intubated on rate control 50/5  -- To OR today, has remained on vent until takeback to OR.   -- Daily SBT, SAT      Renal:  -- Keep murdock for strict I/O. Maintaining murdock for I/O while in ICU  -- BUN/Cr 11/0.4  -- 1.3L UOP / 24 hr      FEN / GI:   -- Net +1.6 L / 24 hr  -- Replace lytes as needed  -- Nutrition: NPO. TF previously at goal, held since midnight for OR.   -- OGT in place      ID:   -- Afebrile, WBC 8.5  -- Abx: linezolid, cefepime, flagyl, doxy  -- ID following  -- Following OR cultures 6/8 Staph aureus  -- Blood Cx OSH 6/7 NGTD  -- ID consulted  -- ENT planning to take back to OR today      Heme/Onc:   -- H/H stable 9/27  -- Daily CBC      Endo:   -- Gluc goal 140-180      PPx:   Feeding: NPO, TF to goal (held since midnight)  Analgesia/Sedation: adequate  Thromboembolic prevention: lovenox  HOB >30: Yes  Stress Ulcer ppx: n/a  Glucose control: Critical care goal 140-180 g/dl     Lines/Drains/Airway: pIVs, OGT, Murdock, ETT, PICC      Dispo/Code  Status/Palliative:   -- SICU / Full Code        Critical care was time spent personally by me on the following activities: development of treatment plan with patient or surrogate and bedside caregivers, discussions with consultants, evaluation of patient's response to treatment, examination of patient, ordering and performing treatments and interventions, ordering and review of laboratory studies, ordering and review of radiographic studies, pulse oximetry, re-evaluation of patient's condition.  This critical care time did not overlap with that of any other provider or involve time for any procedures.     Clement Torres MD  Critical Care - Surgery  [facility]

## 2023-06-12 NOTE — ASSESSMENT & PLAN NOTE
42F presenting with right neck necrotizing fasciitis with surround cellulitis s/p debridement of multiple levels of right neck on 6/8. Necrotic skin, soft tissue, and muscle removed. Open neck wound packed with saline soaked kerlix. Patient remains intubated.     -- To OR today for right neck wound exploration, possible rotational flap, possible wound vac, possible tracheostomy  --  consented yesterday at bedside; witnessed by SICU RN  -- Hold tube feeding at midnight tonight  -- Continue BID dressing changes with saline-soaked Kirlex, ENT-driven  -- Appreciate ID recs regarding antibiotics  -- Please Secure Chat me for any questions, page ENT on-call if unresponsive or urgent

## 2023-06-12 NOTE — PLAN OF CARE
OR and anesthesia at bedside to transport pt to OR for neck exploration. Pt placed on portable monitor and ventilator. All VSS. Handoff given to CRNA, consents in chart and sent with patient.  updated at bedside.

## 2023-06-12 NOTE — BRIEF OP NOTE
[facility]  Brief Operative Note    SUMMARY     Surgery Date: 6/12/2023     Surgeon(s) and Role:     * Gerardo Seymour MD - Primary     * Javier Parker MD - Resident - Assisting        Pre-op Diagnosis:  Necrotizing fasciitis [M72.6]    Post-op Diagnosis:  Post-Op Diagnosis Codes:     * Necrotizing fasciitis [M72.6]    Procedure(s) (LRB):  RIGHT NECK WOUND EXPLORATION, POSSIBLE WOUND VAC, POSSIBLE TRACHEOSTOMY, POSSIBLE ROTATIONAL FLAP (Right)    Anesthesia: General    Estimated Blood Loss has been documented..         Specimens:   Specimen (24h ago, onward)      None            [id number]

## 2023-06-12 NOTE — PROGRESS NOTES
"[facility]  Adult Nutrition  Progress Note    SUMMARY       Recommendations    If propofol continue at current rate, rec'd Impact peptide @ 35ml/hr to provide 1260 kcal, 79g protein, 647ml FW. Propofol provides additional 398 kcal     If propofol d/c, rec'd Impact peptide 1.5 @ 45ml/hr to provide 1620 kcal, 101g protein, 832ml FW     When medically able, ADAT Regular diet with texture per SLP     RD to monitor and follow    Goals: Meey % EEN, EPN by RD f/u  Nutrition Goal Status: progressing towards goal  Communication of RD Recs:  (POC)    Assessment and Plan    Nutrition Problem:  Inadequate energy intake     Related to (etiology):   Inability to consume sufficient energy      Signs and Symptoms (as evidenced by):   NPO     Interventions/Recommendations (treatment strategy):  Collaboration with providers  EN     Nutrition Diagnosis Status:   Continues     Reason for Assessment    Reason For Assessment: RD follow-up  Diagnosis:  (necrotizing fasciitis)  Interdisciplinary Rounds: did not attend  General Information Comments: Pt seen for f/u. Intubated/sedated on propofol. Spoke to RN. TF held since midnight due OR today. Endorses tolerate on TF at this time. Following  Nutrition Discharge Planning: Pending medical course    Nutrition Risk Screen    Nutrition Risk Screen: tube feeding or parenteral nutrition      Anthropometrics    Temp: 98.8 °F (37.1 °C)  Height: 5' 2" (157.5 cm)  Height (inches): 62 in  Weight Method: Bed Scale  Weight: 55.8 kg (123 lb 0.3 oz)  Weight (lb): 123.02 lb  Ideal Body Weight (IBW), Female: 110 lb  BMI (Calculated): 22.5       Lab/Procedures/Meds    Pertinent Labs Reviewed: reviewed  Pertinent Labs Comments: H/H: 9/26.8, creatinine: 0.4, Glu: 140  Pertinent Medications Reviewed: reviewed  Pertinent Medications Comments: ABX, enoxaparin, famotidine, polyethylene glycol, fentanyl, propofol      Estimated/Assessed Needs    Weight Used For Calorie Calculations: " 48.5 kg (107 lb)  Energy Calorie Requirements (kcal): 1508 kcal  Energy Need Method: [place]  Protein Requirements: 72g (1.5g/kg)  Weight Used For Protein Calculations: 48.5 kg (107 lb)  Fluid Requirements (mL): 1ml/kcal or per MD  Estimated Fluid Requirement Method: RDA Method  RDA Method (mL): 1508         Nutrition Prescription Ordered    Current Diet Order: NPO  Current Nutrition Support Formula Ordered: Impact Peptide 1.5  Current Nutrition Support Rate Ordered: 45 (ml) (ml)  Current Nutrition Support Frequency Ordered: 24 (hours)    Evaluation of Received Nutrient/Fluid Intake    Enteral Calories (kcal): 1620  Enteral Protein (gm): 101  Enteral (Free Water) Fluid (mL): 832  Lipid Calories (kcals): 398 kcals (propofol- 358)  % Kcal Needs: 107  % Protein Needs: 140  I/O: +6.5 L since admit  Energy Calories Required: meeting needs  Protein Required: exceeds needs  Comments: LBM N/A  Tolerance: tolerating  % Intake of Estimated Energy Needs: 75 - 100 %  % Meal Intake: NPO    Nutrition Risk    Level of Risk/Frequency of Follow-up:  (1x/week)     Monitor and Evaluation    Food and Nutrient Intake: energy intake, food and beverage intake, enteral nutrition intake, parenteral nutrition intake  Food and Nutrient Adminstration: diet order, enteral and parenteral nutrition administration  Physical Activity and Function: nutrition-related ADLs and IADLs  Anthropometric Measurements: height/length, weight, weight change, body mass index  Biochemical Data, Medical Tests and Procedures: electrolyte and renal panel, glucose/endocrine profile, inflammatory profile, gastrointestinal profile, lipid profile  Nutrition-Focused Physical Findings: overall appearance     Nutrition Follow-Up    RD Follow-up?: Yes    Kathi Bermudez, Registration Eligible, Provisional LDN

## 2023-06-12 NOTE — SUBJECTIVE & OBJECTIVE
Interval History: Takeback to OR negative for residual abscess or infectious findings.    Review of Systems   Unable to perform ROS: Intubated   Objective:     Vital Signs (Most Recent):  Temp: 98.8 °F (37.1 °C) (06/12/23 1100)  Pulse: 85 (06/12/23 1730)  Resp: (!) 29 (06/12/23 1345)  BP: (!) 143/83 (06/12/23 1700)  SpO2: 100 % (06/12/23 1730) Vital Signs (24h Range):  Temp:  [98.8 °F (37.1 °C)-101.8 °F (38.8 °C)] 98.8 °F (37.1 °C)  Pulse:  [] 85  Resp:  [14-44] 29  SpO2:  [94 %-100 %] 100 %  BP: ()/(50-90) 143/83     Weight: 55.8 kg (123 lb 0.3 oz)  Body mass index is 22.5 kg/m².    Estimated Creatinine Clearance: 147.9 mL/min (A) (based on SCr of 0.4 mg/dL (L)).     Physical Exam  Constitutional:       Appearance: She is ill-appearing. She is not toxic-appearing or diaphoretic.   HENT:      Head:      Comments: intubated  Eyes:      General: No scleral icterus.        Right eye: No discharge.         Left eye: No discharge.   Neck:      Comments: Erythema/swelling from neck down to upper chest improving since admission.  Surgical site dressed  Pulmonary:      Comments: Vent Mode: A/C  Oxygen Concentration (%):  [40-50] 40  Resp Rate Total:  [15 br/min-40 br/min] 18 br/min  Vt Set:  [360 mL] 360 mL  PEEP/CPAP:  [5 cmH20] 5 cmH20  Mean Airway Pressure:  [6.4 fhE34-78 cmH20] 16 cmH20     Skin:     Coloration: Skin is not jaundiced.      Findings: Erythema present.        Significant Labs: All pertinent labs within the past 24 hours have been reviewed.    Significant Imaging: I have reviewed all pertinent imaging results/findings within the past 24 hours.

## 2023-06-12 NOTE — TRANSFER OF CARE
"Anesthesia Transfer of Care Note    Patient: Aditi Conway    Procedure(s) Performed: Procedure(s) (LRB):  RIGHT NECK WOUND EXPLORATION, POSSIBLE WOUND VAC, POSSIBLE TRACHEOSTOMY, POSSIBLE ROTATIONAL FLAP (Right)    Patient location: ICU    Anesthesia Type: general    Transport from OR: Transported from OR intubated on 100% O2  with adequate ventilation controlled by transport ventilator. Continuous ECG monitoring in transport. Continuous SpO2 monitoring in transport    Post pain: adequate analgesia    Post assessment: no apparent anesthetic complications    Post vital signs: stable    Level of consciousness: sedated    Nausea/Vomiting: no nausea/vomiting    Complications: none    Transfer of care protocol was followed      Last vitals:   Visit Vitals  BP (!) 95/54 (BP Location: Left arm, Patient Position: Lying)   Pulse 80   Temp (!) 38.6 °C (101.5 °F) (Oral)   Resp (!) 21   Ht 5' 2" (1.575 m)   Wt 55.8 kg (123 lb 0.3 oz)   SpO2 100%   BMI 22.50 kg/m²     "

## 2023-06-12 NOTE — ASSESSMENT & PLAN NOTE
42F PMH depression, seizures (no meds), tobacco use, presenting with concern for toshia's angina / nec fasc of neck s/p exploration and debridement with ENT 6/8/23          Neuro/Psych:   -- Sedation: propofol gtt and precedex ggt  -- Pain: fentanyl gtt             Cards:   -- HDS  -- currently not requiring pressors  -- MAP > 65      Pulm:   -- Goal O2 sat > 90%  -- Intubated on rate control 50/5  -- To OR today, has remained on vent until takeback to OR.   -- Daily SBT, SAT      Renal:  -- Keep murdock for strict I/O  -- BUN/Cr 11/0.4  -- 1.3L UOP / 24 hr      FEN / GI:   -- Net +1.6 L / 24 hr  -- Replace lytes as needed  -- Nutrition: NPO. TF previously at goal, held since midnight for OR.   -- OGT in place      ID:   -- Afebrile, WBC 8.5  -- Abx: linezolid, cefepime, flagyl, doxy  -- ID following  -- Following OR cultures 6/8 Staph aureus  -- Blood Cx OSH 6/7 NGTD  -- ID consulted  -- ENT planning to take back to OR today      Heme/Onc:   -- H/H stable 9/27  -- Daily CBC      Endo:   -- Gluc goal 140-180      PPx:   Feeding: NPO, TF to goal (held since midnight)  Analgesia/Sedation: adequate  Thromboembolic prevention: lovenox  HOB >30: Yes  Stress Ulcer ppx: n/a  Glucose control: Critical care goal 140-180 g/dl     Lines/Drains/Airway: pIVs, OGT, Murdock, ETT, PICC      Dispo/Code Status/Palliative:   -- SICU / Full Code

## 2023-06-12 NOTE — SUBJECTIVE & OBJECTIVE
Interval History: No acute events over night.    Medications:  Continuous Infusions:   dexmedeTOMIDine (Precedex) infusion (titrating) 1.4 mcg/kg/hr (06/12/23 0500)    fentanyl 200 mcg/hr (06/12/23 0500)    propofoL 50 mcg/kg/min (06/12/23 0500)     Scheduled Meds:   ceFEPime (MAXIPIME) IVPB  2 g Intravenous Q8H    doxycycline (VIBRAMYCIN) IVPB  100 mg Intravenous Q12H    enoxparin  40 mg Subcutaneous Q24H (prophylaxis, 1700)    linezolid  600 mg Intravenous Q12H    metronidazole  500 mg Intravenous Q8H    midazolam  2 mg Intravenous Once    sodium chloride 0.9%  10 mL Intravenous Q6H     PRN Meds:acetaminophen, HYDROmorphone, magnesium oxide, magnesium oxide, oxyCODONE, potassium bicarbonate, potassium bicarbonate, potassium bicarbonate, potassium, sodium phosphates, potassium, sodium phosphates, potassium, sodium phosphates, sodium chloride 0.9%, sodium chloride 0.9%, Flushing PICC Protocol **AND** sodium chloride 0.9% **AND** sodium chloride 0.9%     Review of patient's allergies indicates:   Allergen Reactions    Clindamycin      Objective:     Vital Signs (24h Range):  Temp:  [98.8 °F (37.1 °C)-101.8 °F (38.8 °C)] 98.8 °F (37.1 °C)  Pulse:  [70-85] 75  Resp:  [14-32] 16  SpO2:  [94 %-100 %] 100 %  BP: ()/(50-81) 95/51       Lines/Drains/Airways       Peripherally Inserted Central Catheter Line  Duration             PICC Double Lumen 06/09/23 1152 right brachial 2 days              Drain  Duration                  NG/OG Tube 06/08/23 2343 [place] sump Center mouth 3 days         Urethral Catheter 06/08/23 2343 Latex 3 days              Airway  Duration                  Airway - Non-Surgical 06/08/23 2300 Endotracheal Tube 3 days              Peripheral Intravenous Line  Duration                  Peripheral IV - Single Lumen 06/08/23 2000 20 G Right Antecubital 3 days         Peripheral IV - Single Lumen 06/08/23 2002 20 G Left Antecubital 3 days         Peripheral IV - Single Lumen 06/08/23 2346 20 G  Left;Posterior Hand 3 days                     Physical Exam  Intubated and partially sedated  Right neck open wound with good granulation tissue, no evidence of necrosis, spanning from mastoid tip to below mandible, covered with saline-soaked Kirlex  Regression of anterior chest cellulitis     Significant Labs:  CBC:   Recent Labs   Lab 06/12/23 0318   WBC 8.47   RBC 2.98*   HGB 9.0*   HCT 26.8*      MCV 90   MCH 30.2   MCHC 33.6     CMP:   Recent Labs   Lab 06/12/23 0318   *   CALCIUM 8.3*   ALBUMIN 1.8*   PROT 4.3*      K 3.9   CO2 27      BUN 11   CREATININE 0.4*   ALKPHOS 149*   ALT 35   AST 30   BILITOT 0.3
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
## 2023-06-12 NOTE — PROGRESS NOTES
[facility]  Otorhinolaryngology-Head & Neck Surgery  Progress Note    Subjective:     Post-Op Info:  Procedure(s) (LRB):  RIGHT NECK WOUND EXPLORATION, POSSIBLE WOUND VAC, POSSIBLE TRACHEOSTOMY, POSSIBLE ROTATIONAL FLAP (Right)   Day of Surgery  Hospital Day: 5     Interval History: No acute events over night.    Medications:  Continuous Infusions:   dexmedeTOMIDine (Precedex) infusion (titrating) 1.4 mcg/kg/hr (06/12/23 0500)    fentanyl 200 mcg/hr (06/12/23 0500)    propofoL 50 mcg/kg/min (06/12/23 0500)     Scheduled Meds:   ceFEPime (MAXIPIME) IVPB  2 g Intravenous Q8H    doxycycline (VIBRAMYCIN) IVPB  100 mg Intravenous Q12H    enoxparin  40 mg Subcutaneous Q24H (prophylaxis, 1700)    linezolid  600 mg Intravenous Q12H    metronidazole  500 mg Intravenous Q8H    midazolam  2 mg Intravenous Once    sodium chloride 0.9%  10 mL Intravenous Q6H     PRN Meds:acetaminophen, HYDROmorphone, magnesium oxide, magnesium oxide, oxyCODONE, potassium bicarbonate, potassium bicarbonate, potassium bicarbonate, potassium, sodium phosphates, potassium, sodium phosphates, potassium, sodium phosphates, sodium chloride 0.9%, sodium chloride 0.9%, Flushing PICC Protocol **AND** sodium chloride 0.9% **AND** sodium chloride 0.9%     Review of patient's allergies indicates:   Allergen Reactions    Clindamycin      Objective:     Vital Signs (24h Range):  Temp:  [98.8 °F (37.1 °C)-101.8 °F (38.8 °C)] 98.8 °F (37.1 °C)  Pulse:  [70-85] 75  Resp:  [14-32] 16  SpO2:  [94 %-100 %] 100 %  BP: ()/(50-81) 95/51       Lines/Drains/Airways       Peripherally Inserted Central Catheter Line  Duration             PICC Double Lumen 06/09/23 1152 right brachial 2 days              Drain  Duration                  NG/OG Tube 06/08/23 2343 [facility] mouth 3 days         Urethral Catheter 06/08/23 2343 [place] 3 days              Airway  Duration                  Airway - Non-Surgical 06/08/23 2300  Endotracheal Tube 3 days              Peripheral Intravenous Line  Duration                  Peripheral IV - Single Lumen 06/08/23 2000 20 G Right Antecubital 3 days         Peripheral IV - Single Lumen 06/08/23 2002 20 G Left Antecubital 3 days         Peripheral IV - Single Lumen 06/08/23 2346 20 G Left;Posterior Hand 3 days                     Physical Exam  Intubated and partially sedated  Right neck open wound with good granulation tissue, no evidence of necrosis, spanning from mastoid tip to below mandible, covered with saline-soaked Kirlex  Regression of anterior chest cellulitis     Significant Labs:  CBC:   Recent Labs   Lab 06/12/23  0318   WBC 8.47   RBC 2.98*   HGB 9.0*   HCT 26.8*      MCV 90   MCH 30.2   MCHC 33.6     CMP:   Recent Labs   Lab 06/12/23 0318   *   CALCIUM 8.3*   ALBUMIN 1.8*   PROT 4.3*      K 3.9   CO2 27      BUN 11   CREATININE 0.4*   ALKPHOS 149*   ALT 35   AST 30   BILITOT 0.3         Assessment/Plan:     * Necrotizing fasciitis  42F presenting with right neck necrotizing fasciitis with surround cellulitis s/p debridement of multiple levels of right neck on 6/8. Necrotic skin, soft tissue, and muscle removed. Open neck wound packed with saline soaked kerlix. Patient remains intubated.     -- To OR today for right neck wound exploration, possible rotational flap, possible wound vac, possible tracheostomy  --  consented yesterday at bedside; witnessed by SICU RN  -- Hold tube feeding at midnight tonight  -- Continue BID dressing changes with saline-soaked Kirlex, ENT-driven  -- Appreciate ID recs regarding antibiotics  -- Please Secure Chat me for any questions, page ENT on-call if unresponsive or urgent        Javier Parker MD  Otorhinolaryngology-Head & Neck Surgery  [facility]

## 2023-06-12 NOTE — ASSESSMENT & PLAN NOTE
42-year-old female with h/o depression, seizure (no meds), tobacco use emergently [place] [facility] [place] [facility] [place] [facility] 6/4/23 with an open neck wound.  Admitted for necrotizing fasciitis of her neck.  + exposure to brackish water.    Intraoperative cultures growing MSSA.  Repeat OR findings small amt of necrotic tissue anterior skin resent, wound otherwise appeared healthy.  Having fevers with relative bradycardia; unclear etiology as current abx regimen should be appropriate, may be suggestive of drug fever.    Recommendations:  -continue cefepime, metronidazole, doxycycline, linezolid.  -starting tomorrow 6/12/23 change all antibiotics to cefazolin  -6/9/23 Bcx ngtd

## 2023-06-12 NOTE — PLAN OF CARE
Recommendations    If propofol continue at current rate, rec'd Impact peptide @ 35ml/hr to provide 1260 kcal, 79g protein, 647ml FW. Propofol provides additional 398 kcal     If propofol d/c, rec'd Impact peptide 1.5 @ 45ml/hr to provide 1620 kcal, 101g protein, 832ml FW     When medically able, ADAT Regular diet with texture per SLP     RD to monitor and follow    Goals: Meey % EEN, EPN by RD f/u  Nutrition Goal Status: progressing towards goal  Communication of RD Recs:  (POC)

## 2023-06-12 NOTE — PROGRESS NOTES
[facility]  Infectious Disease  Progress Note    Patient Name: Aditi Conway  MRN: [id number]  Admission Date: 6/8/2023  Length of Stay: 3 days  Attending Physician: Gerardo Seymour MD  Primary Care Provider: Primary Doctor No    Isolation Status: No active isolations  Assessment/Plan:      ID  * Necrotizing fasciitis  42-year-old female with h/o depression, seizure (no meds), tobacco use emergently transferred to [facility] [place] hospitals 6/4/23 with an open neck wound.  Admitted for necrotizing fasciitis of her neck.  Intraoperative cultures growing staph aureus.  Given exposure to brackish water, covering waterborne pathogens pending final culture results.    Recommendations:  -continue cefepime, metronidazole, doxycycline, linezolid.  -on fentanyl and linezolid.  Monitor clinically for signs of serotonin syndrome  -follow up final culture results  -planned for OR 6/12/23.  Please send new cultures if purulence, fluid collections encountered.      Discussed with primary team.    Thank you for your consult. I will follow-up with patient. Please contact us if you have any additional questions.    Javier Lim MD  Infectious Disease  [facility]    Subjective:     Principal Problem:Necrotizing fasciitis    HPI: 42-year-old female with h/o depression, seizure (no meds), tobacco use [place]23 while having an open wound on the right side of her neck.  Subsequently developed fevers, chills, and erythema throughout her neck and upper chest.  Initially presented to [facility] 6/6/23 and was given Bactrim.  Then went to [facility] 6/7/23 and started on vancomycin/pip-tazo/doxycycline.  Emergently transferred to [facility] after continued rapid spread of infection and acute decompensation.      CT showed fairly extensive asymmetric soft tissue swelling and subcutaneous stranding involving the right anterior, lateral and posterior  neck continuing inferiorly to the anterior chest wall consistent with changes of cellulitis and possible phlegmon without clearly identified abscess formation; associated myositis suspected right sternocleidomastoid and right posterolateral musculature.  6/8/23 s/p wide debridement by ENT.    Consult: Nectrotizing fasciitis management    Interval History: Afebrile.  OR planned for 6/12/23.    Review of Systems   Unable to perform ROS: Intubated   Objective:     Vital Signs (Most Recent):  Temp: 99.8 °F (37.7 °C) (06/11/23 0700)  Pulse: 73 (06/11/23 0930)  Resp: 20 (06/11/23 0930)  BP: 118/77 (06/11/23 0930)  SpO2: 100 % (06/11/23 0930) Vital Signs (24h Range):  Temp:  [98.1 °F (36.7 °C)-99.8 °F (37.7 °C)] 99.8 °F (37.7 °C)  Pulse:  [] 73  Resp:  [17-45] 20  SpO2:  [96 %-100 %] 100 %  BP: ()/(51-86) 118/77     Weight: 48.9 kg (107 lb 12.9 oz)  Body mass index is 19.72 kg/m².    Estimated Creatinine Clearance: 144.3 mL/min (A) (based on SCr of 0.4 mg/dL (L)).     Physical Exam  Constitutional:       Appearance: She is ill-appearing. She is not toxic-appearing or diaphoretic.   HENT:      Head:      Comments: intubated  Eyes:      General: No scleral icterus.        Right eye: No discharge.         Left eye: No discharge.   Neck:      Comments: Erythema/swelling from neck down to upper chest with some crepitus.  Surgical site dressed  Pulmonary:      Comments: Vent Mode: A/C  Oxygen Concentration (%):  [35-50] 50  Resp Rate Total:  [18 br/min-39 br/min] 22 br/min  Vt Set:  [360 mL] 360 mL  PEEP/CPAP:  [5 cmH20] 5 cmH20  Mean Airway Pressure:  [8.3 cmH20-9.7 cmH20] 9.7 cmH20     Skin:     Coloration: Skin is not jaundiced.      Findings: Erythema present.        Significant Labs: All pertinent labs within the past 24 hours have been reviewed.    Significant Imaging: I have reviewed all pertinent imaging results/findings within the past 24 hours.

## 2023-06-12 NOTE — OP NOTE
DATE OF PROCEDURE: 6/12/2023     PREOPERATIVE DIAGNOSES:   Necrotizing fasciitis right neck  Neck wound measuring 5x15 cm    POSTOPERATIVE DIAGNOSES:   Same    SURGEON:  Surgeon(s) and Role:     * Gerardo Seymour MD - Primary     * Javier Parker MD - Resident - Assisting      PROCEDURES PERFORMED:   Wound prep of right neck wound measuring 5 x 15 cm    ANESTHESIA: General      INDICATIONS FOR PROCEDURE:   Aditi Conway is a 42 y.o. woman currently admitted [facility] with necrotizing fasciitis of the right neck.  She is status post radical resection of necrotic tissue.  She presents today for wound debridement.    Her  was apprised of the risks, benefits and alternatives to surgery.  In spite of the risk inherent to surgery, he provided informed consent for the aforementioned procedures on her behalf.     PROCEDURE IN DETAIL:  The patient was taken to the operating room and placed on the operating table in the supine position.  General endotracheal anesthesia was induced by the anesthesia team.     The right neck was addressed.  It was prepped and draped in standard sterile fashion.  There was a small amount of necrotic tissue anteriorly at the skin which was resected.  There was a similar appearing strip of skin inferiorly.  There was also a small area of necrosis within the depths of the wound which was removed and a bleeding tissue. Debridement included skin, subcutaneous tissue and muscle. Overall, the entirety of the wound appeared healthy.  Hemostasis was achieved electrocautery.  A dressing was applied.  She was then handed back to anesthesia and transferred back to ICU in satisfactory condition.    There were no intraoperative complications.  I was present for and participated in the entire procedure as dictated above.       ESTIMATED BLOOD LOSS:  Minimal    SPECIMENS:   Specimen (24h ago, onward)      None

## 2023-06-13 ENCOUNTER — ANESTHESIA EVENT (OUTPATIENT)
Dept: SURGERY | Facility: HOSPITAL | Age: 43
DRG: 501 | End: 2023-06-13
Payer: MEDICAID

## 2023-06-13 LAB
ALBUMIN SERPL BCP-MCNC: 1.8 G/DL (ref 3.5–5.2)
ALLENS TEST: ABNORMAL
ALP SERPL-CCNC: 133 U/L (ref 55–135)
ALT SERPL W/O P-5'-P-CCNC: 28 U/L (ref 10–44)
ANION GAP SERPL CALC-SCNC: 6 MMOL/L (ref 8–16)
AST SERPL-CCNC: 29 U/L (ref 10–40)
BACTERIA SPEC ANAEROBE CULT: NORMAL
BASOPHILS # BLD AUTO: 0.05 K/UL (ref 0–0.2)
BASOPHILS NFR BLD: 0.5 % (ref 0–1.9)
BILIRUB SERPL-MCNC: 0.3 MG/DL (ref 0.1–1)
BUN SERPL-MCNC: 9 MG/DL (ref 6–20)
CALCIUM SERPL-MCNC: 8.8 MG/DL (ref 8.7–10.5)
CHLORIDE SERPL-SCNC: 103 MMOL/L (ref 95–110)
CO2 SERPL-SCNC: 30 MMOL/L (ref 23–29)
CREAT SERPL-MCNC: 0.4 MG/DL (ref 0.5–1.4)
DIFFERENTIAL METHOD: ABNORMAL
EOSINOPHIL # BLD AUTO: 0.2 K/UL (ref 0–0.5)
EOSINOPHIL NFR BLD: 1.9 % (ref 0–8)
ERYTHROCYTE [DISTWIDTH] IN BLOOD BY AUTOMATED COUNT: 12.4 % (ref 11.5–14.5)
EST. GFR  (NO RACE VARIABLE): >60 ML/MIN/1.73 M^2
GLUCOSE SERPL-MCNC: 145 MG/DL (ref 70–110)
HCO3 UR-SCNC: 30.3 MMOL/L (ref 24–28)
HCT VFR BLD AUTO: 27.1 % (ref 37–48.5)
HCT VFR BLD CALC: 25 %PCV (ref 36–54)
HGB BLD-MCNC: 9 G/DL (ref 12–16)
IMM GRANULOCYTES # BLD AUTO: 0.14 K/UL (ref 0–0.04)
IMM GRANULOCYTES NFR BLD AUTO: 1.4 % (ref 0–0.5)
LYMPHOCYTES # BLD AUTO: 1.4 K/UL (ref 1–4.8)
LYMPHOCYTES NFR BLD: 14 % (ref 18–48)
MAGNESIUM SERPL-MCNC: 1.7 MG/DL (ref 1.6–2.6)
MCH RBC QN AUTO: 29.8 PG (ref 27–31)
MCHC RBC AUTO-ENTMCNC: 33.2 G/DL (ref 32–36)
MCV RBC AUTO: 90 FL (ref 82–98)
MONOCYTES # BLD AUTO: 1.1 K/UL (ref 0.3–1)
MONOCYTES NFR BLD: 11.4 % (ref 4–15)
NEUTROPHILS # BLD AUTO: 6.9 K/UL (ref 1.8–7.7)
NEUTROPHILS NFR BLD: 70.8 % (ref 38–73)
NRBC BLD-RTO: 0 /100 WBC
PCO2 BLDA: 37.3 MMHG (ref 35–45)
PH SMN: 7.52 [PH] (ref 7.35–7.45)
PHOSPHATE SERPL-MCNC: 2.9 MG/DL (ref 2.7–4.5)
PLATELET # BLD AUTO: 347 K/UL (ref 150–450)
PMV BLD AUTO: 10.5 FL (ref 9.2–12.9)
PO2 BLDA: 57 MMHG (ref 80–100)
POC BE: 7 MMOL/L
POC IONIZED CALCIUM: 1.16 MMOL/L (ref 1.06–1.42)
POC SATURATED O2: 92 % (ref 95–100)
POC TCO2: 31 MMOL/L (ref 23–27)
POCT GLUCOSE: 112 MG/DL (ref 70–110)
POCT GLUCOSE: 115 MG/DL (ref 70–110)
POTASSIUM BLD-SCNC: 3.1 MMOL/L (ref 3.5–5.1)
POTASSIUM SERPL-SCNC: 3.3 MMOL/L (ref 3.5–5.1)
PROT SERPL-MCNC: 4.9 G/DL (ref 6–8.4)
RBC # BLD AUTO: 3.02 M/UL (ref 4–5.4)
SAMPLE: ABNORMAL
SITE: ABNORMAL
SODIUM BLD-SCNC: 134 MMOL/L (ref 136–145)
SODIUM SERPL-SCNC: 139 MMOL/L (ref 136–145)
TRIGL SERPL-MCNC: 185 MG/DL (ref 30–150)
WBC # BLD AUTO: 9.72 K/UL (ref 3.9–12.7)

## 2023-06-13 PROCEDURE — 25000003 PHARM REV CODE 250

## 2023-06-13 PROCEDURE — 99900026 HC AIRWAY MAINTENANCE (STAT)

## 2023-06-13 PROCEDURE — 63600175 PHARM REV CODE 636 W HCPCS: Performed by: STUDENT IN AN ORGANIZED HEALTH CARE EDUCATION/TRAINING PROGRAM

## 2023-06-13 PROCEDURE — 84478 ASSAY OF TRIGLYCERIDES: CPT | Performed by: OTOLARYNGOLOGY

## 2023-06-13 PROCEDURE — 99900035 HC TECH TIME PER 15 MIN (STAT)

## 2023-06-13 PROCEDURE — 82800 BLOOD PH: CPT

## 2023-06-13 PROCEDURE — A4216 STERILE WATER/SALINE, 10 ML: HCPCS | Performed by: OTOLARYNGOLOGY

## 2023-06-13 PROCEDURE — 99233 PR SUBSEQUENT HOSPITAL CARE,LEVL III: ICD-10-PCS | Mod: ,,, | Performed by: INTERNAL MEDICINE

## 2023-06-13 PROCEDURE — 93010 ELECTROCARDIOGRAM REPORT: CPT | Mod: 76,,, | Performed by: INTERNAL MEDICINE

## 2023-06-13 PROCEDURE — 20000000 HC ICU ROOM

## 2023-06-13 PROCEDURE — 82330 ASSAY OF CALCIUM: CPT

## 2023-06-13 PROCEDURE — 82803 BLOOD GASES ANY COMBINATION: CPT

## 2023-06-13 PROCEDURE — 99291 PR CRITICAL CARE, E/M 30-74 MINUTES: ICD-10-PCS | Mod: ,,, | Performed by: STUDENT IN AN ORGANIZED HEALTH CARE EDUCATION/TRAINING PROGRAM

## 2023-06-13 PROCEDURE — 94003 VENT MGMT INPAT SUBQ DAY: CPT

## 2023-06-13 PROCEDURE — 93010 ELECTROCARDIOGRAM REPORT: CPT | Mod: ,,, | Performed by: INTERNAL MEDICINE

## 2023-06-13 PROCEDURE — 27200966 HC CLOSED SUCTION SYSTEM

## 2023-06-13 PROCEDURE — 85014 HEMATOCRIT: CPT

## 2023-06-13 PROCEDURE — 83735 ASSAY OF MAGNESIUM: CPT

## 2023-06-13 PROCEDURE — 93005 ELECTROCARDIOGRAM TRACING: CPT

## 2023-06-13 PROCEDURE — 63600175 PHARM REV CODE 636 W HCPCS

## 2023-06-13 PROCEDURE — 99233 SBSQ HOSP IP/OBS HIGH 50: CPT | Mod: ,,, | Performed by: INTERNAL MEDICINE

## 2023-06-13 PROCEDURE — 94761 N-INVAS EAR/PLS OXIMETRY MLT: CPT

## 2023-06-13 PROCEDURE — 25000003 PHARM REV CODE 250: Performed by: OTOLARYNGOLOGY

## 2023-06-13 PROCEDURE — 25000003 PHARM REV CODE 250: Performed by: STUDENT IN AN ORGANIZED HEALTH CARE EDUCATION/TRAINING PROGRAM

## 2023-06-13 PROCEDURE — 82565 ASSAY OF CREATININE: CPT

## 2023-06-13 PROCEDURE — 80053 COMPREHEN METABOLIC PANEL: CPT

## 2023-06-13 PROCEDURE — 84295 ASSAY OF SERUM SODIUM: CPT

## 2023-06-13 PROCEDURE — 84100 ASSAY OF PHOSPHORUS: CPT

## 2023-06-13 PROCEDURE — 85025 COMPLETE CBC W/AUTO DIFF WBC: CPT

## 2023-06-13 PROCEDURE — S0030 INJECTION, METRONIDAZOLE: HCPCS

## 2023-06-13 PROCEDURE — 84132 ASSAY OF SERUM POTASSIUM: CPT

## 2023-06-13 PROCEDURE — 36600 WITHDRAWAL OF ARTERIAL BLOOD: CPT

## 2023-06-13 PROCEDURE — 93010 EKG 12-LEAD: ICD-10-PCS | Mod: ,,, | Performed by: INTERNAL MEDICINE

## 2023-06-13 PROCEDURE — 27000221 HC OXYGEN, UP TO 24 HOURS

## 2023-06-13 PROCEDURE — 99291 CRITICAL CARE FIRST HOUR: CPT | Mod: ,,, | Performed by: STUDENT IN AN ORGANIZED HEALTH CARE EDUCATION/TRAINING PROGRAM

## 2023-06-13 RX ORDER — QUETIAPINE FUMARATE 25 MG/1
50 TABLET, FILM COATED ORAL ONCE
Status: COMPLETED | OUTPATIENT
Start: 2023-06-13 | End: 2023-06-13

## 2023-06-13 RX ORDER — HALOPERIDOL 5 MG/ML
5 INJECTION INTRAMUSCULAR EVERY 6 HOURS PRN
Status: DISCONTINUED | OUTPATIENT
Start: 2023-06-13 | End: 2023-06-13

## 2023-06-13 RX ORDER — HALOPERIDOL 5 MG/ML
5 INJECTION INTRAMUSCULAR EVERY 6 HOURS PRN
Status: DISCONTINUED | OUTPATIENT
Start: 2023-06-13 | End: 2023-06-21 | Stop reason: HOSPADM

## 2023-06-13 RX ORDER — QUETIAPINE FUMARATE 25 MG/1
100 TABLET, FILM COATED ORAL 2 TIMES DAILY
Status: DISCONTINUED | OUTPATIENT
Start: 2023-06-13 | End: 2023-06-15

## 2023-06-13 RX ADMIN — CEFAZOLIN 2 G: 2 INJECTION, POWDER, FOR SOLUTION INTRAMUSCULAR; INTRAVENOUS at 04:06

## 2023-06-13 RX ADMIN — HALOPERIDOL LACTATE 5 MG: 5 INJECTION, SOLUTION INTRAMUSCULAR at 02:06

## 2023-06-13 RX ADMIN — POTASSIUM BICARBONATE 35 MEQ: 391 TABLET, EFFERVESCENT ORAL at 05:06

## 2023-06-13 RX ADMIN — METRONIDAZOLE 500 MG: 500 INJECTION, SOLUTION INTRAVENOUS at 02:06

## 2023-06-13 RX ADMIN — POLYETHYLENE GLYCOL 3350 17 G: 17 POWDER, FOR SOLUTION ORAL at 08:06

## 2023-06-13 RX ADMIN — DEXMEDETOMIDINE HYDROCHLORIDE 0.5 MCG/KG/HR: 4 INJECTION, SOLUTION INTRAVENOUS at 09:06

## 2023-06-13 RX ADMIN — LINEZOLID 600 MG: 600 INJECTION, SOLUTION INTRAVENOUS at 01:06

## 2023-06-13 RX ADMIN — Medication 10 ML: at 12:06

## 2023-06-13 RX ADMIN — Medication 800 MG: at 05:06

## 2023-06-13 RX ADMIN — MUPIROCIN: 20 OINTMENT TOPICAL at 10:06

## 2023-06-13 RX ADMIN — DEXMEDETOMIDINE HYDROCHLORIDE 1.4 MCG/KG/HR: 4 INJECTION, SOLUTION INTRAVENOUS at 07:06

## 2023-06-13 RX ADMIN — QUETIAPINE FUMARATE 50 MG: 25 TABLET ORAL at 08:06

## 2023-06-13 RX ADMIN — Medication 10 ML: at 06:06

## 2023-06-13 RX ADMIN — Medication 200 MCG/HR: at 12:06

## 2023-06-13 RX ADMIN — PROPOFOL 50 MCG/KG/MIN: 10 INJECTION, EMULSION INTRAVENOUS at 03:06

## 2023-06-13 RX ADMIN — CEFAZOLIN 2 G: 2 INJECTION, POWDER, FOR SOLUTION INTRAMUSCULAR; INTRAVENOUS at 11:06

## 2023-06-13 RX ADMIN — PROPOFOL 50 MCG/KG/MIN: 10 INJECTION, EMULSION INTRAVENOUS at 08:06

## 2023-06-13 RX ADMIN — ENOXAPARIN SODIUM 40 MG: 40 INJECTION SUBCUTANEOUS at 04:06

## 2023-06-13 RX ADMIN — MUPIROCIN: 20 OINTMENT TOPICAL at 08:06

## 2023-06-13 RX ADMIN — PROPOFOL 50 MCG/KG/MIN: 10 INJECTION, EMULSION INTRAVENOUS at 10:06

## 2023-06-13 RX ADMIN — DEXMEDETOMIDINE HYDROCHLORIDE 1 MCG/KG/HR: 4 INJECTION, SOLUTION INTRAVENOUS at 12:06

## 2023-06-13 RX ADMIN — POTASSIUM BICARBONATE 35 MEQ: 391 TABLET, EFFERVESCENT ORAL at 07:06

## 2023-06-13 RX ADMIN — CEFAZOLIN 2 G: 2 INJECTION, POWDER, FOR SOLUTION INTRAMUSCULAR; INTRAVENOUS at 08:06

## 2023-06-13 RX ADMIN — QUETIAPINE FUMARATE 50 MG: 25 TABLET ORAL at 11:06

## 2023-06-13 RX ADMIN — QUETIAPINE FUMARATE 100 MG: 25 TABLET ORAL at 10:06

## 2023-06-13 NOTE — PROGRESS NOTES
[facility]  Otorhinolaryngology-Head & Neck Surgery  Progress Note    Subjective:     Post-Op Info:  Procedure(s) (LRB):  RIGHT NECK WOUND EXPLORATION WITH WOUND DEBRIDEMENT (Right)   1 Day Post-Op  Hospital Day: 6     Interval History: NAEON, remains febrile. S/P debridement, added on for free flap reconstrcution 6/14    Medications:  Continuous Infusions:   dexmedeTOMIDine (Precedex) infusion (titrating) 1.398 mcg/kg/hr (06/13/23 1100)    fentanyl 200 mcg/hr (06/13/23 1100)    propofoL 50 mcg/kg/min (06/13/23 1100)     Scheduled Meds:   ceFAZolin (ANCEF) IVPB  2 g Intravenous Q8H    enoxparin  40 mg Subcutaneous Q24H (prophylaxis, 1700)    mupirocin   Nasal BID    polyethylene glycol  17 g Per NG tube Daily    QUEtiapine  100 mg Per OG tube BID    sodium chloride 0.9%  10 mL Intravenous Q6H     PRN Meds:acetaminophen, haloperidol lactate, magnesium oxide, magnesium oxide, potassium bicarbonate, potassium bicarbonate, potassium bicarbonate, potassium, sodium phosphates, potassium, sodium phosphates, potassium, sodium phosphates, sodium chloride 0.9%, sodium chloride 0.9%, Flushing PICC Protocol **AND** sodium chloride 0.9% **AND** sodium chloride 0.9%     Review of patient's allergies indicates:   Allergen Reactions    Clindamycin      Objective:     Vital Signs (24h Range):  Temp:  [98.6 °F (37 °C)-100.3 °F (37.9 °C)] 100.3 °F (37.9 °C)  Pulse:  [] 73  Resp:  [18-44] 18  SpO2:  [96 %-100 %] 100 %  BP: (100-161)/(55-90) 102/56     Date 06/13/23 0700 - 06/14/23 0659   Shift 4683-2916 7007-0846 9562-3097 24 Hour Total   INTAKE   I.V.(mL/kg) 292.1(5.2)   292.1(5.2)   IV Piggyback 49.1   49.1   Shift Total(mL/kg) 341.2(6.1)   341.2(6.1)   OUTPUT   Shift Total(mL/kg)       Weight (kg) 55.8 55.8 55.8 55.8     Lines/Drains/Airways       Peripherally Inserted Central Catheter Line  Duration             PICC Double Lumen 06/09/23 1152 right brachial 3 days              Drain  Duration                   NG/OG Tube 06/08/23 2343 [facility] mouth 4 days         Urethral Catheter 06/12/23 0845 1 day              Airway  Duration                  Airway - Non-Surgical 06/08/23 2300 Endotracheal Tube 4 days                     Physical Exam   Intubated and partially sedated  Right neck open wound with healthy appearing tissue no evidence of necrosis, spanning from mastoid tip to below mandible, covered with saline-soaked Kirlex  Regression of anterior chest cellulitis      Significant Labs:  CBC:   Recent Labs   Lab 06/13/23  0325 06/13/23  0346   WBC 9.72  --    RBC 3.02*  --    HGB 9.0*  --    HCT 27.1* 25*     --    MCV 90  --    MCH 29.8  --    MCHC 33.2  --      CMP:   Recent Labs   Lab 06/13/23  0325   *   CALCIUM 8.8   ALBUMIN 1.8*   PROT 4.9*      K 3.3*   CO2 30*      BUN 9   CREATININE 0.4*   ALKPHOS 133   ALT 28   AST 29   BILITOT 0.3       Significant Diagnostics:  I have reviewed all pertinent imaging results/findings within the past 24 hours.    Assessment/Plan:     * Necrotizing fasciitis  42F presenting with right neck necrotizing fasciitis with surround cellulitis s/p debridement of multiple levels of right neck on 6/8. Necrotic skin, soft tissue, and muscle removed. Open neck wound packed with saline soaked kerlix. Patient remains intubated. S/P debridement in OR on 6/12.     -- To OR today for right neck wound exploration, possible rotational flap, possible wound vac, possible tracheostomy 6/14   --  consented yesterday at bedside; witnessed by SICU RN   -- Hold tube feeding at midnight tonight  -- Continue BID dressing changes with saline-soaked Kirlex, ENT-driven  -- Appreciate ID recs regarding antibiotics  -- Please Secure Chat me for any questions, page ENT on-call if unresponsive or urgent        Miranda Casas MD  Otorhinolaryngology-Head & Neck Surgery  [facility]

## 2023-06-13 NOTE — PROGRESS NOTES
[facility]  Infectious Disease  Progress Note    Patient Name: Aditi Conway  MRN: [id number]  Admission Date: 6/8/2023  Length of Stay: 5 days  Attending Physician: Gerardo Seymour MD  Primary Care Provider: Primary Doctor No    Isolation Status: No active isolations  Assessment/Plan:      ID  * Necrotizing fasciitis  42-year-old female with h/o depression, seizure (no meds), tobacco use emergently transferred to [facility] after swimming in the [facility] 6/4/23 with an open neck wound.  Admitted for necrotizing fasciitis of her neck.  + exposure to brackish water.    Intraoperative cultures growing MSSA.  6/12/23 repeat debridement findings small amt of necrotic tissue anterior skin present otherwise no other pocket of infection.  Pending possible flap/wound vac/trach 6/14/23.    Recommendations:  -6/9/23 Bcx ngtd; repeat blood culture if patient becomes febrile.  -continue cefazolin 2g q8h  -anticipating total 4 weeks of antibiotic therapy from last debridement, end date 7/9/23  -please reconsult ID 6/25/23 (or if patient is to be discharged before then) for updated antibiotic recommendations.  If there is sufficient improvement, may be able to finish therapy with PO regimen.        Discussed with primary team.    Thank you for your consult. I will sign off. Please contact us if you have any additional questions.    Javier Lim MD  Infectious Disease  [facility]    Subjective:     Principal Problem:Necrotizing fasciitis    HPI: 42-year-old female with h/o depression, seizure (no meds), tobacco use went swimming in the [facility] on 6/4/23 while having an open wound on the right side of her neck.  Subsequently developed fevers, chills, and erythema throughout her neck and upper chest.  Initially presented to [facility] 6/6/23 and was given Bactrim.  Then went to [place] [facility] 6/7/23 and started on vancomycin/pip-tazo/doxycycline.  Emergently transferred to [facility]  after continued rapid spread of infection and acute decompensation.      CT showed fairly extensive asymmetric soft tissue swelling and subcutaneous stranding involving the right anterior, lateral and posterior neck continuing inferiorly to the anterior chest wall consistent with changes of cellulitis and possible phlegmon without clearly identified abscess formation; associated myositis suspected right sternocleidomastoid and right posterolateral musculature.  6/8/23 s/p wide debridement by ENT.    Consult: Nectrotizing fasciitis management    Interval History: Tmax 100.3, fever curve improving.  Pending flap tomorrow.   at bedside, all questions answered.    Review of Systems   Unable to perform ROS: Intubated   Objective:     Vital Signs (Most Recent):  Temp: 100 °F (37.8 °C) (06/13/23 1645)  Pulse: 73 (06/13/23 1700)  Resp: 18 (06/13/23 1700)  BP: (!) 96/52 (06/13/23 1700)  SpO2: 100 % (06/13/23 1700) Vital Signs (24h Range):  Temp:  [98.6 °F (37 °C)-100.3 °F (37.9 °C)] 100 °F (37.8 °C)  Pulse:  [69-88] 73  Resp:  [18-23] 18  SpO2:  [100 %] 100 %  BP: ()/(52-79) 96/52     Weight: 55.8 kg (123 lb 0.3 oz)  Body mass index is 22.5 kg/m².    Estimated Creatinine Clearance: 147.9 mL/min (A) (based on SCr of 0.4 mg/dL (L)).     Physical Exam  Constitutional:       Appearance: She is ill-appearing. She is not toxic-appearing or diaphoretic.   HENT:      Head:      Comments: intubated  Eyes:      General: No scleral icterus.        Right eye: No discharge.         Left eye: No discharge.   Neck:      Comments: Erythema/swelling from neck down to upper chest improving since admission.  Surgical site dressed  Pulmonary:      Comments: Vent Mode: A/C  Oxygen Concentration (%):  [40-60] 50  Resp Rate Total:  [18 br/min-24 br/min] 18 br/min  Vt Set:  [360 mL] 360 mL  PEEP/CPAP:  [5 cmH20-8 cmH20] 8 cmH20  Mean Airway Pressure:  [8.7 ffJ77-00 cmH20] 12 cmH20     Skin:     Coloration: Skin is not jaundiced.       Findings: Erythema present.      Comments: Erythema improving at neck and upper chest improving        Significant Labs: All pertinent labs within the past 24 hours have been reviewed.    Significant Imaging: I have reviewed all pertinent imaging results/findings within the past 24 hours.

## 2023-06-13 NOTE — ASSESSMENT & PLAN NOTE
42-year-old female with h/o depression, seizure (no meds), tobacco use [place] [facility] [facility] [place] with an open neck wound.  Admitted for necrotizing fasciitis of her neck.  + exposure to brackish water.    Intraoperative cultures growing MSSA.  6/12/23 repeat debridement findings small amt of necrotic tissue anterior skin present otherwise no other pocket of infection.  Pending possible flap/wound vac/trach 6/14/23.    Recommendations:  -6/9/23 Bcx ngtd; repeat blood culture if patient becomes febrile.  -continue cefazolin 2g q8h  -anticipating total 4 weeks of antibiotic therapy from last debridement, end date 7/9/23  -please reconsult ID 6/25/23 (or if patient is to be discharged before then) for updated antibiotic recommendations.  If there is sufficient improvement, may be able to finish therapy with PO regimen.

## 2023-06-13 NOTE — ASSESSMENT & PLAN NOTE
42F PMH depression, seizures (no meds), tobacco use, presenting with concern for toshia's angina / nec fasc of neck s/p exploration and debridement with ENT 6/8/23          Neuro/Psych:   -- Sedation: propofol gtt and precedex ggt. Goal to wean precedex today  -- Pain: fentanyl gtt  -- Quetiapine, Seroquel, haldol prn              Cards:   -- HDS  -- currently not requiring pressors  -- MAP > 65      Pulm:   -- Goal O2 sat > 90%  -- Intubated on rate control 50/5  -- To OR today, has remained on vent until takeback to OR.   -- Daily SBT, SAT      Renal:  -- Keep murdock for strict I/O  -- BUN/Cr 9/0.4  -- 2.4L UOP / 24 hr      FEN / GI:   -- Net +1.6 L / 24 hr  -- Replace lytes as needed  -- Nutrition: NPO. TF at goal, hold at midnight for OR 6/14/23.   -- OGT in place      ID:   -- Afebrile, WBC 9.72  -- Abx: linezolid, cefepime, flagyl, doxy switch to cefazolin  -- ID following  -- Following OR cultures 6/8 Staph aureus, MSSA  -- Blood Cx OSH 6/7 NGTD  -- ID consulted  -- ENT planning to take back to OR tomorrow      Heme/Onc:   -- H/H stable 9/27  -- Daily CBC      Endo:   -- Gluc goal 140-180      PPx:   Feeding: NPO, TF to goal   Analgesia/Sedation: adequate  Thromboembolic prevention: lovenox  HOB >30: Yes  Stress Ulcer ppx: n/a  Glucose control: Critical care goal 140-180 g/dl     Lines/Drains/Airway: pIVs, OGT, Murdock, ETT, PICC      Dispo/Code Status/Palliative:   -- SICU / Full Code

## 2023-06-13 NOTE — SUBJECTIVE & OBJECTIVE
Interval History: Tmax 100.3, fever curve improving.  Pending flap tomorrow.   at bedside, all questions answered.    Review of Systems   Unable to perform ROS: Intubated   Objective:     Vital Signs (Most Recent):  Temp: 100 °F (37.8 °C) (06/13/23 1645)  Pulse: 73 (06/13/23 1700)  Resp: 18 (06/13/23 1700)  BP: (!) 96/52 (06/13/23 1700)  SpO2: 100 % (06/13/23 1700) Vital Signs (24h Range):  Temp:  [98.6 °F (37 °C)-100.3 °F (37.9 °C)] 100 °F (37.8 °C)  Pulse:  [69-88] 73  Resp:  [18-23] 18  SpO2:  [100 %] 100 %  BP: ()/(52-79) 96/52     Weight: 55.8 kg (123 lb 0.3 oz)  Body mass index is 22.5 kg/m².    Estimated Creatinine Clearance: 147.9 mL/min (A) (based on SCr of 0.4 mg/dL (L)).     Physical Exam  Constitutional:       Appearance: She is ill-appearing. She is not toxic-appearing or diaphoretic.   HENT:      Head:      Comments: intubated  Eyes:      General: No scleral icterus.        Right eye: No discharge.         Left eye: No discharge.   Neck:      Comments: Erythema/swelling from neck down to upper chest improving since admission.  Surgical site dressed  Pulmonary:      Comments: Vent Mode: A/C  Oxygen Concentration (%):  [40-60] 50  Resp Rate Total:  [18 br/min-24 br/min] 18 br/min  Vt Set:  [360 mL] 360 mL  PEEP/CPAP:  [5 cmH20-8 cmH20] 8 cmH20  Mean Airway Pressure:  [8.7 vbH65-56 cmH20] 12 cmH20     Skin:     Coloration: Skin is not jaundiced.      Findings: Erythema present.      Comments: Erythema improving at neck and upper chest improving        Significant Labs: All pertinent labs within the past 24 hours have been reviewed.    Significant Imaging: I have reviewed all pertinent imaging results/findings within the past 24 hours.

## 2023-06-13 NOTE — PROGRESS NOTES
[facility]  Critical Care - Surgery  Progress Note    Patient Name: Aditi Conway  MRN: [id number]  Admission Date: 6/8/2023  Hospital Length of Stay: 5 days  Code Status: Full Code  Attending Provider: Gerardo Seymour MD  Primary Care Provider: Primary Doctor No   Principal Problem: Necrotizing fasciitis    Subjective:     Hospital/ICU Course:  No notes on file    Interval History/Significant Events: No acute events overnight. On vent 50%/ PEEP 8. Planning for OR for flap tomorrow. ID deescalated to ancef. Good urine output.     Follow-up For: Procedure(s) (LRB):  RIGHT NECK WOUND EXPLORATION WITH WOUND DEBRIDEMENT (Right)    Post-Operative Day: 1 Day Post-Op    Objective:     Vital Signs (Most Recent):  Temp: 99.4 °F (37.4 °C) (06/13/23 0300)  Pulse: 74 (06/13/23 0710)  Resp: 18 (06/13/23 0710)  BP: (!) 100/57 (06/13/23 0600)  SpO2: 100 % (06/13/23 0710) Vital Signs (24h Range):  Temp:  [98.6 °F (37 °C)-99.7 °F (37.6 °C)] 99.4 °F (37.4 °C)  Pulse:  [] 74  Resp:  [15-44] 18  SpO2:  [96 %-100 %] 100 %  BP: (100-161)/(55-90) 100/57     Weight: 55.8 kg (123 lb 0.3 oz)  Body mass index is 22.5 kg/m².      Intake/Output Summary (Last 24 hours) at 6/13/2023 0802  Last data filed at 6/13/2023 0600  Gross per 24 hour   Intake 2812.67 ml   Output 2210 ml   Net 602.67 ml          Physical Exam  Constitutional:       Appearance: She is ill-appearing. She is not toxic-appearing or diaphoretic.   HENT:      Head:      Comments: intubated  Eyes:      General: No scleral icterus.        Right eye: No discharge.         Left eye: No discharge.   Neck:      Comments: Erythema/swelling from neck down to upper chest with some crepitus.  Surgical site dressed  Pulmonary:      Comments: Vent Mode: A/C  Oxygen Concentration (%):  [35-50] 50  Resp Rate Total:  [18 br/min-39 br/min] 22 br/min  Vt Set:  [360 mL] 360 mL  PEEP/CPAP:  [5 cmH20] 5 cmH20  Mean Airway Pressure:  [8.3 cmH20-9.7 cmH20]  9.7 cmH20     Skin:     Coloration: Skin is not jaundiced.      Findings: Erythema present.          Vents:  Vent Mode: A/C (06/13/23 0710)  Set Rate: 18 BPM (06/13/23 0710)  Vt Set: 360 mL (06/13/23 0543)  PEEP/CPAP: 8 cmH20 (06/13/23 0710)  Oxygen Concentration (%): 50 (06/13/23 0710)  Peak Airway Pressure: 23 cmH20 (06/13/23 0710)  Plateau Pressure: 11 cmH20 (06/13/23 0710)  Total Ve: 8.92 L/m (06/13/23 0710)  Negative Inspiratory Force (cm H2O): 0 (06/13/23 0710)  F/VT Ratio<105 (RSBI): (!) 36.36 (06/13/23 0710)    Lines/Drains/Airways       Peripherally Inserted Central Catheter Line  Duration             PICC Double Lumen 06/09/23 1152 right brachial 3 days              Drain  Duration                  NG/OG Tube 06/08/23 2343 [place] [facility] mouth 4 days         Urethral Catheter 06/12/23 0845 <1 day              Airway  Duration                  Airway - Non-Surgical 06/08/23 2300 Endotracheal Tube 4 days                    Significant Labs:    CBC/Anemia Profile:  Recent Labs   Lab 06/12/23 0318 06/13/23  0325 06/13/23  0346   WBC 8.47 9.72  --    HGB 9.0* 9.0*  --    HCT 26.8* 27.1* 25*    347  --    MCV 90 90  --    RDW 12.7 12.4  --         Chemistries:  Recent Labs   Lab 06/12/23 0318 06/13/23  0325    139   K 3.9 3.3*    103   CO2 27 30*   BUN 11 9   CREATININE 0.4* 0.4*   CALCIUM 8.3* 8.8   ALBUMIN 1.8* 1.8*   PROT 4.3* 4.9*   BILITOT 0.3 0.3   ALKPHOS 149* 133   ALT 35 28   AST 30 29   MG 1.8 1.7   PHOS 4.1 2.9       All pertinent labs within the past 24 hours have been reviewed.    Significant Imaging:  I have reviewed all pertinent imaging results/findings within the past 24 hours.    Assessment/Plan:     ID  * Necrotizing fasciitis  42F PMH depression, seizures (no meds), tobacco use, presenting with concern for toshia's angina / nec fasc of neck s/p exploration and debridement with ENT 6/8/23          Neuro/Psych:   -- Sedation: propofol gtt and precedex ggt  -- Pain:  fentanyl gtt  -- Quetiapine, Seroquel              Cards:   -- HDS  -- currently not requiring pressors  -- MAP > 65      Pulm:   -- Goal O2 sat > 90%  -- Intubated on rate control 50/5  -- To OR today, has remained on vent until takeback to OR.   -- Daily SBT, SAT      Renal:  -- Keep murdock for strict I/O  -- BUN/Cr 9/0.4  -- 2.4L UOP / 24 hr      FEN / GI:   -- Net +1.6 L / 24 hr  -- Replace lytes as needed  -- Nutrition: NPO. TF previously at goal, held since midnight for OR.   -- OGT in place      ID:   -- Afebrile, WBC 9.72  -- Abx: linezolid, cefepime, flagyl, doxy switch to cefazolin  -- ID following  -- Following OR cultures 6/8 Staph aureus, MSSA  -- Blood Cx OSH 6/7 NGTD  -- ID consulted  -- ENT planning to take back to OR tomorrow      Heme/Onc:   -- H/H stable 9/27  -- Daily CBC      Endo:   -- Gluc goal 140-180      PPx:   Feeding: NPO, TF to goal   Analgesia/Sedation: adequate  Thromboembolic prevention: lovenox  HOB >30: Yes  Stress Ulcer ppx: n/a  Glucose control: Critical care goal 140-180 g/dl     Lines/Drains/Airway: pIVs, OGT, Murdock, ETT, PICC      Dispo/Code Status/Palliative:   -- SICU / Full Code           Critical care was time spent personally by me on the following activities: development of treatment plan with patient or surrogate and bedside caregivers, discussions with consultants, evaluation of patient's response to treatment, examination of patient, ordering and performing treatments and interventions, ordering and review of laboratory studies, ordering and review of radiographic studies, pulse oximetry, re-evaluation of patient's condition.  This critical care time did not overlap with that of any other provider or involve time for any procedures.     Mago Block MD  Critical Care - Surgery  [facility]

## 2023-06-13 NOTE — SUBJECTIVE & OBJECTIVE
Interval History: NAEON, remains febrile. S/P debridement, added on for free flap reconstrcution 6/14    Medications:  Continuous Infusions:   dexmedeTOMIDine (Precedex) infusion (titrating) 1.398 mcg/kg/hr (06/13/23 1100)    fentanyl 200 mcg/hr (06/13/23 1100)    propofoL 50 mcg/kg/min (06/13/23 1100)     Scheduled Meds:   ceFAZolin (ANCEF) IVPB  2 g Intravenous Q8H    enoxparin  40 mg Subcutaneous Q24H (prophylaxis, 1700)    mupirocin   Nasal BID    polyethylene glycol  17 g Per NG tube Daily    QUEtiapine  100 mg Per OG tube BID    sodium chloride 0.9%  10 mL Intravenous Q6H     PRN Meds:acetaminophen, haloperidol lactate, magnesium oxide, magnesium oxide, potassium bicarbonate, potassium bicarbonate, potassium bicarbonate, potassium, sodium phosphates, potassium, sodium phosphates, potassium, sodium phosphates, sodium chloride 0.9%, sodium chloride 0.9%, Flushing PICC Protocol **AND** sodium chloride 0.9% **AND** sodium chloride 0.9%     Review of patient's allergies indicates:   Allergen Reactions    Clindamycin      Objective:     Vital Signs (24h Range):  Temp:  [98.6 °F (37 °C)-100.3 °F (37.9 °C)] 100.3 °F (37.9 °C)  Pulse:  [] 73  Resp:  [18-44] 18  SpO2:  [96 %-100 %] 100 %  BP: (100-161)/(55-90) 102/56     Date 06/13/23 0700 - 06/14/23 0659   Shift 6302-3100 0719-0523 6026-2127 24 Hour Total   INTAKE   I.V.(mL/kg) 292.1(5.2)   292.1(5.2)   IV Piggyback 49.1   49.1   Shift Total(mL/kg) 341.2(6.1)   341.2(6.1)   OUTPUT   Shift Total(mL/kg)       Weight (kg) 55.8 55.8 55.8 55.8     Lines/Drains/Airways       Peripherally Inserted Central Catheter Line  Duration             PICC Double Lumen 06/09/23 1152 right brachial 3 days              Drain  Duration                  NG/OG Tube 06/08/23 2343 [place] [facility] mouth 4 days         Urethral Catheter 06/12/23 0845 1 day              Airway  Duration                  Airway - Non-Surgical 06/08/23 2300 Endotracheal Tube 4 days                      Physical Exam   Intubated and partially sedated  Right neck open wound with healthy appearing tissue no evidence of necrosis, spanning from mastoid tip to below mandible, covered with saline-soaked Kirlex  Regression of anterior chest cellulitis      Significant Labs:  CBC:   Recent Labs   Lab 06/13/23  0325 06/13/23  0346   WBC 9.72  --    RBC 3.02*  --    HGB 9.0*  --    HCT 27.1* 25*     --    MCV 90  --    MCH 29.8  --    MCHC 33.2  --      CMP:   Recent Labs   Lab 06/13/23  0325   *   CALCIUM 8.8   ALBUMIN 1.8*   PROT 4.9*      K 3.3*   CO2 30*      BUN 9   CREATININE 0.4*   ALKPHOS 133   ALT 28   AST 29   BILITOT 0.3       Significant Diagnostics:  I have reviewed all pertinent imaging results/findings within the past 24 hours.

## 2023-06-13 NOTE — SUBJECTIVE & OBJECTIVE
Interval History/Significant Events: No acute events overnight. On vent 50%/ PEEP 8. Planning for OR for flap tomorrow. ID deescalated to ancef. Good urine output.     Follow-up For: Procedure(s) (LRB):  RIGHT NECK WOUND EXPLORATION WITH WOUND DEBRIDEMENT (Right)    Post-Operative Day: 1 Day Post-Op    Objective:     Vital Signs (Most Recent):  Temp: 99.4 °F (37.4 °C) (06/13/23 0300)  Pulse: 74 (06/13/23 0710)  Resp: 18 (06/13/23 0710)  BP: (!) 100/57 (06/13/23 0600)  SpO2: 100 % (06/13/23 0710) Vital Signs (24h Range):  Temp:  [98.6 °F (37 °C)-99.7 °F (37.6 °C)] 99.4 °F (37.4 °C)  Pulse:  [] 74  Resp:  [15-44] 18  SpO2:  [96 %-100 %] 100 %  BP: (100-161)/(55-90) 100/57     Weight: 55.8 kg (123 lb 0.3 oz)  Body mass index is 22.5 kg/m².      Intake/Output Summary (Last 24 hours) at 6/13/2023 0802  Last data filed at 6/13/2023 0600  Gross per 24 hour   Intake 2812.67 ml   Output 2210 ml   Net 602.67 ml          Physical Exam  Constitutional:       Appearance: She is ill-appearing. She is not toxic-appearing or diaphoretic.   HENT:      Head:      Comments: intubated  Eyes:      General: No scleral icterus.        Right eye: No discharge.         Left eye: No discharge.   Neck:      Comments: Erythema/swelling from neck down to upper chest with some crepitus.  Surgical site dressed  Pulmonary:      Comments: Vent Mode: A/C  Oxygen Concentration (%):  [35-50] 50  Resp Rate Total:  [18 br/min-39 br/min] 22 br/min  Vt Set:  [360 mL] 360 mL  PEEP/CPAP:  [5 cmH20] 5 cmH20  Mean Airway Pressure:  [8.3 cmH20-9.7 cmH20] 9.7 cmH20     Skin:     Coloration: Skin is not jaundiced.      Findings: Erythema present.          Vents:  Vent Mode: A/C (06/13/23 0710)  Set Rate: 18 BPM (06/13/23 0710)  Vt Set: 360 mL (06/13/23 0543)  PEEP/CPAP: 8 cmH20 (06/13/23 0710)  Oxygen Concentration (%): 50 (06/13/23 0710)  Peak Airway Pressure: 23 cmH20 (06/13/23 0710)  Plateau Pressure: 11 cmH20 (06/13/23 0710)  Total Ve: 8.92 L/m  (06/13/23 0710)  Negative Inspiratory Force (cm H2O): 0 (06/13/23 0710)  F/VT Ratio<105 (RSBI): (!) 36.36 (06/13/23 0710)    Lines/Drains/Airways       Peripherally Inserted Central Catheter Line  Duration             PICC Double Lumen 06/09/23 1152 right brachial 3 days              Drain  Duration                  NG/OG Tube 06/08/23 2343 [facility] mouth 4 days         Urethral Catheter 06/12/23 0845 <1 day              Airway  Duration                  Airway - Non-Surgical 06/08/23 2300 Endotracheal Tube 4 days                    Significant Labs:    CBC/Anemia Profile:  Recent Labs   Lab 06/12/23 0318 06/13/23  0325 06/13/23  0346   WBC 8.47 9.72  --    HGB 9.0* 9.0*  --    HCT 26.8* 27.1* 25*    347  --    MCV 90 90  --    RDW 12.7 12.4  --         Chemistries:  Recent Labs   Lab 06/12/23 0318 06/13/23 0325    139   K 3.9 3.3*    103   CO2 27 30*   BUN 11 9   CREATININE 0.4* 0.4*   CALCIUM 8.3* 8.8   ALBUMIN 1.8* 1.8*   PROT 4.3* 4.9*   BILITOT 0.3 0.3   ALKPHOS 149* 133   ALT 35 28   AST 30 29   MG 1.8 1.7   PHOS 4.1 2.9       All pertinent labs within the past 24 hours have been reviewed.    Significant Imaging:  I have reviewed all pertinent imaging results/findings within the past 24 hours.

## 2023-06-13 NOTE — ANESTHESIA PREPROCEDURE EVALUATION
[facility]  Anesthesia Pre-Operative Evaluation         Patient Name: Aditi Conway  YOB: 1980  MRN: [id number]    SUBJECTIVE:     Pre-operative evaluation for Procedure(s) (LRB):  EXPLORATION, NECK (Right)  CREATION, FREE FLAP (N/A)     06/13/2023    Aditi Conway is a 42 y.o. female w/ a significant PMHx of  depression, seizure (no meds), and tobacco use who went swimming in the [facility] on 6/4/23 while having an open wound on the right side of her neck.  Subsequently developed fevers, chills, and erythema throughout her neck and upper chest.  Initially presented to [facility] 6/6/23 and was emergently transferred to [facility] after continued rapid spread of infection and acute decompensation. CT showed fairly extensive asymmetric soft tissue swelling and subcutaneous stranding involving the right anterior, lateral and posterior neck continuing inferiorly to the anterior chest wall consistent with changes of cellulitis and possible phlegmon without clearly identified abscess formation; associated myositis suspected right sternocleidomastoid and right posterolateral musculature.  S/p wide debridement by ENT on 6/8/23 and 6/12/23.    Pt currently intubated in the SICU.       Patient now presents for the above procedure(s).      LDA:   PICC Double Lumen 06/09/23 1152 right brachial (Active)   Line Necessity Review Longterm central access required 06/13/23 0300   Verification by X-ray Yes 06/13/23 0300   Site Assessment No drainage;No redness;No swelling;No warmth 06/13/23 0300   Extremity Assessment Distal to IV No abnormal discoloration;No redness;No swelling;No warmth 06/13/23 0300   Line Securement Device Secured with sutureless device 06/13/23 0300   Dressing Type Central line dressing 06/13/23 0300   Dressing Status Clean;Dry;Intact 06/13/23 0300   Dressing Intervention Integrity maintained 06/13/23 0300   Date on Dressing 06/09/23 06/13/23 0300   Dressing  "Due to be Changed 06/16/23 06/13/23 0300   Distal Patency/Care infusing 06/13/23 0300   Proximal 1 Patency/Care infusing 06/13/23 0300   Current Insertion Depth (cm) 35 cm 06/09/23 1145   Current Exposed Catheter (cm) 0 cm 06/09/23 1145   Extremity Circumference (cm) 23 cm 06/09/23 1145   Waveform Not being transduced 06/13/23 0300   Number of days: 3            NG/OG Tube 06/08/23 2343 [facility] mouth (Active)   Placement Check placement verified by x-ray 06/13/23 0300   Tolerance no signs/symptoms of discomfort 06/13/23 0300   Securement secured to nostril center w/ adhesive device 06/13/23 0300   Clamp Status/Tolerance unclamped 06/13/23 0300   Suction Setting/Drainage Method suction at the bedside 06/12/23 1500   Insertion Site Appearance no redness, warmth, tenderness, skin breakdown, drainage 06/13/23 0300   Drainage Yellow 06/13/23 0300   Flush/Irrigation flushed w/;water;no resistance met 06/13/23 0300   Feeding Type continuous;by pump 06/13/23 0300   Feeding Action feeding continued 06/13/23 0300   Current Rate (mL/hr) 20 mL/hr 06/12/23 1901   Goal Rate (mL/hr) 45 mL/hr 06/12/23 1901   Intake (mL) 60 mL 06/11/23 2300   Water Bolus (mL) 50 mL 06/11/23 1100   Tube Output(mL)(Include Discarded Residual) 50 mL 06/11/23 1100   Rate Formula Tube Feeding (mL/hr) 25 mL/hr 06/11/23 1500   Formula Name Impact Peptide 1.5 06/12/23 0315   Intake (mL) - Formula Tube Feeding 40 06/13/23 0300   Residual Amount (ml) 25 ml 06/12/23 1901   Number of days: 4            Urethral Catheter 06/12/23 0845 (Active)   Site Assessment Clean;Intact 06/13/23 0300   Collection Container Urimeter 06/13/23 0300   Securement Method secured to top of thigh w/ adhesive device 06/13/23 0300   Catheter Care Performed yes 06/13/23 0300   Reason for Continuing Urinary Catheterization Critically ill in ICU and requiring hourly monitoring of intake/output 06/13/23 0300   CAUTI Prevention Bundle Securement Device in place with 1" " slack;Intact seal between catheter & drainage tubing;Drainage bag/urimeter off the floor;Sheeting clip in use;No dependent loops or kinks;Drainage bag/urimeter not overfilled (<2/3 full);Drainage bag/urimeter below bladder 06/13/23 0300   Output (mL) 160 mL 06/13/23 0600   Number of days: 1       Prev airway:   Vent Mode: A/C  Oxygen Concentration (%):  [40-60] 50  Resp Rate Total:  [18 br/min-37 br/min] 18 br/min  Vt Set:  [360 mL] 360 mL  PEEP/CPAP:  [5 cmH20-8 cmH20] 8 cmH20  Mean Airway Pressure:  [8.7 sgY05-99 cmH20] 12 cmH20      Drips:    dexmedeTOMIDine (Precedex) infusion (titrating) 1 mcg/kg/hr (06/13/23 1300)    fentanyl 200 mcg/hr (06/13/23 1300)    propofoL 50 mcg/kg/min (06/13/23 1300)         Patient Active Problem List   Diagnosis    Epilepsy    Elevated BP    Tobacco abuse    Systolic murmur    Dental caries    Pharyngitis    Tooth abscess    Cellulitis of neck    Necrotizing fasciitis       Review of patient's allergies indicates:   Allergen Reactions    Clindamycin        Current Outpatient Medications:    Current Facility-Administered Medications:     acetaminophen oral solution 650 mg, 650 mg, Oral, Q6H PRN, Martinez John MD, 650 mg at 06/12/23 0748    ceFAZolin 2 g in dextrose 5 % in water (D5W) 5 % 50 mL IVPB (MB+), 2 g, Intravenous, Q8H, Mago Block MD, Last Rate: 100 mL/hr at 06/13/23 0856, 2 g at 06/13/23 0856    dexmedetomidine (PRECEDEX) 400mcg/100mL 0.9% NaCL infusion, 0-1.4 mcg/kg/hr, Intravenous, Continuous, Clement Torres MD, Last Rate: 19.5 mL/hr at 06/13/23 0731, 1.398 mcg/kg/hr at 06/13/23 0731    enoxaparin injection 40 mg, 40 mg, Subcutaneous, Q24H (prophylaxis, 1700), Martinez John MD, 40 mg at 06/12/23 1612    fentaNYL 2500 mcg in 0.9% sodium chloride 250 mL infusion premix (titrating), 0-250 mcg/hr, Intravenous, Continuous, Mago Block MD, Last Rate: 20 mL/hr at 06/13/23 0600, 200 mcg/hr at 06/13/23 0600    magnesium oxide tablet  800 mg, 800 mg, Oral, PRN, Clement Torres MD, 800 mg at 06/13/23 0518    magnesium oxide tablet 800 mg, 800 mg, Oral, PRN, Clement Torres MD, 800 mg at 06/11/23 0529    mupirocin 2 % ointment, , Nasal, BID, Gerardo Seymour MD, Given at 06/13/23 0858    OLANZapine zydis disintegrating tablet 5 mg, 5 mg, Oral, Q8H PRN, Clement Torres MD, 5 mg at 06/12/23 1818    polyethylene glycol packet 17 g, 17 g, Per NG tube, Daily, Clement Torres MD, 17 g at 06/13/23 0848    potassium bicarbonate disintegrating tablet 35 mEq, 35 mEq, Oral, PRN, Clement Torres MD, 35 mEq at 06/13/23 0726    potassium bicarbonate disintegrating tablet 50 mEq, 50 mEq, Oral, PRN, Clement Torres MD, 50 mEq at 06/10/23 0634    potassium bicarbonate disintegrating tablet 60 mEq, 60 mEq, Oral, PRN, Clement Torres MD    potassium, sodium phosphates 280-160-250 mg packet 2 packet, 2 packet, Oral, PRN, Clement Torres MD    potassium, sodium phosphates 280-160-250 mg packet 2 packet, 2 packet, Oral, PRN, Clement Torres MD    potassium, sodium phosphates 280-160-250 mg packet 2 packet, 2 packet, Oral, PRN, Clement Torres MD    propofol (DIPRIVAN) 10 mg/mL infusion, 0-50 mcg/kg/min, Intravenous, Continuous, Clement Torres MD, Last Rate: 15 mL/hr at 06/13/23 0847, 50 mcg/kg/min at 06/13/23 0847    QUEtiapine tablet 50 mg, 50 mg, Per OG tube, BID, Clement Torres MD, 50 mg at 06/13/23 0848    sodium chloride 0.9% flush 10 mL, 10 mL, Intravenous, PRN, Walter De La Cruz MD    sodium chloride 0.9% flush 10 mL, 10 mL, Intravenous, PRN, Clement Torres MD    Flushing PICC Protocol, , , Until Discontinued **AND** sodium chloride 0.9% flush 10 mL, 10 mL, Intravenous, Q6H, 10 mL at 06/13/23 0600 **AND** sodium chloride 0.9% flush 10 mL, 10 mL, Intravenous, PRN, Gerardo Seymour MD    Past Surgical History:   Procedure Laterality Date    DEBRIDEMENT, BREAST Left 11/11/2021    Procedure: DEBRIDEMENT,  BREAST;  Surgeon: Yoan Sparks MD;  Location: [facility];  Service: General;   Laterality: Left;  DEBRIDEMENT, WOUND, LEFT BREAST    DILATION AND CURETTAGE OF UTERUS      INCISION AND DRAINAGE Left 7/12/2021    Procedure: INCISION AND DRAINAGE, HEMATOMA, SEROMA, OR FLUID COLLECTION ;  Surgeon: Yoan Sparks MD;  Location: [facility];  Service: General;  Laterality: Left;    NECK EXPLORATION Bilateral 6/8/2023    Procedure: EXPLORATION and debridement, NECK;  Surgeon: Jose Jules MD;  [facility];  Service: ENT;  Laterality: Bilateral;    NECK EXPLORATION Right 6/12/2023    Procedure: RIGHT NECK WOUND EXPLORATION WITH WOUND DEBRIDEMENT;  Surgeon: Gerardo Seymour MD;  [facility];  Service: ENT;  Laterality: Right;       Social History     Socioeconomic History    Marital status:     Years of education: 9th   Occupational History     Employer: [id number]   Tobacco Use    Smoking status: Every Day     Packs/day: 1.00     Years: 20.00     Pack years: 20.00     Types: Cigarettes    Smokeless tobacco: Never    Tobacco comments:     Informed about classes in smoking cessation. States will call.   Substance and Sexual Activity    Alcohol use: Yes     Comment: Socially    Drug use: No    Sexual activity: Yes     Partners: Male     Birth control/protection: None     Social Determinants of Health     Financial Resource Strain: Low Risk     Difficulty of Paying Living Expenses: Not hard at all   Food Insecurity: No Food Insecurity    Worried About Running Out of Food in the Last Year: Never true    Ran Out of Food in the Last Year: Never true   Transportation Needs: Unknown    Lack of Transportation (Non-Medical): No   Physical Activity: Sufficiently Active    Days of Exercise per Week: 6 days    Minutes of Exercise per Session: 30 min   Stress: No Stress Concern Present    Feeling of Stress : Not at all   Social Connections: Moderately Isolated    Frequency of Communication with Friends and Family: More than three times a week     Frequency of Social Gatherings with Friends and Family: More than three times a week    Attends Yarsani Services: Never    Active Member of Clubs or Organizations: No    Attends Club or Organization Meetings: Never    Marital Status:    Housing Stability: Unknown    Unable to Pay for Housing in the Last Year: No    Unstable Housing in the Last Year: No       OBJECTIVE:     Vital Signs Range (Last 24H):  Temp:  [37 °C (98.6 °F)-37.6 °C (99.7 °F)]   Pulse:  []   Resp:  [15-44]   BP: (100-161)/(55-90)   SpO2:  [96 %-100 %]       Significant Labs:  Lab Results   Component Value Date    WBC 9.72 06/13/2023    HGB 9.0 (L) 06/13/2023    HCT 25 (L) 06/13/2023     06/13/2023    TRIG 185 (H) 06/13/2023    ALT 28 06/13/2023    AST 29 06/13/2023     06/13/2023    K 3.3 (L) 06/13/2023     06/13/2023    CREATININE 0.4 (L) 06/13/2023    BUN 9 06/13/2023    CO2 30 (H) 06/13/2023    INR 1.2 06/08/2023       Diagnostic Studies: No relevant studies.    EKG:   Results for orders placed or performed during the hospital encounter of 06/07/23   EKG 12-lead    Collection Time: 06/07/23  8:29 PM    Narrative    Test Reason : RO7.9    Vent. Rate : 095 BPM     Atrial Rate : 095 BPM     P-R Int : 138 ms          QRS Dur : 084 ms      QT Int : 326 ms       P-R-T Axes : 041 038 043 degrees     QTc Int : 409 ms    Normal sinus rhythm  Within normal limits  No previous ECGs available  Confirmed by Germain Ziegler MD ([id number]) on 6/8/2023 8:13:05 AM    Referred By: CHRISTIAN   SELF           Confirmed By:Germain Ziegler MD       2D ECHO:  TTE:  No results found for this or any previous visit.    KIKE:  No results found for this or any previous visit.    ASSESSMENT/PLAN:                                                                                                                  06/13/2023  Aditi Conway is a 42 y.o., female.      Pre-op Assessment    I have reviewed the Patient Summary Reports.      I have reviewed the Nursing Notes. I have reviewed the NPO Status.   I have reviewed the Medications.     Review of Systems  Anesthesia Hx:  No problems with previous Anesthesia  History of prior surgery of interest to airway management or planning: Denies Family Hx of Anesthesia complications.   Denies Personal Hx of Anesthesia complications.   Cardiovascular:   Denies Hypertension.  Denies MI.  Denies CAD.    denies PVD    Hepatic/GI:   Denies GERD.    Neurological:   Seizures    Endocrine:  Denies Obesity / BMI > 30  Psych:   Psychiatric History          Physical Exam  General: Intubated and sedated in the ICU.   Pt has large bandage on right lateral neck   Dental:Oral and dentition exam deferred 2/2 to pt being intubated and sedated   Chest/Lungs:  Normal Respiratory Rate  Mechanical breath sounds   Heart:  Rate: Normal  Rhythm: Regular Rhythm        Anesthesia Plan  Type of Anesthesia, risks & benefits discussed:    Anesthesia Type: Gen ETT  Intra-op Monitoring Plan: Standard ASA Monitors and Art Line  Post Op Pain Control Plan: multimodal analgesia and IV/PO Opioids PRN  Induction:  IV  Airway Plan: Direct, Post-Induction  Informed Consent: Informed consent signed with the Patient and all parties understand the risks and agree with anesthesia plan.  All questions answered.   ASA Score: 4  Day of Surgery Review of History & Physical: H&P Update referred to the surgeon/provider.    Ready For Surgery From Anesthesia Perspective.     .

## 2023-06-13 NOTE — NURSING
End of Shift Summary:    Pt remains intubated and sedated on prop and fent gtts. Precedex weaned down today per SICU team, but per ENT team, if pt gets restless, let them know and precedex will be started again. Front bath/CHG bath given today; partial linen change and gown change; murdock care done as well. ENT plans to take pt to OR tomorrow am for flap procedure. All consents are signed. Tube feeding to be turned off at midnight. Tube feedings currently at 45mL/hr which is goal and pt is tolerating well. Per ENT, if dressing looks to be getting dry, it is okay for nurse to squirt a few normal saline syringes in to keep moist, but DO NOT REMOVE dressing.  at bedside intermittently throughout shift. All questions and concerns addressed and answered. No other significant changes throughout shift.

## 2023-06-13 NOTE — ASSESSMENT & PLAN NOTE
42F presenting with right neck necrotizing fasciitis with surround cellulitis s/p debridement of multiple levels of right neck on 6/8. Necrotic skin, soft tissue, and muscle removed. Open neck wound packed with saline soaked kerlix. Patient remains intubated. S/P debridement in OR on 6/12.     -- To OR today for right neck wound exploration, possible rotational flap, possible wound vac, possible tracheostomy 6/14   --  consented yesterday at bedside; witnessed by SICU RN   -- Hold tube feeding at midnight tonight  -- Continue BID dressing changes with saline-soaked Kirlex, ENT-driven  -- Appreciate ID recs regarding antibiotics  -- Please Secure Chat me for any questions, page ENT on-call if unresponsive or urgent

## 2023-06-13 NOTE — PLAN OF CARE
Problem: Adult Inpatient Plan of Care  Goal: Plan of Care Review  Outcome: Ongoing, Progressing  Goal: Patient-Specific Goal (Individualized)  Outcome: Ongoing, Progressing  Goal: Absence of Hospital-Acquired Illness or Injury  Outcome: Ongoing, Progressing  Goal: Optimal Comfort and Wellbeing  Outcome: Ongoing, Progressing  Goal: Readiness for Transition of Care  Outcome: Ongoing, Progressing     Problem: Fall Injury Risk  Goal: Absence of Fall and Fall-Related Injury  Outcome: Ongoing, Progressing     Problem: Restraint, Nonbehavioral (Nonviolent)  Goal: Absence of Harm or Injury  Outcome: Ongoing, Progressing     Problem: Communication Impairment (Mechanical Ventilation, Invasive)  Goal: Effective Communication  Outcome: Ongoing, Progressing     Problem: Device-Related Complication Risk (Mechanical Ventilation, Invasive)  Goal: Optimal Device Function  Outcome: Ongoing, Progressing     Problem: Inability to Wean (Mechanical Ventilation, Invasive)  Goal: Mechanical Ventilation Liberation  Outcome: Ongoing, Progressing     Problem: Nutrition Impairment (Mechanical Ventilation, Invasive)  Goal: Optimal Nutrition Delivery  Outcome: Ongoing, Progressing     Problem: Skin and Tissue Injury (Mechanical Ventilation, Invasive)  Goal: Absence of Device-Related Skin and Tissue Injury  Outcome: Ongoing, Progressing     Problem: Ventilator-Induced Lung Injury (Mechanical Ventilation, Invasive)  Goal: Absence of Ventilator-Induced Lung Injury  Outcome: Ongoing, Progressing     Problem: Communication Impairment (Artificial Airway)  Goal: Effective Communication  Outcome: Ongoing, Progressing     Problem: Device-Related Complication Risk (Artificial Airway)  Goal: Optimal Device Function  Outcome: Ongoing, Progressing     Problem: Skin and Tissue Injury (Artificial Airway)  Goal: Absence of Device-Related Skin or Tissue Injury  Outcome: Ongoing, Progressing     Problem: Noninvasive Ventilation Acute  Goal: Effective Unassisted  Ventilation and Oxygenation  Outcome: Ongoing, Progressing     Problem: Skin Injury Risk Increased  Goal: Skin Health and Integrity  Outcome: Ongoing, Progressing     Problem: Infection  Goal: Absence of Infection Signs and Symptoms  Outcome: Ongoing, Progressing

## 2023-06-13 NOTE — NURSING
"      SICU PLAN OF CARE NOTE    Dx: Necrotizing fasciitis    Shift Events: NAEON    Goals of Care: MAPs >65    Neuro: Sedated, Arouses to Voice, Follows Commands, and Moves All Extremities    Vital Signs: BP (!) 100/57 (BP Location: Left arm, Patient Position: Lying)   Pulse 76   Temp 99.4 °F (37.4 °C) (Oral)   Resp 18   Ht 5' 2" (1.575 m)   Wt 55.8 kg (123 lb 0.3 oz)   SpO2 100%   BMI 22.50 kg/m²     Respiratory: Ventilator ACPC FiO2 50%/PEEP 8    Diet: Tube Feeds @ 40cc/hour     Gtts:    Propofol @ 50 mcg/kg/min   Precedex @ 1.4 mcg/kg/hr   Fentanyl @ 200 mg/hr    Urine Output: Urinary Catheter 1360 cc/shift    Restraints BUE soft wrist in place. Assessing Q 2hr for skin breakdown. None noted on shift.     Labs/Accuchecks: Accuchecks Q 4hr, labs daily.    Skin: No skin breakdown noted on shift. Waffle mattress inflated. Foams in place. Heel foams in place.       "

## 2023-06-14 ENCOUNTER — ANESTHESIA (OUTPATIENT)
Dept: SURGERY | Facility: HOSPITAL | Age: 43
DRG: 501 | End: 2023-06-14
Payer: MEDICAID

## 2023-06-14 LAB
ALBUMIN SERPL BCP-MCNC: 1.8 G/DL (ref 3.5–5.2)
ALP SERPL-CCNC: 167 U/L (ref 55–135)
ALT SERPL W/O P-5'-P-CCNC: 25 U/L (ref 10–44)
ANION GAP SERPL CALC-SCNC: 8 MMOL/L (ref 8–16)
AST SERPL-CCNC: 28 U/L (ref 10–40)
BACTERIA BLD CULT: NORMAL
BACTERIA BLD CULT: NORMAL
BASOPHILS # BLD AUTO: 0.05 K/UL (ref 0–0.2)
BASOPHILS NFR BLD: 0.6 % (ref 0–1.9)
BILIRUB SERPL-MCNC: 0.3 MG/DL (ref 0.1–1)
BUN SERPL-MCNC: 11 MG/DL (ref 6–20)
CALCIUM SERPL-MCNC: 8.6 MG/DL (ref 8.7–10.5)
CHLORIDE SERPL-SCNC: 103 MMOL/L (ref 95–110)
CO2 SERPL-SCNC: 27 MMOL/L (ref 23–29)
CREAT SERPL-MCNC: 0.4 MG/DL (ref 0.5–1.4)
DIFFERENTIAL METHOD: ABNORMAL
EOSINOPHIL # BLD AUTO: 0.2 K/UL (ref 0–0.5)
EOSINOPHIL NFR BLD: 2.5 % (ref 0–8)
ERYTHROCYTE [DISTWIDTH] IN BLOOD BY AUTOMATED COUNT: 12.8 % (ref 11.5–14.5)
EST. GFR  (NO RACE VARIABLE): >60 ML/MIN/1.73 M^2
FINAL PATHOLOGIC DIAGNOSIS: NORMAL
GLUCOSE SERPL-MCNC: 99 MG/DL (ref 70–110)
GROSS: NORMAL
HCT VFR BLD AUTO: 27.7 % (ref 37–48.5)
HGB BLD-MCNC: 9.4 G/DL (ref 12–16)
IMM GRANULOCYTES # BLD AUTO: 0.2 K/UL (ref 0–0.04)
IMM GRANULOCYTES NFR BLD AUTO: 2.3 % (ref 0–0.5)
LYMPHOCYTES # BLD AUTO: 1.2 K/UL (ref 1–4.8)
LYMPHOCYTES NFR BLD: 14.3 % (ref 18–48)
Lab: NORMAL
MAGNESIUM SERPL-MCNC: 1.8 MG/DL (ref 1.6–2.6)
MCH RBC QN AUTO: 30.5 PG (ref 27–31)
MCHC RBC AUTO-ENTMCNC: 33.9 G/DL (ref 32–36)
MCV RBC AUTO: 90 FL (ref 82–98)
MONOCYTES # BLD AUTO: 1 K/UL (ref 0.3–1)
MONOCYTES NFR BLD: 11 % (ref 4–15)
NEUTROPHILS # BLD AUTO: 6 K/UL (ref 1.8–7.7)
NEUTROPHILS NFR BLD: 69.3 % (ref 38–73)
NRBC BLD-RTO: 0 /100 WBC
PHOSPHATE SERPL-MCNC: 3.5 MG/DL (ref 2.7–4.5)
PLATELET # BLD AUTO: 445 K/UL (ref 150–450)
PMV BLD AUTO: 10.9 FL (ref 9.2–12.9)
POCT GLUCOSE: 111 MG/DL (ref 70–110)
POCT GLUCOSE: 142 MG/DL (ref 70–110)
POTASSIUM SERPL-SCNC: 4.2 MMOL/L (ref 3.5–5.1)
PROT SERPL-MCNC: 5.2 G/DL (ref 6–8.4)
RBC # BLD AUTO: 3.08 M/UL (ref 4–5.4)
SODIUM SERPL-SCNC: 138 MMOL/L (ref 136–145)
WBC # BLD AUTO: 8.63 K/UL (ref 3.9–12.7)

## 2023-06-14 PROCEDURE — 27800903 OPTIME MED/SURG SUP & DEVICES OTHER IMPLANTS: Performed by: OTOLARYNGOLOGY

## 2023-06-14 PROCEDURE — 37000008 HC ANESTHESIA 1ST 15 MINUTES: Performed by: OTOLARYNGOLOGY

## 2023-06-14 PROCEDURE — 63600175 PHARM REV CODE 636 W HCPCS

## 2023-06-14 PROCEDURE — 15757 PR FREE SKIN FLAP W MICROVASC ANAST: ICD-10-PCS | Mod: ,,, | Performed by: OTOLARYNGOLOGY

## 2023-06-14 PROCEDURE — 94761 N-INVAS EAR/PLS OXIMETRY MLT: CPT

## 2023-06-14 PROCEDURE — 93010 EKG 12-LEAD: ICD-10-PCS | Mod: ,,, | Performed by: PEDIATRICS

## 2023-06-14 PROCEDURE — 25000003 PHARM REV CODE 250: Performed by: STUDENT IN AN ORGANIZED HEALTH CARE EDUCATION/TRAINING PROGRAM

## 2023-06-14 PROCEDURE — 88305 TISSUE EXAM BY PATHOLOGIST: CPT | Performed by: PATHOLOGY

## 2023-06-14 PROCEDURE — 88305 TISSUE EXAM BY PATHOLOGIST: CPT | Mod: 26,,, | Performed by: PATHOLOGY

## 2023-06-14 PROCEDURE — 20000000 HC ICU ROOM

## 2023-06-14 PROCEDURE — 15004 WOUND PREP F/N/HF/G: CPT | Mod: ,,, | Performed by: OTOLARYNGOLOGY

## 2023-06-14 PROCEDURE — D9220A PRA ANESTHESIA: Mod: ANES,,, | Performed by: ANESTHESIOLOGY

## 2023-06-14 PROCEDURE — D9220A PRA ANESTHESIA: ICD-10-PCS | Mod: ANES,,, | Performed by: ANESTHESIOLOGY

## 2023-06-14 PROCEDURE — 99291 CRITICAL CARE FIRST HOUR: CPT | Mod: ,,, | Performed by: STUDENT IN AN ORGANIZED HEALTH CARE EDUCATION/TRAINING PROGRAM

## 2023-06-14 PROCEDURE — 25000003 PHARM REV CODE 250: Performed by: OTOLARYNGOLOGY

## 2023-06-14 PROCEDURE — A4216 STERILE WATER/SALINE, 10 ML: HCPCS | Performed by: OTOLARYNGOLOGY

## 2023-06-14 PROCEDURE — 42440 EXCISE SUBMAXILLARY GLAND: CPT | Mod: 51,RT,, | Performed by: OTOLARYNGOLOGY

## 2023-06-14 PROCEDURE — 15004 PR WND PREP PED, FACE/NCK/HND/FT/GEN 1ST 100 CM: ICD-10-PCS | Mod: ,,, | Performed by: OTOLARYNGOLOGY

## 2023-06-14 PROCEDURE — 93005 ELECTROCARDIOGRAM TRACING: CPT

## 2023-06-14 PROCEDURE — 63600175 PHARM REV CODE 636 W HCPCS: Performed by: STUDENT IN AN ORGANIZED HEALTH CARE EDUCATION/TRAINING PROGRAM

## 2023-06-14 PROCEDURE — 27201423 OPTIME MED/SURG SUP & DEVICES STERILE SUPPLY: Performed by: OTOLARYNGOLOGY

## 2023-06-14 PROCEDURE — 88305 TISSUE EXAM BY PATHOLOGIST: ICD-10-PCS | Mod: 26,,, | Performed by: PATHOLOGY

## 2023-06-14 PROCEDURE — 85025 COMPLETE CBC W/AUTO DIFF WBC: CPT

## 2023-06-14 PROCEDURE — D9220A PRA ANESTHESIA: Mod: CRNA,,, | Performed by: NURSE ANESTHETIST, CERTIFIED REGISTERED

## 2023-06-14 PROCEDURE — 15757 FREE SKIN FLAP MICROVASC: CPT | Mod: ,,, | Performed by: OTOLARYNGOLOGY

## 2023-06-14 PROCEDURE — 27000221 HC OXYGEN, UP TO 24 HOURS

## 2023-06-14 PROCEDURE — 99291 PR CRITICAL CARE, E/M 30-74 MINUTES: ICD-10-PCS | Mod: ,,, | Performed by: STUDENT IN AN ORGANIZED HEALTH CARE EDUCATION/TRAINING PROGRAM

## 2023-06-14 PROCEDURE — D9220A PRA ANESTHESIA: ICD-10-PCS | Mod: CRNA,,, | Performed by: NURSE ANESTHETIST, CERTIFIED REGISTERED

## 2023-06-14 PROCEDURE — 93010 ELECTROCARDIOGRAM REPORT: CPT | Mod: ,,, | Performed by: PEDIATRICS

## 2023-06-14 PROCEDURE — 99900035 HC TECH TIME PER 15 MIN (STAT)

## 2023-06-14 PROCEDURE — 42440 PR EXCISION SUBMAXILLARY GLAND: ICD-10-PCS | Mod: 51,RT,, | Performed by: OTOLARYNGOLOGY

## 2023-06-14 PROCEDURE — 25000003 PHARM REV CODE 250

## 2023-06-14 PROCEDURE — 99900026 HC AIRWAY MAINTENANCE (STAT)

## 2023-06-14 PROCEDURE — 83735 ASSAY OF MAGNESIUM: CPT

## 2023-06-14 PROCEDURE — 25000003 PHARM REV CODE 250: Performed by: NURSE ANESTHETIST, CERTIFIED REGISTERED

## 2023-06-14 PROCEDURE — 36000707: Performed by: OTOLARYNGOLOGY

## 2023-06-14 PROCEDURE — 63600175 PHARM REV CODE 636 W HCPCS: Performed by: NURSE ANESTHETIST, CERTIFIED REGISTERED

## 2023-06-14 PROCEDURE — 37000009 HC ANESTHESIA EA ADD 15 MINS: Performed by: OTOLARYNGOLOGY

## 2023-06-14 PROCEDURE — 63600175 PHARM REV CODE 636 W HCPCS: Performed by: OTOLARYNGOLOGY

## 2023-06-14 PROCEDURE — 94003 VENT MGMT INPAT SUBQ DAY: CPT

## 2023-06-14 PROCEDURE — 80053 COMPREHEN METABOLIC PANEL: CPT

## 2023-06-14 PROCEDURE — 84100 ASSAY OF PHOSPHORUS: CPT

## 2023-06-14 PROCEDURE — 36000706: Performed by: OTOLARYNGOLOGY

## 2023-06-14 DEVICE — COUPLER FLOW ANASTOMOSIS 3MM: Type: IMPLANTABLE DEVICE | Site: NECK | Status: FUNCTIONAL

## 2023-06-14 RX ORDER — FENTANYL CITRATE 50 UG/ML
INJECTION, SOLUTION INTRAMUSCULAR; INTRAVENOUS
Status: DISCONTINUED | OUTPATIENT
Start: 2023-06-14 | End: 2023-06-14

## 2023-06-14 RX ORDER — MIDAZOLAM HYDROCHLORIDE 1 MG/ML
INJECTION, SOLUTION INTRAMUSCULAR; INTRAVENOUS
Status: DISCONTINUED | OUTPATIENT
Start: 2023-06-14 | End: 2023-06-14

## 2023-06-14 RX ORDER — HEPARIN SODIUM 1000 [USP'U]/ML
INJECTION, SOLUTION INTRAVENOUS; SUBCUTANEOUS
Status: DISCONTINUED | OUTPATIENT
Start: 2023-06-14 | End: 2023-06-14 | Stop reason: HOSPADM

## 2023-06-14 RX ORDER — ONDANSETRON 2 MG/ML
INJECTION INTRAMUSCULAR; INTRAVENOUS
Status: DISCONTINUED | OUTPATIENT
Start: 2023-06-14 | End: 2023-06-14

## 2023-06-14 RX ORDER — HEPARIN SODIUM 1000 [USP'U]/ML
INJECTION, SOLUTION INTRAVENOUS; SUBCUTANEOUS
Status: DISCONTINUED | OUTPATIENT
Start: 2023-06-14 | End: 2023-06-14

## 2023-06-14 RX ORDER — DEXAMETHASONE SODIUM PHOSPHATE 4 MG/ML
INJECTION, SOLUTION INTRA-ARTICULAR; INTRALESIONAL; INTRAMUSCULAR; INTRAVENOUS; SOFT TISSUE
Status: DISCONTINUED | OUTPATIENT
Start: 2023-06-14 | End: 2023-06-14

## 2023-06-14 RX ORDER — LIDOCAINE HYDROCHLORIDE 40 MG/ML
INJECTION, SOLUTION RETROBULBAR
Status: DISCONTINUED | OUTPATIENT
Start: 2023-06-14 | End: 2023-06-14 | Stop reason: HOSPADM

## 2023-06-14 RX ORDER — KETAMINE HCL IN 0.9 % NACL 50 MG/5 ML
SYRINGE (ML) INTRAVENOUS
Status: DISCONTINUED | OUTPATIENT
Start: 2023-06-14 | End: 2023-06-14

## 2023-06-14 RX ORDER — ROCURONIUM BROMIDE 10 MG/ML
INJECTION, SOLUTION INTRAVENOUS
Status: DISCONTINUED | OUTPATIENT
Start: 2023-06-14 | End: 2023-06-14

## 2023-06-14 RX ADMIN — ROCURONIUM BROMIDE 50 MG: 10 INJECTION INTRAVENOUS at 08:06

## 2023-06-14 RX ADMIN — PROPOFOL 40 MCG/KG/MIN: 10 INJECTION, EMULSION INTRAVENOUS at 09:06

## 2023-06-14 RX ADMIN — FENTANYL CITRATE 50 MCG: 50 INJECTION, SOLUTION INTRAMUSCULAR; INTRAVENOUS at 11:06

## 2023-06-14 RX ADMIN — MUPIROCIN: 20 OINTMENT TOPICAL at 09:06

## 2023-06-14 RX ADMIN — HALOPERIDOL LACTATE 5 MG: 5 INJECTION, SOLUTION INTRAMUSCULAR at 08:06

## 2023-06-14 RX ADMIN — ROCURONIUM BROMIDE 10 MG: 10 INJECTION INTRAVENOUS at 09:06

## 2023-06-14 RX ADMIN — ROCURONIUM BROMIDE 10 MG: 10 INJECTION INTRAVENOUS at 10:06

## 2023-06-14 RX ADMIN — HALOPERIDOL LACTATE 5 MG: 5 INJECTION, SOLUTION INTRAMUSCULAR at 05:06

## 2023-06-14 RX ADMIN — DEXMEDETOMIDINE HYDROCHLORIDE 1 MCG/KG/HR: 4 INJECTION, SOLUTION INTRAVENOUS at 07:06

## 2023-06-14 RX ADMIN — DEXMEDETOMIDINE HYDROCHLORIDE 1.2 MCG/KG/HR: 4 INJECTION, SOLUTION INTRAVENOUS at 05:06

## 2023-06-14 RX ADMIN — ONDANSETRON 4 MG: 2 INJECTION INTRAMUSCULAR; INTRAVENOUS at 09:06

## 2023-06-14 RX ADMIN — DEXAMETHASONE SODIUM PHOSPHATE 4 MG: 4 INJECTION, SOLUTION INTRAMUSCULAR; INTRAVENOUS at 09:06

## 2023-06-14 RX ADMIN — Medication 200 MCG/HR: at 12:06

## 2023-06-14 RX ADMIN — CEFAZOLIN 2 G: 2 INJECTION, POWDER, FOR SOLUTION INTRAMUSCULAR; INTRAVENOUS at 03:06

## 2023-06-14 RX ADMIN — HALOPERIDOL LACTATE 5 MG: 5 INJECTION, SOLUTION INTRAMUSCULAR at 01:06

## 2023-06-14 RX ADMIN — Medication 10 ML: at 12:06

## 2023-06-14 RX ADMIN — CEFAZOLIN 2 G: 2 INJECTION, POWDER, FOR SOLUTION INTRAMUSCULAR; INTRAVENOUS at 11:06

## 2023-06-14 RX ADMIN — PROPOFOL 50 MCG/KG/MIN: 10 INJECTION, EMULSION INTRAVENOUS at 05:06

## 2023-06-14 RX ADMIN — QUETIAPINE FUMARATE 100 MG: 25 TABLET ORAL at 09:06

## 2023-06-14 RX ADMIN — ROCURONIUM BROMIDE 30 MG: 10 INJECTION INTRAVENOUS at 10:06

## 2023-06-14 RX ADMIN — ENOXAPARIN SODIUM 40 MG: 40 INJECTION SUBCUTANEOUS at 05:06

## 2023-06-14 RX ADMIN — HEPARIN SODIUM 3000 UNITS: 1000 INJECTION, SOLUTION INTRAVENOUS; SUBCUTANEOUS at 10:06

## 2023-06-14 RX ADMIN — PROPOFOL 50 MCG/KG/MIN: 10 INJECTION, EMULSION INTRAVENOUS at 01:06

## 2023-06-14 RX ADMIN — Medication 15 MG: at 10:06

## 2023-06-14 RX ADMIN — Medication 10 ML: at 05:06

## 2023-06-14 RX ADMIN — FENTANYL CITRATE 50 MCG: 50 INJECTION, SOLUTION INTRAMUSCULAR; INTRAVENOUS at 10:06

## 2023-06-14 RX ADMIN — Medication 800 MG: at 04:06

## 2023-06-14 RX ADMIN — MIDAZOLAM HYDROCHLORIDE 2 MG: 1 INJECTION, SOLUTION INTRAMUSCULAR; INTRAVENOUS at 07:06

## 2023-06-14 RX ADMIN — CEFAZOLIN 2 G: 2 INJECTION, POWDER, FOR SOLUTION INTRAMUSCULAR; INTRAVENOUS at 07:06

## 2023-06-14 RX ADMIN — Medication 15 MG: at 09:06

## 2023-06-14 RX ADMIN — FENTANYL CITRATE 50 MCG: 50 INJECTION, SOLUTION INTRAMUSCULAR; INTRAVENOUS at 09:06

## 2023-06-14 RX ADMIN — SODIUM CHLORIDE, SODIUM GLUCONATE, SODIUM ACETATE, POTASSIUM CHLORIDE, MAGNESIUM CHLORIDE, SODIUM PHOSPHATE, DIBASIC, AND POTASSIUM PHOSPHATE: .53; .5; .37; .037; .03; .012; .00082 INJECTION, SOLUTION INTRAVENOUS at 07:06

## 2023-06-14 RX ADMIN — Medication 250 MCG/HR: at 03:06

## 2023-06-14 NOTE — ANESTHESIA POSTPROCEDURE EVALUATION
Anesthesia Post Evaluation    Patient: Aditi Conway    Procedure(s) Performed: Procedure(s) (LRB):  EXPLORATION, NECK (Right)  CREATION, FREE FLAP (N/A)    Final Anesthesia Type: general      Patient location during evaluation: ICU  Patient participation: No - Unable to Participate, Intubation  Level of consciousness: sedated  Post-procedure vital signs: reviewed and stable  Pain management: adequate  Airway patency: patent    PONV status at discharge: No PONV  Anesthetic complications: no      Cardiovascular status: hemodynamically stable  Respiratory status: intubated  Hydration status: euvolemic  Follow-up not needed.          Vitals Value Taken Time   /74 06/14/23 1301   Temp 37.3 °C (99.2 °F) 06/14/23 1200   Pulse 79 06/14/23 1328   Resp 18 06/14/23 1328   SpO2 100 % 06/14/23 1328   Vitals shown include unvalidated device data.      No case tracking events are documented in the log.      Pain/Oliverio Score: Pain Rating Prior to Med Admin: 6 (6/14/2023 12:40 AM)  Pain Rating Post Med Admin: 0 (6/13/2023 12:51 PM)

## 2023-06-14 NOTE — PROGRESS NOTES
[facility]  Critical Care - Surgery  Progress Note    Patient Name: Aditi Conway  MRN: [id number]  Admission Date: 6/8/2023  Hospital Length of Stay: 6 days  Code Status: Full Code  Attending Provider: Gerardo Seymour MD  Primary Care Provider: Primary Doctor No   Principal Problem: Necrotizing fasciitis    Subjective:     Interval History/Significant Events: NAEO. Pt agitated, fentanyl drip increased. This Am,pt sedated and intubated however able to participate in evaluation. She has no complaints this AM. Plan for the OR today    Follow-up For: Procedure(s) (LRB):  EXPLORATION, NECK (Right)  CREATION, FREE FLAP (N/A)    Post-Operative Day: Day of Surgery    Objective:     Vital Signs (Most Recent):  Temp: 98.3 °F (36.8 °C) (06/14/23 1300)  Pulse: 76 (06/14/23 1400)  Resp: 18 (06/14/23 1400)  BP: 109/61 (06/14/23 1400)  SpO2: 100 % (06/14/23 1400) Vital Signs (24h Range):  Temp:  [98.3 °F (36.8 °C)-100 °F (37.8 °C)] 98.3 °F (36.8 °C)  Pulse:  [] 76  Resp:  [15-29] 18  SpO2:  [100 %] 100 %  BP: ()/(52-76) 109/61     Weight: 55.8 kg (123 lb 0.3 oz)  Body mass index is 22.5 kg/m².      Intake/Output Summary (Last 24 hours) at 6/14/2023 1437  Last data filed at 6/14/2023 1211  Gross per 24 hour   Intake 2296.27 ml   Output 2410 ml   Net -113.73 ml          Physical Exam  Constitutional:       General: She is not in acute distress.     Appearance: Normal appearance.      Comments: Sedated and intubated   HENT:      Head: Normocephalic and atraumatic.      Mouth/Throat:      Mouth: Mucous membranes are dry.      Pharynx: Oropharynx is clear.   Eyes:      Pupils: Pupils are equal, round, and reactive to light.   Neck:      Comments: Wound with sterile dressing on right side of neck. Clear-yellow drainage appears on dressing.   Cardiovascular:      Rate and Rhythm: Normal rate and regular rhythm.      Pulses: Normal pulses.   Pulmonary:      Comments: intubated  Abdominal:       Palpations: Abdomen is soft.   Skin:     General: Skin is warm and dry.   Neurological:      General: No focal deficit present.      Mental Status: She is alert.          Vents:  Vent Mode: A/C (06/14/23 1300)  Set Rate: 18 BPM (06/14/23 1300)  Vt Set: 360 mL (06/13/23 0543)  PEEP/CPAP: 8 cmH20 (06/14/23 1300)  Oxygen Concentration (%): 50 (06/14/23 1400)  Peak Airway Pressure: 23 cmH20 (06/14/23 1300)  Plateau Pressure: 0 cmH20 (06/14/23 1300)  Total Ve: 8.46 L/m (06/14/23 1300)  Negative Inspiratory Force (cm H2O): 0 (06/14/23 1300)  F/VT Ratio<105 (RSBI): (!) 38.14 (06/14/23 1300)    Lines/Drains/Airways       Peripherally Inserted Central Catheter Line  Duration             PICC Double Lumen 06/09/23 1152 right brachial 5 days              Drain  Duration                  NG/OG Tube 06/08/23 2343 [facility] mouth 5 days         Urethral Catheter 06/12/23 0845 2 days         Closed/Suction Drain 06/14/23 1044 Right Neck Bulb 15 Fr. <1 day         Closed/Suction Drain 06/14/23 1044 Right Thigh Bulb 19 Fr. <1 day              Airway  Duration                  Airway - Non-Surgical 06/08/23 2300 Endotracheal Tube 5 days                    Significant Labs:    CBC/Anemia Profile:  Recent Labs   Lab 06/13/23  0325 06/13/23  0346 06/14/23  0310   WBC 9.72  --  8.63   HGB 9.0*  --  9.4*   HCT 27.1* 25* 27.7*     --  445   MCV 90  --  90   RDW 12.4  --  12.8        Chemistries:  Recent Labs   Lab 06/13/23  0325 06/14/23  0310    138   K 3.3* 4.2    103   CO2 30* 27   BUN 9 11   CREATININE 0.4* 0.4*   CALCIUM 8.8 8.6*   ALBUMIN 1.8* 1.8*   PROT 4.9* 5.2*   BILITOT 0.3 0.3   ALKPHOS 133 167*   ALT 28 25   AST 29 28   MG 1.7 1.8   PHOS 2.9 3.5       All pertinent labs within the past 24 hours have been reviewed.    Significant Imaging:  I have reviewed all pertinent imaging results/findings within the past 24 hours.    Assessment/Plan:     ID  * Necrotizing fasciitis  42F PMH depression,  seizures (no meds), tobacco use, presenting with concern for toshia's angina / nec fasc of neck s/p exploration and debridement with ENT 6/8/23. Plan for OR today for wound closure and flap from right thigh.          Neuro/Psych:   -- Sedation: propofol gtt and precedex ggt. Goal to wean precedex today  -- Pain: fentanyl gtt, propofol gtt, precedex 0.5mcg/kg/hr  -- Quetiapine, Seroquel, haldol prn              Cards:   -- HDS  -- currently not requiring pressors  -- MAP > 65      Pulm:   -- Goal O2 sat > 90%  -- Intubated on rate control 50/5  -- To OR today, has remained on vent until takeback to OR. Per ENT, ok to wean to extubate tomorrow 6.15.   -- Daily SBT, SAT      Renal:  -- Keep murdock for strict I/O  -- BUN/Cr 11/0.4      FEN / GI:   -- Replace lytes as needed  -- Nutrition: NPO. TF at goal, will restart after procedure  -- OGT in place      ID:   -- Afebrile, WBC 9.72  -- Abx: linezolid, cefepime, flagyl, doxy switch to cefazolin  -- ID following  -- Following OR cultures 6/8 Staph aureus, MSSA  -- Blood Cx OSH 6/7 NGTD  -- ID signed off but stated to re-consult on 6/25      Heme/Onc:   -- H/H stable 9.4/27.7  -- Daily CBC      Endo:   -- Gluc goal 140-180      PPx:   Feeding: NPO, TF to goal   Analgesia/Sedation: adequate  Thromboembolic prevention: lovenox  HOB >30: Yes  Stress Ulcer ppx: n/a  Glucose control: Critical care goal 140-180 g/dl     Lines/Drains/Airway: pIVs, OGT, Murdock, ETT, PICC      Dispo/Code Status/Palliative:   -- SICU / Full Code         Critical care was time spent personally by me on the following activities: development of treatment plan with patient or surrogate and bedside caregivers, discussions with consultants, evaluation of patient's response to treatment, examination of patient, ordering and performing treatments and interventions, ordering and review of laboratory studies, ordering and review of radiographic studies, pulse oximetry, re-evaluation of patient's  condition.  This critical care time did not overlap with that of any other provider or involve time for any procedures.     Trudi Martinez MD  Critical Care - Surgery  [facility]

## 2023-06-14 NOTE — PLAN OF CARE
6 Hr post anastomosis Flap Check     Post-op events: NAEON     Flap Site  Color: good; approximates that of the R anterior thigh  Cap refill: well perfused  Turgor: soft  Temperature: warm  Doppler:  + triphasic artery signal + venous signal   Needle Prick: +delayed bright red bleeding;    Incision c/d/I   AMADO drain with s/s output      Donor Site  R lower extremity palpable pulse. Incision c/d/I with island dressing in place, soft to palpation. AMADO drain with s/s output.

## 2023-06-14 NOTE — NURSING
"      SICU PLAN OF CARE NOTE    Dx: Necrotizing fasciitis    Shift Events: SILVINA HERNANDEZ @ midnight in prep for OR this AM. Surgery checklist completed    Goals of Care: MAP>65, keep pt adequately sedated/no overstimulation     Neuro: Sedated, Arouses to Voice, and Follows Commands    Cardiac: NSR    Vital Signs: /66 (BP Location: Left arm, Patient Position: Lying)   Pulse 84   Temp 99.9 °F (37.7 °C) (Oral)   Resp 18   Ht 5' 2" (1.575 m)   Wt 55.8 kg (123 lb 0.3 oz)   SpO2 100%   BMI 22.50 kg/m²     Respiratory: Ventilator    Diet: NPO    Gtts: Propfol, Fentanyl, and Precedex    Urine Output: Urinary Catheter 1775 cc/shift    Restraints checked Q2hr no skin breakdown noted       Labs/Accuchecks: Daily Labs, accuchecks Q.    Skin: No new skin breakdown noted.Specialty bed plugged in and working. R neck Incision with dried drainage, only to be changed by ENT.   Frequent weight shifting provided Q2hr. Foams placed to heels and sacrum for addition breakdown prevention.     PoC reviewed with pt/family. All questions/concerns addressed. See flowsheets for full assessment.        "

## 2023-06-14 NOTE — BRIEF OP NOTE
[facility]  Brief Operative Note    SUMMARY     Surgery Date: 6/14/2023     Surgeon(s) and Role:     * Gerardo Seymour MD - Primary     * Miranda Casas MD - Resident - Assisting     * Walter De La Cruz MD - Resident - Assisting        Pre-op Diagnosis:  Myositis, unspecified myositis type, unspecified site [M60.9]    Post-op Diagnosis:  Post-Op Diagnosis Codes:     * Myositis, unspecified myositis type, unspecified site [M60.9]    Procedure(s) (LRB):  EXPLORATION, NECK (Right)  CREATION, FREE FLAP (N/A)    Anesthesia: General    Operative Findings:   Healthy appearing tissue in wound bed   R ALT free flap for reconstruction    Estimated Blood Loss: 20 cc    Estimated Blood Loss has been documented.         Specimens:   Specimen (24h ago, onward)       Start     Ordered    Signed and Held  Specimen to Pathology, Surgery ENT  Once        Comments: Pre-op Diagnosis: Myositis, unspecified myositis type, unspecified site [M60.9]Procedure(s):EXPLORATION, NECKCREATION, FREE FLAP Number of specimens: 1Name of specimens: 1. Right submandibular gland - permanent     References:    Click here for ordering Quick Tip   Question Answer Comment   Procedure Type: ENT    Specimen Class: Routine/Screening    Which provider would you like to cc? GERARDO SEYMOUR    Release to patient Immediate        Signed and Held                    [id number]

## 2023-06-14 NOTE — OP NOTE
DATE OF PROCEDURE: 6/14/2023     PREOPERATIVE DIAGNOSES:   1. Necrotizing fasciitis of right neck   2. Open wound of right neck measuring 7 x 15 cm    POSTOPERATIVE DIAGNOSES:   Myositis, unspecified myositis type, unspecified site [M60.9]    SURGEON:  Surgeon(s) and Role:     * Gerardo Seymour MD - Primary     * Miranda Casas MD - Resident - Assisting     * Walter De La Cruz MD - Resident - Assisting      PROCEDURES PERFORMED:   Wound prep of right neck with removal of anterior neck skin and devitalized sternocleidomastoid muscle fascia  Right suprahyoid neck dissection with resection of right submandibular gland and isolation of facial vessels for microvascular anastomosis  3.  Right myocutaneous anterolateral thigh free flap with reconstruction of right neck wound with anastomosis right facial artery and vein    ANESTHESIA: General      INDICATIONS FOR PROCEDURE:   Aditi Conway is a 42 y.o. woman currently admitted [facility] after undergoing serial debridements of the right neck for necrotizing fasciitis.  She returns today for assessment of her wound possible flap closure.    Her family was apprised of the risks, benefits and alternatives to surgery.  In spite of the risk inherent to surgery,they provided informed consent for the aforementioned procedures on her behalf.     PROCEDURE IN DETAIL:  The patient was taken to the operating room and placed on the operating table in the supine position.  General endotracheal anesthesia was induced by the anesthesia team.     The right neck was addressed.  The vast majority of the wound appeared to be healthy and granulating well.  There was no significant devitalized tissue noted.    Next, the right thigh was addressed.  The relevant landmarks for anterolateral thigh free flap including the anterior superior iliac spine and superolateral border of the patella were marked.  A line was drawn between these 2 sites the center point of  the site was marked.  Near the center part of the line, a  was auscultated with the Doppler stethoscope.  A 2nd  was auscultated just cephalad to this 1.  An approximately 7 cm wide by 15 cm long skin paddle was marked centered on these perforators.  The right face, neck, chest and right lower extremity were then prepped and draped in standard sterile fashion.      To begin, the right neck was addressed.  Redundant skin was removed anteriorly.  This was sharply excised and discarded.  There was a desiccated area of possible nonviable tissue overlying the sternocleidomastoid muscle region of the tail of parotid.  This was resected utilizing electrocautery.      Next, the submandibular gland was identified.  The inferior border was skeletonized.  The facial vein was identified and preserved along its full length.  The digastric tendon anterior belly of the digastric muscle were then skeletonized.  The mylohyoid muscle was exposed the mylohyoid neurovascular pedicle was identified, clipped and divided.  The superior attachments between the gland and the mandible were taken down preserving the facial artery.  Ultimately, the lingual nerve and submandibular ganglion were identified.  The ganglion was clipped and divided as was Denver's duct.  The hypoglossal nerve was identified and preserved.  Resecting gland allowed us to isolate the facial artery and vein.  The digastric muscle and stylohyoid were then taken down to provide additional length on the facial artery.  The facial vessels were left to perfuse until the reconstructive portion of the procedure.      Next, our attention was turned to the right thigh.  The anterior aspect of the skin paddle was incised.  Sharp dissection proceeded through the underlying subcutaneous tissue down to the level of the rectus femoris fascia.  Subfascial dissection proceeded to identify the intermuscular septum between the rectus femoris and vastus lateralis  muscles.  The descending branch of the lateral circumflex femoral artery was identified.  There was noted to be a large  coming off proximally supplying the skin paddle.  A smaller  was noted to be coming off distally.  Both of these were preserved.  Posteriorly, the skin paddle was incised down to the underlying fascia annika.  The fascia annika was incised down to the underlying vastus lateralis muscle.  The distal aspect of the pedicle was clipped and divided.  A cuff of vastus lateralis was incorporated with her flap.  The proximal pedicle was then dissected up to the transverse branch of lateral circumflex femoral artery and vein.  Numerous branches and tributaries of the vascular pedicle were isolated, clipped and divided.  Ultimately, the artery was circumferentially dissected, clamped, divided and suture ligated with a 2-0 silk tie.  The veins were isolated, clipped and divided and the flap was transferred up to the head neck.  The right thigh donor site was then closed over a 19 [place] Irineo drain utilizing 3-0 Vicryl and skin staples.  Next, the operating microscope was brought into the field.    The flap artery flushed readily with heparinized saline with excellent egress of saline through the veins.  The flap artery and vein as well as the facial artery and vein were prepared under the microscope.  The recipient vessels were clipped and divided and clamped proximally with microvascular clamps.  To begin, an end-to-end anastomosis was carried out between the facial vein and the dominant flap vein utilizing 3 mm venous .  The secondary flap vein was clipped.  Next, an end-to-end anastomosis carried out between the flap artery and the facial artery utilizing 9-0 nylon suture placed circumferentially is interrupted sutures.  Once the anastomosis was complete, the clamps were let down.  3000 units of heparin were administered by anesthesia.  There was noted to be excellent arterial and  venous flow through the pedicle.  Pedicle geometry was then optimized to prevent kinking of the venous anastomosis.  The flap was then inset into the right neck defect utilizing 3-0 Vicryl and skin staples.  The right neck was closed over 15 [place] Irineo drain which was secured with silk suture.      Once the flap was inset, a Doppler signal was checked.  There was noted to be in excellent arterial and venous Doppler signal.  The flap also bled readily when scratched with a 15 blade.  The patient was then handed back to anesthesia.  She was transferred to ICU intubated in satisfactory condition.    There were no intraoperative complications.  I was present for and participated in the entire procedure as dictated above.       ESTIMATED BLOOD LOSS: 25 mL    SPECIMENS:   Specimen (24h ago, onward)       Start     Ordered    Signed and Held  Specimen to Pathology, Surgery ENT  Once        Comments: Pre-op Diagnosis: Myositis, unspecified myositis type, unspecified site [M60.9]Procedure(s):EXPLORATION, NECKCREATION, FREE FLAP Number of specimens: 1Name of specimens: 1. Right submandibular gland - permanent     References:    Click here for ordering Quick Tip   Question Answer Comment   Procedure Type: ENT    Specimen Class: Routine/Screening    Which provider would you like to cc? DAVID CASTELLON    Release to patient Immediate        Signed and Held

## 2023-06-14 NOTE — ANESTHESIA POSTPROCEDURE EVALUATION
Anesthesia Post Evaluation    Patient: Aditi Conway    Procedure(s) Performed: Procedure(s) (LRB):  RIGHT NECK WOUND EXPLORATION WITH WOUND DEBRIDEMENT (Right)    Final Anesthesia Type: general      Patient location during evaluation: ICU  Patient participation: No - Unable to Participate, Intubation  Level of consciousness: sedated  Post-procedure vital signs: reviewed and stable  Pain management: adequate  Airway patency: patent    PONV status at discharge: No PONV  Anesthetic complications: no      Cardiovascular status: hemodynamically stable  Respiratory status: ETT  Hydration status: euvolemic  Follow-up not needed.          Vitals Value Taken Time   /82 06/14/23 1231   Temp 37.3 °C (99.2 °F) 06/14/23 1200   Pulse 84 06/14/23 1237   Resp 18 06/14/23 1237   SpO2 100 % 06/14/23 1237   Vitals shown include unvalidated device data.      No case tracking events are documented in the log.      Pain/Oliverio Score: Pain Rating Prior to Med Admin: 6 (6/14/2023 12:40 AM)  Pain Rating Post Med Admin: 0 (6/13/2023 12:51 PM)

## 2023-06-14 NOTE — ASSESSMENT & PLAN NOTE
42F presenting with right neck necrotizing fasciitis with surround cellulitis s/p debridement of multiple levels of right neck on 6/8. Necrotic skin, soft tissue, and muscle removed. Open neck wound packed with saline soaked kerlix. Patient remains intubated. S/P debridement in OR on 6/12.     -- To OR today for right neck wound exploration, possible rotational flap, possible wound vac, possible tracheostomy today 6/14  -- Continue BID dressing changes with saline-soaked Kirlex, ENT-driven  -- Appreciate ID recs regarding antibiotics  -- Please Secure Chat me for any questions, page ENT on-call if unresponsive or urgent

## 2023-06-14 NOTE — NURSING
Pt returned from surgery. VSS. Remains on the ventilator. Propofol, fentanyl, and precedex restarted. Right neck flap with good color and dopplered pulse audible. AMADO drain to right neck. Right leg donor site with AMADO drain. Site c/d/I.

## 2023-06-14 NOTE — TRANSFER OF CARE
"Anesthesia Transfer of Care Note    Patient: Aditi Conway    Procedure(s) Performed: Procedure(s) (LRB):  EXPLORATION, NECK (Right)  CREATION, FREE FLAP (N/A)    Patient location: ICU    Anesthesia Type: general    Transport from OR: Transported from OR intubated on 100% O2 by AMBU with assisted ventilation. Upon arrival to PACU/ICU, patient attached to ventilator and auscultated to confirm bilateral breath sounds and adequate TV. Continuous ECG monitoring in transport. Continuous SpO2 monitoring in transport    Post pain: adequate analgesia    Post assessment: no apparent anesthetic complications and tolerated procedure well    Post vital signs: stable    Level of consciousness: sedated    Nausea/Vomiting: no nausea/vomiting    Complications: none    Transfer of care protocol was followed      Last vitals:   Visit Vitals  /76   Pulse 102   Temp 37.3 °C (99.2 °F) (Oral)   Resp 18   Ht 5' 2" (1.575 m)   Wt 55.8 kg (123 lb 0.3 oz)   SpO2 100%   BMI 22.50 kg/m²     "

## 2023-06-14 NOTE — SUBJECTIVE & OBJECTIVE
Interval History/Significant Events: NAEO. Pt agitated, fentanyl drip increased. This Am,pt sedated and intubated however able to participate in evaluation. She has no complaints this AM. Plan for the OR today    Follow-up For: Procedure(s) (LRB):  EXPLORATION, NECK (Right)  CREATION, FREE FLAP (N/A)    Post-Operative Day: Day of Surgery    Objective:     Vital Signs (Most Recent):  Temp: 98.3 °F (36.8 °C) (06/14/23 1300)  Pulse: 76 (06/14/23 1400)  Resp: 18 (06/14/23 1400)  BP: 109/61 (06/14/23 1400)  SpO2: 100 % (06/14/23 1400) Vital Signs (24h Range):  Temp:  [98.3 °F (36.8 °C)-100 °F (37.8 °C)] 98.3 °F (36.8 °C)  Pulse:  [] 76  Resp:  [15-29] 18  SpO2:  [100 %] 100 %  BP: ()/(52-76) 109/61     Weight: 55.8 kg (123 lb 0.3 oz)  Body mass index is 22.5 kg/m².      Intake/Output Summary (Last 24 hours) at 6/14/2023 1437  Last data filed at 6/14/2023 1211  Gross per 24 hour   Intake 2296.27 ml   Output 2410 ml   Net -113.73 ml          Physical Exam  Constitutional:       General: She is not in acute distress.     Appearance: Normal appearance.      Comments: Sedated and intubated   HENT:      Head: Normocephalic and atraumatic.      Mouth/Throat:      Mouth: Mucous membranes are dry.      Pharynx: Oropharynx is clear.   Eyes:      Pupils: Pupils are equal, round, and reactive to light.   Neck:      Comments: Wound with sterile dressing on right side of neck. Clear-yellow drainage appears on dressing.   Cardiovascular:      Rate and Rhythm: Normal rate and regular rhythm.      Pulses: Normal pulses.   Pulmonary:      Comments: intubated  Abdominal:      Palpations: Abdomen is soft.   Skin:     General: Skin is warm and dry.   Neurological:      General: No focal deficit present.      Mental Status: She is alert.          Vents:  Vent Mode: A/C (06/14/23 1300)  Set Rate: 18 BPM (06/14/23 1300)  Vt Set: 360 mL (06/13/23 0543)  PEEP/CPAP: 8 cmH20 (06/14/23 1300)  Oxygen Concentration (%): 50 (06/14/23  1400)  Peak Airway Pressure: 23 cmH20 (06/14/23 1300)  Plateau Pressure: 0 cmH20 (06/14/23 1300)  Total Ve: 8.46 L/m (06/14/23 1300)  Negative Inspiratory Force (cm H2O): 0 (06/14/23 1300)  F/VT Ratio<105 (RSBI): (!) 38.14 (06/14/23 1300)    Lines/Drains/Airways       Peripherally Inserted Central Catheter Line  Duration             PICC Double Lumen 06/09/23 1152 right brachial 5 days              Drain  Duration                  NG/OG Tube 06/08/23 2343 [facility] mouth 5 days         Urethral Catheter 06/12/23 0845 2 days         Closed/Suction Drain 06/14/23 1044 Right Neck Bulb 15 Fr. <1 day         Closed/Suction Drain 06/14/23 1044 Right Thigh Bulb 19 Fr. <1 day              Airway  Duration                  Airway - Non-Surgical 06/08/23 2300 Endotracheal Tube 5 days                    Significant Labs:    CBC/Anemia Profile:  Recent Labs   Lab 06/13/23  0325 06/13/23  0346 06/14/23  0310   WBC 9.72  --  8.63   HGB 9.0*  --  9.4*   HCT 27.1* 25* 27.7*     --  445   MCV 90  --  90   RDW 12.4  --  12.8        Chemistries:  Recent Labs   Lab 06/13/23  0325 06/14/23  0310    138   K 3.3* 4.2    103   CO2 30* 27   BUN 9 11   CREATININE 0.4* 0.4*   CALCIUM 8.8 8.6*   ALBUMIN 1.8* 1.8*   PROT 4.9* 5.2*   BILITOT 0.3 0.3   ALKPHOS 133 167*   ALT 28 25   AST 29 28   MG 1.7 1.8   PHOS 2.9 3.5       All pertinent labs within the past 24 hours have been reviewed.    Significant Imaging:  I have reviewed all pertinent imaging results/findings within the past 24 hours.

## 2023-06-14 NOTE — PROGRESS NOTES
[facility]  Otorhinolaryngology-Head & Neck Surgery  Progress Note    Subjective:     Post-Op Info:  Procedure(s) (LRB):  RIGHT NECK WOUND EXPLORATION WITH WOUND DEBRIDEMENT (Right)   2 Days Post-Op  Hospital Day: 7     Interval History: NAEON    Medications:  Continuous Infusions:   dexmedeTOMIDine (Precedex) infusion (titrating) 0.5 mcg/kg/hr (06/14/23 0600)    fentanyl 200 mcg/hr (06/14/23 0600)    propofoL 50 mcg/kg/min (06/14/23 0600)     Scheduled Meds:   ceFAZolin (ANCEF) IVPB  2 g Intravenous Q8H    enoxparin  40 mg Subcutaneous Q24H (prophylaxis, 1700)    mupirocin   Nasal BID    polyethylene glycol  17 g Per NG tube Daily    QUEtiapine  100 mg Per OG tube BID    sodium chloride 0.9%  10 mL Intravenous Q6H     PRN Meds:acetaminophen, haloperidol lactate, magnesium oxide, magnesium oxide, potassium bicarbonate, potassium bicarbonate, potassium bicarbonate, potassium, sodium phosphates, potassium, sodium phosphates, potassium, sodium phosphates, sodium chloride 0.9%, sodium chloride 0.9%, Flushing PICC Protocol **AND** sodium chloride 0.9% **AND** sodium chloride 0.9%     Review of patient's allergies indicates:   Allergen Reactions    Clindamycin      Objective:     Vital Signs (24h Range):  Temp:  [99.4 °F (37.4 °C)-100.3 °F (37.9 °C)] 99.9 °F (37.7 °C)  Pulse:  [70-88] 84  Resp:  [18-29] 18  SpO2:  [100 %] 100 %  BP: ()/(52-69) 122/66       Lines/Drains/Airways       Peripherally Inserted Central Catheter Line  Duration             PICC Double Lumen 06/09/23 1152 right brachial 4 days              Drain  Duration                  NG/OG Tube 06/08/23 2343 [facility] mouth 5 days         Urethral Catheter 06/12/23 0845 1 day              Airway  Duration                  Airway - Non-Surgical 06/08/23 2300 Endotracheal Tube 5 days                     Physical Exam   Intubated and partially sedated  Right neck open wound with healthy appearing tissue no evidence  of necrosis, spanning from mastoid tip to below mandible, covered with saline-soaked Kerlex  Regression of anterior chest cellulitis         Significant Labs:  CBC:   Recent Labs   Lab 06/14/23  0310   WBC 8.63   RBC 3.08*   HGB 9.4*   HCT 27.7*      MCV 90   MCH 30.5   MCHC 33.9     CMP:   Recent Labs   Lab 06/14/23  0310   GLU 99   CALCIUM 8.6*   ALBUMIN 1.8*   PROT 5.2*      K 4.2   CO2 27      BUN 11   CREATININE 0.4*   ALKPHOS 167*   ALT 25   AST 28   BILITOT 0.3       Significant Diagnostics:  None    Assessment/Plan:     * Necrotizing fasciitis  42F presenting with right neck necrotizing fasciitis with surround cellulitis s/p debridement of multiple levels of right neck on 6/8. Necrotic skin, soft tissue, and muscle removed. Open neck wound packed with saline soaked kerlix. Patient remains intubated. S/P debridement in OR on 6/12.     -- To OR today for right neck wound exploration, possible rotational flap, possible wound vac, possible tracheostomy today 6/14  -- Continue BID dressing changes with saline-soaked Kirlex, ENT-driven  -- Appreciate ID recs regarding antibiotics  -- Please Secure Chat me for any questions, page ENT on-call if unresponsive or urgent        Miranda Casas MD  Otorhinolaryngology-Head & Neck Surgery  [facility]

## 2023-06-14 NOTE — ASSESSMENT & PLAN NOTE
42F PMH depression, seizures (no meds), tobacco use, presenting with concern for toshia's angina / nec fasc of neck s/p exploration and debridement with ENT 6/8/23. Plan for OR today for wound closure and flap from right thigh.          Neuro/Psych:   -- Sedation: propofol gtt and precedex ggt. Goal to wean precedex today  -- Pain: fentanyl gtt, propofol gtt, precedex 0.5mcg/kg/hr  -- Quetiapine, Seroquel, haldol prn              Cards:   -- HDS  -- currently not requiring pressors  -- MAP > 65      Pulm:   -- Goal O2 sat > 90%  -- Intubated on rate control 50/5  -- To OR today, has remained on vent until takeback to OR. Per ENT, ok to wean to extubate tomorrow 6.15.   -- Daily SBT, SAT      Renal:  -- Keep murdock for strict I/O  -- BUN/Cr 11/0.4      FEN / GI:   -- Replace lytes as needed  -- Nutrition: NPO. TF at goal, will restart after procedure  -- OGT in place      ID:   -- Afebrile, WBC 9.72  -- Abx: linezolid, cefepime, flagyl, doxy switch to cefazolin  -- ID following  -- Following OR cultures 6/8 Staph aureus, MSSA  -- Blood Cx OSH 6/7 NGTD  -- ID signed off but stated to re-consult on 6/25      Heme/Onc:   -- H/H stable 9.4/27.7  -- Daily CBC      Endo:   -- Gluc goal 140-180      PPx:   Feeding: NPO, TF to goal   Analgesia/Sedation: adequate  Thromboembolic prevention: lovenox  HOB >30: Yes  Stress Ulcer ppx: n/a  Glucose control: Critical care goal 140-180 g/dl     Lines/Drains/Airway: pIVs, OGT, Murdock, ETT, PICC      Dispo/Code Status/Palliative:   -- SICU / Full Code

## 2023-06-14 NOTE — SUBJECTIVE & OBJECTIVE
Interval History: NAEON    Medications:  Continuous Infusions:   dexmedeTOMIDine (Precedex) infusion (titrating) 0.5 mcg/kg/hr (06/14/23 0600)    fentanyl 200 mcg/hr (06/14/23 0600)    propofoL 50 mcg/kg/min (06/14/23 0600)     Scheduled Meds:   ceFAZolin (ANCEF) IVPB  2 g Intravenous Q8H    enoxparin  40 mg Subcutaneous Q24H (prophylaxis, 1700)    mupirocin   Nasal BID    polyethylene glycol  17 g Per NG tube Daily    QUEtiapine  100 mg Per OG tube BID    sodium chloride 0.9%  10 mL Intravenous Q6H     PRN Meds:acetaminophen, haloperidol lactate, magnesium oxide, magnesium oxide, potassium bicarbonate, potassium bicarbonate, potassium bicarbonate, potassium, sodium phosphates, potassium, sodium phosphates, potassium, sodium phosphates, sodium chloride 0.9%, sodium chloride 0.9%, Flushing PICC Protocol **AND** sodium chloride 0.9% **AND** sodium chloride 0.9%     Review of patient's allergies indicates:   Allergen Reactions    Clindamycin      Objective:     Vital Signs (24h Range):  Temp:  [99.4 °F (37.4 °C)-100.3 °F (37.9 °C)] 99.9 °F (37.7 °C)  Pulse:  [70-88] 84  Resp:  [18-29] 18  SpO2:  [100 %] 100 %  BP: ()/(52-69) 122/66       Lines/Drains/Airways       Peripherally Inserted Central Catheter Line  Duration             PICC Double Lumen 06/09/23 1152 right brachial 4 days              Drain  Duration                  NG/OG Tube 06/08/23 2343 [facility] mouth 5 days         Urethral Catheter 06/12/23 0845 1 day              Airway  Duration                  Airway - Non-Surgical 06/08/23 2300 Endotracheal Tube 5 days                     Physical Exam   Intubated and partially sedated  Right neck open wound with healthy appearing tissue no evidence of necrosis, spanning from mastoid tip to below mandible, covered with saline-soaked Kerlex  Regression of anterior chest cellulitis         Significant Labs:  CBC:   Recent Labs   Lab 06/14/23  0310   WBC 8.63   RBC 3.08*   HGB 9.4*   HCT 27.7*   PLT  445   MCV 90   MCH 30.5   MCHC 33.9     CMP:   Recent Labs   Lab 06/14/23  0310   GLU 99   CALCIUM 8.6*   ALBUMIN 1.8*   PROT 5.2*      K 4.2   CO2 27      BUN 11   CREATININE 0.4*   ALKPHOS 167*   ALT 25   AST 28   BILITOT 0.3       Significant Diagnostics:  None

## 2023-06-14 NOTE — PLAN OF CARE
[facility]  Discharge Reassessment    Primary Care Provider: Primary Doctor No    Expected Discharge Date: 6/15/2023    Reassessment (most recent)       Discharge Reassessment - 06/14/23 1411          Discharge Reassessment    Assessment Type Discharge Planning Reassessment (P)      Communicated DAMIEN with patient/caregiver Date not available/Unable to determine (P)      Discharge Plan A Other (P)    TBD    DME Needed Upon Discharge  other (see comments) (P)    TBD    Transition of Care Barriers Underinsured (P)      Why the patient remains in the hospital Requires continued medical care (P)                    Per MD note:  To OR today for right neck wound exploration, possible rotational flap, possible wound vac, possible tracheostomy today 6/14  -- Continue BID dressing changes with saline-soaked Kirlex, ENT-driven  -- Appreciate ID recs regarding antibiotics  -- Please Secure Chat me for any questions, page ENT on-call if unresponsive or urgent    Will continue to follow.    Maggy Chandler, RN     Ext [phone]

## 2023-06-14 NOTE — OP NOTE
Certification of Assistant at Surgery       Surgery Date: 6/14/2023     Participating Surgeons:  Surgeon(s) and Role:     * Gerardo Seymour MD - Primary     * Miranda Casas MD - Resident - Assisting     * Walter De La Cruz MD - Resident - Assisting    Procedures:  Procedure(s) (LRB):  EXPLORATION, NECK (Right)  CREATION, FREE FLAP (N/A)    Assistant Surgeon's Certification of Necessity:  I understand that section 1842 (b) (6) (d) of the Social Security Act generally prohibits Medicare Part B reasonable charge payment for the services of assistants at surgery in teaching hospitals when qualified residents are available to furnish such services. I certify that the services for which payment is claimed were medically necessary, and that no qualified resident was available to perform the services. I further understand that these services are subject to post-payment review by the Medicare carrier.      Gerardo Seymour MD    06/14/2023  12:20 PM

## 2023-06-15 LAB
ALBUMIN SERPL BCP-MCNC: 1.8 G/DL (ref 3.5–5.2)
ALP SERPL-CCNC: 130 U/L (ref 55–135)
ALT SERPL W/O P-5'-P-CCNC: 24 U/L (ref 10–44)
ANION GAP SERPL CALC-SCNC: 10 MMOL/L (ref 8–16)
AST SERPL-CCNC: 35 U/L (ref 10–40)
BASOPHILS # BLD AUTO: 0.05 K/UL (ref 0–0.2)
BASOPHILS NFR BLD: 0.6 % (ref 0–1.9)
BILIRUB SERPL-MCNC: 0.3 MG/DL (ref 0.1–1)
BUN SERPL-MCNC: 13 MG/DL (ref 6–20)
CALCIUM SERPL-MCNC: 8.7 MG/DL (ref 8.7–10.5)
CHLORIDE SERPL-SCNC: 100 MMOL/L (ref 95–110)
CO2 SERPL-SCNC: 25 MMOL/L (ref 23–29)
CREAT SERPL-MCNC: 0.4 MG/DL (ref 0.5–1.4)
DIFFERENTIAL METHOD: ABNORMAL
EOSINOPHIL # BLD AUTO: 0.1 K/UL (ref 0–0.5)
EOSINOPHIL NFR BLD: 1.2 % (ref 0–8)
ERYTHROCYTE [DISTWIDTH] IN BLOOD BY AUTOMATED COUNT: 12.6 % (ref 11.5–14.5)
EST. GFR  (NO RACE VARIABLE): >60 ML/MIN/1.73 M^2
GLUCOSE SERPL-MCNC: 119 MG/DL (ref 70–110)
HCT VFR BLD AUTO: 25.7 % (ref 37–48.5)
HGB BLD-MCNC: 8.8 G/DL (ref 12–16)
IMM GRANULOCYTES # BLD AUTO: 0.13 K/UL (ref 0–0.04)
IMM GRANULOCYTES NFR BLD AUTO: 1.5 % (ref 0–0.5)
LYMPHOCYTES # BLD AUTO: 1.5 K/UL (ref 1–4.8)
LYMPHOCYTES NFR BLD: 16.9 % (ref 18–48)
MAGNESIUM SERPL-MCNC: 1.8 MG/DL (ref 1.6–2.6)
MCH RBC QN AUTO: 30.7 PG (ref 27–31)
MCHC RBC AUTO-ENTMCNC: 34.2 G/DL (ref 32–36)
MCV RBC AUTO: 90 FL (ref 82–98)
MONOCYTES # BLD AUTO: 0.8 K/UL (ref 0.3–1)
MONOCYTES NFR BLD: 9.5 % (ref 4–15)
NEUTROPHILS # BLD AUTO: 6.3 K/UL (ref 1.8–7.7)
NEUTROPHILS NFR BLD: 70.3 % (ref 38–73)
NRBC BLD-RTO: 0 /100 WBC
PHOSPHATE SERPL-MCNC: 2.8 MG/DL (ref 2.7–4.5)
PLATELET # BLD AUTO: 533 K/UL (ref 150–450)
PMV BLD AUTO: 10.3 FL (ref 9.2–12.9)
POTASSIUM SERPL-SCNC: 3.6 MMOL/L (ref 3.5–5.1)
PROT SERPL-MCNC: 5.2 G/DL (ref 6–8.4)
RBC # BLD AUTO: 2.87 M/UL (ref 4–5.4)
SODIUM SERPL-SCNC: 135 MMOL/L (ref 136–145)
WBC # BLD AUTO: 8.88 K/UL (ref 3.9–12.7)

## 2023-06-15 PROCEDURE — 63600175 PHARM REV CODE 636 W HCPCS

## 2023-06-15 PROCEDURE — 99900035 HC TECH TIME PER 15 MIN (STAT)

## 2023-06-15 PROCEDURE — 63600175 PHARM REV CODE 636 W HCPCS: Performed by: STUDENT IN AN ORGANIZED HEALTH CARE EDUCATION/TRAINING PROGRAM

## 2023-06-15 PROCEDURE — 25000003 PHARM REV CODE 250: Performed by: STUDENT IN AN ORGANIZED HEALTH CARE EDUCATION/TRAINING PROGRAM

## 2023-06-15 PROCEDURE — 25000003 PHARM REV CODE 250: Performed by: OTOLARYNGOLOGY

## 2023-06-15 PROCEDURE — 99900026 HC AIRWAY MAINTENANCE (STAT)

## 2023-06-15 PROCEDURE — 87040 BLOOD CULTURE FOR BACTERIA: CPT | Mod: 59 | Performed by: STUDENT IN AN ORGANIZED HEALTH CARE EDUCATION/TRAINING PROGRAM

## 2023-06-15 PROCEDURE — 93005 ELECTROCARDIOGRAM TRACING: CPT

## 2023-06-15 PROCEDURE — 99291 CRITICAL CARE FIRST HOUR: CPT | Mod: ,,, | Performed by: STUDENT IN AN ORGANIZED HEALTH CARE EDUCATION/TRAINING PROGRAM

## 2023-06-15 PROCEDURE — 94761 N-INVAS EAR/PLS OXIMETRY MLT: CPT

## 2023-06-15 PROCEDURE — 94010 BREATHING CAPACITY TEST: CPT

## 2023-06-15 PROCEDURE — 99900017 HC EXTUBATION W/PARAMETERS (STAT)

## 2023-06-15 PROCEDURE — 87154 CUL TYP ID BLD PTHGN 6+ TRGT: CPT | Performed by: STUDENT IN AN ORGANIZED HEALTH CARE EDUCATION/TRAINING PROGRAM

## 2023-06-15 PROCEDURE — 25000003 PHARM REV CODE 250

## 2023-06-15 PROCEDURE — 27000221 HC OXYGEN, UP TO 24 HOURS

## 2023-06-15 PROCEDURE — 83735 ASSAY OF MAGNESIUM: CPT

## 2023-06-15 PROCEDURE — 99291 PR CRITICAL CARE, E/M 30-74 MINUTES: ICD-10-PCS | Mod: ,,, | Performed by: STUDENT IN AN ORGANIZED HEALTH CARE EDUCATION/TRAINING PROGRAM

## 2023-06-15 PROCEDURE — 80053 COMPREHEN METABOLIC PANEL: CPT

## 2023-06-15 PROCEDURE — 93010 EKG 12-LEAD: ICD-10-PCS | Mod: ,,, | Performed by: INTERNAL MEDICINE

## 2023-06-15 PROCEDURE — 93010 ELECTROCARDIOGRAM REPORT: CPT | Mod: ,,, | Performed by: INTERNAL MEDICINE

## 2023-06-15 PROCEDURE — A4216 STERILE WATER/SALINE, 10 ML: HCPCS | Performed by: OTOLARYNGOLOGY

## 2023-06-15 PROCEDURE — 20000000 HC ICU ROOM

## 2023-06-15 PROCEDURE — 84100 ASSAY OF PHOSPHORUS: CPT

## 2023-06-15 PROCEDURE — 94003 VENT MGMT INPAT SUBQ DAY: CPT

## 2023-06-15 PROCEDURE — 85025 COMPLETE CBC W/AUTO DIFF WBC: CPT

## 2023-06-15 RX ORDER — LOPERAMIDE HYDROCHLORIDE 2 MG/1
2 CAPSULE ORAL ONCE
Status: COMPLETED | OUTPATIENT
Start: 2023-06-15 | End: 2023-06-15

## 2023-06-15 RX ORDER — ONDANSETRON 2 MG/ML
4 INJECTION INTRAMUSCULAR; INTRAVENOUS EVERY 4 HOURS PRN
Status: DISCONTINUED | OUTPATIENT
Start: 2023-06-15 | End: 2023-06-21 | Stop reason: HOSPADM

## 2023-06-15 RX ORDER — OXYCODONE HYDROCHLORIDE 5 MG/1
5 TABLET ORAL EVERY 6 HOURS PRN
Status: DISCONTINUED | OUTPATIENT
Start: 2023-06-15 | End: 2023-06-21 | Stop reason: HOSPADM

## 2023-06-15 RX ORDER — ACETAMINOPHEN 650 MG/20.3ML
650 LIQUID ORAL EVERY 6 HOURS PRN
Status: DISCONTINUED | OUTPATIENT
Start: 2023-06-15 | End: 2023-06-16

## 2023-06-15 RX ORDER — FENTANYL CITRATE 50 UG/ML
25 INJECTION, SOLUTION INTRAMUSCULAR; INTRAVENOUS
Status: DISCONTINUED | OUTPATIENT
Start: 2023-06-15 | End: 2023-06-15

## 2023-06-15 RX ORDER — QUETIAPINE FUMARATE 25 MG/1
50 TABLET, FILM COATED ORAL ONCE
Status: COMPLETED | OUTPATIENT
Start: 2023-06-15 | End: 2023-06-15

## 2023-06-15 RX ORDER — LORAZEPAM 2 MG/ML
2 INJECTION INTRAMUSCULAR ONCE
Status: COMPLETED | OUTPATIENT
Start: 2023-06-15 | End: 2023-06-15

## 2023-06-15 RX ORDER — OXYCODONE HYDROCHLORIDE 10 MG/1
10 TABLET ORAL EVERY 6 HOURS PRN
Status: DISCONTINUED | OUTPATIENT
Start: 2023-06-15 | End: 2023-06-21 | Stop reason: HOSPADM

## 2023-06-15 RX ORDER — SODIUM CHLORIDE 9 MG/ML
INJECTION, SOLUTION INTRAVENOUS
Status: DISCONTINUED | OUTPATIENT
Start: 2023-06-15 | End: 2023-06-21 | Stop reason: HOSPADM

## 2023-06-15 RX ADMIN — LORAZEPAM 2 MG: 2 INJECTION INTRAMUSCULAR; INTRAVENOUS at 10:06

## 2023-06-15 RX ADMIN — MUPIROCIN: 20 OINTMENT TOPICAL at 09:06

## 2023-06-15 RX ADMIN — CEFAZOLIN 2 G: 2 INJECTION, POWDER, FOR SOLUTION INTRAMUSCULAR; INTRAVENOUS at 12:06

## 2023-06-15 RX ADMIN — ACETAMINOPHEN 650 MG: 650 SOLUTION ORAL at 11:06

## 2023-06-15 RX ADMIN — FENTANYL CITRATE 25 MCG: 0.05 INJECTION, SOLUTION INTRAMUSCULAR; INTRAVENOUS at 04:06

## 2023-06-15 RX ADMIN — OXYCODONE HYDROCHLORIDE 5 MG: 5 TABLET ORAL at 08:06

## 2023-06-15 RX ADMIN — Medication 800 MG: at 04:06

## 2023-06-15 RX ADMIN — QUETIAPINE FUMARATE 100 MG: 25 TABLET ORAL at 09:06

## 2023-06-15 RX ADMIN — DEXMEDETOMIDINE HYDROCHLORIDE 1 MCG/KG/HR: 4 INJECTION, SOLUTION INTRAVENOUS at 12:06

## 2023-06-15 RX ADMIN — DEXMEDETOMIDINE HYDROCHLORIDE 1 MCG/KG/HR: 4 INJECTION, SOLUTION INTRAVENOUS at 05:06

## 2023-06-15 RX ADMIN — POTASSIUM BICARBONATE 50 MEQ: 978 TABLET, EFFERVESCENT ORAL at 04:06

## 2023-06-15 RX ADMIN — Medication 10 ML: at 06:06

## 2023-06-15 RX ADMIN — LOPERAMIDE HYDROCHLORIDE 2 MG: 2 CAPSULE ORAL at 08:06

## 2023-06-15 RX ADMIN — DEXMEDETOMIDINE HYDROCHLORIDE 1.4 MCG/KG/HR: 4 INJECTION, SOLUTION INTRAVENOUS at 03:06

## 2023-06-15 RX ADMIN — DEXMEDETOMIDINE HYDROCHLORIDE 1.4 MCG/KG/HR: 4 INJECTION, SOLUTION INTRAVENOUS at 11:06

## 2023-06-15 RX ADMIN — QUETIAPINE FUMARATE 150 MG: 100 TABLET ORAL at 08:06

## 2023-06-15 RX ADMIN — Medication 100 MCG/HR: at 06:06

## 2023-06-15 RX ADMIN — ONDANSETRON 4 MG: 2 INJECTION INTRAMUSCULAR; INTRAVENOUS at 02:06

## 2023-06-15 RX ADMIN — Medication 10 ML: at 12:06

## 2023-06-15 RX ADMIN — Medication 10 ML: at 03:06

## 2023-06-15 RX ADMIN — MUPIROCIN: 20 OINTMENT TOPICAL at 08:06

## 2023-06-15 RX ADMIN — DEXMEDETOMIDINE HYDROCHLORIDE 1.4 MCG/KG/HR: 4 INJECTION, SOLUTION INTRAVENOUS at 08:06

## 2023-06-15 RX ADMIN — CEFAZOLIN 2 G: 2 INJECTION, POWDER, FOR SOLUTION INTRAMUSCULAR; INTRAVENOUS at 09:06

## 2023-06-15 RX ADMIN — ENOXAPARIN SODIUM 40 MG: 40 INJECTION SUBCUTANEOUS at 04:06

## 2023-06-15 RX ADMIN — PROPOFOL 35 MCG/KG/MIN: 10 INJECTION, EMULSION INTRAVENOUS at 05:06

## 2023-06-15 RX ADMIN — CEFAZOLIN 2 G: 2 INJECTION, POWDER, FOR SOLUTION INTRAMUSCULAR; INTRAVENOUS at 03:06

## 2023-06-15 RX ADMIN — QUETIAPINE FUMARATE 50 MG: 25 TABLET ORAL at 01:06

## 2023-06-15 RX ADMIN — HALOPERIDOL LACTATE 5 MG: 5 INJECTION, SOLUTION INTRAMUSCULAR at 11:06

## 2023-06-15 NOTE — NURSING
Pt remains on ventilator. Sedated on propofol, fentanyl, and precedex. VSS. Right neck flap procedure done today. Flap with good color, cap refill, and pulses on qhour checks. AMADO drain to the right neck with 20cc serosanguinous output. AMADO drain to right leg with 15cc output. Clark with 1400 UO. 1 loose brown bowel movement.

## 2023-06-15 NOTE — PROGRESS NOTES
[facility]  Critical Care - Surgery  Progress Note    Patient Name: Aditi Conway  MRN: [id number]  Admission Date: 6/8/2023  Hospital Length of Stay: 7 days  Code Status: Full Code  Attending Provider: Gerardo Seymour MD  Primary Care Provider: Primary Doctor No   Principal Problem: Necrotizing fasciitis    Subjective:     Hospital/ICU Course:  No notes on file    Interval History/Significant Events:   Yesterday patient went for debridement of right neck with dissection and free flap/reconstruction.  Pt sedated and intubated, off propofol. She has no complaints this AM.     Follow-up For: Procedure(s) (LRB):  EXPLORATION, NECK (Right)  CREATION, FREE FLAP (N/A)    Post-Operative Day: Day of Surgery    Objective:     Vital Signs (Most Recent):  Temp: 99.2 °F (37.3 °C) (06/15/23 1115)  Pulse: 79 (06/15/23 1245)  Resp: (!) 26 (06/15/23 1245)  BP: (!) 103/56 (06/15/23 1230)  SpO2: 100 % (06/15/23 1245) Vital Signs (24h Range):  Temp:  [98.3 °F (36.8 °C)-99.2 °F (37.3 °C)] 99.2 °F (37.3 °C)  Pulse:  [62-85] 79  Resp:  [0-28] 26  SpO2:  [76 %-100 %] 100 %  BP: ()/(48-74) 103/56     Weight: 55.8 kg (123 lb 0.3 oz)  Body mass index is 22.5 kg/m².      Intake/Output Summary (Last 24 hours) at 6/15/2023 1255  Last data filed at 6/15/2023 1200  Gross per 24 hour   Intake 1560.63 ml   Output 2585 ml   Net -1024.37 ml            Physical Exam  Constitutional:       General: She is not in acute distress.     Appearance: Normal appearance.      Comments: Sedated and intubated   HENT:      Head: Normocephalic and atraumatic.      Mouth/Throat:      Mouth: Mucous membranes are dry.      Pharynx: Oropharynx is clear.   Eyes:      Pupils: Pupils are equal, round, and reactive to light.   Neck:      Comments: Right neck with ALT flap stapled in place  Soft, appropriate color   Strong arterial and venous external doppler signal   AMADO drain with s/s output     Cardiovascular:      Rate and  Rhythm: Normal rate and regular rhythm.      Pulses: Normal pulses.   Pulmonary:      Comments: Vent Mode: Spont  Oxygen Concentration (%):  [40-50] 40  Resp Rate Total:  [9.7 br/min-32 br/min] 20 br/min  Vt Set:  [360 mL] 360 mL  PEEP/CPAP:  [5 cmH20-8 cmH20] 5 cmH20  Pressure Support:  [10 cmH20] 10 cmH20  Mean Airway Pressure:  [7.9 wwW76-94 cmH20] 7.9 cmH20  Abdominal:      Palpations: Abdomen is soft.   Musculoskeletal:      Comments: RLE with soft to palpation, island dressing in place  AMADO drain with s/s output    Skin:     General: Skin is warm and dry.   Neurological:      General: No focal deficit present.      Mental Status: She is alert.          Vents:  Vent Mode: Spont (06/15/23 1155)  Set Rate: 18 BPM (06/15/23 1115)  Vt Set: 360 mL (06/15/23 0559)  Pressure Support: 10 cmH20 (06/15/23 1155)  PEEP/CPAP: 5 cmH20 (06/15/23 1155)  Oxygen Concentration (%): 40 (06/15/23 1245)  Peak Airway Pressure: 16 cmH20 (06/15/23 1155)  Plateau Pressure: 0 cmH20 (06/15/23 1155)  Total Ve: 5.12 L/m (06/15/23 1155)  Negative Inspiratory Force (cm H2O): 0 (06/15/23 1155)  F/VT Ratio<105 (RSBI): (!) 29.41 (06/15/23 1155)    Lines/Drains/Airways       Peripherally Inserted Central Catheter Line  Duration             PICC Double Lumen 06/09/23 1152 right brachial 6 days              Drain  Duration                  NG/OG Tube 06/08/23 2343 [facility] mouth 6 days         Urethral Catheter 06/12/23 0845 3 days         Closed/Suction Drain 06/14/23 1044 Right Neck Bulb 15 Fr. 1 day         Closed/Suction Drain 06/14/23 1044 Right Thigh Bulb 19 Fr. 1 day              Airway  Duration                  Airway - Non-Surgical 06/08/23 2300 Endotracheal Tube 6 days                    Significant Labs:    CBC/Anemia Profile:  Recent Labs   Lab 06/14/23  0310 06/15/23  0345   WBC 8.63 8.88   HGB 9.4* 8.8*   HCT 27.7* 25.7*    533*   MCV 90 90   RDW 12.8 12.6          Chemistries:  Recent Labs   Lab 06/14/23  2693  06/15/23  0345    135*   K 4.2 3.6    100   CO2 27 25   BUN 11 13   CREATININE 0.4* 0.4*   CALCIUM 8.6* 8.7   ALBUMIN 1.8* 1.8*   PROT 5.2* 5.2*   BILITOT 0.3 0.3   ALKPHOS 167* 130   ALT 25 24   AST 28 35   MG 1.8 1.8   PHOS 3.5 2.8         All pertinent labs within the past 24 hours have been reviewed.    Significant Imaging:  I have reviewed all pertinent imaging results/findings within the past 24 hours.    Assessment/Plan:     ID  * Necrotizing fasciitis  42F PMH depression, seizures (no meds), tobacco use, presenting with concern for toshia's angina / nec fasc of neck s/p exploration and debridement with ENT 6/8/23. S/p 6/14 right neck debridement with right anterolateral free flap coverage.           Neuro/Psych:   -- Sedation: Fentanyl ggt, precedex ggt. Goal is to wean sedation and have patient comfortable and breathing on their own.   -- Pain: fentanyl gtt   -- Quetiapine, Seroquel 150mg BID, haldol prn              Cards:   -- HDS  -- currently not requiring pressors  -- MAP > 65  -- q1 flap checks      Pulm:   -- Goal O2 sat > 90%  -- Intubated on rate control 40/5  -- Wean to extubate.   -- Daily SBT, SAT      Renal:  --  Clark to stay in, patient is critically ill and required strict I/O  -- BUN/Cr 13/0.4      FEN / GI:   -- Replace lytes as needed  -- Nutrition: NPO. TF at goal  -- OGT in place      ID:   -- Afebrile, WBC 8.9  -- Abx: cefazolin  -- ID following  -- Following OR cultures 6/8 Staph aureus, MSSA  -- Blood Cx OSH 6/7 NGTD  -- ID signed off but stated to re-consult on 6/25      Heme/Onc:   -- H/H stable 8.8/25.7  -- Daily CBC      Endo:   -- Gluc goal 140-180      PPx:   Feeding: NPO, TF to goal   Analgesia/Sedation: adequate  Thromboembolic prevention: lovenox  HOB >30: Yes  Stress Ulcer ppx: n/a  Glucose control: Critical care goal 140-180 g/dl     Lines/Drains/Airway: pIVs, OGT, Clark, ETT, PICC      Dispo/Code Status/Palliative:   -- SICU / Full Code      Critical  care was time spent personally by me on the following activities: development of treatment plan with patient or surrogate and bedside caregivers, discussions with consultants, evaluation of patient's response to treatment, examination of patient, ordering and performing treatments and interventions, ordering and review of laboratory studies, ordering and review of radiographic studies, pulse oximetry, re-evaluation of patient's condition.  This critical care time did not overlap with that of any other provider or involve time for any procedures.     Martinez John MD  Critical Care - Surgery  [facility]

## 2023-06-15 NOTE — CARE UPDATE
Patient extubated at bedside after cuff leak was established. Respiratory charge and Dr. Martinez present. Tolerated well, placed on 2 L NC.

## 2023-06-15 NOTE — ASSESSMENT & PLAN NOTE
42F PMH depression, seizures (no meds), tobacco use, presenting with concern for toshia's angina / nec fasc of neck s/p exploration and debridement with ENT 6/8/23. S/p 6/14 right neck debridement with right anterolateral free flap coverage.           Neuro/Psych:   -- Sedation: Fentanyl ggt, precedex ggt. Goal is to wean sedation and have patient comfortable and breathing on their own.   -- Pain: fentanyl gtt   -- Quetiapine, Seroquel 150mg BID, haldol prn              Cards:   -- HDS  -- currently not requiring pressors  -- MAP > 65  -- q1 flap checks      Pulm:   -- Goal O2 sat > 90%  -- Intubated on rate control 40/5  -- Wean to extubate.   -- Daily SBT, SAT      Renal:  --  Clark to stay in, patient is critically ill and required strict I/O  -- BUN/Cr 13/0.4      FEN / GI:   -- Replace lytes as needed  -- Nutrition: NPO. TF at goal  -- OGT in place      ID:   -- Afebrile, WBC 8.9  -- Abx: cefazolin  -- ID following  -- Following OR cultures 6/8 Staph aureus, MSSA  -- Blood Cx OSH 6/7 NGTD  -- ID signed off but stated to re-consult on 6/25      Heme/Onc:   -- H/H stable 8.8/25.7  -- Daily CBC      Endo:   -- Gluc goal 140-180      PPx:   Feeding: NPO, TF to goal   Analgesia/Sedation: adequate  Thromboembolic prevention: lovenox  HOB >30: Yes  Stress Ulcer ppx: n/a  Glucose control: Critical care goal 140-180 g/dl     Lines/Drains/Airway: pIVs, OGT, Clark, ETT, PICC      Dispo/Code Status/Palliative:   -- SICU / Full Code

## 2023-06-15 NOTE — RESPIRATORY THERAPY
Patient extubated with parameters all In normal limits also leak detection noted. Patient placed on 2 LPM nasal cannula, changes tolerated well. Will continue to monitor status.

## 2023-06-15 NOTE — SUBJECTIVE & OBJECTIVE
Interval History: POD1, NAEON. Remains on vent    Medications:  Continuous Infusions:   dexmedeTOMIDine (Precedex) infusion (titrating) 1 mcg/kg/hr (06/15/23 0600)    fentanyl 100 mcg/hr (06/15/23 0619)    propofoL 35 mcg/kg/min (06/15/23 0600)     Scheduled Meds:   ceFAZolin (ANCEF) IVPB  2 g Intravenous Q8H    enoxparin  40 mg Subcutaneous Q24H (prophylaxis, 1700)    mupirocin   Nasal BID    polyethylene glycol  17 g Per NG tube Daily    QUEtiapine  100 mg Per OG tube BID    sodium chloride 0.9%  10 mL Intravenous Q6H     PRN Meds:acetaminophen, haloperidol lactate, magnesium oxide, magnesium oxide, potassium bicarbonate, potassium bicarbonate, potassium bicarbonate, potassium, sodium phosphates, potassium, sodium phosphates, potassium, sodium phosphates, sodium chloride 0.9%, sodium chloride 0.9%, Flushing PICC Protocol **AND** sodium chloride 0.9% **AND** sodium chloride 0.9%     Review of patient's allergies indicates:   Allergen Reactions    Clindamycin      Objective:     Vital Signs (24h Range):  Temp:  [98.3 °F (36.8 °C)-99.3 °F (37.4 °C)] 98.7 °F (37.1 °C)  Pulse:  [] 70  Resp:  [0-24] 18  SpO2:  [95 %-100 %] 100 %  BP: ()/(48-76) 99/55       Lines/Drains/Airways       Peripherally Inserted Central Catheter Line  Duration             PICC Double Lumen 06/09/23 1152 right brachial 5 days              Drain  Duration                  NG/OG Tube 06/08/23 2343 [facility] mouth 6 days         Urethral Catheter 06/12/23 0845 2 days         Closed/Suction Drain 06/14/23 1044 Right Neck Bulb 15 Fr. <1 day         Closed/Suction Drain 06/14/23 1044 Right Thigh Bulb 19 Fr. <1 day              Airway  Duration                  Airway - Non-Surgical 06/08/23 2300 Endotracheal Tube 6 days                     Physical Exam   Intubated and partially sedated  Right neck with ALT flap stapled in place  Soft, appropriate color   Strong arterial and venous external doppler signal   AMADO drain with s/s output    RLE with soft to palpation, island dressing in place  AMADO drain with s/s output     Significant Labs:  CBC:   Recent Labs   Lab 06/15/23  0345   WBC 8.88   RBC 2.87*   HGB 8.8*   HCT 25.7*   *   MCV 90   MCH 30.7   MCHC 34.2     CMP:   Recent Labs   Lab 06/15/23  0345   *   CALCIUM 8.7   ALBUMIN 1.8*   PROT 5.2*   *   K 3.6   CO2 25      BUN 13   CREATININE 0.4*   ALKPHOS 130   ALT 24   AST 35   BILITOT 0.3       Significant Diagnostics:  None

## 2023-06-15 NOTE — ASSESSMENT & PLAN NOTE
42F presenting with right neck necrotizing fasciitis with surround cellulitis s/p debridement of multiple levels of right neck on 6/8. Necrotic skin, soft tissue, and muscle removed. Open neck wound packed with saline soaked kerlix. Patient remains intubated. S/P debridement in OR on 6/12. Now s/p reconstruction with R ALT free flap on 6/14.      ENT/HNS:  - Q1H flap checks   - Routine drain care   - Keep head neutral or turned to the left, minimize right sided head movement   -Fresh wound care, clean incision with peroxide/qtip followed by bacitracin BID     Neuro:   -Pain: Multimodality PRN regimen initiated     Cardiac:  -Remain normotensive  -Page ENT before starting vasopressors     Pulmonary:   - OK to wean to extubation today per ENT      Renal:   - monitor Is and Os  - Cr stable  - Clark remains in place until extubation     Infectious Disease:  - Daily CBC  - ID consulted; appreciate recs   - Crista      Hematology/Oncology:  - H/H stable  - Lovenox DVT prophylaxis      FEN/GI  - Replace electrolytes as needed to keep K>4, Mg>2  - Bowel reg  - Protonix for GI prophylaxis  - OK for diet if extubated and awake      MSK  - Drain care  - Island dressing to be removed POD3     Dispo:  -Continue ICU care

## 2023-06-15 NOTE — SUBJECTIVE & OBJECTIVE
Interval History/Significant Events:   Yesterday patient went for debridement of right neck with dissection and free flap/reconstruction.  Pt sedated and intubated, off propofol. She has no complaints this AM.     Follow-up For: Procedure(s) (LRB):  EXPLORATION, NECK (Right)  CREATION, FREE FLAP (N/A)    Post-Operative Day: Day of Surgery    Objective:     Vital Signs (Most Recent):  Temp: 99.2 °F (37.3 °C) (06/15/23 1115)  Pulse: 79 (06/15/23 1245)  Resp: (!) 26 (06/15/23 1245)  BP: (!) 103/56 (06/15/23 1230)  SpO2: 100 % (06/15/23 1245) Vital Signs (24h Range):  Temp:  [98.3 °F (36.8 °C)-99.2 °F (37.3 °C)] 99.2 °F (37.3 °C)  Pulse:  [62-85] 79  Resp:  [0-28] 26  SpO2:  [76 %-100 %] 100 %  BP: ()/(48-74) 103/56     Weight: 55.8 kg (123 lb 0.3 oz)  Body mass index is 22.5 kg/m².      Intake/Output Summary (Last 24 hours) at 6/15/2023 1255  Last data filed at 6/15/2023 1200  Gross per 24 hour   Intake 1560.63 ml   Output 2585 ml   Net -1024.37 ml            Physical Exam  Constitutional:       General: She is not in acute distress.     Appearance: Normal appearance.      Comments: Sedated and intubated   HENT:      Head: Normocephalic and atraumatic.      Mouth/Throat:      Mouth: Mucous membranes are dry.      Pharynx: Oropharynx is clear.   Eyes:      Pupils: Pupils are equal, round, and reactive to light.   Neck:      Comments: Right neck with ALT flap stapled in place  Soft, appropriate color   Strong arterial and venous external doppler signal   AMADO drain with s/s output     Cardiovascular:      Rate and Rhythm: Normal rate and regular rhythm.      Pulses: Normal pulses.   Pulmonary:      Comments: Vent Mode: Spont  Oxygen Concentration (%):  [40-50] 40  Resp Rate Total:  [9.7 br/min-32 br/min] 20 br/min  Vt Set:  [360 mL] 360 mL  PEEP/CPAP:  [5 cmH20-8 cmH20] 5 cmH20  Pressure Support:  [10 cmH20] 10 cmH20  Mean Airway Pressure:  [7.9 llU44-23 cmH20] 7.9 cmH20  Abdominal:      Palpations: Abdomen is soft.    Musculoskeletal:      Comments: RLE with soft to palpation, island dressing in place  AMADO drain with s/s output    Skin:     General: Skin is warm and dry.   Neurological:      General: No focal deficit present.      Mental Status: She is alert.          Vents:  Vent Mode: Spont (06/15/23 1155)  Set Rate: 18 BPM (06/15/23 1115)  Vt Set: 360 mL (06/15/23 0559)  Pressure Support: 10 cmH20 (06/15/23 1155)  PEEP/CPAP: 5 cmH20 (06/15/23 1155)  Oxygen Concentration (%): 40 (06/15/23 1245)  Peak Airway Pressure: 16 cmH20 (06/15/23 1155)  Plateau Pressure: 0 cmH20 (06/15/23 1155)  Total Ve: 5.12 L/m (06/15/23 1155)  Negative Inspiratory Force (cm H2O): 0 (06/15/23 1155)  F/VT Ratio<105 (RSBI): (!) 29.41 (06/15/23 1155)    Lines/Drains/Airways       Peripherally Inserted Central Catheter Line  Duration             PICC Double Lumen 06/09/23 1152 right brachial 6 days              Drain  Duration                  NG/OG Tube 06/08/23 2343 [facility] mouth 6 days         Urethral Catheter 06/12/23 0845 3 days         Closed/Suction Drain 06/14/23 1044 Right Neck Bulb 15 Fr. 1 day         Closed/Suction Drain 06/14/23 1044 Right Thigh Bulb 19 Fr. 1 day              Airway  Duration                  Airway - Non-Surgical 06/08/23 2300 Endotracheal Tube 6 days                    Significant Labs:    CBC/Anemia Profile:  Recent Labs   Lab 06/14/23 0310 06/15/23  0345   WBC 8.63 8.88   HGB 9.4* 8.8*   HCT 27.7* 25.7*    533*   MCV 90 90   RDW 12.8 12.6          Chemistries:  Recent Labs   Lab 06/14/23  0310 06/15/23  0345    135*   K 4.2 3.6    100   CO2 27 25   BUN 11 13   CREATININE 0.4* 0.4*   CALCIUM 8.6* 8.7   ALBUMIN 1.8* 1.8*   PROT 5.2* 5.2*   BILITOT 0.3 0.3   ALKPHOS 167* 130   ALT 25 24   AST 28 35   MG 1.8 1.8   PHOS 3.5 2.8         All pertinent labs within the past 24 hours have been reviewed.    Significant Imaging:  I have reviewed all pertinent imaging results/findings within the past 24  hours.

## 2023-06-15 NOTE — PLAN OF CARE
"      SICU PLAN OF CARE NOTE    Dx: Necrotizing fasciitis    Shift Events: Agitation earlier in shift resolved with scheduled seroquel and PRN haldol. Bmx1. Plan for extubation today     Goals of Care: MAP >65    Neuro: Arouses to Voice, Follows Commands, and Moves All Extremities    Cardiac: NSR    Vital Signs: BP (!) 94/51 (BP Location: Left arm, Patient Position: Lying)   Pulse 73   Temp 98.7 °F (37.1 °C) (Oral)   Resp 18   Ht 5' 2" (1.575 m)   Wt 55.8 kg (123 lb 0.3 oz)   SpO2 100%   BMI 22.50 kg/m²     Respiratory: Ventilator    Diet: NPO and Tube Feeds    Gtts: Propfol, Fentanyl, and Precedex    Urine Output: Urinary Catheter 850 cc/shift    Drains:   R neck AMADO Drain, total output 20 cc / shift  R leg AMADO Drain, total output 25 cc/shift    Flap Checks Q1,  Flap with good color, cap refill, and pulses.     Labs/Accuchecks: Daily Labs, Accuchecks Q6.    Skin: No new skin breakdown noted.Specialty bed plugged in and working. waffle mattress inflated and in use.Frequent weight shifting provided Q2hr. Foams placed to heels and sacrum for addition breakdown prevention.      PoC reviewed with pt/family. All questions/concerns addressed. See flowsheets for full assessment.              "

## 2023-06-15 NOTE — CARE UPDATE
"      SICU PLAN OF CARE NOTE    Dx: Necrotizing fasciitis    Shift Events: extubated, weaned off of propofol and fentanyl. Bedside swallow test passed. Flap checks performed Q1H.    Care Plan reviewed with patient and family, no questions at this time.     Goals of Care: MAPS >65, Accuchcekcs Q6H, pain management    Neuro: AAO x4, Follows Commands, and Moves All Extremities    Vital Signs: BP (!) 142/84   Pulse 84   Temp 99.2 °F (37.3 °C) (Oral)   Resp (!) 31   Ht 5' 2" (1.575 m)   Wt 55.8 kg (123 lb 0.3 oz)   SpO2 100%   Breastfeeding No   BMI 22.50 kg/m²     Respiratory: Room Air    Diet: NPO    Gtts: Precedex    Urine Output: Urinary Catheter 1500 cc/shift    Drains: AMADO Drain, total output 30 cc /shift  AMADO Drain, total output 10 cc / shift    Flap Checks Q1H     Labs/Accuchecks: Daily labs, Q6H Accuchecks.    Skin: No breakdown noted, tanner bed plugged in and in use. Weight shifts independently.        "

## 2023-06-16 LAB
ALBUMIN SERPL BCP-MCNC: 2.3 G/DL (ref 3.5–5.2)
ALP SERPL-CCNC: 126 U/L (ref 55–135)
ALT SERPL W/O P-5'-P-CCNC: 38 U/L (ref 10–44)
ANION GAP SERPL CALC-SCNC: 12 MMOL/L (ref 8–16)
AST SERPL-CCNC: 57 U/L (ref 10–40)
BASOPHILS # BLD AUTO: 0.03 K/UL (ref 0–0.2)
BASOPHILS NFR BLD: 0.3 % (ref 0–1.9)
BILIRUB SERPL-MCNC: 0.4 MG/DL (ref 0.1–1)
BUN SERPL-MCNC: 11 MG/DL (ref 6–20)
CALCIUM SERPL-MCNC: 8.8 MG/DL (ref 8.7–10.5)
CHLORIDE SERPL-SCNC: 106 MMOL/L (ref 95–110)
CO2 SERPL-SCNC: 25 MMOL/L (ref 23–29)
CREAT SERPL-MCNC: 0.4 MG/DL (ref 0.5–1.4)
DIFFERENTIAL METHOD: ABNORMAL
EOSINOPHIL # BLD AUTO: 0 K/UL (ref 0–0.5)
EOSINOPHIL NFR BLD: 0 % (ref 0–8)
ERYTHROCYTE [DISTWIDTH] IN BLOOD BY AUTOMATED COUNT: 11.8 % (ref 11.5–14.5)
EST. GFR  (NO RACE VARIABLE): >60 ML/MIN/1.73 M^2
GLUCOSE SERPL-MCNC: 113 MG/DL (ref 70–110)
HCT VFR BLD AUTO: 25.6 % (ref 37–48.5)
HGB BLD-MCNC: 8.7 G/DL (ref 12–16)
IMM GRANULOCYTES # BLD AUTO: 0.11 K/UL (ref 0–0.04)
IMM GRANULOCYTES NFR BLD AUTO: 0.9 % (ref 0–0.5)
LYMPHOCYTES # BLD AUTO: 0.9 K/UL (ref 1–4.8)
LYMPHOCYTES NFR BLD: 7.6 % (ref 18–48)
MAGNESIUM SERPL-MCNC: 1.8 MG/DL (ref 1.6–2.6)
MCH RBC QN AUTO: 30 PG (ref 27–31)
MCHC RBC AUTO-ENTMCNC: 34 G/DL (ref 32–36)
MCV RBC AUTO: 88 FL (ref 82–98)
MONOCYTES # BLD AUTO: 0.6 K/UL (ref 0.3–1)
MONOCYTES NFR BLD: 5.3 % (ref 4–15)
NEUTROPHILS # BLD AUTO: 10.1 K/UL (ref 1.8–7.7)
NEUTROPHILS NFR BLD: 85.9 % (ref 38–73)
NRBC BLD-RTO: 0 /100 WBC
PHOSPHATE SERPL-MCNC: 3.1 MG/DL (ref 2.7–4.5)
PLATELET # BLD AUTO: 587 K/UL (ref 150–450)
PMV BLD AUTO: 9.9 FL (ref 9.2–12.9)
POTASSIUM SERPL-SCNC: 3.5 MMOL/L (ref 3.5–5.1)
PROT SERPL-MCNC: 5.6 G/DL (ref 6–8.4)
RBC # BLD AUTO: 2.9 M/UL (ref 4–5.4)
SODIUM SERPL-SCNC: 143 MMOL/L (ref 136–145)
WBC # BLD AUTO: 11.78 K/UL (ref 3.9–12.7)

## 2023-06-16 PROCEDURE — 93005 ELECTROCARDIOGRAM TRACING: CPT

## 2023-06-16 PROCEDURE — 25000003 PHARM REV CODE 250

## 2023-06-16 PROCEDURE — 84100 ASSAY OF PHOSPHORUS: CPT

## 2023-06-16 PROCEDURE — 85025 COMPLETE CBC W/AUTO DIFF WBC: CPT

## 2023-06-16 PROCEDURE — 94761 N-INVAS EAR/PLS OXIMETRY MLT: CPT

## 2023-06-16 PROCEDURE — 20000000 HC ICU ROOM

## 2023-06-16 PROCEDURE — 87086 URINE CULTURE/COLONY COUNT: CPT | Performed by: STUDENT IN AN ORGANIZED HEALTH CARE EDUCATION/TRAINING PROGRAM

## 2023-06-16 PROCEDURE — 99291 CRITICAL CARE FIRST HOUR: CPT | Mod: ,,, | Performed by: STUDENT IN AN ORGANIZED HEALTH CARE EDUCATION/TRAINING PROGRAM

## 2023-06-16 PROCEDURE — 25000003 PHARM REV CODE 250: Performed by: STUDENT IN AN ORGANIZED HEALTH CARE EDUCATION/TRAINING PROGRAM

## 2023-06-16 PROCEDURE — 63600175 PHARM REV CODE 636 W HCPCS

## 2023-06-16 PROCEDURE — 80053 COMPREHEN METABOLIC PANEL: CPT

## 2023-06-16 PROCEDURE — 63600175 PHARM REV CODE 636 W HCPCS: Performed by: STUDENT IN AN ORGANIZED HEALTH CARE EDUCATION/TRAINING PROGRAM

## 2023-06-16 PROCEDURE — 93010 ELECTROCARDIOGRAM REPORT: CPT | Mod: ,,, | Performed by: INTERNAL MEDICINE

## 2023-06-16 PROCEDURE — 25000003 PHARM REV CODE 250: Performed by: OTOLARYNGOLOGY

## 2023-06-16 PROCEDURE — A4216 STERILE WATER/SALINE, 10 ML: HCPCS | Performed by: OTOLARYNGOLOGY

## 2023-06-16 PROCEDURE — 83735 ASSAY OF MAGNESIUM: CPT

## 2023-06-16 PROCEDURE — 99291 PR CRITICAL CARE, E/M 30-74 MINUTES: ICD-10-PCS | Mod: ,,, | Performed by: STUDENT IN AN ORGANIZED HEALTH CARE EDUCATION/TRAINING PROGRAM

## 2023-06-16 PROCEDURE — 93010 EKG 12-LEAD: ICD-10-PCS | Mod: ,,, | Performed by: INTERNAL MEDICINE

## 2023-06-16 RX ORDER — QUETIAPINE FUMARATE 25 MG/1
50 TABLET, FILM COATED ORAL ONCE
Status: COMPLETED | OUTPATIENT
Start: 2023-06-16 | End: 2023-06-16

## 2023-06-16 RX ORDER — QUETIAPINE FUMARATE 200 MG/1
200 TABLET, FILM COATED ORAL 2 TIMES DAILY
Status: DISCONTINUED | OUTPATIENT
Start: 2023-06-16 | End: 2023-06-16

## 2023-06-16 RX ORDER — ACETAMINOPHEN 500 MG
1000 TABLET ORAL EVERY 8 HOURS
Status: DISCONTINUED | OUTPATIENT
Start: 2023-06-16 | End: 2023-06-21 | Stop reason: HOSPADM

## 2023-06-16 RX ORDER — POLYETHYLENE GLYCOL 3350 17 G/17G
17 POWDER, FOR SOLUTION ORAL DAILY
Status: DISCONTINUED | OUTPATIENT
Start: 2023-06-17 | End: 2023-06-21 | Stop reason: HOSPADM

## 2023-06-16 RX ORDER — DEXMEDETOMIDINE HYDROCHLORIDE 4 UG/ML
0-1.4 INJECTION, SOLUTION INTRAVENOUS CONTINUOUS
Status: DISCONTINUED | OUTPATIENT
Start: 2023-06-16 | End: 2023-06-17

## 2023-06-16 RX ORDER — QUETIAPINE FUMARATE 200 MG/1
200 TABLET, FILM COATED ORAL 2 TIMES DAILY
Status: DISCONTINUED | OUTPATIENT
Start: 2023-06-16 | End: 2023-06-21 | Stop reason: HOSPADM

## 2023-06-16 RX ORDER — CLONIDINE HYDROCHLORIDE 0.1 MG/1
0.3 TABLET ORAL EVERY 12 HOURS
Status: DISCONTINUED | OUTPATIENT
Start: 2023-06-20 | End: 2023-06-19

## 2023-06-16 RX ORDER — ACETAMINOPHEN 650 MG/20.3ML
1000 LIQUID ORAL EVERY 8 HOURS
Status: DISCONTINUED | OUTPATIENT
Start: 2023-06-16 | End: 2023-06-16

## 2023-06-16 RX ORDER — IBUPROFEN 400 MG/1
400 TABLET ORAL EVERY 6 HOURS
Status: DISCONTINUED | OUTPATIENT
Start: 2023-06-16 | End: 2023-06-21 | Stop reason: HOSPADM

## 2023-06-16 RX ORDER — CLONIDINE HYDROCHLORIDE 0.1 MG/1
0.3 TABLET ORAL DAILY
Status: DISCONTINUED | OUTPATIENT
Start: 2023-06-22 | End: 2023-06-19

## 2023-06-16 RX ORDER — GUAIFENESIN 600 MG/1
600 TABLET, EXTENDED RELEASE ORAL ONCE
Status: COMPLETED | OUTPATIENT
Start: 2023-06-16 | End: 2023-06-16

## 2023-06-16 RX ORDER — CLONIDINE HYDROCHLORIDE 0.1 MG/1
0.3 TABLET ORAL EVERY 8 HOURS
Status: DISCONTINUED | OUTPATIENT
Start: 2023-06-18 | End: 2023-06-19

## 2023-06-16 RX ORDER — CLONIDINE HYDROCHLORIDE 0.1 MG/1
0.3 TABLET ORAL EVERY 6 HOURS
Status: COMPLETED | OUTPATIENT
Start: 2023-06-16 | End: 2023-06-17

## 2023-06-16 RX ORDER — POTASSIUM CHLORIDE 29.8 MG/ML
40 INJECTION INTRAVENOUS ONCE
Status: COMPLETED | OUTPATIENT
Start: 2023-06-16 | End: 2023-06-16

## 2023-06-16 RX ADMIN — DEXMEDETOMIDINE HYDROCHLORIDE 0.8 MCG/KG/HR: 4 INJECTION, SOLUTION INTRAVENOUS at 08:06

## 2023-06-16 RX ADMIN — ACETAMINOPHEN 1000 MG: 500 TABLET ORAL at 12:06

## 2023-06-16 RX ADMIN — HALOPERIDOL LACTATE 5 MG: 5 INJECTION, SOLUTION INTRAMUSCULAR at 03:06

## 2023-06-16 RX ADMIN — CLONIDINE HYDROCHLORIDE 0.3 MG: 0.1 TABLET ORAL at 12:06

## 2023-06-16 RX ADMIN — QUETIAPINE FUMARATE 150 MG: 100 TABLET ORAL at 09:06

## 2023-06-16 RX ADMIN — POTASSIUM BICARBONATE 50 MEQ: 978 TABLET, EFFERVESCENT ORAL at 06:06

## 2023-06-16 RX ADMIN — QUETIAPINE FUMARATE 50 MG: 25 TABLET ORAL at 12:06

## 2023-06-16 RX ADMIN — IBUPROFEN 400 MG: 400 TABLET, FILM COATED ORAL at 11:06

## 2023-06-16 RX ADMIN — DEXMEDETOMIDINE HYDROCHLORIDE 1.4 MCG/KG/HR: 4 INJECTION, SOLUTION INTRAVENOUS at 04:06

## 2023-06-16 RX ADMIN — ENOXAPARIN SODIUM 40 MG: 40 INJECTION SUBCUTANEOUS at 04:06

## 2023-06-16 RX ADMIN — IBUPROFEN 400 MG: 400 TABLET, FILM COATED ORAL at 06:06

## 2023-06-16 RX ADMIN — POTASSIUM CHLORIDE 40 MEQ: 29.8 INJECTION, SOLUTION INTRAVENOUS at 06:06

## 2023-06-16 RX ADMIN — DEXMEDETOMIDINE HYDROCHLORIDE 1.4 MCG/KG/HR: 4 INJECTION, SOLUTION INTRAVENOUS at 10:06

## 2023-06-16 RX ADMIN — Medication 10 ML: at 12:06

## 2023-06-16 RX ADMIN — CEFAZOLIN 2 G: 2 INJECTION, POWDER, FOR SOLUTION INTRAMUSCULAR; INTRAVENOUS at 11:06

## 2023-06-16 RX ADMIN — DEXMEDETOMIDINE HYDROCHLORIDE 0.2 MCG/KG/HR: 4 INJECTION, SOLUTION INTRAVENOUS at 11:06

## 2023-06-16 RX ADMIN — DEXMEDETOMIDINE HYDROCHLORIDE 1 MCG/KG/HR: 4 INJECTION, SOLUTION INTRAVENOUS at 03:06

## 2023-06-16 RX ADMIN — DEXMEDETOMIDINE HYDROCHLORIDE 1.4 MCG/KG/HR: 4 INJECTION, SOLUTION INTRAVENOUS at 01:06

## 2023-06-16 RX ADMIN — OXYCODONE HYDROCHLORIDE 10 MG: 10 TABLET ORAL at 09:06

## 2023-06-16 RX ADMIN — MUPIROCIN: 20 OINTMENT TOPICAL at 09:06

## 2023-06-16 RX ADMIN — IBUPROFEN 400 MG: 400 TABLET, FILM COATED ORAL at 12:06

## 2023-06-16 RX ADMIN — ACETAMINOPHEN 1000 MG: 500 TABLET ORAL at 10:06

## 2023-06-16 RX ADMIN — Medication 10 ML: at 06:06

## 2023-06-16 RX ADMIN — CLONIDINE HYDROCHLORIDE 0.3 MG: 0.1 TABLET ORAL at 11:06

## 2023-06-16 RX ADMIN — CEFAZOLIN 2 G: 2 INJECTION, POWDER, FOR SOLUTION INTRAMUSCULAR; INTRAVENOUS at 09:06

## 2023-06-16 RX ADMIN — MUPIROCIN: 20 OINTMENT TOPICAL at 08:06

## 2023-06-16 RX ADMIN — CEFAZOLIN 2 G: 2 INJECTION, POWDER, FOR SOLUTION INTRAMUSCULAR; INTRAVENOUS at 04:06

## 2023-06-16 RX ADMIN — CLONIDINE HYDROCHLORIDE 0.3 MG: 0.1 TABLET ORAL at 06:06

## 2023-06-16 RX ADMIN — GUAIFENESIN 600 MG: 600 TABLET, EXTENDED RELEASE ORAL at 11:06

## 2023-06-16 RX ADMIN — CEFAZOLIN 2 G: 2 INJECTION, POWDER, FOR SOLUTION INTRAMUSCULAR; INTRAVENOUS at 12:06

## 2023-06-16 RX ADMIN — Medication 800 MG: at 06:06

## 2023-06-16 RX ADMIN — QUETIAPINE FUMARATE 200 MG: 200 TABLET ORAL at 08:06

## 2023-06-16 NOTE — ASSESSMENT & PLAN NOTE
42F presenting with right neck necrotizing fasciitis with surround cellulitis s/p debridement of multiple levels of right neck on 6/8. Necrotic skin, soft tissue, and muscle removed. Open neck wound packed with saline soaked kerlix. Patient remains intubated. S/P debridement in OR on 6/12. Now s/p reconstruction with R ALT free flap on 6/14.      ENT/HNS:  - Q1H flap checks    - OK for Q2H this PM  - Routine drain care   - Keep head neutral or turned to the left, minimize right sided head movement   -Fresh wound care, clean incision with peroxide/qtip followed by bacitracin BID     Neuro:   -Pain: Multimodality PRN regimen initiated     Cardiac:  -Remain normotensive  -Page ENT before starting vasopressors     Pulmonary:   - CTM     Renal:   - monitor Is and Os  - Cr stable  - OK to d/c murdock  - UTI management per SICU     Infectious Disease:  - Daily CBC  - ID consulted; appreciate recs   - Crista      Hematology/Oncology:  - H/H stable  - Lovenox DVT prophylaxis      FEN/GI  - Replace electrolytes as needed to keep K>4, Mg>2  - Bowel reg  - Protonix for GI prophylaxis  - OK for diet if     MSK  - Drain care  - Bacitracin to incision site      Dispo:  -Continue ICU care

## 2023-06-16 NOTE — ASSESSMENT & PLAN NOTE
42F PMH depression, seizures (no meds), tobacco use, presenting with concern for toshia's angina / nec fasc of neck s/p exploration and debridement with ENT 6/8/23. S/p 6/14 right neck debridement with right anterolateral free flap coverage.           Neuro/Psych:   -- Sedation: precedex ggt.  -- Pain: tylenol 650mg q6h PRN, oxy 5/10 PRN  -- Seroquel 150mg BID, haldol 5 q6h prn              Cards:   -- HDS  -- currently not requiring pressors  -- MAP > 65  -- q1 flap checks, ok to transition to q2h this PM      Pulm:   -- Goal O2 sat > 90%  -- Intubated on rate control 40/5  -- Extubated 6/15  -- IS, Acapella  -- Wean as able      Renal:  -- D/c collette, I/O  -- BUN/Cr 11/0.4      FEN / GI:   -- Replace lytes as needed  -- Nutrition: Bedside swallow, if passes can have diet      ID:   -- Afebrile, WBC 11.8  -- Abx: cefazolin  -- ID following  -- Following OR cultures 6/8 Staph aureus, MSSA  -- Blood Cx OSH 6/7 NGTD  -- ID signed off but stated to re-consult on 6/25      Heme/Onc:   -- H/H stable 8.7/25.6  -- Daily CBC      Endo:   -- Gluc goal 140-180      PPx:   Feeding:Bedside swallow, will start diet if passes  Analgesia/Sedation: tylenol 650mg q6h PRN, oxy5/10 PRN, seroquel 150 BID, haldol PRN  Thromboembolic prevention: lovenox  HOB >30: Yes  Stress Ulcer ppx: n/a  Glucose control: Critical care goal 140-180 g/dl     Lines/Drains/Airway: pIVs, OGT, Clark, ETT, PICC      Dispo/Code Status/Palliative:   -- SICU / Full Code

## 2023-06-16 NOTE — PROGRESS NOTES
[facility] - Surgical Intensive Care  Critical Care - Surgery  Progress Note    Patient Name: Aditi Conway  MRN: [id number]  Admission Date: 6/8/2023  Hospital Length of Stay: 8 days  Code Status: Full Code  Attending Provider: Gerardo Seymour MD  Primary Care Provider: Primary Doctor No   Principal Problem: Necrotizing fasciitis    Subjective:     Hospital/ICU Course:  No notes on file    Interval History/Significant Events:   Yesterday patient was extubated, did well. She has been agitated all night. Off the fentanyl ggt. On precedex 1.4. Has received Haldol PRN and at the moment is ok. Complains of some pain.      Follow-up For: Procedure(s) (LRB):  EXPLORATION, NECK (Right)  CREATION, FREE FLAP (N/A)    Post-Operative Day: Day of Surgery    Objective:     Vital Signs (Most Recent):  Temp: 99 °F (37.2 °C) (06/16/23 0715)  Pulse: 102 (06/16/23 0600)  Resp: (!) 47 (06/16/23 0600)  BP: (!) 160/92 (06/16/23 0600)  SpO2: (!) 93 % (06/16/23 0600) Vital Signs (24h Range):  Temp:  [98.3 °F (36.8 °C)-100.4 °F (38 °C)] 99 °F (37.2 °C)  Pulse:  [] 102  Resp:  [14-47] 47  SpO2:  [76 %-100 %] 93 %  BP: ()/() 160/92     Weight: 55.8 kg (123 lb 0.3 oz)  Body mass index is 22.5 kg/m².      Intake/Output Summary (Last 24 hours) at 6/16/2023 0738  Last data filed at 6/16/2023 0701  Gross per 24 hour   Intake 849.19 ml   Output 3300 ml   Net -2450.81 ml            Physical Exam  Constitutional:       General: She is not in acute distress.     Appearance: Normal appearance.   HENT:      Head: Normocephalic and atraumatic.      Mouth/Throat:      Mouth: Mucous membranes are dry.      Pharynx: Oropharynx is clear.   Eyes:      Pupils: Pupils are equal, round, and reactive to light.   Neck:      Comments: Right neck with ALT flap stapled in place  Soft, appropriate color   Strong arterial and venous external doppler signal   AMADO drain with s/s output     Cardiovascular:      Rate and Rhythm: Normal rate  and regular rhythm.      Pulses: Normal pulses.   Abdominal:      Palpations: Abdomen is soft.   Musculoskeletal:      Comments: RLE with soft to palpation, island dressing in place  AMADO drain with s/s output    Skin:     General: Skin is warm and dry.   Neurological:      General: No focal deficit present.      Mental Status: She is alert.          Vents:  Vent Mode: Spont (06/15/23 1426)  Set Rate: 18 BPM (06/15/23 1115)  Vt Set: 360 mL (06/15/23 0559)  Pressure Support: 5 cmH20 (06/15/23 1426)  PEEP/CPAP: 5 cmH20 (06/15/23 1426)  Oxygen Concentration (%): 40 (06/15/23 1426)  Peak Airway Pressure: 10 cmH20 (06/15/23 1426)  Plateau Pressure: 0 cmH20 (06/15/23 1426)  Total Ve: 7.98 L/m (06/15/23 1426)  Negative Inspiratory Force (cm H2O): -44 (06/15/23 1426)  F/VT Ratio<105 (RSBI): (!) 29.41 (06/15/23 1155)    Lines/Drains/Airways       Peripherally Inserted Central Catheter Line  Duration             PICC Double Lumen 06/09/23 1152 right brachial 6 days              Drain  Duration                  Urethral Catheter 06/12/23 0845 3 days         Closed/Suction Drain 06/14/23 1044 Right Neck Bulb 15 Fr. 1 day         Closed/Suction Drain 06/14/23 1044 Right Thigh Bulb 19 Fr. 1 day                    Significant Labs:    CBC/Anemia Profile:  Recent Labs   Lab 06/15/23  0345 06/16/23  0308   WBC 8.88 11.78   HGB 8.8* 8.7*   HCT 25.7* 25.6*   * 587*   MCV 90 88   RDW 12.6 11.8          Chemistries:  Recent Labs   Lab 06/15/23  0345 06/16/23  0308   * 143   K 3.6 3.5    106   CO2 25 25   BUN 13 11   CREATININE 0.4* 0.4*   CALCIUM 8.7 8.8   ALBUMIN 1.8* 2.3*   PROT 5.2* 5.6*   BILITOT 0.3 0.4   ALKPHOS 130 126   ALT 24 38   AST 35 57*   MG 1.8 1.8   PHOS 2.8 3.1         All pertinent labs within the past 24 hours have been reviewed.    Significant Imaging:  I have reviewed all pertinent imaging results/findings within the past 24 hours.    Assessment/Plan:     ID  * Necrotizing fasciitis  42F PMH  depression, seizures (no meds), tobacco use, presenting with concern for toshia's angina / nec fasc of neck s/p exploration and debridement with ENT 6/8/23. S/p 6/14 right neck debridement with right anterolateral free flap coverage.           Neuro/Psych:   -- Sedation: precedex ggt.  -- Pain: tylenol 650mg q6h PRN, oxy 5/10 PRN  -- Seroquel 150mg BID, haldol 5 q6h prn              Cards:   -- HDS  -- currently not requiring pressors  -- MAP > 65  -- q1 flap checks, ok to transition to q2h this PM      Pulm:   -- Goal O2 sat > 90%  -- Intubated on rate control 40/5  -- Extubated 6/15  -- IS, Acapella  -- Wean as able      Renal:  -- D/c murdock, I/O  -- BUN/Cr 11/0.4      FEN / GI:   -- Replace lytes as needed  -- Nutrition: Bedside swallow, if passes can have diet      ID:   -- Afebrile, WBC 11.8  -- Abx: cefazolin  -- ID following  -- Following OR cultures 6/8 Staph aureus, MSSA  -- Blood Cx OSH 6/7 NGTD  -- ID signed off but stated to re-consult on 6/25      Heme/Onc:   -- H/H stable 8.7/25.6  -- Daily CBC      Endo:   -- Gluc goal 140-180      PPx:   Feeding:Bedside swallow, will start diet if passes  Analgesia/Sedation: tylenol 650mg q6h PRN, oxy5/10 PRN, seroquel 150 BID, haldol PRN  Thromboembolic prevention: lovenox  HOB >30: Yes  Stress Ulcer ppx: n/a  Glucose control: Critical care goal 140-180 g/dl     Lines/Drains/Airway: pIVs, OGT, Murdock, ETT, PICC      Dispo/Code Status/Palliative:   -- SICU / Full Code      Critical care was time spent personally by me on the following activities: development of treatment plan with patient or surrogate and bedside caregivers, discussions with consultants, evaluation of patient's response to treatment, examination of patient, ordering and performing treatments and interventions, ordering and review of laboratory studies, ordering and review of radiographic studies, pulse oximetry, re-evaluation of patient's condition.  This critical care time did not overlap  with that of any other provider or involve time for any procedures.     Martinez John MD  Critical Care - Surgery  [facility] - Surgical Intensive Care

## 2023-06-16 NOTE — PROGRESS NOTES
[facility] - [facility] Intensive Care  Otorhinolaryngology-Head & Neck Surgery  Progress Note    Subjective:     Post-Op Info:  Procedure(s) (LRB):  EXPLORATION, NECK (Right)  CREATION, FREE FLAP (N/A)   2 Days Post-Op  Hospital Day: 9     Interval History: Extubated yesterday, doing well. Tmax to 100.4. Clark with dark urine and evidence of UTI.     Medications:  Continuous Infusions:   dexmedeTOMIDine (Precedex) infusion (titrating) 1.4 mcg/kg/hr (06/16/23 0701)     Scheduled Meds:   ceFAZolin (ANCEF) IVPB  2 g Intravenous Q8H    enoxparin  40 mg Subcutaneous Q24H (prophylaxis, 1700)    mupirocin   Nasal BID    polyethylene glycol  17 g Per NG tube Daily    QUEtiapine  150 mg Per OG tube BID    sodium chloride 0.9%  10 mL Intravenous Q6H     PRN Meds:sodium chloride 0.9%, acetaminophen, haloperidol lactate, magnesium oxide, magnesium oxide, ondansetron, oxyCODONE, oxyCODONE, potassium bicarbonate, potassium bicarbonate, potassium bicarbonate, potassium, sodium phosphates, potassium, sodium phosphates, potassium, sodium phosphates, sodium chloride 0.9%, sodium chloride 0.9%, Flushing PICC Protocol **AND** sodium chloride 0.9% **AND** sodium chloride 0.9%     Review of patient's allergies indicates:   Allergen Reactions    Clindamycin      Objective:     Vital Signs (24h Range):  Temp:  [98.3 °F (36.8 °C)-100.4 °F (38 °C)] 99.5 °F (37.5 °C)  Pulse:  [] 102  Resp:  [14-47] 47  SpO2:  [76 %-100 %] 93 %  BP: ()/() 160/92     Date 06/16/23 0700 - 06/17/23 0659   Shift 4894-1498 5964-9126 9880-6949 24 Hour Total   INTAKE   I.V.(mL/kg) 19.9(0.4)   19.9(0.4)   IV Piggyback 16.3   16.3   Shift Total(mL/kg) 36.2(0.6)   36.2(0.6)   OUTPUT   Shift Total(mL/kg)       Weight (kg) 55.8 55.8 55.8 55.8     Lines/Drains/Airways       Peripherally Inserted Central Catheter Line  Duration             PICC Double Lumen 06/09/23 1152 right brachial 6 days              Drain  Duration                  Urethral  Catheter 06/12/23 0845 3 days         Closed/Suction Drain 06/14/23 1044 Right Neck Bulb 15 Fr. 1 day         Closed/Suction Drain 06/14/23 1044 Right Thigh Bulb 19 Fr. 1 day                     Physical Exam:  Awake, alert   Right neck with ALT flap stapled in place  Soft, appropriate color   Strong arterial and venous external doppler signal   AMADO drain with s/s output   RLE with soft to palpation, island dressing in place, removed. Incision c/d/I   AMADO drain with s/s output      Significant Labs:  CBC:   Recent Labs   Lab 06/16/23  0308   WBC 11.78   RBC 2.90*   HGB 8.7*   HCT 25.6*   *   MCV 88   MCH 30.0   MCHC 34.0     CMP:   Recent Labs   Lab 06/16/23  0308   *   CALCIUM 8.8   ALBUMIN 2.3*   PROT 5.6*      K 3.5   CO2 25      BUN 11   CREATININE 0.4*   ALKPHOS 126   ALT 38   AST 57*   BILITOT 0.4       Significant Diagnostics:  None    Assessment/Plan:     * Necrotizing fasciitis  42F presenting with right neck necrotizing fasciitis with surround cellulitis s/p debridement of multiple levels of right neck on 6/8. Necrotic skin, soft tissue, and muscle removed. Open neck wound packed with saline soaked kerlix. Patient remains intubated. S/P debridement in OR on 6/12. Now s/p reconstruction with R ALT free flap on 6/14.      ENT/HNS:  - Q1H flap checks    - OK for Q2H this PM  - Routine drain care   - Keep head neutral or turned to the left, minimize right sided head movement   -Fresh wound care, clean incision with peroxide/qtip followed by bacitracin BID     Neuro:   -Pain: Multimodality PRN regimen initiated     Cardiac:  -Remain normotensive  -Page ENT before starting vasopressors     Pulmonary:   - CTM     Renal:   - monitor Is and Os  - Cr stable  - OK to d/c murdock  - UTI management per SICU     Infectious Disease:  - Daily CBC  - ID consulted; appreciate recs   - Crista      Hematology/Oncology:  - H/H stable  - Lovenox DVT prophylaxis      FEN/GI  - Replace electrolytes as needed  to keep K>4, Mg>2  - Bowel reg  - Protonix for GI prophylaxis  - OK for diet if     MSK  - Drain care  - Bacitracin to incision site      Dispo:  -Continue ICU care        Miranda Casas MD  Otorhinolaryngology-Head & Neck Surgery  [facility] - Surgical Intensive Care

## 2023-06-16 NOTE — PLAN OF CARE
"      SICU PLAN OF CARE NOTE    Dx: Necrotizing fasciitis    Shift Events: 4 ativan, haldol x2, blood culture x2    Goals of Care: MAP    Neuro: Follows Commands, Moves All Extremities, and Confused    Vital Signs: BP (!) 160/92 (BP Location: Left arm, Patient Position: Lying)   Pulse 102   Temp 99.5 °F (37.5 °C) (Oral)   Resp (!) 47   Ht 5' 2" (1.575 m)   Wt 55.8 kg (123 lb 0.3 oz)   SpO2 (!) 93%   Breastfeeding No   BMI 22.50 kg/m²     Cardiac: ST    Respiratory: Room Air    Diet: NPO    Gtts: Precedex    Urine Output: Urinary Catheter 1385 cc/shift    Drains: AMADO Drain, total output 100 cc / shift     Labs/Accuchecks: Daily labs/accuchecks q6.    Skin: Bed plugged in with pad and foams in place. Pt able to weight shift and turn independently. Flap pink , intact, with strong pulsation. No new skin compromises overnight.       "

## 2023-06-16 NOTE — SUBJECTIVE & OBJECTIVE
Interval History/Significant Events:   Yesterday patient was extubated, did well. She has been agitated all night. Off the fentanyl ggt. On precedex 1.4. Has received Haldol PRN and at the moment is ok. Complains of some pain.      Follow-up For: Procedure(s) (LRB):  EXPLORATION, NECK (Right)  CREATION, FREE FLAP (N/A)    Post-Operative Day: Day of Surgery    Objective:     Vital Signs (Most Recent):  Temp: 99 °F (37.2 °C) (06/16/23 0715)  Pulse: 102 (06/16/23 0600)  Resp: (!) 47 (06/16/23 0600)  BP: (!) 160/92 (06/16/23 0600)  SpO2: (!) 93 % (06/16/23 0600) Vital Signs (24h Range):  Temp:  [98.3 °F (36.8 °C)-100.4 °F (38 °C)] 99 °F (37.2 °C)  Pulse:  [] 102  Resp:  [14-47] 47  SpO2:  [76 %-100 %] 93 %  BP: ()/() 160/92     Weight: 55.8 kg (123 lb 0.3 oz)  Body mass index is 22.5 kg/m².      Intake/Output Summary (Last 24 hours) at 6/16/2023 0738  Last data filed at 6/16/2023 0701  Gross per 24 hour   Intake 849.19 ml   Output 3300 ml   Net -2450.81 ml            Physical Exam  Constitutional:       General: She is not in acute distress.     Appearance: Normal appearance.   HENT:      Head: Normocephalic and atraumatic.      Mouth/Throat:      Mouth: Mucous membranes are dry.      Pharynx: Oropharynx is clear.   Eyes:      Pupils: Pupils are equal, round, and reactive to light.   Neck:      Comments: Right neck with ALT flap stapled in place  Soft, appropriate color   Strong arterial and venous external doppler signal   AMADO drain with s/s output     Cardiovascular:      Rate and Rhythm: Normal rate and regular rhythm.      Pulses: Normal pulses.   Abdominal:      Palpations: Abdomen is soft.   Musculoskeletal:      Comments: RLE with soft to palpation, island dressing in place  AMADO drain with s/s output    Skin:     General: Skin is warm and dry.   Neurological:      General: No focal deficit present.      Mental Status: She is alert.          Vents:  Vent Mode: Spont (06/15/23 1426)  Set Rate: 18  BPM (06/15/23 1115)  Vt Set: 360 mL (06/15/23 0559)  Pressure Support: 5 cmH20 (06/15/23 1426)  PEEP/CPAP: 5 cmH20 (06/15/23 1426)  Oxygen Concentration (%): 40 (06/15/23 1426)  Peak Airway Pressure: 10 cmH20 (06/15/23 1426)  Plateau Pressure: 0 cmH20 (06/15/23 1426)  Total Ve: 7.98 L/m (06/15/23 1426)  Negative Inspiratory Force (cm H2O): -44 (06/15/23 1426)  F/VT Ratio<105 (RSBI): (!) 29.41 (06/15/23 1155)    Lines/Drains/Airways       Peripherally Inserted Central Catheter Line  Duration             PICC Double Lumen 06/09/23 1152 right brachial 6 days              Drain  Duration                  Urethral Catheter 06/12/23 0845 3 days         Closed/Suction Drain 06/14/23 1044 Right Neck Bulb 15 Fr. 1 day         Closed/Suction Drain 06/14/23 1044 Right Thigh Bulb 19 Fr. 1 day                    Significant Labs:    CBC/Anemia Profile:  Recent Labs   Lab 06/15/23  0345 06/16/23  0308   WBC 8.88 11.78   HGB 8.8* 8.7*   HCT 25.7* 25.6*   * 587*   MCV 90 88   RDW 12.6 11.8          Chemistries:  Recent Labs   Lab 06/15/23  0345 06/16/23  0308   * 143   K 3.6 3.5    106   CO2 25 25   BUN 13 11   CREATININE 0.4* 0.4*   CALCIUM 8.7 8.8   ALBUMIN 1.8* 2.3*   PROT 5.2* 5.6*   BILITOT 0.3 0.4   ALKPHOS 130 126   ALT 24 38   AST 35 57*   MG 1.8 1.8   PHOS 2.8 3.1         All pertinent labs within the past 24 hours have been reviewed.    Significant Imaging:  I have reviewed all pertinent imaging results/findings within the past 24 hours.

## 2023-06-16 NOTE — SUBJECTIVE & OBJECTIVE
Interval History: Extubated yesterday, doing well. Tmax to 100.4. Clark with dark urine and evidence of UTI.     Medications:  Continuous Infusions:   dexmedeTOMIDine (Precedex) infusion (titrating) 1.4 mcg/kg/hr (06/16/23 0701)     Scheduled Meds:   ceFAZolin (ANCEF) IVPB  2 g Intravenous Q8H    enoxparin  40 mg Subcutaneous Q24H (prophylaxis, 1700)    mupirocin   Nasal BID    polyethylene glycol  17 g Per NG tube Daily    QUEtiapine  150 mg Per OG tube BID    sodium chloride 0.9%  10 mL Intravenous Q6H     PRN Meds:sodium chloride 0.9%, acetaminophen, haloperidol lactate, magnesium oxide, magnesium oxide, ondansetron, oxyCODONE, oxyCODONE, potassium bicarbonate, potassium bicarbonate, potassium bicarbonate, potassium, sodium phosphates, potassium, sodium phosphates, potassium, sodium phosphates, sodium chloride 0.9%, sodium chloride 0.9%, Flushing PICC Protocol **AND** sodium chloride 0.9% **AND** sodium chloride 0.9%     Review of patient's allergies indicates:   Allergen Reactions    Clindamycin      Objective:     Vital Signs (24h Range):  Temp:  [98.3 °F (36.8 °C)-100.4 °F (38 °C)] 99.5 °F (37.5 °C)  Pulse:  [] 102  Resp:  [14-47] 47  SpO2:  [76 %-100 %] 93 %  BP: ()/() 160/92     Date 06/16/23 0700 - 06/17/23 0659   Shift 5991-7553 7737-2778 8784-3840 24 Hour Total   INTAKE   I.V.(mL/kg) 19.9(0.4)   19.9(0.4)   IV Piggyback 16.3   16.3   Shift Total(mL/kg) 36.2(0.6)   36.2(0.6)   OUTPUT   Shift Total(mL/kg)       Weight (kg) 55.8 55.8 55.8 55.8     Lines/Drains/Airways       Peripherally Inserted Central Catheter Line  Duration             PICC Double Lumen 06/09/23 1152 right brachial 6 days              Drain  Duration                  Urethral Catheter 06/12/23 0845 3 days         Closed/Suction Drain 06/14/23 1044 Right Neck Bulb 15 Fr. 1 day         Closed/Suction Drain 06/14/23 1044 Right Thigh Bulb 19 Fr. 1 day                     Physical Exam:  Awake, alert   Right neck with ALT  flap stapled in place  Soft, appropriate color   Strong arterial and venous external doppler signal   AMADO drain with s/s output   RLE with soft to palpation, island dressing in place, removed. Incision c/d/I   AMADO drain with s/s output      Significant Labs:  CBC:   Recent Labs   Lab 06/16/23  0308   WBC 11.78   RBC 2.90*   HGB 8.7*   HCT 25.6*   *   MCV 88   MCH 30.0   MCHC 34.0     CMP:   Recent Labs   Lab 06/16/23  0308   *   CALCIUM 8.8   ALBUMIN 2.3*   PROT 5.6*      K 3.5   CO2 25      BUN 11   CREATININE 0.4*   ALKPHOS 126   ALT 38   AST 57*   BILITOT 0.4       Significant Diagnostics:  None   No deficits noted at this time

## 2023-06-17 LAB
ACINETOBACTER CALCOACETICUS/BAUMANNII COMPLEX: NOT DETECTED
ALBUMIN SERPL BCP-MCNC: 2.1 G/DL (ref 3.5–5.2)
ALP SERPL-CCNC: 107 U/L (ref 55–135)
ALT SERPL W/O P-5'-P-CCNC: 43 U/L (ref 10–44)
ANION GAP SERPL CALC-SCNC: 7 MMOL/L (ref 8–16)
AST SERPL-CCNC: 52 U/L (ref 10–40)
BACTERIA UR CULT: NO GROWTH
BACTEROIDES FRAGILIS: NOT DETECTED
BASOPHILS # BLD AUTO: 0.07 K/UL (ref 0–0.2)
BASOPHILS NFR BLD: 0.8 % (ref 0–1.9)
BILIRUB SERPL-MCNC: 0.3 MG/DL (ref 0.1–1)
BUN SERPL-MCNC: 12 MG/DL (ref 6–20)
CALCIUM SERPL-MCNC: 8.9 MG/DL (ref 8.7–10.5)
CANDIDA ALBICANS: NOT DETECTED
CANDIDA AURIS: NOT DETECTED
CANDIDA GLABRATA: NOT DETECTED
CANDIDA KRUSEI: NOT DETECTED
CANDIDA PARAPSILOSIS: NOT DETECTED
CANDIDA TROPICALIS: NOT DETECTED
CHLORIDE SERPL-SCNC: 108 MMOL/L (ref 95–110)
CO2 SERPL-SCNC: 26 MMOL/L (ref 23–29)
CREAT SERPL-MCNC: 0.5 MG/DL (ref 0.5–1.4)
CRYPTOCOCCUS NEOFORMANS/GATTII: NOT DETECTED
CTX-M GENE: NOT DETECTED
DIFFERENTIAL METHOD: ABNORMAL
ENTEROBACTER CLOACAE COMPLEX: NOT DETECTED
ENTEROBACTERALES: NOT DETECTED
ENTEROCOCCUS FAECALIS: NOT DETECTED
ENTEROCOCCUS FAECIUM: NOT DETECTED
EOSINOPHIL # BLD AUTO: 0.1 K/UL (ref 0–0.5)
EOSINOPHIL NFR BLD: 1.2 % (ref 0–8)
ERYTHROCYTE [DISTWIDTH] IN BLOOD BY AUTOMATED COUNT: 12.5 % (ref 11.5–14.5)
ESCHERICHIA COLI: NOT DETECTED
EST. GFR  (NO RACE VARIABLE): >60 ML/MIN/1.73 M^2
GLUCOSE SERPL-MCNC: 123 MG/DL (ref 70–110)
HAEMOPHILUS INFLUENZAE: NOT DETECTED
HCT VFR BLD AUTO: 27.1 % (ref 37–48.5)
HGB BLD-MCNC: 9.1 G/DL (ref 12–16)
IMM GRANULOCYTES # BLD AUTO: 0.06 K/UL (ref 0–0.04)
IMM GRANULOCYTES NFR BLD AUTO: 0.7 % (ref 0–0.5)
IMP GENE: NOT DETECTED
KLEBSIELLA AEROGENES: NOT DETECTED
KLEBSIELLA OXYTOCA: NOT DETECTED
KLEBSIELLA PNEUMONIAE GROUP: NOT DETECTED
KPC: NOT DETECTED
LISTERIA MONOCYTOGENES: NOT DETECTED
LYMPHOCYTES # BLD AUTO: 1.9 K/UL (ref 1–4.8)
LYMPHOCYTES NFR BLD: 20.6 % (ref 18–48)
MAGNESIUM SERPL-MCNC: 1.9 MG/DL (ref 1.6–2.6)
MCH RBC QN AUTO: 30.4 PG (ref 27–31)
MCHC RBC AUTO-ENTMCNC: 33.6 G/DL (ref 32–36)
MCR-1: NOT DETECTED
MCV RBC AUTO: 91 FL (ref 82–98)
MEC A/C AND MREJ (MRSA): NOT DETECTED
MEC A/C: NOT DETECTED
MONOCYTES # BLD AUTO: 0.9 K/UL (ref 0.3–1)
MONOCYTES NFR BLD: 9.4 % (ref 4–15)
NDM GENE: NOT DETECTED
NEISSERIA MENINGITIDIS: NOT DETECTED
NEUTROPHILS # BLD AUTO: 6.2 K/UL (ref 1.8–7.7)
NEUTROPHILS NFR BLD: 67.3 % (ref 38–73)
NRBC BLD-RTO: 0 /100 WBC
OXA-48-LIKE: NOT DETECTED
PHOSPHATE SERPL-MCNC: 3.9 MG/DL (ref 2.7–4.5)
PLATELET # BLD AUTO: 657 K/UL (ref 150–450)
PMV BLD AUTO: 9.6 FL (ref 9.2–12.9)
POTASSIUM SERPL-SCNC: 3.5 MMOL/L (ref 3.5–5.1)
PROT SERPL-MCNC: 5.5 G/DL (ref 6–8.4)
PROTEUS SPECIES: NOT DETECTED
PSEUDOMONAS AERUGINOSA: NOT DETECTED
RBC # BLD AUTO: 2.99 M/UL (ref 4–5.4)
SALMONELLA SP: NOT DETECTED
SERRATIA MARCESCENS: NOT DETECTED
SODIUM SERPL-SCNC: 141 MMOL/L (ref 136–145)
STAPHYLOCOCCUS AUREUS: NOT DETECTED
STAPHYLOCOCCUS EPIDERMIDIS: NOT DETECTED
STAPHYLOCOCCUS LUGDUNESIS: NOT DETECTED
STAPHYLOCOCCUS SPECIES: NOT DETECTED
STENOTROPHOMONAS MALTOPHILIA: NOT DETECTED
STREPTOCOCCUS AGALACTIAE: NOT DETECTED
STREPTOCOCCUS PNEUMONIAE: NOT DETECTED
STREPTOCOCCUS PYOGENES: NOT DETECTED
STREPTOCOCCUS SPECIES: NOT DETECTED
VAN A/B: NOT DETECTED
VIM GENE: NOT DETECTED
WBC # BLD AUTO: 9.19 K/UL (ref 3.9–12.7)

## 2023-06-17 PROCEDURE — 99291 PR CRITICAL CARE, E/M 30-74 MINUTES: ICD-10-PCS | Mod: ,,, | Performed by: STUDENT IN AN ORGANIZED HEALTH CARE EDUCATION/TRAINING PROGRAM

## 2023-06-17 PROCEDURE — A4216 STERILE WATER/SALINE, 10 ML: HCPCS | Performed by: OTOLARYNGOLOGY

## 2023-06-17 PROCEDURE — 97162 PT EVAL MOD COMPLEX 30 MIN: CPT

## 2023-06-17 PROCEDURE — 25000003 PHARM REV CODE 250: Performed by: STUDENT IN AN ORGANIZED HEALTH CARE EDUCATION/TRAINING PROGRAM

## 2023-06-17 PROCEDURE — 93005 ELECTROCARDIOGRAM TRACING: CPT

## 2023-06-17 PROCEDURE — 99291 CRITICAL CARE FIRST HOUR: CPT | Mod: ,,, | Performed by: STUDENT IN AN ORGANIZED HEALTH CARE EDUCATION/TRAINING PROGRAM

## 2023-06-17 PROCEDURE — 97116 GAIT TRAINING THERAPY: CPT

## 2023-06-17 PROCEDURE — 63600175 PHARM REV CODE 636 W HCPCS: Performed by: STUDENT IN AN ORGANIZED HEALTH CARE EDUCATION/TRAINING PROGRAM

## 2023-06-17 PROCEDURE — 93010 EKG 12-LEAD: ICD-10-PCS | Mod: ,,, | Performed by: INTERNAL MEDICINE

## 2023-06-17 PROCEDURE — 84100 ASSAY OF PHOSPHORUS: CPT

## 2023-06-17 PROCEDURE — 93010 ELECTROCARDIOGRAM REPORT: CPT | Mod: ,,, | Performed by: INTERNAL MEDICINE

## 2023-06-17 PROCEDURE — 25000003 PHARM REV CODE 250

## 2023-06-17 PROCEDURE — 25000003 PHARM REV CODE 250: Performed by: OTOLARYNGOLOGY

## 2023-06-17 PROCEDURE — 83735 ASSAY OF MAGNESIUM: CPT

## 2023-06-17 PROCEDURE — 85025 COMPLETE CBC W/AUTO DIFF WBC: CPT

## 2023-06-17 PROCEDURE — 80053 COMPREHEN METABOLIC PANEL: CPT

## 2023-06-17 PROCEDURE — 20000000 HC ICU ROOM

## 2023-06-17 PROCEDURE — 27000221 HC OXYGEN, UP TO 24 HOURS

## 2023-06-17 PROCEDURE — 94761 N-INVAS EAR/PLS OXIMETRY MLT: CPT

## 2023-06-17 RX ORDER — POTASSIUM CHLORIDE 29.8 MG/ML
40 INJECTION INTRAVENOUS ONCE
Status: COMPLETED | OUTPATIENT
Start: 2023-06-17 | End: 2023-06-17

## 2023-06-17 RX ADMIN — IBUPROFEN 400 MG: 400 TABLET, FILM COATED ORAL at 05:06

## 2023-06-17 RX ADMIN — OXYCODONE HYDROCHLORIDE 10 MG: 10 TABLET ORAL at 08:06

## 2023-06-17 RX ADMIN — IBUPROFEN 400 MG: 400 TABLET, FILM COATED ORAL at 11:06

## 2023-06-17 RX ADMIN — CLONIDINE HYDROCHLORIDE 0.3 MG: 0.1 TABLET ORAL at 05:06

## 2023-06-17 RX ADMIN — ENOXAPARIN SODIUM 40 MG: 40 INJECTION SUBCUTANEOUS at 04:06

## 2023-06-17 RX ADMIN — CEFAZOLIN 2 G: 2 INJECTION, POWDER, FOR SOLUTION INTRAMUSCULAR; INTRAVENOUS at 11:06

## 2023-06-17 RX ADMIN — QUETIAPINE FUMARATE 200 MG: 200 TABLET ORAL at 08:06

## 2023-06-17 RX ADMIN — Medication 10 ML: at 06:06

## 2023-06-17 RX ADMIN — CEFAZOLIN 2 G: 2 INJECTION, POWDER, FOR SOLUTION INTRAMUSCULAR; INTRAVENOUS at 08:06

## 2023-06-17 RX ADMIN — Medication 10 ML: at 12:06

## 2023-06-17 RX ADMIN — CLONIDINE HYDROCHLORIDE 0.3 MG: 0.1 TABLET ORAL at 11:06

## 2023-06-17 RX ADMIN — POTASSIUM CHLORIDE 40 MEQ: 29.8 INJECTION, SOLUTION INTRAVENOUS at 05:06

## 2023-06-17 RX ADMIN — ACETAMINOPHEN 1000 MG: 500 TABLET ORAL at 01:06

## 2023-06-17 RX ADMIN — ACETAMINOPHEN 1000 MG: 500 TABLET ORAL at 09:06

## 2023-06-17 RX ADMIN — MUPIROCIN: 20 OINTMENT TOPICAL at 08:06

## 2023-06-17 RX ADMIN — Medication 10 ML: at 05:06

## 2023-06-17 RX ADMIN — ACETAMINOPHEN 1000 MG: 500 TABLET ORAL at 05:06

## 2023-06-17 RX ADMIN — Medication 10 ML: at 11:06

## 2023-06-17 RX ADMIN — OXYCODONE HYDROCHLORIDE 10 MG: 10 TABLET ORAL at 06:06

## 2023-06-17 RX ADMIN — CEFAZOLIN 2 G: 2 INJECTION, POWDER, FOR SOLUTION INTRAMUSCULAR; INTRAVENOUS at 04:06

## 2023-06-17 NOTE — PT/OT/SLP EVAL
Physical Therapy Evaluation    Patient Name:  Aditi Conway   MRN:  [id number]    Recommendations:     Discharge Recommendations: rehabilitation facility   Discharge Equipment Recommendations: none   Barriers to discharge: None    Assessment:     Aditi Conway is a 42 y.o. female admitted with a medical diagnosis of Necrotizing fasciitis.  She presents with the following impairments/functional limitations: weakness, impaired functional mobility, decreased safety awareness, impaired cardiopulmonary response to activity, impaired endurance, gait instability, pain, impaired balance, impaired self care skills, decreased lower extremity function. Pt motivated to participate in session. Pt has significant weakness in R leg with R knee buckling during ambulation. Pt was maxAx2 with hand held assist and no AD with ambulation from bedside chair to toilet and back to chair (about 15' total). Pt was very unsteady and ambulated with narrow MICHAELA and decreased step length. Pt was modAx2 with STS from bedside chair and from toilet. Pt fatigued quickly with ambulation but was able to perform LE exercises (1x10 reps of hip marches, LAQs, heel raises, toe raises) on return to chair.  Pt would benefit from intensive inpatient rehabilitation for: Dynamic/static standing/sitting balance through skilled balance training, strengthening with the use of skilled therapeutic exercises interventions, mobility and safety training to ensure safe discharge home through skilled patient and caregiver education home management training, mobility through community re-integration training. Pt highly motivated to return to independent PLOF and can tolerate 3+hours of therapy. Pt continues to benefit from a multidisciplinary program to improve quality of life and focus on recovery of impairments.     Rehab Prognosis: Good; patient would benefit from acute skilled PT services to address these deficits and reach maximum level of  function.    Recent Surgery: Procedure(s) (LRB):  EXPLORATION, NECK (Right)  CREATION, FREE FLAP (N/A) 3 Days Post-Op    Plan:     During this hospitalization, patient to be seen 4 x/week to address the identified rehab impairments via gait training, therapeutic activities, therapeutic exercises, neuromuscular re-education and progress toward the following goals:    Plan of Care Expires:  07/17/23    Subjective     Chief Complaint: pain in R thigh  Patient/Family Comments/goals: Pt's  was very appreciative of staff and told pt to make sure she remembers the LE exercises.  Pain/Comfort:  Pain Rating 1: 10/10  Location - Side 1: Right  Location 1: thigh  Pain Addressed 1: Reposition, Distraction  Pain Rating Post-Intervention 1:  (pt did not rate)    Patients cultural, spiritual, Buddhist conflicts given the current situation: no    Living Environment:  Pt lives with  and daughter in a two story home with no NOELLE. Pt has a half bath on first floor. Pt has a tub shower.  Prior to admission, patients level of function was independent with ADLs and functional mobility including driving. Pt was working as an  at [employer].  Equipment used at home: none.  DME owned (not currently used): rolling walker and single point cane.  Upon discharge, patient will have assistance from  and best friend.    Objective:     Communicated with RN prior to session. RN disconnected IV and brought portable monitor for session. Patient found up in chair with AMADO drain, PureWick, PICC line, peripheral IV, telemetry  upon PT entry to room.    General Precautions: Standard, fall  Orthopedic Precautions:N/A   Braces: N/A  Respiratory Status: Room air    Exams:  Cognitive Exam:  Patient is oriented to Person, Place, Time, and Situation  Postural Exam:  Patient presented with the following abnormalities:    -       Rounded shoulders  -       Forward head  Sensation:    -       pt denies numbness/tingling in  BLE  RLE ROM: WFL  RLE Strength: grossly 4/5, hip flexion 3/5 no resistance applied due to incisions  LLE ROM: WFL  LLE Strength: grossly 4/5    Functional Mobility:  Bed Mobility: not assessed at this time due to pt being up in chair on PT entry to room   Transfers:     Sit to Stand:  moderate assistancex2 with no AD  Gait: 15' maxAx2 with hand held assist and no AD with ambulation from bedside chair to toilet and back to chair, very unsteady, narrow MICHAELA and decreased step length.   Balance:   Static sitting: CGA  Dynamic sitting: Nimesh  Static standing: modA-maxA  Dynamic standing: modA-maxA      AM-PAC 6 CLICK MOBILITY  Total Score:12       Treatment & Education:  Pt completed 1x10 reps of hip marches, LAQs, heel raises, toe raises in bedside chair. Pt educated on role of therapy, goals of session, and benefits of mobilizing. All questions answered within PT scope of practice.    Patient left up in chair with all lines intact and call button in reach.    GOALS:   Multidisciplinary Problems       Physical Therapy Goals          Problem: Physical Therapy    Goal Priority Disciplines Outcome Goal Variances Interventions   Physical Therapy Goal     PT, PT/OT Ongoing, Progressing     Description: Goals to be met by: 2023     Patient will increase functional independence with mobility by performin. Supine to sit with Stand-by Assistance  2. Sit to supine with Stand-by Assistance  3. Rolling to Left and Right with Stand-by Assistance.  4. Sit to stand transfer with Contact Guard Assistance  5. Gait  x 50 feet with Minimal Assistance using No Assistive Device.                          History:     Past Medical History:   Diagnosis Date    Depression     Heart murmur     Seizures        Past Surgical History:   Procedure Laterality Date    DEBRIDEMENT, BREAST Left 2021    Procedure: DEBRIDEMENT,  BREAST;  Surgeon: Yoan Sparks MD;  Location: [facility];  Service: General;  Laterality: Left;  DEBRIDEMENT,  WOUND, LEFT BREAST    DILATION AND CURETTAGE OF UTERUS      FLAP PROCEDURE N/A 6/14/2023    Procedure: CREATION, FREE FLAP;  Surgeon: Gerardo Seymour MD;  [facility];  Service: ENT;  Laterality: N/A;    INCISION AND DRAINAGE Left 7/12/2021    Procedure: INCISION AND DRAINAGE, HEMATOMA, SEROMA, OR FLUID COLLECTION ;  Surgeon: Yoan Sparks MD;  Location: [facility];  Service: General;  Laterality: Left;    NECK EXPLORATION Bilateral 6/8/2023    Procedure: EXPLORATION and debridement, NECK;  Surgeon: Jose Jules MD;  [facility];  Service: ENT;  Laterality: Bilateral;    NECK EXPLORATION Right 6/12/2023    Procedure: RIGHT NECK WOUND EXPLORATION WITH WOUND DEBRIDEMENT;  Surgeon: Gerardo Seymour MD;  [facility];  Service: ENT;  Laterality: Right;    NECK EXPLORATION Right 6/14/2023    Procedure: EXPLORATION, NECK;  Surgeon: Gerardo Seymour MD;  [facility];  Service: ENT;  Laterality: Right;       Time Tracking:     PT Received On: 06/17/23  PT Start Time: 1049     PT Stop Time: 1111  PT Total Time (min): 22 min     Billable Minutes: Evaluation 12 min and Gait Training 10 min      06/17/2023

## 2023-06-17 NOTE — ASSESSMENT & PLAN NOTE
42F PMH depression, seizures (no meds), tobacco use, presenting with concern for toshia's angina / nec fasc of neck s/p exploration and debridement with ENT 6/8/23. S/p 6/14 right neck debridement with right anterolateral free flap coverage.           Neuro/Psych:   -- Sedation: precedex ggt discontinued  -- Pain: tylenol 650mg q6h PRN, oxy 5/10 PRN  -- Seroquel 150mg BID, haldol 5 q6h prn              Cards:   -- HDS  -- currently not requiring pressors  -- MAP > 65  -- q2h flap checks      Pulm:   -- Goal O2 sat > 90%  -- Extubated 6/15  -- IS, Acapella  -- Room Air      Renal:  -- D/c murdock, I/O  -- BUN/Cr 11/0.4      FEN / GI:   -- Replace lytes as needed  -- Nutrition: Passed bedside swallow       ID:   -- Afebrile, WBC 9.19  -- Abx: cefazolin  -- ID following  -- Following OR cultures 6/8 Staph aureus, MSSA  -- Blood Cx OSH 6/7 NGTD  -- ID signed off but stated to re-consult on 6/25      Heme/Onc:   -- H/H stable 9.1/27.1  -- Daily CBC      Endo:   -- Gluc goal 140-180      PPx:   Feeding: Regular diet  Analgesia/Sedation: tylenol 650mg q6h PRN, oxy5/10 PRN, seroquel 150 BID, haldol PRN  Thromboembolic prevention: lovenox  HOB >30: Yes  Stress Ulcer ppx: n/a  Glucose control: Critical care goal 140-180 g/dl     Lines/Drains/Airway: pIVs, OGT, Murdock, ETT, PICC      Dispo/Code Status/Palliative:   -- SICU / Full Code

## 2023-06-17 NOTE — NURSING
"      SICU PLAN OF CARE NOTE    Dx: Necrotizing fasciitis    Shift Events: NAEON. Precedex titrated off. OOBTC in AM    Goals of Care: MAP > 65; Potentially stepdown today    Neuro: AAO x4, Follows Commands, and Moves All Extremities    Vital Signs: /66   Pulse 68   Temp 98.9 °F (37.2 °C) (Oral)   Resp 19   Ht 5' 2" (1.575 m)   Wt 55.8 kg (123 lb 0.3 oz)   SpO2 98%   Breastfeeding No   BMI 22.50 kg/m²     Respiratory: Room Air    Diet: Regular Diet    Gtts: N/A    Urine Output: Voids Spontaneously 700 cc/shift    Drains: AMADO Drain, total output 99 cc / shift       Labs/Accuchecks: Daily labs.    Skin: No new skin breakdown noted. Pt on specialty bed with protective foams intact. Pt repositions independently with frequent weight shift encouraged and weight shift assistance provided.       "

## 2023-06-17 NOTE — PROGRESS NOTES
[facility]  Critical Care - Surgery  Progress Note    Patient Name: Aditi Conway  MRN: [id number]  Admission Date: 6/8/2023  Hospital Length of Stay: 9 days  Code Status: Full Code  Attending Provider: Gerardo Seymour MD  Primary Care Provider: Primary Doctor No   Principal Problem: Necrotizing fasciitis    Subjective:     Hospital/ICU Course:  No notes on file    Interval History/Significant Events: No agitation overnight. Precedex is off. Respiratory status stable on room air. Flap checks q2h    Follow-up For: Procedure(s) (LRB):  EXPLORATION, NECK (Right)  CREATION, FREE FLAP (N/A)    Post-Operative Day: 3 Days Post-Op    Objective:     Vital Signs (Most Recent):  Temp: 98.8 °F (37.1 °C) (06/17/23 0705)  Pulse: 77 (06/17/23 0710)  Resp: (!) 23 (06/17/23 0705)  BP: 130/76 (06/17/23 0705)  SpO2: 100 % (06/17/23 0705) Vital Signs (24h Range):  Temp:  [97.6 °F (36.4 °C)-99.5 °F (37.5 °C)] 98.8 °F (37.1 °C)  Pulse:  [] 77  Resp:  [12-46] 23  SpO2:  [92 %-100 %] 100 %  BP: ()/() 130/76     Weight: 55.8 kg (123 lb 0.3 oz)  Body mass index is 22.5 kg/m².      Intake/Output Summary (Last 24 hours) at 6/17/2023 0940  Last data filed at 6/17/2023 0600  Gross per 24 hour   Intake 1002.96 ml   Output 1424 ml   Net -421.04 ml          Physical Exam  Constitutional:       General: She is not in acute distress.     Appearance: Normal appearance.   HENT:      Head: Normocephalic and atraumatic.      Mouth/Throat:      Mouth: Mucous membranes are dry.      Pharynx: Oropharynx is clear.   Eyes:      Pupils: Pupils are equal, round, and reactive to light.   Neck:      Comments: Right neck with ALT flap stapled in place  Soft, appropriate color   Strong arterial and venous external doppler signal   AMADO drain with s/s output     Cardiovascular:      Rate and Rhythm: Normal rate and regular rhythm.      Pulses: Normal pulses.   Abdominal:      Palpations: Abdomen is soft.    Musculoskeletal:      Comments: RLE with soft to palpation, island dressing in place  AMADO drain with s/s output    Skin:     General: Skin is warm and dry.   Neurological:      General: No focal deficit present.      Mental Status: She is alert.          Vents:  Vent Mode: Spont (06/15/23 1426)  Set Rate: 18 BPM (06/15/23 1115)  Vt Set: 360 mL (06/15/23 0559)  Pressure Support: 5 cmH20 (06/15/23 1426)  PEEP/CPAP: 5 cmH20 (06/15/23 1426)  Oxygen Concentration (%): 40 (06/15/23 1426)  Peak Airway Pressure: 10 cmH20 (06/15/23 1426)  Plateau Pressure: 0 cmH20 (06/15/23 1426)  Total Ve: 7.98 L/m (06/15/23 1426)  Negative Inspiratory Force (cm H2O): -44 (06/15/23 1426)  F/VT Ratio<105 (RSBI): (!) 29.41 (06/15/23 1155)    Lines/Drains/Airways       Peripherally Inserted Central Catheter Line  Duration             PICC Double Lumen 06/09/23 1152 right brachial 7 days              Drain  Duration                  Closed/Suction Drain 06/14/23 1044 Right Neck Bulb 15 Fr. 2 days         Closed/Suction Drain 06/14/23 1044 Right Thigh Bulb 19 Fr. 2 days    Female External Urinary Catheter 06/16/23 1200 <1 day                    Significant Labs:    CBC/Anemia Profile:  Recent Labs   Lab 06/16/23  0308 06/17/23  0256   WBC 11.78 9.19   HGB 8.7* 9.1*   HCT 25.6* 27.1*   * 657*   MCV 88 91   RDW 11.8 12.5        Chemistries:  Recent Labs   Lab 06/16/23  0308 06/17/23  0256    141   K 3.5 3.5    108   CO2 25 26   BUN 11 12   CREATININE 0.4* 0.5   CALCIUM 8.8 8.9   ALBUMIN 2.3* 2.1*   PROT 5.6* 5.5*   BILITOT 0.4 0.3   ALKPHOS 126 107   ALT 38 43   AST 57* 52*   MG 1.8 1.9   PHOS 3.1 3.9       All pertinent labs within the past 24 hours have been reviewed.    Significant Imaging:  I have reviewed all pertinent imaging results/findings within the past 24 hours.    Assessment/Plan:     ID  * Necrotizing fasciitis  42F PMH depression, seizures (no meds), tobacco use, presenting with concern for toshia's angina / nec  fasc of neck s/p exploration and debridement with ENT 6/8/23. S/p 6/14 right neck debridement with right anterolateral free flap coverage.           Neuro/Psych:   -- Sedation: precedex ggt discontinued  -- Pain: tylenol 650mg q6h PRN, oxy 5/10 PRN  -- Seroquel 150mg BID, haldol 5 q6h prn              Cards:   -- HDS  -- currently not requiring pressors  -- MAP > 65  -- q2h flap checks      Pulm:   -- Goal O2 sat > 90%  -- Extubated 6/15  -- IS, Acapella  -- Room Air      Renal:  -- D/c murdock, I/O  -- BUN/Cr 11/0.4      FEN / GI:   -- Replace lytes as needed  -- Nutrition: Passed bedside swallow       ID:   -- Afebrile, WBC 9.19  -- Abx: cefazolin  -- ID following  -- Following OR cultures 6/8 Staph aureus, MSSA  -- Blood Cx OSH 6/7 NGTD  -- ID signed off but stated to re-consult on 6/25      Heme/Onc:   -- H/H stable 9.1/27.1  -- Daily CBC      Endo:   -- Gluc goal 140-180      PPx:   Feeding: Regular diet  Analgesia/Sedation: tylenol 650mg q6h PRN, oxy5/10 PRN, seroquel 150 BID, haldol PRN  Thromboembolic prevention: lovenox  HOB >30: Yes  Stress Ulcer ppx: n/a  Glucose control: Critical care goal 140-180 g/dl     Lines/Drains/Airway: pIVs, OGT, Murdock, ETT, PICC      Dispo/Code Status/Palliative:   -- SICU / Full Code             Critical care was time spent personally by me on the following activities: development of treatment plan with patient or surrogate and bedside caregivers, discussions with consultants, evaluation of patient's response to treatment, examination of patient, ordering and performing treatments and interventions, ordering and review of laboratory studies, ordering and review of radiographic studies, pulse oximetry, re-evaluation of patient's condition.  This critical care time did not overlap with that of any other provider or involve time for any procedures.     Mago Block MD  Critical Care - Surgery  [facility]

## 2023-06-17 NOTE — PLAN OF CARE
PT eval complete and plan of care established.       Problem: Physical Therapy  Goal: Physical Therapy Goal  Description: Goals to be met by: 2023     Patient will increase functional independence with mobility by performin. Supine to sit with Stand-by Assistance  2. Sit to supine with Stand-by Assistance  3. Rolling to Left and Right with Stand-by Assistance.  4. Sit to stand transfer with Contact Guard Assistance  5. Gait  x 50 feet with Minimal Assistance using No Assistive Device.     Outcome: Ongoing, Progressing

## 2023-06-17 NOTE — CARE UPDATE
"      SICU PLAN OF CARE NOTE    Dx: Necrotizing fasciitis    Shift Events: Clark removed, pain management regiment followed. Flap checks completed.    Patient and family up to date on plan of care, no questions    Goals of Care: MAPs >65  pain management    Neuro: AAO x4, Follows Commands, and Moves All Extremities    Vital Signs: /71   Pulse 75   Temp 99 °F (37.2 °C) (Oral)   Resp (!) 35   Ht 5' 2" (1.575 m)   Wt 55.8 kg (123 lb 0.3 oz)   SpO2 99%   Breastfeeding No   BMI 22.50 kg/m²     Respiratory: Room Air    Diet: Regular Diet    Gtts: Precedex @ 0.8    Urine Output: Voids Spontaneously 1200 cc/shift    Drains: AMADO Drain, total output 25 cc / shift    AMADO Drain, total output 35 cc / shift    Flap Checks Q2H    Labs/Accuchecks: Daily labs.    Skin: Nuvia bed in use and plugged in. No breakdown. AMADO drains in place.       "

## 2023-06-17 NOTE — SUBJECTIVE & OBJECTIVE
Interval History/Significant Events: No agitation overnight. Precedex is off. Respiratory status stable on room air. Flap checks q2h    Follow-up For: Procedure(s) (LRB):  EXPLORATION, NECK (Right)  CREATION, FREE FLAP (N/A)    Post-Operative Day: 3 Days Post-Op    Objective:     Vital Signs (Most Recent):  Temp: 98.8 °F (37.1 °C) (06/17/23 0705)  Pulse: 77 (06/17/23 0710)  Resp: (!) 23 (06/17/23 0705)  BP: 130/76 (06/17/23 0705)  SpO2: 100 % (06/17/23 0705) Vital Signs (24h Range):  Temp:  [97.6 °F (36.4 °C)-99.5 °F (37.5 °C)] 98.8 °F (37.1 °C)  Pulse:  [] 77  Resp:  [12-46] 23  SpO2:  [92 %-100 %] 100 %  BP: ()/() 130/76     Weight: 55.8 kg (123 lb 0.3 oz)  Body mass index is 22.5 kg/m².      Intake/Output Summary (Last 24 hours) at 6/17/2023 0940  Last data filed at 6/17/2023 0600  Gross per 24 hour   Intake 1002.96 ml   Output 1424 ml   Net -421.04 ml          Physical Exam  Constitutional:       General: She is not in acute distress.     Appearance: Normal appearance.   HENT:      Head: Normocephalic and atraumatic.      Mouth/Throat:      Mouth: Mucous membranes are dry.      Pharynx: Oropharynx is clear.   Eyes:      Pupils: Pupils are equal, round, and reactive to light.   Neck:      Comments: Right neck with ALT flap stapled in place  Soft, appropriate color   Strong arterial and venous external doppler signal   AMADO drain with s/s output     Cardiovascular:      Rate and Rhythm: Normal rate and regular rhythm.      Pulses: Normal pulses.   Abdominal:      Palpations: Abdomen is soft.   Musculoskeletal:      Comments: RLE with soft to palpation, island dressing in place  AMADO drain with s/s output    Skin:     General: Skin is warm and dry.   Neurological:      General: No focal deficit present.      Mental Status: She is alert.          Vents:  Vent Mode: Spont (06/15/23 1426)  Set Rate: 18 BPM (06/15/23 1115)  Vt Set: 360 mL (06/15/23 0559)  Pressure Support: 5 cmH20 (06/15/23  1426)  PEEP/CPAP: 5 cmH20 (06/15/23 1426)  Oxygen Concentration (%): 40 (06/15/23 1426)  Peak Airway Pressure: 10 cmH20 (06/15/23 1426)  Plateau Pressure: 0 cmH20 (06/15/23 1426)  Total Ve: 7.98 L/m (06/15/23 1426)  Negative Inspiratory Force (cm H2O): -44 (06/15/23 1426)  F/VT Ratio<105 (RSBI): (!) 29.41 (06/15/23 1155)    Lines/Drains/Airways       Peripherally Inserted Central Catheter Line  Duration             PICC Double Lumen 06/09/23 1152 right brachial 7 days              Drain  Duration                  Closed/Suction Drain 06/14/23 1044 Right Neck Bulb 15 Fr. 2 days         Closed/Suction Drain 06/14/23 1044 Right Thigh Bulb 19 Fr. 2 days    Female External Urinary Catheter 06/16/23 1200 <1 day                    Significant Labs:    CBC/Anemia Profile:  Recent Labs   Lab 06/16/23  0308 06/17/23  0256   WBC 11.78 9.19   HGB 8.7* 9.1*   HCT 25.6* 27.1*   * 657*   MCV 88 91   RDW 11.8 12.5        Chemistries:  Recent Labs   Lab 06/16/23  0308 06/17/23  0256    141   K 3.5 3.5    108   CO2 25 26   BUN 11 12   CREATININE 0.4* 0.5   CALCIUM 8.8 8.9   ALBUMIN 2.3* 2.1*   PROT 5.6* 5.5*   BILITOT 0.4 0.3   ALKPHOS 126 107   ALT 38 43   AST 57* 52*   MG 1.8 1.9   PHOS 3.1 3.9       All pertinent labs within the past 24 hours have been reviewed.    Significant Imaging:  I have reviewed all pertinent imaging results/findings within the past 24 hours.

## 2023-06-17 NOTE — PROGRESS NOTES
[facility]  Otorhinolaryngology-Head & Neck Surgery  Progress Note    Subjective:     Post-Op Info:  Procedure(s) (LRB):  EXPLORATION, NECK (Right)  CREATION, FREE FLAP (N/A)   3 Days Post-Op  Hospital Day: 10     Interval History: Afebrile. Sitting in chair this morning in good spirits. Transitioned to q2h flap checks last night.    Medications:  Continuous Infusions:  Scheduled Meds:   acetaminophen  1,000 mg Oral Q8H    ceFAZolin (ANCEF) IVPB  2 g Intravenous Q8H    cloNIDine  0.3 mg Oral Q6H    Followed by    [START ON 6/18/2023] cloNIDine  0.3 mg Oral Q8H    Followed by    [START ON 6/20/2023] cloNIDine  0.3 mg Oral Q12H    Followed by    [START ON 6/22/2023] cloNIDine  0.3 mg Oral Daily    enoxparin  40 mg Subcutaneous Q24H (prophylaxis, 1700)    ibuprofen  400 mg Oral Q6H    mupirocin   Nasal BID    polyethylene glycol  17 g Oral Daily    QUEtiapine  200 mg Oral BID    sodium chloride 0.9%  10 mL Intravenous Q6H     PRN Meds:sodium chloride 0.9%, haloperidol lactate, magnesium oxide, magnesium oxide, ondansetron, oxyCODONE, oxyCODONE, potassium bicarbonate, potassium bicarbonate, potassium bicarbonate, potassium, sodium phosphates, potassium, sodium phosphates, potassium, sodium phosphates, sodium chloride 0.9%, sodium chloride 0.9%, Flushing PICC Protocol **AND** sodium chloride 0.9% **AND** sodium chloride 0.9%     Review of patient's allergies indicates:   Allergen Reactions    Clindamycin      Objective:     Vital Signs (24h Range):  Temp:  [97.6 °F (36.4 °C)-99.5 °F (37.5 °C)] 98.8 °F (37.1 °C)  Pulse:  [62-98] 70  Resp:  [12-45] 22  SpO2:  [94 %-100 %] 99 %  BP: ()/() 105/64     Date 06/17/23 0700 - 06/18/23 0659   Shift 3060-4566 0493-1029 1241-9154 24 Hour Total   INTAKE   IV Piggyback 146.2   146.2   Shift Total(mL/kg) 146.2(2.6)   146.2(2.6)   OUTPUT   Drains 14   14   Shift Total(mL/kg) 14(0.3)   14(0.3)   Weight (kg) 55.8 55.8 55.8 55.8      Lines/Drains/Airways       Peripherally Inserted Central Catheter Line  Duration             PICC Double Lumen 06/09/23 1152 right brachial 7 days              Drain  Duration                  Closed/Suction Drain 06/14/23 1044 Right Neck Bulb 15 Fr. 3 days         Closed/Suction Drain 06/14/23 1044 Right Thigh Bulb 19 Fr. 3 days    Female External Urinary Catheter 06/16/23 1200 <1 day                     Physical Exam     Awake, alert   Right neck with ALT flap stapled in place  Soft, appropriate color   Strong arterial and venous external doppler signal   AMADO drain with s/s output   RLE with soft to palpation, Incision c/d/I with staples, no hematoma  AMADO drain with s/s output     Significant Labs:  CBC:   Recent Labs   Lab 06/17/23  0256   WBC 9.19   RBC 2.99*   HGB 9.1*   HCT 27.1*   *   MCV 91   MCH 30.4   MCHC 33.6       Significant Diagnostics:  I have reviewed all pertinent imaging results/findings within the past 24 hours.    Assessment/Plan:     * Necrotizing fasciitis  42F presenting with right neck necrotizing fasciitis with surround cellulitis s/p debridement of multiple levels of right neck on 6/8. Necrotic skin, soft tissue, and muscle removed. Open neck wound packed with saline soaked kerlix. Patient remains intubated. S/P debridement in OR on 6/12. Now s/p reconstruction with R ALT free flap on 6/14.      ENT/HNS:  - Q2H flap checks   - Routine drain care   - Keep head neutral or turned to the left, minimize right sided head movement   -Fresh wound care, clean incision with peroxide/qtip followed by bacitracin BID     Neuro:   -Pain: Multimodality PRN regimen initiated     Cardiac:  -Remain normotensive  -Page ENT before starting vasopressors     Pulmonary:   - CTM     Renal:   - monitor Is and Os  - Cr stable  - OK to d/c murdock  - UTI management per SICU     Infectious Disease:  - Daily CBC  - ID consulted; appreciate recs   - Crista      Hematology/Oncology:  - H/H stable  - Lovenox DVT  prophylaxis      FEN/GI  - Replace electrolytes as needed to keep K>4, Mg>2  - Bowel reg  - Protonix for GI prophylaxis  - OK for diet      MSK  - Drain care  - Bacitracin to incision site      Dispo:  -Continue ICU care for flap checks        Walter De La Cruz MD  Otorhinolaryngology-Head & Neck Surgery  [facility] - Surgical Intensive Care

## 2023-06-17 NOTE — ASSESSMENT & PLAN NOTE
42F PMH depression, seizures (no meds), tobacco use, presenting with concern for toshia's angina / nec fasc of neck s/p exploration and debridement with ENT 6/8/23. S/p 6/14 right neck debridement with right anterolateral free flap coverage.           Neuro/Psych:   -- Sedation: none  -- Pain: tylenol 1g q8, oxy 5/10 PRN  -- Seroquel 200mg BID, haldol 5mg q6h prn              Cards:   -- HDS  -- currently not requiring pressors  -- MAP > 65  -- q2h flap checks; anticipating q4h flap checks later today (POD 4)      Pulm:   -- Goal O2 sat > 90%  -- Extubated 6/15  -- IS, Acapella  -- Room Air      Renal:  -- Strict  I/O  -- BUN/Cr 10/0.5      FEN / GI:   -- Replace lytes as needed  -- Nutrition: Regular diet      ID:   -- Afebrile, WBC 9.7  -- Abx: cefazolin 6/13-7/9  -- ID following  -- Following OR cultures 6/8 Staph aureus, MSSA  -- Blood Cx OSH 6/7 NGTD  -- ID signed off but stated to re-consult on 6/25      Heme/Onc:   -- H/H stable 9.4/28  -- Daily CBC      Endo:   -- Gluc goal 140-180      PPx:   Feeding: Regular diet  Analgesia/Sedation: as above  Thromboembolic prevention: lovenox  HOB >30: Yes  Stress Ulcer ppx: n/a  Glucose control: Critical care goal 140-180 g/dl     Lines/Drains/Airway: AMADO x2, PICC      Dispo/Code Status/Palliative:   -- SICU / Full Code  -- anticipate step down later today once Q4h flap check

## 2023-06-17 NOTE — SUBJECTIVE & OBJECTIVE
Interval History: Afebrile. Sitting in chair this morning in good spirits. Transitioned to q2h flap checks last night.    Medications:  Continuous Infusions:  Scheduled Meds:   acetaminophen  1,000 mg Oral Q8H    ceFAZolin (ANCEF) IVPB  2 g Intravenous Q8H    cloNIDine  0.3 mg Oral Q6H    Followed by    [START ON 6/18/2023] cloNIDine  0.3 mg Oral Q8H    Followed by    [START ON 6/20/2023] cloNIDine  0.3 mg Oral Q12H    Followed by    [START ON 6/22/2023] cloNIDine  0.3 mg Oral Daily    enoxparin  40 mg Subcutaneous Q24H (prophylaxis, 1700)    ibuprofen  400 mg Oral Q6H    mupirocin   Nasal BID    polyethylene glycol  17 g Oral Daily    QUEtiapine  200 mg Oral BID    sodium chloride 0.9%  10 mL Intravenous Q6H     PRN Meds:sodium chloride 0.9%, haloperidol lactate, magnesium oxide, magnesium oxide, ondansetron, oxyCODONE, oxyCODONE, potassium bicarbonate, potassium bicarbonate, potassium bicarbonate, potassium, sodium phosphates, potassium, sodium phosphates, potassium, sodium phosphates, sodium chloride 0.9%, sodium chloride 0.9%, Flushing PICC Protocol **AND** sodium chloride 0.9% **AND** sodium chloride 0.9%     Review of patient's allergies indicates:   Allergen Reactions    Clindamycin      Objective:     Vital Signs (24h Range):  Temp:  [97.6 °F (36.4 °C)-99.5 °F (37.5 °C)] 98.8 °F (37.1 °C)  Pulse:  [62-98] 70  Resp:  [12-45] 22  SpO2:  [94 %-100 %] 99 %  BP: ()/() 105/64     Date 06/17/23 0700 - 06/18/23 0659   Shift 8921-1225 9849-1097 4050-5164 24 Hour Total   INTAKE   IV Piggyback 146.2   146.2   Shift Total(mL/kg) 146.2(2.6)   146.2(2.6)   OUTPUT   Drains 14   14   Shift Total(mL/kg) 14(0.3)   14(0.3)   Weight (kg) 55.8 55.8 55.8 55.8     Lines/Drains/Airways       Peripherally Inserted Central Catheter Line  Duration             PICC Double Lumen 06/09/23 1152 right brachial 7 days              Drain  Duration                  Closed/Suction Drain 06/14/23 1044 Right Neck Bulb 15 Fr. 3 days          Closed/Suction Drain 06/14/23 1044 Right Thigh Bulb 19 Fr. 3 days    Female External Urinary Catheter 06/16/23 1200 <1 day                     Physical Exam     Awake, alert   Right neck with ALT flap stapled in place  Soft, appropriate color   Strong arterial and venous external doppler signal   AMADO drain with s/s output   RLE with soft to palpation, Incision c/d/I with staples, no hematoma  AMADO drain with s/s output     Significant Labs:  CBC:   Recent Labs   Lab 06/17/23  0256   WBC 9.19   RBC 2.99*   HGB 9.1*   HCT 27.1*   *   MCV 91   MCH 30.4   MCHC 33.6       Significant Diagnostics:  I have reviewed all pertinent imaging results/findings within the past 24 hours.

## 2023-06-17 NOTE — PLAN OF CARE
"SICU PLAN OF CARE NOTE    Dx: Necrotizing fasciitis    Goals of Care: MAP >65    Vital Signs: /81 (BP Location: Left arm, Patient Position: Lying)   Pulse 71   Temp 98.8 °F (37.1 °C) (Oral)   Resp (!) 25   Ht 5' 2" (1.575 m)   Wt 55.8 kg (123 lb 0.3 oz)   SpO2 97%   Breastfeeding No   BMI 22.50 kg/m²     Cardiac: NSR    Resp: SpO2 97% on room air     Neuro:  AAO x4, Follows Commands, and Moves All Extremities    Gtts: MIVF    Urine Output:  Voids Spontaneously 500 cc/shift    Drains: Neck AMADO drain, total output, 18 cc/shift  R leg AMADO drain, total output, 11 cc/shift     Diet: Regular Diet    Flap Checks Q2    Skin:  All skin remains free from new injury, patient changes position independently, waffle mattress inflated, ICU bed working correctly.    Shift Events: See flowsheet for further assessment/details.  Family updated on current condition/plan of care, questions answered, and emotional support provided. MD updated on current condition, vitals, labs, and gtts. No new orders received, will continue to monitor.    "

## 2023-06-17 NOTE — ASSESSMENT & PLAN NOTE
42F presenting with right neck necrotizing fasciitis with surround cellulitis s/p debridement of multiple levels of right neck on 6/8. Necrotic skin, soft tissue, and muscle removed. Open neck wound packed with saline soaked kerlix. Patient remains intubated. S/P debridement in OR on 6/12. Now s/p reconstruction with R ALT free flap on 6/14.      ENT/HNS:  - Q2H flap checks   - Routine drain care   - Keep head neutral or turned to the left, minimize right sided head movement   -Fresh wound care, clean incision with peroxide/qtip followed by bacitracin BID     Neuro:   -Pain: Multimodality PRN regimen initiated     Cardiac:  -Remain normotensive  -Page ENT before starting vasopressors     Pulmonary:   - CTM     Renal:   - monitor Is and Os  - Cr stable  - OK to d/c murdock  - UTI management per SICU     Infectious Disease:  - Daily CBC  - ID consulted; appreciate recs   - Crista      Hematology/Oncology:  - H/H stable  - Lovenox DVT prophylaxis      FEN/GI  - Replace electrolytes as needed to keep K>4, Mg>2  - Bowel reg  - Protonix for GI prophylaxis  - OK for diet      MSK  - Drain care  - Bacitracin to incision site      Dispo:  -Continue ICU care for flap checks

## 2023-06-18 LAB
ALBUMIN SERPL BCP-MCNC: 2.4 G/DL (ref 3.5–5.2)
ALP SERPL-CCNC: 100 U/L (ref 55–135)
ALT SERPL W/O P-5'-P-CCNC: 37 U/L (ref 10–44)
ANION GAP SERPL CALC-SCNC: 9 MMOL/L (ref 8–16)
AST SERPL-CCNC: 31 U/L (ref 10–40)
BASOPHILS # BLD AUTO: 0.09 K/UL (ref 0–0.2)
BASOPHILS NFR BLD: 0.9 % (ref 0–1.9)
BILIRUB SERPL-MCNC: 0.2 MG/DL (ref 0.1–1)
BUN SERPL-MCNC: 10 MG/DL (ref 6–20)
CALCIUM SERPL-MCNC: 9.1 MG/DL (ref 8.7–10.5)
CHLORIDE SERPL-SCNC: 108 MMOL/L (ref 95–110)
CO2 SERPL-SCNC: 24 MMOL/L (ref 23–29)
CREAT SERPL-MCNC: 0.5 MG/DL (ref 0.5–1.4)
DIFFERENTIAL METHOD: ABNORMAL
EOSINOPHIL # BLD AUTO: 0.2 K/UL (ref 0–0.5)
EOSINOPHIL NFR BLD: 2.1 % (ref 0–8)
ERYTHROCYTE [DISTWIDTH] IN BLOOD BY AUTOMATED COUNT: 12.3 % (ref 11.5–14.5)
EST. GFR  (NO RACE VARIABLE): >60 ML/MIN/1.73 M^2
GLUCOSE SERPL-MCNC: 110 MG/DL (ref 70–110)
HCT VFR BLD AUTO: 28.4 % (ref 37–48.5)
HGB BLD-MCNC: 9.4 G/DL (ref 12–16)
IMM GRANULOCYTES # BLD AUTO: 0.05 K/UL (ref 0–0.04)
IMM GRANULOCYTES NFR BLD AUTO: 0.5 % (ref 0–0.5)
LYMPHOCYTES # BLD AUTO: 2.5 K/UL (ref 1–4.8)
LYMPHOCYTES NFR BLD: 25.2 % (ref 18–48)
MAGNESIUM SERPL-MCNC: 1.7 MG/DL (ref 1.6–2.6)
MCH RBC QN AUTO: 30.4 PG (ref 27–31)
MCHC RBC AUTO-ENTMCNC: 33.1 G/DL (ref 32–36)
MCV RBC AUTO: 92 FL (ref 82–98)
MONOCYTES # BLD AUTO: 0.8 K/UL (ref 0.3–1)
MONOCYTES NFR BLD: 8.6 % (ref 4–15)
NEUTROPHILS # BLD AUTO: 6.1 K/UL (ref 1.8–7.7)
NEUTROPHILS NFR BLD: 62.7 % (ref 38–73)
NRBC BLD-RTO: 0 /100 WBC
PHOSPHATE SERPL-MCNC: 4 MG/DL (ref 2.7–4.5)
PLATELET # BLD AUTO: 728 K/UL (ref 150–450)
PMV BLD AUTO: 9.9 FL (ref 9.2–12.9)
POTASSIUM SERPL-SCNC: 3.6 MMOL/L (ref 3.5–5.1)
PROT SERPL-MCNC: 5.5 G/DL (ref 6–8.4)
RBC # BLD AUTO: 3.09 M/UL (ref 4–5.4)
SODIUM SERPL-SCNC: 141 MMOL/L (ref 136–145)
WBC # BLD AUTO: 9.72 K/UL (ref 3.9–12.7)

## 2023-06-18 PROCEDURE — 25000003 PHARM REV CODE 250: Performed by: OTOLARYNGOLOGY

## 2023-06-18 PROCEDURE — 25000003 PHARM REV CODE 250

## 2023-06-18 PROCEDURE — 93010 EKG 12-LEAD: ICD-10-PCS | Mod: ,,, | Performed by: INTERNAL MEDICINE

## 2023-06-18 PROCEDURE — 94761 N-INVAS EAR/PLS OXIMETRY MLT: CPT

## 2023-06-18 PROCEDURE — 85025 COMPLETE CBC W/AUTO DIFF WBC: CPT

## 2023-06-18 PROCEDURE — 93005 ELECTROCARDIOGRAM TRACING: CPT

## 2023-06-18 PROCEDURE — 93010 ELECTROCARDIOGRAM REPORT: CPT | Mod: ,,, | Performed by: INTERNAL MEDICINE

## 2023-06-18 PROCEDURE — 63600175 PHARM REV CODE 636 W HCPCS: Performed by: STUDENT IN AN ORGANIZED HEALTH CARE EDUCATION/TRAINING PROGRAM

## 2023-06-18 PROCEDURE — 11000001 HC ACUTE MED/SURG PRIVATE ROOM

## 2023-06-18 PROCEDURE — 25000003 PHARM REV CODE 250: Performed by: STUDENT IN AN ORGANIZED HEALTH CARE EDUCATION/TRAINING PROGRAM

## 2023-06-18 PROCEDURE — 84100 ASSAY OF PHOSPHORUS: CPT

## 2023-06-18 PROCEDURE — 99233 SBSQ HOSP IP/OBS HIGH 50: CPT | Mod: ,,, | Performed by: INTERNAL MEDICINE

## 2023-06-18 PROCEDURE — 99291 PR CRITICAL CARE, E/M 30-74 MINUTES: ICD-10-PCS | Mod: ,,, | Performed by: STUDENT IN AN ORGANIZED HEALTH CARE EDUCATION/TRAINING PROGRAM

## 2023-06-18 PROCEDURE — 83735 ASSAY OF MAGNESIUM: CPT

## 2023-06-18 PROCEDURE — A4216 STERILE WATER/SALINE, 10 ML: HCPCS | Performed by: OTOLARYNGOLOGY

## 2023-06-18 PROCEDURE — 99291 CRITICAL CARE FIRST HOUR: CPT | Mod: ,,, | Performed by: STUDENT IN AN ORGANIZED HEALTH CARE EDUCATION/TRAINING PROGRAM

## 2023-06-18 PROCEDURE — 87040 BLOOD CULTURE FOR BACTERIA: CPT | Performed by: STUDENT IN AN ORGANIZED HEALTH CARE EDUCATION/TRAINING PROGRAM

## 2023-06-18 PROCEDURE — 80053 COMPREHEN METABOLIC PANEL: CPT

## 2023-06-18 PROCEDURE — 99233 PR SUBSEQUENT HOSPITAL CARE,LEVL III: ICD-10-PCS | Mod: ,,, | Performed by: INTERNAL MEDICINE

## 2023-06-18 RX ADMIN — OXYCODONE HYDROCHLORIDE 5 MG: 5 TABLET ORAL at 08:06

## 2023-06-18 RX ADMIN — ACETAMINOPHEN 1000 MG: 500 TABLET ORAL at 05:06

## 2023-06-18 RX ADMIN — IBUPROFEN 400 MG: 400 TABLET, FILM COATED ORAL at 05:06

## 2023-06-18 RX ADMIN — OXYCODONE HYDROCHLORIDE 5 MG: 5 TABLET ORAL at 03:06

## 2023-06-18 RX ADMIN — QUETIAPINE FUMARATE 200 MG: 200 TABLET ORAL at 08:06

## 2023-06-18 RX ADMIN — ACETAMINOPHEN 1000 MG: 500 TABLET ORAL at 01:06

## 2023-06-18 RX ADMIN — Medication 10 ML: at 12:06

## 2023-06-18 RX ADMIN — Medication 800 MG: at 05:06

## 2023-06-18 RX ADMIN — Medication 10 ML: at 06:06

## 2023-06-18 RX ADMIN — CEFAZOLIN 2 G: 2 INJECTION, POWDER, FOR SOLUTION INTRAMUSCULAR; INTRAVENOUS at 11:06

## 2023-06-18 RX ADMIN — IBUPROFEN 400 MG: 400 TABLET, FILM COATED ORAL at 11:06

## 2023-06-18 RX ADMIN — CEFAZOLIN 2 G: 2 INJECTION, POWDER, FOR SOLUTION INTRAMUSCULAR; INTRAVENOUS at 04:06

## 2023-06-18 RX ADMIN — CEFAZOLIN 2 G: 2 INJECTION, POWDER, FOR SOLUTION INTRAMUSCULAR; INTRAVENOUS at 07:06

## 2023-06-18 RX ADMIN — ACETAMINOPHEN 1000 MG: 500 TABLET ORAL at 09:06

## 2023-06-18 RX ADMIN — CLONIDINE HYDROCHLORIDE 0.3 MG: 0.1 TABLET ORAL at 09:06

## 2023-06-18 RX ADMIN — ENOXAPARIN SODIUM 40 MG: 40 INJECTION SUBCUTANEOUS at 04:06

## 2023-06-18 RX ADMIN — POTASSIUM BICARBONATE 50 MEQ: 978 TABLET, EFFERVESCENT ORAL at 05:06

## 2023-06-18 RX ADMIN — CLONIDINE HYDROCHLORIDE 0.3 MG: 0.1 TABLET ORAL at 01:06

## 2023-06-18 RX ADMIN — OXYCODONE HYDROCHLORIDE 5 MG: 5 TABLET ORAL at 02:06

## 2023-06-18 RX ADMIN — CLONIDINE HYDROCHLORIDE 0.3 MG: 0.1 TABLET ORAL at 05:06

## 2023-06-18 NOTE — NURSING
"      SICU PLAN OF CARE NOTE    Dx: Necrotizing fasciitis    Shift Events: NAEON. Flap checks q2H completed. POC reviewed with patient and spouse at bedside    Goals of Care: MAP > 65; stepdown    Neuro: AAO x4, Follows Commands, and Moves All Extremities    Vital Signs: /78   Pulse 72   Temp 98.5 °F (36.9 °C) (Oral)   Resp (!) 22   Ht 5' 2" (1.575 m)   Wt 55.8 kg (123 lb 0.3 oz)   SpO2 99%   Breastfeeding No   BMI 22.50 kg/m²     Respiratory: Room Air    Diet: Regular Diet    Gtts: N/A    Urine Output: Voids Spontaneously 400 cc/shift    Drains: AMADO Drain, total output 30 cc / shift    Flap Checks Q2H; site CDI, strong doppler signal      Labs/Accuchecks: Daily labs.    Skin: No new skin breakdown noted. Pt on specialty bed with protective foams intact. Pt repositions independently with frequent weight shift encouraged and weight shift assistance provided.    "

## 2023-06-18 NOTE — PLAN OF CARE
"SICU PLAN OF CARE NOTE    Dx: Necrotizing fasciitis    Goals of Care:  MAP >65     Vital Signs:  /73 (BP Location: Left arm, Patient Position: Lying)   Pulse 71   Temp 99.6 °F (37.6 °C) (Oral)   Resp (!) 30   Ht 5' 2" (1.575 m)   Wt 55.8 kg (123 lb 0.3 oz)   SpO2 100%   Breastfeeding No   BMI 22.50 kg/m²     Cardiac:  NSR    Resp:  SpO2 100% on room air     Neuro:  AAO x4, Follows Commands, and Moves All Extremities    Gtts:  n/a    Urine Output:  Voids Spontaneously 500 cc/shift    Drains:  Neck AMADO drain, total output, 25 cc/shift   R leg AMADO drain, total output, 35 cc/shift     Diet:  Regular Diet    Flap Checks Q4    Skin:  All skin remains free from new injury, patient changes position independently, waffle mattress inflated, ICU bed working correctly.    Shift Events: See flowsheet for further assessment/details.  Family updated on current condition/plan of care, questions answered, and emotional support provided.  MD updated on current condition, vitals, labs, and gtts.  No new orders received, will continue to monitor.    "

## 2023-06-18 NOTE — PROGRESS NOTES
[facility]  Otorhinolaryngology-Head & Neck Surgery  Progress Note    Subjective:     Post-Op Info:  Procedure(s) (LRB):  EXPLORATION, NECK (Right)  CREATION, FREE FLAP (N/A)   4 Days Post-Op  Hospital Day: 11     Interval History: Afebrile. Remains on clonidine taper. No complaints this morning.     Medications:  Continuous Infusions:  Scheduled Meds:   acetaminophen  1,000 mg Oral Q8H    ceFAZolin (ANCEF) IVPB  2 g Intravenous Q8H    cloNIDine  0.3 mg Oral Q8H    Followed by    [START ON 6/20/2023] cloNIDine  0.3 mg Oral Q12H    Followed by    [START ON 6/22/2023] cloNIDine  0.3 mg Oral Daily    enoxparin  40 mg Subcutaneous Q24H (prophylaxis, 1700)    ibuprofen  400 mg Oral Q6H    polyethylene glycol  17 g Oral Daily    QUEtiapine  200 mg Oral BID    sodium chloride 0.9%  10 mL Intravenous Q6H     PRN Meds:sodium chloride 0.9%, haloperidol lactate, magnesium oxide, magnesium oxide, ondansetron, oxyCODONE, oxyCODONE, potassium bicarbonate, potassium bicarbonate, potassium bicarbonate, potassium, sodium phosphates, potassium, sodium phosphates, potassium, sodium phosphates, sodium chloride 0.9%, sodium chloride 0.9%, Flushing PICC Protocol **AND** sodium chloride 0.9% **AND** sodium chloride 0.9%     Review of patient's allergies indicates:   Allergen Reactions    Clindamycin      Objective:     Vital Signs (24h Range):  Temp:  [98.3 °F (36.8 °C)-98.8 °F (37.1 °C)] 98.8 °F (37.1 °C)  Pulse:  [57-86] 64  Resp:  [10-57] 19  SpO2:  [92 %-100 %] 98 %  BP: ()/() 126/81       Lines/Drains/Airways       Peripherally Inserted Central Catheter Line  Duration             PICC Double Lumen 06/09/23 1152 right brachial 8 days              Drain  Duration                  Closed/Suction Drain 06/14/23 1044 Right Neck Bulb 15 Fr. 3 days         Closed/Suction Drain 06/14/23 1044 Right Thigh Bulb 19 Fr. 3 days                     Physical Exam     Awake, alert   Right neck with ALT  flap stapled in place  Soft, appropriate color   Strong arterial and venous external doppler signal   AMADO drain with s/s output   RLE with soft to palpation, Incision c/d/I with staples, no hematoma  AMADO drain with s/s output     Significant Labs:  All pertinent labs from the last 24 hours have been reviewed.    Significant Diagnostics:  I have reviewed all pertinent imaging results/findings within the past 24 hours.    Assessment/Plan:     * Necrotizing fasciitis  42F presenting with right neck necrotizing fasciitis with surround cellulitis s/p debridement of multiple levels of right neck on 6/8. Necrotic skin, soft tissue, and muscle removed. Open neck wound packed with saline soaked kerlix. Patient remains intubated. S/P debridement in OR on 6/12. Now s/p reconstruction with R ALT free flap on 6/14.      ENT/HNS:  - Q2H flap checks    - q4h flap checks at 5pm   - Routine drain care   - Keep head neutral or turned to the left, minimize right sided head movement   -Fresh wound care, clean incision with peroxide/qtip followed by bacitracin BID     Neuro:   -Pain: Multimodality PRN regimen initiated     Cardiac:  -Remain normotensive  -On clonidine taper until Thursday per SICU, will discuss if safe to be discharged on clonidine taper  -Page ENT before starting vasopressors     Pulmonary:   - CTM     Renal:   - monitor Is and Os  - Cr stable  - OK to d/c murdock  - UTI management per SICU     Infectious Disease:  - Daily CBC  - ID consulted; appreciate recs   - Loreleizol    - Need outpatient antibiotics recommendations. Estimated discharge as soon as tomorrow     Hematology/Oncology:  - H/H stable  - Lovenox DVT prophylaxis      FEN/GI  - Replace electrolytes as needed to keep K>4, Mg>2  - Bowel reg  - Protonix for GI prophylaxis  - OK for diet      MSK  - Drain care  - Bacitracin to incision site      Dispo:  -Continue ICU care for flap checks while q2h.   -Step down orders can be placed when q4h at 5pm and remains  hemodynamically stable   -Needs outpatient abx recommendations from ID  -Will discuss possibility of discharge while still on clonidine taper (ends Thursday)        Walter De La Cruz MD  Otorhinolaryngology-Head & Neck Surgery  [facility] - Surgical Int[facility]

## 2023-06-18 NOTE — ASSESSMENT & PLAN NOTE
42F presenting with right neck necrotizing fasciitis with surround cellulitis s/p debridement of multiple levels of right neck on 6/8. Necrotic skin, soft tissue, and muscle removed. Open neck wound packed with saline soaked kerlix. Patient remains intubated. S/P debridement in OR on 6/12. Now s/p reconstruction with R ALT free flap on 6/14.      ENT/HNS:  - Q2H flap checks    - q4h flap checks at 5pm   - Routine drain care   - Keep head neutral or turned to the left, minimize right sided head movement   -Fresh wound care, clean incision with peroxide/qtip followed by bacitracin BID     Neuro:   -Pain: Multimodality PRN regimen initiated     Cardiac:  -Remain normotensive  -On clonidine taper until Thursday per SICU, will discuss if safe to be discharged on clonidine taper  -Page ENT before starting vasopressors     Pulmonary:   - CTM     Renal:   - monitor Is and Os  - Cr stable  - OK to d/c murdock  - UTI management per SICU     Infectious Disease:  - Daily CBC  - ID consulted; appreciate recs   - Loreleizol    - Need outpatient antibiotics recommendations. Estimated discharge as soon as tomorrow     Hematology/Oncology:  - H/H stable  - Lovenox DVT prophylaxis      FEN/GI  - Replace electrolytes as needed to keep K>4, Mg>2  - Bowel reg  - Protonix for GI prophylaxis  - OK for diet      MSK  - Drain care  - Bacitracin to incision site      Dispo:  -Continue ICU care for flap checks while q2h.   -Step down orders can be placed when q4h at 5pm and remains hemodynamically stable   -Needs outpatient abx recommendations from ID  -Will discuss possibility of discharge while still on clonidine taper (ends Thursday)

## 2023-06-18 NOTE — SUBJECTIVE & OBJECTIVE
Interval History/Significant Events:   NAEON. AFVSS. Anticipating Q4 flap checks later today (POD 4) per ENT, will likely step down once Q4. Remains off pcdx, did not require haldol yesterday, on clonidine taper    Follow-up For: Procedure(s) (LRB):  EXPLORATION, NECK (Right)  CREATION, FREE FLAP (N/A)    Post-Operative Day: 4 Days Post-Op    Objective:     Vital Signs (Most Recent):  Temp: 98.5 °F (36.9 °C) (06/18/23 0315)  Pulse: 72 (06/18/23 0430)  Resp: (!) 22 (06/18/23 0430)  BP: 132/78 (06/18/23 0430)  SpO2: 99 % (06/18/23 0430) Vital Signs (24h Range):  Temp:  [98.3 °F (36.8 °C)-98.8 °F (37.1 °C)] 98.5 °F (36.9 °C)  Pulse:  [57-86] 72  Resp:  [13-53] 22  SpO2:  [92 %-100 %] 99 %  BP: ()/(48-94) 132/78     Weight: 55.8 kg (123 lb 0.3 oz)  Body mass index is 22.5 kg/m².      Intake/Output Summary (Last 24 hours) at 6/18/2023 0547  Last data filed at 6/18/2023 0305  Gross per 24 hour   Intake 963.2 ml   Output 859 ml   Net 104.2 ml          Physical Exam      General: She is not in acute distress.     Appearance: Normal appearance.   HENT:      Head: Normocephalic and atraumatic.      Mouth/Throat:      Pharynx: Oropharynx is clear.   Eyes:      Pupils: Pupils are equal, round, and reactive to light.   Neck:      Comments: Right neck with ALT flap stapled in place  Soft, appropriate color   Strong arterial and venous external doppler signal   AMADO drain with s/s output      Cardiovascular:      Rate and Rhythm: Normal rate and regular rhythm.      Pulses: Normal pulses.   Abdominal:      Palpations: Abdomen is soft.   Musculoskeletal:      Comments: RLE soft to palpation, island dressing in place  AMADO drain with s/s output    Skin:     General: Skin is warm and dry.   Neurological:      General: No focal deficit present.      Mental Status: She is alert.     Vents:  Vent Mode: Spont (06/15/23 1426)  Set Rate: 18 BPM (06/15/23 1115)  Vt Set: 360 mL (06/15/23 0559)  Pressure Support: 5 cmH20 (06/15/23  1426)  PEEP/CPAP: 5 cmH20 (06/15/23 1426)  Oxygen Concentration (%): 40 (06/15/23 1426)  Peak Airway Pressure: 10 cmH20 (06/15/23 1426)  Plateau Pressure: 0 cmH20 (06/15/23 1426)  Total Ve: 7.98 L/m (06/15/23 1426)  Negative Inspiratory Force (cm H2O): -44 (06/15/23 1426)  F/VT Ratio<105 (RSBI): (!) 29.41 (06/15/23 1155)    Lines/Drains/Airways       Peripherally Inserted Central Catheter Line  Duration             PICC Double Lumen 06/09/23 1152 right brachial 8 days              Drain  Duration                  Closed/Suction Drain 06/14/23 1044 Right Neck Bulb 15 Fr. 3 days         Closed/Suction Drain 06/14/23 1044 Right Thigh Bulb 19 Fr. 3 days                    Significant Labs:    CBC/Anemia Profile:  Recent Labs   Lab 06/17/23  0256 06/18/23  0226   WBC 9.19 9.72   HGB 9.1* 9.4*   HCT 27.1* 28.4*   * 728*   MCV 91 92   RDW 12.5 12.3        Chemistries:  Recent Labs   Lab 06/17/23  0256 06/18/23  0226    141   K 3.5 3.6    108   CO2 26 24   BUN 12 10   CREATININE 0.5 0.5   CALCIUM 8.9 9.1   ALBUMIN 2.1* 2.4*   PROT 5.5* 5.5*   BILITOT 0.3 0.2   ALKPHOS 107 100   ALT 43 37   AST 52* 31   MG 1.9 1.7   PHOS 3.9 4.0       All pertinent labs within the past 24 hours have been reviewed.    Significant Imaging:  I have reviewed all pertinent imaging results/findings within the past 24 hours.  No results found in the last 24 hours.

## 2023-06-18 NOTE — PROGRESS NOTES
[facility]  Critical Care - Surgery  Progress Note    Patient Name: Aditi Conway  MRN: [id number]  Admission Date: 6/8/2023  Hospital Length of Stay: 10 days  Code Status: Full Code  Attending Provider: Gerardo Seymour MD  Primary Care Provider: Primary Doctor No   Principal Problem: Necrotizing fasciitis    Subjective:     Hospital/ICU Course:  No notes on file    Interval History/Significant Events:   NAEON. AFVSS. Anticipating Q4 flap checks later today (POD 4) per ENT, will likely step down once Q4. Remains off pcdx, did not require haldol yesterday, on clonidine taper    Follow-up For: Procedure(s) (LRB):  EXPLORATION, NECK (Right)  CREATION, FREE FLAP (N/A)    Post-Operative Day: 4 Days Post-Op    Objective:     Vital Signs (Most Recent):  Temp: 98.5 °F (36.9 °C) (06/18/23 0315)  Pulse: 72 (06/18/23 0430)  Resp: (!) 22 (06/18/23 0430)  BP: 132/78 (06/18/23 0430)  SpO2: 99 % (06/18/23 0430) Vital Signs (24h Range):  Temp:  [98.3 °F (36.8 °C)-98.8 °F (37.1 °C)] 98.5 °F (36.9 °C)  Pulse:  [57-86] 72  Resp:  [13-53] 22  SpO2:  [92 %-100 %] 99 %  BP: ()/(48-94) 132/78     Weight: 55.8 kg (123 lb 0.3 oz)  Body mass index is 22.5 kg/m².      Intake/Output Summary (Last 24 hours) at 6/18/2023 0510  Last data filed at 6/18/2023 0305  Gross per 24 hour   Intake 963.2 ml   Output 859 ml   Net 104.2 ml          Physical Exam      General: She is not in acute distress.     Appearance: Normal appearance.   HENT:      Head: Normocephalic and atraumatic.      Mouth/Throat:      Pharynx: Oropharynx is clear.   Eyes:      Pupils: Pupils are equal, round, and reactive to light.   Neck:      Comments: Right neck with ALT flap stapled in place  Soft, appropriate color   Strong arterial and venous external doppler signal   AMADO drain with s/s output      Cardiovascular:      Rate and Rhythm: Normal rate and regular rhythm.      Pulses: Normal pulses.   Abdominal:      Palpations: Abdomen  is soft.   Musculoskeletal:      Comments: RLE soft to palpation, island dressing in place  AMADO drain with s/s output    Skin:     General: Skin is warm and dry.   Neurological:      General: No focal deficit present.      Mental Status: She is alert.     Vents:  Vent Mode: Spont (06/15/23 1426)  Set Rate: 18 BPM (06/15/23 1115)  Vt Set: 360 mL (06/15/23 0559)  Pressure Support: 5 cmH20 (06/15/23 1426)  PEEP/CPAP: 5 cmH20 (06/15/23 1426)  Oxygen Concentration (%): 40 (06/15/23 1426)  Peak Airway Pressure: 10 cmH20 (06/15/23 1426)  Plateau Pressure: 0 cmH20 (06/15/23 1426)  Total Ve: 7.98 L/m (06/15/23 1426)  Negative Inspiratory Force (cm H2O): -44 (06/15/23 1426)  F/VT Ratio<105 (RSBI): (!) 29.41 (06/15/23 1155)    Lines/Drains/Airways       Peripherally Inserted Central Catheter Line  Duration             PICC Double Lumen 06/09/23 1152 right brachial 8 days              Drain  Duration                  Closed/Suction Drain 06/14/23 1044 Right Neck Bulb 15 Fr. 3 days         Closed/Suction Drain 06/14/23 1044 Right Thigh Bulb 19 Fr. 3 days                    Significant Labs:    CBC/Anemia Profile:  Recent Labs   Lab 06/17/23  0256 06/18/23  0226   WBC 9.19 9.72   HGB 9.1* 9.4*   HCT 27.1* 28.4*   * 728*   MCV 91 92   RDW 12.5 12.3        Chemistries:  Recent Labs   Lab 06/17/23  0256 06/18/23  0226    141   K 3.5 3.6    108   CO2 26 24   BUN 12 10   CREATININE 0.5 0.5   CALCIUM 8.9 9.1   ALBUMIN 2.1* 2.4*   PROT 5.5* 5.5*   BILITOT 0.3 0.2   ALKPHOS 107 100   ALT 43 37   AST 52* 31   MG 1.9 1.7   PHOS 3.9 4.0       All pertinent labs within the past 24 hours have been reviewed.    Significant Imaging:  I have reviewed all pertinent imaging results/findings within the past 24 hours.  No results found in the last 24 hours.      Assessment/Plan:     ID  * Necrotizing fasciitis  42F PMH depression, seizures (no meds), tobacco use, presenting with concern for toshia's angina / nec fasc of neck s/p  exploration and debridement with ENT 6/8/23. S/p 6/14 right neck debridement with right anterolateral free flap coverage.           Neuro/Psych:   -- Sedation: none  -- Pain: tylenol 1g q8, oxy 5/10 PRN  -- Seroquel 200mg BID, haldol 5mg q6h prn              Cards:   -- HDS  -- currently not requiring pressors  -- MAP > 65  -- q2h flap checks; anticipating q4h flap checks later today (POD 4)      Pulm:   -- Goal O2 sat > 90%  -- Extubated 6/15  -- IS, Acapella  -- Room Air      Renal:  -- Strict  I/O  -- BUN/Cr 10/0.5      FEN / GI:   -- Replace lytes as needed  -- Nutrition: Regular diet      ID:   -- Afebrile, WBC 9.7  -- Abx: cefazolin 6/13-7/9  -- ID following  -- Following OR cultures 6/8 Staph aureus, MSSA  -- Blood Cx OSH 6/7 NGTD  -- ID signed off but stated to re-consult on 6/25      Heme/Onc:   -- H/H stable 9.4/28  -- Daily CBC      Endo:   -- Gluc goal 140-180      PPx:   Feeding: Regular diet  Analgesia/Sedation: as above  Thromboembolic prevention: lovenox  HOB >30: Yes  Stress Ulcer ppx: n/a  Glucose control: Critical care goal 140-180 g/dl     Lines/Drains/Airway: AMADO x2, PICC      Dispo/Code Status/Palliative:   -- SICU / Full Code  -- anticipate step down later today once Q4h flap check      Critical care was time spent personally by me on the following activities: development of treatment plan with patient or surrogate and bedside caregivers, discussions with consultants, evaluation of patient's response to treatment, examination of patient, ordering and performing treatments and interventions, ordering and review of laboratory studies, ordering and review of radiographic studies, pulse oximetry, re-evaluation of patient's condition.  This critical care time did not overlap with that of any other provider or involve time for any procedures.     Clement Torres MD  Critical Care - Surgery  [facility]

## 2023-06-18 NOTE — SUBJECTIVE & OBJECTIVE
Interval History: Afebrile. Remains on clonidine taper. No complaints this morning.     Medications:  Continuous Infusions:  Scheduled Meds:   acetaminophen  1,000 mg Oral Q8H    ceFAZolin (ANCEF) IVPB  2 g Intravenous Q8H    cloNIDine  0.3 mg Oral Q8H    Followed by    [START ON 6/20/2023] cloNIDine  0.3 mg Oral Q12H    Followed by    [START ON 6/22/2023] cloNIDine  0.3 mg Oral Daily    enoxparin  40 mg Subcutaneous Q24H (prophylaxis, 1700)    ibuprofen  400 mg Oral Q6H    polyethylene glycol  17 g Oral Daily    QUEtiapine  200 mg Oral BID    sodium chloride 0.9%  10 mL Intravenous Q6H     PRN Meds:sodium chloride 0.9%, haloperidol lactate, magnesium oxide, magnesium oxide, ondansetron, oxyCODONE, oxyCODONE, potassium bicarbonate, potassium bicarbonate, potassium bicarbonate, potassium, sodium phosphates, potassium, sodium phosphates, potassium, sodium phosphates, sodium chloride 0.9%, sodium chloride 0.9%, Flushing PICC Protocol **AND** sodium chloride 0.9% **AND** sodium chloride 0.9%     Review of patient's allergies indicates:   Allergen Reactions    Clindamycin      Objective:     Vital Signs (24h Range):  Temp:  [98.3 °F (36.8 °C)-98.8 °F (37.1 °C)] 98.8 °F (37.1 °C)  Pulse:  [57-86] 64  Resp:  [10-57] 19  SpO2:  [92 %-100 %] 98 %  BP: ()/() 126/81       Lines/Drains/Airways       Peripherally Inserted Central Catheter Line  Duration             PICC Double Lumen 06/09/23 1152 right brachial 8 days              Drain  Duration                  Closed/Suction Drain 06/14/23 1044 Right Neck Bulb 15 Fr. 3 days         Closed/Suction Drain 06/14/23 1044 Right Thigh Bulb 19 Fr. 3 days                     Physical Exam     Awake, alert   Right neck with ALT flap stapled in place  Soft, appropriate color   Strong arterial and venous external doppler signal   AMADO drain with s/s output   RLE with soft to palpation, Incision c/d/I with staples, no hematoma  AMADO drain with s/s output     Significant Labs:  All  pertinent labs from the last 24 hours have been reviewed.    Significant Diagnostics:  I have reviewed all pertinent imaging results/findings within the past 24 hours.

## 2023-06-19 LAB
ALBUMIN SERPL BCP-MCNC: 2.3 G/DL (ref 3.5–5.2)
ALP SERPL-CCNC: 85 U/L (ref 55–135)
ALT SERPL W/O P-5'-P-CCNC: 31 U/L (ref 10–44)
ANION GAP SERPL CALC-SCNC: 6 MMOL/L (ref 8–16)
AST SERPL-CCNC: 32 U/L (ref 10–40)
BACTERIA BLD CULT: ABNORMAL
BASOPHILS # BLD AUTO: 0.1 K/UL (ref 0–0.2)
BASOPHILS NFR BLD: 1.3 % (ref 0–1.9)
BILIRUB SERPL-MCNC: 0.2 MG/DL (ref 0.1–1)
BUN SERPL-MCNC: 7 MG/DL (ref 6–20)
CALCIUM SERPL-MCNC: 9.2 MG/DL (ref 8.7–10.5)
CHLORIDE SERPL-SCNC: 108 MMOL/L (ref 95–110)
CO2 SERPL-SCNC: 28 MMOL/L (ref 23–29)
CREAT SERPL-MCNC: 0.4 MG/DL (ref 0.5–1.4)
DIFFERENTIAL METHOD: ABNORMAL
EOSINOPHIL # BLD AUTO: 0.2 K/UL (ref 0–0.5)
EOSINOPHIL NFR BLD: 2.5 % (ref 0–8)
ERYTHROCYTE [DISTWIDTH] IN BLOOD BY AUTOMATED COUNT: 12.3 % (ref 11.5–14.5)
EST. GFR  (NO RACE VARIABLE): >60 ML/MIN/1.73 M^2
GLUCOSE SERPL-MCNC: 96 MG/DL (ref 70–110)
HCT VFR BLD AUTO: 27.3 % (ref 37–48.5)
HGB BLD-MCNC: 9 G/DL (ref 12–16)
IMM GRANULOCYTES # BLD AUTO: 0.04 K/UL (ref 0–0.04)
IMM GRANULOCYTES NFR BLD AUTO: 0.5 % (ref 0–0.5)
LYMPHOCYTES # BLD AUTO: 2.1 K/UL (ref 1–4.8)
LYMPHOCYTES NFR BLD: 27.5 % (ref 18–48)
MAGNESIUM SERPL-MCNC: 1.7 MG/DL (ref 1.6–2.6)
MCH RBC QN AUTO: 30.7 PG (ref 27–31)
MCHC RBC AUTO-ENTMCNC: 33 G/DL (ref 32–36)
MCV RBC AUTO: 93 FL (ref 82–98)
MONOCYTES # BLD AUTO: 0.6 K/UL (ref 0.3–1)
MONOCYTES NFR BLD: 8.5 % (ref 4–15)
NEUTROPHILS # BLD AUTO: 4.5 K/UL (ref 1.8–7.7)
NEUTROPHILS NFR BLD: 59.7 % (ref 38–73)
NRBC BLD-RTO: 0 /100 WBC
PHOSPHATE SERPL-MCNC: 3.6 MG/DL (ref 2.7–4.5)
PLATELET # BLD AUTO: 783 K/UL (ref 150–450)
PMV BLD AUTO: 9.8 FL (ref 9.2–12.9)
POTASSIUM SERPL-SCNC: 4 MMOL/L (ref 3.5–5.1)
PROT SERPL-MCNC: 5.4 G/DL (ref 6–8.4)
RBC # BLD AUTO: 2.93 M/UL (ref 4–5.4)
SODIUM SERPL-SCNC: 142 MMOL/L (ref 136–145)
WBC # BLD AUTO: 7.49 K/UL (ref 3.9–12.7)

## 2023-06-19 PROCEDURE — 25000003 PHARM REV CODE 250: Performed by: STUDENT IN AN ORGANIZED HEALTH CARE EDUCATION/TRAINING PROGRAM

## 2023-06-19 PROCEDURE — 63600175 PHARM REV CODE 636 W HCPCS: Performed by: STUDENT IN AN ORGANIZED HEALTH CARE EDUCATION/TRAINING PROGRAM

## 2023-06-19 PROCEDURE — 93005 ELECTROCARDIOGRAM TRACING: CPT

## 2023-06-19 PROCEDURE — 99233 SBSQ HOSP IP/OBS HIGH 50: CPT | Mod: ,,, | Performed by: SURGERY

## 2023-06-19 PROCEDURE — 94761 N-INVAS EAR/PLS OXIMETRY MLT: CPT

## 2023-06-19 PROCEDURE — 25000003 PHARM REV CODE 250: Performed by: OTOLARYNGOLOGY

## 2023-06-19 PROCEDURE — 11000001 HC ACUTE MED/SURG PRIVATE ROOM

## 2023-06-19 PROCEDURE — 87040 BLOOD CULTURE FOR BACTERIA: CPT | Performed by: STUDENT IN AN ORGANIZED HEALTH CARE EDUCATION/TRAINING PROGRAM

## 2023-06-19 PROCEDURE — 93010 EKG 12-LEAD: ICD-10-PCS | Mod: ,,, | Performed by: INTERNAL MEDICINE

## 2023-06-19 PROCEDURE — A4216 STERILE WATER/SALINE, 10 ML: HCPCS | Performed by: OTOLARYNGOLOGY

## 2023-06-19 PROCEDURE — 99233 PR SUBSEQUENT HOSPITAL CARE,LEVL III: ICD-10-PCS | Mod: ,,, | Performed by: SURGERY

## 2023-06-19 PROCEDURE — 80053 COMPREHEN METABOLIC PANEL: CPT

## 2023-06-19 PROCEDURE — 25000003 PHARM REV CODE 250

## 2023-06-19 PROCEDURE — 83735 ASSAY OF MAGNESIUM: CPT

## 2023-06-19 PROCEDURE — 93010 ELECTROCARDIOGRAM REPORT: CPT | Mod: ,,, | Performed by: INTERNAL MEDICINE

## 2023-06-19 PROCEDURE — 84100 ASSAY OF PHOSPHORUS: CPT

## 2023-06-19 PROCEDURE — 97116 GAIT TRAINING THERAPY: CPT

## 2023-06-19 PROCEDURE — 85025 COMPLETE CBC W/AUTO DIFF WBC: CPT

## 2023-06-19 PROCEDURE — 97165 OT EVAL LOW COMPLEX 30 MIN: CPT

## 2023-06-19 PROCEDURE — 97535 SELF CARE MNGMENT TRAINING: CPT

## 2023-06-19 RX ORDER — CLONIDINE HYDROCHLORIDE 0.1 MG/1
0.1 TABLET ORAL DAILY
Status: COMPLETED | OUTPATIENT
Start: 2023-06-21 | End: 2023-06-21

## 2023-06-19 RX ORDER — FENTANYL CITRATE 50 UG/ML
25 INJECTION, SOLUTION INTRAMUSCULAR; INTRAVENOUS ONCE
Status: COMPLETED | OUTPATIENT
Start: 2023-06-19 | End: 2023-06-19

## 2023-06-19 RX ORDER — CLONIDINE HYDROCHLORIDE 0.1 MG/1
0.2 TABLET ORAL 2 TIMES DAILY
Status: COMPLETED | OUTPATIENT
Start: 2023-06-19 | End: 2023-06-19

## 2023-06-19 RX ORDER — CLONIDINE HYDROCHLORIDE 0.1 MG/1
0.1 TABLET ORAL EVERY 12 HOURS
Status: COMPLETED | OUTPATIENT
Start: 2023-06-20 | End: 2023-06-20

## 2023-06-19 RX ADMIN — Medication 10 ML: at 05:06

## 2023-06-19 RX ADMIN — CLONIDINE HYDROCHLORIDE 0.2 MG: 0.1 TABLET ORAL at 08:06

## 2023-06-19 RX ADMIN — ENOXAPARIN SODIUM 40 MG: 40 INJECTION SUBCUTANEOUS at 04:06

## 2023-06-19 RX ADMIN — QUETIAPINE FUMARATE 200 MG: 200 TABLET ORAL at 08:06

## 2023-06-19 RX ADMIN — IBUPROFEN 400 MG: 400 TABLET, FILM COATED ORAL at 05:06

## 2023-06-19 RX ADMIN — Medication 10 ML: at 12:06

## 2023-06-19 RX ADMIN — ACETAMINOPHEN 1000 MG: 500 TABLET ORAL at 01:06

## 2023-06-19 RX ADMIN — ACETAMINOPHEN 1000 MG: 500 TABLET ORAL at 05:06

## 2023-06-19 RX ADMIN — FENTANYL CITRATE 25 MCG: 0.05 INJECTION, SOLUTION INTRAMUSCULAR; INTRAVENOUS at 10:06

## 2023-06-19 RX ADMIN — Medication 800 MG: at 05:06

## 2023-06-19 RX ADMIN — CEFAZOLIN 2 G: 2 INJECTION, POWDER, FOR SOLUTION INTRAMUSCULAR; INTRAVENOUS at 04:06

## 2023-06-19 RX ADMIN — CEFAZOLIN 2 G: 2 INJECTION, POWDER, FOR SOLUTION INTRAMUSCULAR; INTRAVENOUS at 08:06

## 2023-06-19 RX ADMIN — Medication 10 ML: at 06:06

## 2023-06-19 RX ADMIN — OXYCODONE HYDROCHLORIDE 5 MG: 5 TABLET ORAL at 01:06

## 2023-06-19 RX ADMIN — OXYCODONE HYDROCHLORIDE 10 MG: 10 TABLET ORAL at 01:06

## 2023-06-19 RX ADMIN — CLONIDINE HYDROCHLORIDE 0.3 MG: 0.1 TABLET ORAL at 05:06

## 2023-06-19 RX ADMIN — IBUPROFEN 400 MG: 400 TABLET, FILM COATED ORAL at 12:06

## 2023-06-19 RX ADMIN — OXYCODONE HYDROCHLORIDE 10 MG: 10 TABLET ORAL at 07:06

## 2023-06-19 NOTE — PROGRESS NOTES
[facility]  Otorhinolaryngology-Head & Neck Surgery  Progress Note    Subjective:     Post-Op Info:  Procedure(s) (LRB):  EXPLORATION, NECK (Right)  CREATION, FREE FLAP (N/A)   5 Days Post-Op  Hospital Day: 12     Interval History: Step down orders are in. Remains on IV abx due to GNRs in last blood culture. Repeat drawn per ID. Flap checks q4h now. Platelets continue to rise.     Medications:  Continuous Infusions:  Scheduled Meds:   acetaminophen  1,000 mg Oral Q8H    ceFAZolin (ANCEF) IVPB  2 g Intravenous Q8H    cloNIDine  0.3 mg Oral Q8H    Followed by    [START ON 6/20/2023] cloNIDine  0.3 mg Oral Q12H    Followed by    [START ON 6/22/2023] cloNIDine  0.3 mg Oral Daily    enoxparin  40 mg Subcutaneous Q24H (prophylaxis, 1700)    ibuprofen  400 mg Oral Q6H    polyethylene glycol  17 g Oral Daily    QUEtiapine  200 mg Oral BID    sodium chloride 0.9%  10 mL Intravenous Q6H     PRN Meds:sodium chloride 0.9%, haloperidol lactate, magnesium oxide, magnesium oxide, ondansetron, oxyCODONE, oxyCODONE, potassium bicarbonate, potassium bicarbonate, potassium bicarbonate, potassium, sodium phosphates, potassium, sodium phosphates, potassium, sodium phosphates, sodium chloride 0.9%, sodium chloride 0.9%, Flushing PICC Protocol **AND** sodium chloride 0.9% **AND** sodium chloride 0.9%     Review of patient's allergies indicates:   Allergen Reactions    Clindamycin      Objective:     Vital Signs (24h Range):  Temp:  [98.1 °F (36.7 °C)-99.6 °F (37.6 °C)] 98.5 °F (36.9 °C)  Pulse:  [] 70  Resp:  [5-47] 42  SpO2:  [97 %-100 %] 98 %  BP: ()/() 150/90       Lines/Drains/Airways       Peripherally Inserted Central Catheter Line  Duration             PICC Double Lumen 06/09/23 1152 right brachial 9 days              Drain  Duration                  Closed/Suction Drain 06/14/23 1044 Right Neck Bulb 15 Fr. 4 days         Closed/Suction Drain 06/14/23 1044 Right Thigh Bulb 19 Fr.  4 days                     Physical Exam     Awake, alert   Right neck with ALT flap stapled in place  Soft, appropriate color   Strong arterial and venous external doppler signal   AMADO drain with s/s output   RLE with soft to palpation, Incision c/d/I with staples, no hematoma  AMADO drain with s/s output     Drain Output  06/18 0701 - 06/19 0700  In: 137.5   Out: 1033 [Drains:83]      Significant Labs:  Microbiology Results (last 7 days)       Procedure Component Value Units Date/Time    Blood culture [[id number]] Collected: 06/19/23 0608    Order Status: Sent Specimen: Blood from Peripheral, Antecubital, Left Updated: 06/19/23 0615    Blood culture [[id number]] Collected: 06/15/23 2340    Order Status: Completed Specimen: Blood from Peripheral, Lower Arm, Right Updated: 06/19/23 0612     Blood Culture, Routine No Growth to date      No Growth to date      No Growth to date      No Growth to date    Blood culture [[id number]] Collected: 06/18/23 2052    Order Status: Completed Specimen: Blood from Peripheral, Forearm, Left Updated: 06/19/23 0345     Blood Culture, Routine No Growth to date    Blood culture [[id number]] Collected: 06/15/23 2340    Order Status: Completed Specimen: Blood from Peripheral, Lower Arm, Right Updated: 06/18/23 0754     Blood Culture, Routine Gram stain aer bottle: Gram positive rods      Results called to and read back by:Gunjan Lam RN 06/17/2023  09:07    Rapid Organism ID by PCR (from Blood culture) [[id number]] Collected: 06/15/23 2355    Order Status: Completed Updated: 06/17/23 1102     Enterococcus faecalis Not Detected     Enterococcus faecium Not Detected     Listeria monocytogenes Not Detected     Staphylococcus spp. Not Detected     Staphylococcus aureus Not Detected     Staphylococcus epidermidis Not Detected     Staphylococcus lugdunensis Not Detected     Streptococcus species Not Detected     Streptococcus agalactiae Not Detected     Streptococcus pneumoniae Not Detected      Streptococcus pyogenes Not Detected     Acinetobacter calcoaceticus/baumannii complex Not Detected     Bacteroides fragilis Not Detected     Enterobacterales Not Detected     Enterobacter cloacae complex Not Detected     Escherichia coli Not Detected     Klebsiella aerogenes Not Detected     Klebsiella oxytoca Not Detected     Klebsiella pneumoniae group Not Detected     Proteus Not Detected     Salmonella sp Not Detected     Serratia marcescens Not Detected     Haemophilus influenzae Not Detected     Neisseria meningtidis Not Detected     Pseudomonas aeruginosa Not Detected     Stenotrophomonas maltophilia Not Detected     Candida albicans Not Detected     Candida auris Not Detected     Candida glabrata Not Detected     Candida krusei Not Detected     Candida parapsilosis Not Detected     Candida tropicalis Not Detected     Cryptococcus neoformans/gattii Not Detected     CTX-M (ESBL ) Not Detected     IMP (Carbapenem resistant) Not Detected     KPC resistance gene (Carbapenem resistant) Not Detected     mcr-1  Not Detected     mec A/C  Not Detected     mec A/C and MREJ (MRSA) gene Not Detected     NDM (Carbapenem resistant) Not Detected     OXA-48-like (Carbapenem resistant) Not Detected     van A/B (VRE gene) Not Detected     VIM (Carbapenem resistant) Not Detected    Urine culture [[id number]] Collected: 06/16/23 0121    Order Status: Completed Specimen: Urine, Catheterized Updated: 06/17/23 0600     Urine Culture, Routine No growth    Narrative:      Indicated criteria for high risk culture:->Other  Other (specify):->sepsis    Urine culture [[id number]] Collected: 06/15/23 2356    Order Status: Canceled Specimen: Urine     Blood culture [[id number]] Collected: 06/09/23 1824    Order Status: Completed Specimen: Blood from Peripheral, Wrist, Right Updated: 06/14/23 2012     Blood Culture, Routine No growth after 5 days.    Blood culture [[id number]] Collected: 06/09/23 1826    Order Status: Completed Specimen:  Blood from Peripheral, Hand, Right Updated: 06/14/23 2012     Blood Culture, Routine No growth after 5 days.    Culture, Anaerobe [[id number]] Collected: 06/08/23 2239    Order Status: Completed Specimen: Wound from Neck Updated: 06/13/23 1009     Anaerobic Culture No anaerobes isolated    Aerobic culture [[id number]]  (Abnormal)  (Susceptibility) Collected: 06/08/23 2239    Order Status: Completed Specimen: Wound from Neck Updated: 06/12/23 1028     Aerobic Bacterial Culture STAPHYLOCOCCUS AUREUS  Moderate  Skin norma also present      Narrative:      Right Neck Soft Tissue Culture    Blood culture [[id number]]     Order Status: Canceled Specimen: Blood     Blood culture [[id number]]     Order Status: Canceled Specimen: Blood     Fungus culture [[id number]] Collected: 06/08/23 2239    Order Status: Completed Specimen: Wound from Neck Updated: 06/12/23 0753     Fungus (Mycology) Culture Culture in progress          Recent Lab Results         06/19/23 0311 06/18/23 2052        Albumin 2.3         Alkaline Phosphatase 85         ALT 31         Anion Gap 6         AST 32         Baso # 0.10         Basophil % 1.3         BILIRUBIN TOTAL 0.2  Comment: For infants and newborns, interpretation of results should be based  on gestational age, weight and in agreement with clinical  observations.    Premature Infant recommended reference ranges:  Up to 24 hours.............<8.0 mg/dL  Up to 48 hours............<12.0 mg/dL  3-5 days..................<15.0 mg/dL  6-29 days.................<15.0 mg/dL           Blood Culture, Routine   No Growth to date  [P]       BUN 7         Calcium 9.2         Chloride 108         CO2 28         Creatinine 0.4         Differential Method Automated         eGFR >60.0         Eos # 0.2         Eosinophil % 2.5         Glucose 96         Gran # (ANC) 4.5         Gran % 59.7         Hematocrit 27.3         Hemoglobin 9.0         Immature Grans (Abs) 0.04  Comment: Mild elevation in immature  granulocytes is non specific and   can be seen in a variety of conditions including stress response,   acute inflammation, trauma and pregnancy. Correlation with other   laboratory and clinical findings is essential.           Immature Granulocytes 0.5         Lymph # 2.1         Lymph % 27.5         Magnesium 1.7         MCH 30.7         MCHC 33.0         MCV 93         Mono # 0.6         Mono % 8.5         MPV 9.8         nRBC 0         Phosphorus 3.6         Platelets 783         Potassium 4.0         PROTEIN TOTAL 5.4         RBC 2.93         RDW 12.3         Sodium 142         WBC 7.49                  [P] - Preliminary Result               Significant Diagnostics:  I have reviewed all pertinent imaging results/findings within the past 24 hours.    Assessment/Plan:     * Necrotizing fasciitis  42F presenting with right neck necrotizing fasciitis with surround cellulitis s/p debridement of multiple levels of right neck on 6/8. Necrotic skin, soft tissue, and muscle removed. Cultures +staph aureus. S/P debridement in OR on 6/12. Now s/p reconstruction with R ALT free flap on 6/14.      ENT/HNS:  - Q4H flap checks   - Routine drain care   - Keep head neutral or turned to the left, minimize right sided head movement   -Fresh wound care, clean incision with peroxide/qtip followed by bacitracin BID     Neuro:   -Pain: Multimodality PRN regimen initiated     Cardiac:  -Remain normotensive  -On clonidine taper through Friday per SICU, will discuss if safe to be discharged on clonidine taper  -Page ENT before starting vasopressors     Pulmonary:   - CTM     Renal:   - monitor Is and Os  - Cr stable  - OK to d/c murdock  - UTI management per SICU     Infectious Disease:  - Daily CBC  - ID consulted; appreciate recs   - Loreleizol   - Repeat blood cultures drawn per ID due to GNRs in last culture (unclear significance)    - Need outpatient antibiotics recommendations, pending result of blood culture     Hematology/Oncology:  -  H/H stable  - Lovenox DVT prophylaxis      FEN/GI  - Replace electrolytes as needed to keep K>4, Mg>2  - Bowel reg  - Protonix for GI prophylaxis  - OK for diet      MSK  - Drain care  - Bacitracin to incision site      Dispo:  -Ok for step down   -Needs outpatient abx recommendations from ID  -Will discuss possibility of discharge while still on clonidine taper (ends Friday)        Walter De La Cruz MD  Otorhinolaryngology-Head & Neck Surgery  [facility]

## 2023-06-19 NOTE — PLAN OF CARE
Problem: Physical Therapy  Goal: Physical Therapy Goal  Description: Goals to be met by: 2023     Patient will increase functional independence with mobility by performin. Supine to sit with Stand-by Assistance- met   2. Sit to supine with Stand-by Assistance - met   3. Rolling to Left and Right with Stand-by Assistance - met   4. Sit to stand transfer with Contact Guard Assistance- met   5. Gait  x 50 feet with Minimal Assistance using No Assistive Device. -met   6. Gait x 150 feet with supervision using assistive device   7. Ascend/descend 1 flight of stairs with supervision using no assistive device.     Outcome: Ongoing, Progressing   Updated and content appropriate. 23

## 2023-06-19 NOTE — SUBJECTIVE & OBJECTIVE
Interval History: patient extubated, s/p flap.    Bcx 1/4 GPR.  Remains stable.    Review of Systems   Constitutional:  Negative for chills and fever.   Respiratory:  Negative for cough and shortness of breath.    Gastrointestinal:  Negative for diarrhea, nausea and vomiting.   Objective:     Vital Signs (Most Recent):  Temp: 98.4 °F (36.9 °C) (06/18/23 1945)  Pulse: 72 (06/18/23 2115)  Resp: (!) 30 (06/18/23 2115)  BP: (!) 142/91 (06/18/23 2100)  SpO2: 100 % (06/18/23 2115) Vital Signs (24h Range):  Temp:  [98.1 °F (36.7 °C)-99.6 °F (37.6 °C)] 98.4 °F (36.9 °C)  Pulse:  [] 72  Resp:  [10-57] 30  SpO2:  [92 %-100 %] 100 %  BP: ()/() 142/91     Weight: 55.8 kg (123 lb 0.3 oz)  Body mass index is 22.5 kg/m².    Estimated Creatinine Clearance: 118.3 mL/min (based on SCr of 0.5 mg/dL).     Physical Exam  Vitals reviewed.   Constitutional:       General: She is not in acute distress.     Appearance: She is not toxic-appearing or diaphoretic.   HENT:      Head: Normocephalic and atraumatic.      Right Ear: External ear normal.      Left Ear: External ear normal.      Nose: Nose normal.      Mouth/Throat:      Mouth: Mucous membranes are moist.      Pharynx: Oropharynx is clear.   Eyes:      General: No scleral icterus.        Right eye: No discharge.         Left eye: No discharge.      Extraocular Movements: Extraocular movements intact.      Conjunctiva/sclera: Conjunctivae normal.   Neck:      Comments: S/p flap.  Erythema posterior chest present but continuous to improve  Cardiovascular:      Rate and Rhythm: Normal rate and regular rhythm.   Pulmonary:      Effort: Pulmonary effort is normal. No respiratory distress.   Abdominal:      General: Abdomen is flat.      Palpations: Abdomen is soft.   Musculoskeletal:         General: Normal range of motion.      Cervical back: Normal range of motion.   Skin:     General: Skin is warm and dry.      Comments: Right anterior thigh surgery no  erythema/dehiscence   Neurological:      Mental Status: She is alert and oriented to person, place, and time. Mental status is at baseline.   Psychiatric:         Mood and Affect: Mood normal.         Behavior: Behavior normal.         Thought Content: Thought content normal.         Judgment: Judgment normal.        Significant Labs: All pertinent labs within the past 24 hours have been reviewed.    Significant Imaging: I have reviewed all pertinent imaging results/findings within the past 24 hours.

## 2023-06-19 NOTE — SUBJECTIVE & OBJECTIVE
Interval History/Significant Events: Blood cultures collected by ID as 1/2 blood cultures grew gram positive rods. Flap checks spaced out to q4h. Stable for stepdown (order in) vs discharge (pending blood cultures).    Follow-up For: Procedure(s) (LRB):  EXPLORATION, NECK (Right)  CREATION, FREE FLAP (N/A)    Post-Operative Day: 5 Days Post-Op    Objective:     Vital Signs (Most Recent):  Temp: 98.5 °F (36.9 °C) (06/19/23 0415)  Pulse: 70 (06/19/23 0600)  Resp: (!) 42 (06/19/23 0600)  BP: (!) 150/90 (06/19/23 0600)  SpO2: 98 % (06/19/23 0600) Vital Signs (24h Range):  Temp:  [98.1 °F (36.7 °C)-99.6 °F (37.6 °C)] 98.5 °F (36.9 °C)  Pulse:  [] 70  Resp:  [5-47] 42  SpO2:  [97 %-100 %] 98 %  BP: ()/() 150/90     Weight: 55.8 kg (123 lb 0.3 oz)  Body mass index is 22.5 kg/m².      Intake/Output Summary (Last 24 hours) at 6/19/2023 0645  Last data filed at 6/19/2023 0600  Gross per 24 hour   Intake 137.5 ml   Output 1033 ml   Net -895.5 ml          Physical Exam  Constitutional:       General: She is not in acute distress.     Appearance: Normal appearance.   HENT:      Head: Normocephalic and atraumatic.      Mouth/Throat:      Mouth: Mucous membranes are dry.      Pharynx: Oropharynx is clear.   Eyes:      Pupils: Pupils are equal, round, and reactive to light.   Neck:      Comments: Right neck with ALT flap stapled in place  Soft, appropriate color   Strong arterial and venous external doppler signal   AMADO drain with s/s output     Cardiovascular:      Rate and Rhythm: Normal rate and regular rhythm.      Pulses: Normal pulses.   Abdominal:      Palpations: Abdomen is soft.   Musculoskeletal:      Comments: RLE with soft to palpation, island dressing in place  AAMDO drain with s/s output    Skin:     General: Skin is warm and dry.   Neurological:      General: No focal deficit present.      Mental Status: She is alert.          Vents:  Vent Mode: Spont (06/15/23 1426)  Set Rate: 18 BPM (06/15/23  1115)  Vt Set: 360 mL (06/15/23 0559)  Pressure Support: 5 cmH20 (06/15/23 1426)  PEEP/CPAP: 5 cmH20 (06/15/23 1426)  Oxygen Concentration (%): 40 (06/15/23 1426)  Peak Airway Pressure: 10 cmH20 (06/15/23 1426)  Plateau Pressure: 0 cmH20 (06/15/23 1426)  Total Ve: 7.98 L/m (06/15/23 1426)  Negative Inspiratory Force (cm H2O): -44 (06/15/23 1426)  F/VT Ratio<105 (RSBI): (!) 29.41 (06/15/23 1155)    Lines/Drains/Airways       Peripherally Inserted Central Catheter Line  Duration             PICC Double Lumen 06/09/23 1152 right brachial 9 days              Drain  Duration                  Closed/Suction Drain 06/14/23 1044 Right Neck Bulb 15 Fr. 4 days         Closed/Suction Drain 06/14/23 1044 Right Thigh Bulb 19 Fr. 4 days                    Significant Labs:    CBC/Anemia Profile:  Recent Labs   Lab 06/18/23 0226 06/19/23  0311   WBC 9.72 7.49   HGB 9.4* 9.0*   HCT 28.4* 27.3*   * 783*   MCV 92 93   RDW 12.3 12.3        Chemistries:  Recent Labs   Lab 06/18/23 0226 06/19/23  0311    142   K 3.6 4.0    108   CO2 24 28   BUN 10 7   CREATININE 0.5 0.4*   CALCIUM 9.1 9.2   ALBUMIN 2.4* 2.3*   PROT 5.5* 5.4*   BILITOT 0.2 0.2   ALKPHOS 100 85   ALT 37 31   AST 31 32   MG 1.7 1.7   PHOS 4.0 3.6       All pertinent labs within the past 24 hours have been reviewed.    Significant Imaging:  I have reviewed all pertinent imaging results/findings within the past 24 hours.

## 2023-06-19 NOTE — SUBJECTIVE & OBJECTIVE
Interval History: Area of swelling and erythema on chest. Denies any pain around area. Remains afebrile and no leukocytosis.     Medications:  Continuous Infusions:  Scheduled Meds:   acetaminophen  1,000 mg Oral Q8H    ceFAZolin (ANCEF) IVPB  2 g Intravenous Q8H    cloNIDine  0.3 mg Oral Q8H    Followed by    [START ON 6/20/2023] cloNIDine  0.3 mg Oral Q12H    Followed by    [START ON 6/22/2023] cloNIDine  0.3 mg Oral Daily    enoxparin  40 mg Subcutaneous Q24H (prophylaxis, 1700)    ibuprofen  400 mg Oral Q6H    polyethylene glycol  17 g Oral Daily    QUEtiapine  200 mg Oral BID    sodium chloride 0.9%  10 mL Intravenous Q6H     PRN Meds:sodium chloride 0.9%, haloperidol lactate, magnesium oxide, magnesium oxide, ondansetron, oxyCODONE, oxyCODONE, potassium bicarbonate, potassium bicarbonate, potassium bicarbonate, potassium, sodium phosphates, potassium, sodium phosphates, potassium, sodium phosphates, sodium chloride 0.9%, sodium chloride 0.9%, Flushing PICC Protocol **AND** sodium chloride 0.9% **AND** sodium chloride 0.9%     Review of patient's allergies indicates:   Allergen Reactions    Clindamycin      Objective:     Vital Signs (24h Range):  Temp:  [98.1 °F (36.7 °C)-99.6 °F (37.6 °C)] 98.1 °F (36.7 °C)  Pulse:  [] 80  Resp:  [5-47] 20  SpO2:  [97 %-100 %] 100 %  BP: ()/(58-95) 104/62     Date 06/19/23 0700 - 06/20/23 0659   Shift 7701-0026 7960-0327 1905-5388 24 Hour Total   INTAKE   IV Piggyback 50   50   Shift Total(mL/kg) 50(0.9)   50(0.9)   OUTPUT   Shift Total(mL/kg)       Weight (kg) 55.8 55.8 55.8 55.8     Lines/Drains/Airways       Peripherally Inserted Central Catheter Line  Duration             PICC Double Lumen 06/09/23 1152 right brachial 9 days              Drain  Duration                  Closed/Suction Drain 06/14/23 1044 Right Neck Bulb 15 Fr. 4 days         Closed/Suction Drain 06/14/23 1044 Right Thigh Bulb 19 Fr. 4 days                     Physical Exam   Awake, alert    Right neck with ALT flap stapled in place  Soft, appropriate color   Chest with soft edema and mild erythema, outlined. No tenderness to palpation. No fluctuance.  Strong arterial and venous external doppler signal   AMADO drain with s/s output   RLE with soft to palpation, Incision c/d/I with staples, no hematoma  AMADO drain with s/s output     Significant Labs:  CBC:   Recent Labs   Lab 06/19/23  0311   WBC 7.49   RBC 2.93*   HGB 9.0*   HCT 27.3*   *   MCV 93   MCH 30.7   MCHC 33.0     CMP:   Recent Labs   Lab 06/19/23  0311   GLU 96   CALCIUM 9.2   ALBUMIN 2.3*   PROT 5.4*      K 4.0   CO2 28      BUN 7   CREATININE 0.4*   ALKPHOS 85   ALT 31   AST 32   BILITOT 0.2       Significant Diagnostics:  None

## 2023-06-19 NOTE — PT/OT/SLP PROGRESS
Physical Therapy Co-Treatment with OT    Patient Name:  Aditi Conway   MRN:  [id number]  Admit Date: 6/8/2023  Admitting Diagnosis:  Necrotizing fasciitis   Length of Stay: 11 days  Recent Surgery: Procedure(s) (LRB):  EXPLORATION, NECK (Right)  CREATION, FREE FLAP (N/A) 5 Days Post-Op    Recommendations:     Discharge Recommendations:  rehabilitation facility   Discharge Equipment Recommendations:  (will be assessed closer to discharge)   Barriers to discharge: None    Plan:     During this hospitalization, patient to be seen 4 x/week to address the listed problems via gait training, therapeutic activities, therapeutic exercises, neuromuscular re-education  Plan of Care Expires:  07/16/23  Plan of Care Reviewed with: patient, spouse    Assessment:     Aditi Conway is a 42 y.o. female admitted with a medical diagnosis of Necrotizing fasciitis. Pt is s/p ex/lap and free flap creation 6/14. Pt tolerated treatment well and showed significant improvement with gait 50 ft CGA using HHA VSS, showing improved cardiovascular endurance. Pt was independent with bed mobility and had improved functional mobility overall from last treatment. Pt was not able to gait further than 50 ft because of fatigue, indicating some impaired cardiopulmonary and musculoskeletal endurance. Pt had no complaints of pain during treatment. Pt had to be cued on proper sequencing for transfers and safe gait pattern after displaying a rushed and unsafe gait pattern as well as unsafe general movement, indicating a decreased safety awareness. Pt would benefit from skilled PT 4/week to further improve functional mobility and improve safety awareness. Pt able to be discharged to [place] when medically stable.     Problem List: weakness, impaired endurance, impaired functional mobility, gait instability, decreased safety awareness.  Rehab Prognosis: Good     GOALS:   Multidisciplinary Problems       Physical Therapy Goals           "Problem: Physical Therapy    Goal Priority Disciplines Outcome Goal Variances Interventions   Physical Therapy Goal     PT, PT/OT Ongoing, Progressing     Description: Goals to be met by: 2023     Patient will increase functional independence with mobility by performin. Supine to sit with Stand-by Assistance- met   2. Sit to supine with Stand-by Assistance - met   3. Rolling to Left and Right with Stand-by Assistance - met   4. Sit to stand transfer with Contact Guard Assistance- met   5. Gait  x 50 feet with Minimal Assistance using No Assistive Device. -met   6. Gait x 150 feet with supervision using assistive device   7. Ascend/descend 1 flight of stairs with supervision using no assistive device.                          Subjective   Communicated with RN prior to session.  Patient found supine upon PT entry to room, agreeable to evaluation. Aditi Conway's  present during session.    Patient/Family Comments/goals: get better and return to PLOF "If she doesn't walk out this room, she'll go crazy" -   Pain/Comfort:  Pain Rating 1: 0/10    Objective:   Patient found with: PICC line, AMADO drain, telemetry, pulse ox (continuous), blood pressure cuff   General Precautions: Standard, Cardiac fall   Orthopedic Precautions:N/A   Braces:     Oxygen Device: Room Air  Vitals: /68   Pulse 78   Temp 98.6 °F (37 °C) (Oral)   Resp (!) 43   Ht 5' 2" (1.575 m)   Wt 55.8 kg (123 lb 0.3 oz)   SpO2 100%   Breastfeeding No   BMI 22.50 kg/m²     Outcome Measures:  AM-PAC 6 CLICK MOBILITY  Turning over in bed (including adjusting bedclothes, sheets and blankets)?: 4  Sitting down on and standing up from a chair with arms (e.g., wheelchair, bedside commode, etc.): 4  Moving from lying on back to sitting on the side of the bed?: 4  Moving to and from a bed to a chair (including a wheelchair)?: 4  Need to walk in hospital room?: 3  Climbing 3-5 steps with a railing?: " 2  Basic Mobility Total Score: 21     Cognition:   Oriented X 4, Alert, Agitated, Distractible, and Cooperative  Command following: Follows multistep verbal commands  Fluency: clear/fluent    Functional Mobility:  Additional staff present: OT and Supervising PT  Bed Mobility:   Rolling/Turning to Left: independence  Supine to Sit: independence; HOB flat  Scooting anteriorly to EOB to have both feet planted on floor: independence        Transfers:   Sit <> Stand Transfer: stand by assistance with no assistive device   Stand <> Sit Transfer: stand by assistance with no assistive device   [id number] from EOB, from toilet, and [id number] from bedside chair      Gait:  Patient ambulated: 50 ft on room air   Patient required: contact guard and of 1 persons IV pole and portable monitor follow  Patient used: hand-held assist  Gait Pattern observed: reciprocal gait  Gait Speed:   Greatly below normal - Household Walker  Gait Deviation(s): occasional unsteady gait, decreased step length, narrow base of support, flexed posture, and decreased rory  Impairments due to: decreased strength and decreased endurance  Comments: Pt gait to toilet and then down hallway back to bedside chair. Pt cued on increasing stride length, keeping head up, and gaze looking forward and scanning. Pt also cued to slow gait speed to a safe velocity. Pt not able to gait further than 50 ft due to fatigue. Pt displayed agitated and rushed behavior throughout gait and treatment.    Due to pt complex medical condition, the skill of 2 licensed therapists is needed to maximize treatment session and progression towards goals    Pt white board updated with current therapists name and level of mobility assistance needed.     Education:  Pt and  educated on role of PT and PT POC and verbally expressed understanding of such.    Patient left up in chair with head in midline, neutral pelvis & heels floated for skin protection with all lines intact, call button  [[email or web address]] : PT PRESENTS FOR POSTOP VISIT. DISCUSSED POSTOP CARE AND LIMITATIONS in reach and RN notified  Time Tracking:     PT Received On: 06/19/23  PT Start Time: 1113     PT Stop Time: 1130  PT Total Time (min): 17 min       Billable Minutes:   Gait Training 17 min    Treatment Type: Treatment  PT/PTA: PT       Da Torres, SPT  6/19/2023

## 2023-06-19 NOTE — PLAN OF CARE
Chart reviewed.  BCx growing diptheroids. This is likely a contaminant.   Repeat BCx is NGTD.  --OK to continue cefazolin while in the hospital  --likely transition to augmentin 875-125mg po bid on discharge x 2 wks.  ID will follow.  Wendy Lee MD  Infectious Disease

## 2023-06-19 NOTE — PLAN OF CARE
[facility]  Discharge Reassessment    Primary Care Provider: Primary Doctor No    Expected Discharge Date: 6/21/2023    Reassessment (most recent)       Discharge Reassessment - 06/19/23 1010          Discharge Reassessment    Assessment Type Discharge Planning Reassessment     Did the patient's condition or plan change since previous assessment? Yes     Discharge Plan discussed with: Spouse/sig other     Communicated DAMIEN with patient/caregiver Date not available/Unable to determine     Discharge Plan A Home;Home with family     Discharge Plan B Home with family;Home     DME Needed Upon Discharge  --   TBD    Transition of Care Barriers Unisured     Why the patient remains in the hospital Requires continued medical care        Post-Acute Status    Discharge Delays None known at this time

## 2023-06-19 NOTE — SUBJECTIVE & OBJECTIVE
Interval History: Step down orders are in. Remains on IV abx due to GNRs in last blood culture. Repeat drawn per ID. Flap checks q4h now. Platelets continue to rise.     Medications:  Continuous Infusions:  Scheduled Meds:   acetaminophen  1,000 mg Oral Q8H    ceFAZolin (ANCEF) IVPB  2 g Intravenous Q8H    cloNIDine  0.3 mg Oral Q8H    Followed by    [START ON 6/20/2023] cloNIDine  0.3 mg Oral Q12H    Followed by    [START ON 6/22/2023] cloNIDine  0.3 mg Oral Daily    enoxparin  40 mg Subcutaneous Q24H (prophylaxis, 1700)    ibuprofen  400 mg Oral Q6H    polyethylene glycol  17 g Oral Daily    QUEtiapine  200 mg Oral BID    sodium chloride 0.9%  10 mL Intravenous Q6H     PRN Meds:sodium chloride 0.9%, haloperidol lactate, magnesium oxide, magnesium oxide, ondansetron, oxyCODONE, oxyCODONE, potassium bicarbonate, potassium bicarbonate, potassium bicarbonate, potassium, sodium phosphates, potassium, sodium phosphates, potassium, sodium phosphates, sodium chloride 0.9%, sodium chloride 0.9%, Flushing PICC Protocol **AND** sodium chloride 0.9% **AND** sodium chloride 0.9%     Review of patient's allergies indicates:   Allergen Reactions    Clindamycin      Objective:     Vital Signs (24h Range):  Temp:  [98.1 °F (36.7 °C)-99.6 °F (37.6 °C)] 98.5 °F (36.9 °C)  Pulse:  [] 70  Resp:  [5-47] 42  SpO2:  [97 %-100 %] 98 %  BP: ()/() 150/90       Lines/Drains/Airways       Peripherally Inserted Central Catheter Line  Duration             PICC Double Lumen 06/09/23 1152 right brachial 9 days              Drain  Duration                  Closed/Suction Drain 06/14/23 1044 Right Neck Bulb 15 Fr. 4 days         Closed/Suction Drain 06/14/23 1044 Right Thigh Bulb 19 Fr. 4 days                     Physical Exam     Awake, alert   Right neck with ALT flap stapled in place  Soft, appropriate color   Strong arterial and venous external doppler signal   AMADO drain with s/s output   RLE with soft to palpation, Incision  c/d/I with staples, no hematoma  AMADO drain with s/s output     Drain Output  06/18 0701 - 06/19 0700  In: 137.5   Out: 1033 [Drains:83]      Significant Labs:  Microbiology Results (last 7 days)       Procedure Component Value Units Date/Time    Blood culture [[id number]] Collected: 06/19/23 0608    Order Status: Sent Specimen: Blood from Peripheral, Antecubital, Left Updated: 06/19/23 0615    Blood culture [[id number]] Collected: 06/15/23 2340    Order Status: Completed Specimen: Blood from Peripheral, Lower Arm, Right Updated: 06/19/23 0612     Blood Culture, Routine No Growth to date      No Growth to date      No Growth to date      No Growth to date    Blood culture [[id number]] Collected: 06/18/23 2052    Order Status: Completed Specimen: Blood from Peripheral, Forearm, Left Updated: 06/19/23 0345     Blood Culture, Routine No Growth to date    Blood culture [[id number]] Collected: 06/15/23 2340    Order Status: Completed Specimen: Blood from Peripheral, Lower Arm, Right Updated: 06/18/23 0754     Blood Culture, Routine Gram stain aer bottle: Gram positive rods      Results called to and read back by:Gunjan Lam RN 06/17/2023  09:07    Rapid Organism ID by PCR (from Blood culture) [[id number]] Collected: 06/15/23 2355    Order Status: Completed Updated: 06/17/23 1102     Enterococcus faecalis Not Detected     Enterococcus faecium Not Detected     Listeria monocytogenes Not Detected     Staphylococcus spp. Not Detected     Staphylococcus aureus Not Detected     Staphylococcus epidermidis Not Detected     Staphylococcus lugdunensis Not Detected     Streptococcus species Not Detected     Streptococcus agalactiae Not Detected     Streptococcus pneumoniae Not Detected     Streptococcus pyogenes Not Detected     Acinetobacter calcoaceticus/baumannii complex Not Detected     Bacteroides fragilis Not Detected     Enterobacterales Not Detected     Enterobacter cloacae complex Not Detected     Escherichia coli Not Detected      Klebsiella aerogenes Not Detected     Klebsiella oxytoca Not Detected     Klebsiella pneumoniae group Not Detected     Proteus Not Detected     Salmonella sp Not Detected     Serratia marcescens Not Detected     Haemophilus influenzae Not Detected     Neisseria meningtidis Not Detected     Pseudomonas aeruginosa Not Detected     Stenotrophomonas maltophilia Not Detected     Candida albicans Not Detected     Candida auris Not Detected     Candida glabrata Not Detected     Candida krusei Not Detected     Candida parapsilosis Not Detected     Candida tropicalis Not Detected     Cryptococcus neoformans/gattii Not Detected     CTX-M (ESBL ) Not Detected     IMP (Carbapenem resistant) Not Detected     KPC resistance gene (Carbapenem resistant) Not Detected     mcr-1  Not Detected     mec A/C  Not Detected     mec A/C and MREJ (MRSA) gene Not Detected     NDM (Carbapenem resistant) Not Detected     OXA-48-like (Carbapenem resistant) Not Detected     van A/B (VRE gene) Not Detected     VIM (Carbapenem resistant) Not Detected    Urine culture [[id number]] Collected: 06/16/23 0121    Order Status: Completed Specimen: Urine, Catheterized Updated: 06/17/23 0600     Urine Culture, Routine No growth    Narrative:      Indicated criteria for high risk culture:->Other  Other (specify):->sepsis    Urine culture [[id number]] Collected: 06/15/23 2356    Order Status: Canceled Specimen: Urine     Blood culture [[id number]] Collected: 06/09/23 1824    Order Status: Completed Specimen: Blood from Peripheral, Wrist, Right Updated: 06/14/23 2012     Blood Culture, Routine No growth after 5 days.    Blood culture [[id number]] Collected: 06/09/23 1826    Order Status: Completed Specimen: Blood from Peripheral, Hand, Right Updated: 06/14/23 2012     Blood Culture, Routine No growth after 5 days.    Culture, Anaerobe [[id number]] Collected: 06/08/23 2239    Order Status: Completed Specimen: Wound from Neck Updated: 06/13/23 1009      Anaerobic Culture No anaerobes isolated    Aerobic culture [[id number]]  (Abnormal)  (Susceptibility) Collected: 06/08/23 2239    Order Status: Completed Specimen: Wound from Neck Updated: 06/12/23 1028     Aerobic Bacterial Culture STAPHYLOCOCCUS AUREUS  Moderate  Skin norma also present      Narrative:      Right Neck Soft Tissue Culture    Blood culture [[id number]]     Order Status: Canceled Specimen: Blood     Blood culture [[id number]]     Order Status: Canceled Specimen: Blood     Fungus culture [[id number]] Collected: 06/08/23 2239    Order Status: Completed Specimen: Wound from Neck Updated: 06/12/23 0753     Fungus (Mycology) Culture Culture in progress          Recent Lab Results         06/19/23  0311 06/18/23 2052        Albumin 2.3         Alkaline Phosphatase 85         ALT 31         Anion Gap 6         AST 32         Baso # 0.10         Basophil % 1.3         BILIRUBIN TOTAL 0.2  Comment: For infants and newborns, interpretation of results should be based  on gestational age, weight and in agreement with clinical  observations.    Premature Infant recommended reference ranges:  Up to 24 hours.............<8.0 mg/dL  Up to 48 hours............<12.0 mg/dL  3-5 days..................<15.0 mg/dL  6-29 days.................<15.0 mg/dL           Blood Culture, Routine   No Growth to date  [P]       BUN 7         Calcium 9.2         Chloride 108         CO2 28         Creatinine 0.4         Differential Method Automated         eGFR >60.0         Eos # 0.2         Eosinophil % 2.5         Glucose 96         Gran # (ANC) 4.5         Gran % 59.7         Hematocrit 27.3         Hemoglobin 9.0         Immature Grans (Abs) 0.04  Comment: Mild elevation in immature granulocytes is non specific and   can be seen in a variety of conditions including stress response,   acute inflammation, trauma and pregnancy. Correlation with other   laboratory and clinical findings is essential.           Immature Granulocytes  0.5         Lymph # 2.1         Lymph % 27.5         Magnesium 1.7         MCH 30.7         MCHC 33.0         MCV 93         Mono # 0.6         Mono % 8.5         MPV 9.8         nRBC 0         Phosphorus 3.6         Platelets 783         Potassium 4.0         PROTEIN TOTAL 5.4         RBC 2.93         RDW 12.3         Sodium 142         WBC 7.49                  [P] - Preliminary Result               Significant Diagnostics:  I have reviewed all pertinent imaging results/findings within the past 24 hours.

## 2023-06-19 NOTE — PROGRESS NOTES
[facility]  Critical Care - Surgery  Progress Note    Patient Name: Aditi Conway  MRN: [id number]  Admission Date: 6/8/2023  Hospital Length of Stay: 11 days  Code Status: Full Code  Attending Provider: Gerardo Seymour MD  Primary Care Provider: Primary Doctor No   Principal Problem: Necrotizing fasciitis    Subjective:     Hospital/ICU Course:  No notes on file    Interval History/Significant Events: Blood cultures collected by ID as 1/2 blood cultures grew gram positive rods. Flap checks spaced out to q4h. Stable for stepdown (order in) vs discharge (pending blood cultures).    Follow-up For: Procedure(s) (LRB):  EXPLORATION, NECK (Right)  CREATION, FREE FLAP (N/A)    Post-Operative Day: 5 Days Post-Op    Objective:     Vital Signs (Most Recent):  Temp: 98.5 °F (36.9 °C) (06/19/23 0415)  Pulse: 70 (06/19/23 0600)  Resp: (!) 42 (06/19/23 0600)  BP: (!) 150/90 (06/19/23 0600)  SpO2: 98 % (06/19/23 0600) Vital Signs (24h Range):  Temp:  [98.1 °F (36.7 °C)-99.6 °F (37.6 °C)] 98.5 °F (36.9 °C)  Pulse:  [] 70  Resp:  [5-47] 42  SpO2:  [97 %-100 %] 98 %  BP: ()/() 150/90     Weight: 55.8 kg (123 lb 0.3 oz)  Body mass index is 22.5 kg/m².      Intake/Output Summary (Last 24 hours) at 6/19/2023 0645  Last data filed at 6/19/2023 0600  Gross per 24 hour   Intake 137.5 ml   Output 1033 ml   Net -895.5 ml          Physical Exam  Constitutional:       General: She is not in acute distress.     Appearance: Normal appearance.   HENT:      Head: Normocephalic and atraumatic.      Mouth/Throat:      Mouth: Mucous membranes are dry.      Pharynx: Oropharynx is clear.   Eyes:      Pupils: Pupils are equal, round, and reactive to light.   Neck:      Comments: Right neck with ALT flap stapled in place  Soft, appropriate color   Strong arterial and venous external doppler signal   AMADO drain with s/s output     Cardiovascular:      Rate and Rhythm: Normal rate and regular rhythm.       Pulses: Normal pulses.   Abdominal:      Palpations: Abdomen is soft.   Musculoskeletal:      Comments: RLE with soft to palpation, island dressing in place  AMADO drain with s/s output    Skin:     General: Skin is warm and dry.   Neurological:      General: No focal deficit present.      Mental Status: She is alert.          Vents:  Vent Mode: Spont (06/15/23 1426)  Set Rate: 18 BPM (06/15/23 1115)  Vt Set: 360 mL (06/15/23 0559)  Pressure Support: 5 cmH20 (06/15/23 1426)  PEEP/CPAP: 5 cmH20 (06/15/23 1426)  Oxygen Concentration (%): 40 (06/15/23 1426)  Peak Airway Pressure: 10 cmH20 (06/15/23 1426)  Plateau Pressure: 0 cmH20 (06/15/23 1426)  Total Ve: 7.98 L/m (06/15/23 1426)  Negative Inspiratory Force (cm H2O): -44 (06/15/23 1426)  F/VT Ratio<105 (RSBI): (!) 29.41 (06/15/23 1155)    Lines/Drains/Airways       Peripherally Inserted Central Catheter Line  Duration             PICC Double Lumen 06/09/23 1152 right brachial 9 days              Drain  Duration                  Closed/Suction Drain 06/14/23 1044 Right Neck Bulb 15 Fr. 4 days         Closed/Suction Drain 06/14/23 1044 Right Thigh Bulb 19 Fr. 4 days                    Significant Labs:    CBC/Anemia Profile:  Recent Labs   Lab 06/18/23  0226 06/19/23  0311   WBC 9.72 7.49   HGB 9.4* 9.0*   HCT 28.4* 27.3*   * 783*   MCV 92 93   RDW 12.3 12.3        Chemistries:  Recent Labs   Lab 06/18/23  0226 06/19/23  0311    142   K 3.6 4.0    108   CO2 24 28   BUN 10 7   CREATININE 0.5 0.4*   CALCIUM 9.1 9.2   ALBUMIN 2.4* 2.3*   PROT 5.5* 5.4*   BILITOT 0.2 0.2   ALKPHOS 100 85   ALT 37 31   AST 31 32   MG 1.7 1.7   PHOS 4.0 3.6       All pertinent labs within the past 24 hours have been reviewed.    Significant Imaging:  I have reviewed all pertinent imaging results/findings within the past 24 hours.    Assessment/Plan:     ID  * Necrotizing fasciitis  42F PMH depression, seizures (no meds), tobacco use, presenting with concern for toshia's  angina / nec fasc of neck s/p exploration and debridement with ENT 6/8/23. S/p 6/14 right neck debridement with right anterolateral free flap coverage.           Neuro/Psych:   -- Sedation: none  -- Pain: tylenol 1g q8, oxy 5/10 PRN  -- Seroquel 200mg BID, haldol 5mg q6h prn              Cards:   -- HDS  -- currently not requiring pressors  -- MAP > 65  -- q4h flap checks      Pulm:   -- Goal O2 sat > 90%  -- Extubated 6/15  -- IS, Acapella  -- Room Air      Renal:  -- Strict  I/O  -- BUN/Cr 10/0.5      FEN / GI:   -- Replace lytes as needed  -- Nutrition: Regular diet      ID:   -- Afebrile, WBC 9.7 -> 7.49  -- Abx: cefazolin 6/13-7/9  -- ID following  -- Following OR cultures 6/8 Staph aureus, MSSA  -- Blood Cx OSH 6/7 NGTD  -- ID signed off but stated to re-consult on 6/25      Heme/Onc:   -- H/H stable 9.0/27.3  -- Daily CBC      Endo:   -- Gluc goal 140-180      PPx:   Feeding: Regular diet  Analgesia/Sedation: as above  Thromboembolic prevention: lovenox  HOB >30: Yes  Stress Ulcer ppx: n/a  Glucose control: Critical care goal 140-180 g/dl     Lines/Drains/Airway: AMADO x2, PICC      Dispo/Code Status/Palliative:   -- SICU / Full Code  -- stepdown orders in          Critical care was time spent personally by me on the following activities: development of treatment plan with patient or surrogate and bedside caregivers, discussions with consultants, evaluation of patient's response to treatment, examination of patient, ordering and performing treatments and interventions, ordering and review of laboratory studies, ordering and review of radiographic studies, pulse oximetry, re-evaluation of patient's condition.  This critical care time did not overlap with that of any other provider or involve time for any procedures.     Mago Block MD  Critical Care - Surgery  [facility]

## 2023-06-19 NOTE — PLAN OF CARE
Problem: Occupational Therapy  Goal: Occupational Therapy Goal  Outcome: Met   OT eval completed; no goals established as pt is performing ADL safely and without A. ZE Morrow

## 2023-06-19 NOTE — ASSESSMENT & PLAN NOTE
42F PMH depression, seizures (no meds), tobacco use, presenting with concern for toshia's angina / nec fasc of neck s/p exploration and debridement with ENT 6/8/23. S/p 6/14 right neck debridement with right anterolateral free flap coverage.           Neuro/Psych:   -- Sedation: none  -- Pain: tylenol 1g q8, oxy 5/10 PRN  -- Seroquel 200mg BID, haldol 5mg q6h prn              Cards:   -- HDS  -- currently not requiring pressors  -- MAP > 65  -- q4h flap checks      Pulm:   -- Goal O2 sat > 90%  -- Extubated 6/15  -- IS, Acapella  -- Room Air      Renal:  -- Strict  I/O  -- BUN/Cr 10/0.5      FEN / GI:   -- Replace lytes as needed  -- Nutrition: Regular diet      ID:   -- Afebrile, WBC 9.7 -> 7.49  -- Abx: cefazolin 6/13-7/9  -- ID following  -- Following OR cultures 6/8 Staph aureus, MSSA  -- Blood Cx OSH 6/7 NGTD  -- ID signed off but stated to re-consult on 6/25      Heme/Onc:   -- H/H stable 9.0/27.3  -- Daily CBC      Endo:   -- Gluc goal 140-180      PPx:   Feeding: Regular diet  Analgesia/Sedation: as above  Thromboembolic prevention: lovenox  HOB >30: Yes  Stress Ulcer ppx: n/a  Glucose control: Critical care goal 140-180 g/dl     Lines/Drains/Airway: AMADO x2, PICC      Dispo/Code Status/Palliative:   -- SICU / Full Code  -- stepdown orders in

## 2023-06-19 NOTE — ASSESSMENT & PLAN NOTE
42-year-old female with h/o depression, seizure (no meds), tobacco use [place] [facility] [facility] 6/4/23 with an open neck wound.  Admitted for necrotizing fasciitis of her neck.  + exposure to brackish water.    Intraoperative cultures growing MSSA.  6/12/23 repeat debridement findings small amt of necrotic tissue anterior skin present otherwise no other pocket of infection.  S/p flap 6/14/23. 6/15/23 blood culture 1/4 positive GPR.  May be contaminant, but will repeat BCx to verify    Recommendations:  -continue cefazolin 2g q8h  -anticipating total 4 weeks of antibiotic therapy from last debridement, end date 7/9/23  -Repeat blood culture ordered.

## 2023-06-19 NOTE — PLAN OF CARE
Recommendations    1. Continue Regular Diet   2. Continue ONS Boost Plus -TID   3. RD to monitor and follow    Goals: Meet % of EEn, EPN by next RD follow up date  Nutrition Goal Status: progressing towards goal  Communication of RD Recs: other (comment) (Plan of Care (POC))

## 2023-06-19 NOTE — ASSESSMENT & PLAN NOTE
42F presenting with right neck necrotizing fasciitis with surround cellulitis s/p debridement of multiple levels of right neck on 6/8. Necrotic skin, soft tissue, and muscle removed. Cultures +staph aureus. S/P debridement in OR on 6/12. Now s/p reconstruction with R ALT free flap on 6/14.      ENT/HNS:  - Q4H flap checks   - Routine drain care   - Keep head neutral or turned to the left, minimize right sided head movement   -Fresh wound care, clean incision with peroxide/qtip followed by bacitracin BID     Neuro:   -Pain: Multimodality PRN regimen initiated     Cardiac:  -Remain normotensive  -On clonidine taper through Friday per SICU, will discuss if safe to be discharged on clonidine taper  -Page ENT before starting vasopressors     Pulmonary:   - CTM     Renal:   - monitor Is and Os  - Cr stable  - OK to d/c murdock  - UTI management per SICU     Infectious Disease:  - Daily CBC  - ID consulted; appreciate recs   - Kefzol   - Repeat blood cultures drawn per ID due to GNRs in last culture (unclear significance)    - Need outpatient antibiotics recommendations, pending result of blood culture     Hematology/Oncology:  - H/H stable  - Lovenox DVT prophylaxis      FEN/GI  - Replace electrolytes as needed to keep K>4, Mg>2  - Bowel reg  - Protonix for GI prophylaxis  - OK for diet      MSK  - Drain care  - Bacitracin to incision site      Dispo:  -Ok for step down   -Needs outpatient abx recommendations from ID  -Will discuss possibility of discharge while still on clonidine taper (ends Friday)

## 2023-06-19 NOTE — PROGRESS NOTES
[facility]  Adult Nutrition  Progress Note    SUMMARY       Recommendations    1. Continue Regular Diet   2. Continue ONS Boost Plus -TID   3. RD to monitor and follow    Goals: Meet % of EEn, EPN by next RD follow up date  Nutrition Goal Status: progressing towards goal  Communication of RD Recs: other (comment) (Plan of Care (POC))    Assessment and Plan     Nutrition Problem:  Inadequate energy intake     Related to (etiology):   Inability to consume sufficient energy      Signs and Symptoms (as evidenced by):   NPO     Interventions/Recommendations (treatment strategy):  Collaboration with providers  EN     Nutrition Diagnosis Status:   Resolved      Malnutrition Assessment     Teeth (Micronutrient): yellow-brown pigment         Temple Region (Muscle Loss): mild depletion  Clavicle Bone Region (Muscle Loss): mild depletion  Dorsal Hand (Muscle Loss): moderate depletion     Reason for Assessment    Reason For Assessment: RD follow-up  Diagnosis: surgery/postoperative complications (Necrotizing fasciitis)  Relevant Medical History: depression, seizures (no meds), tobacco use  Interdisciplinary Rounds: did not attend  General Information Comments: Consulted w/ pt bedside. Pt reports moderate appetite. Pt states appetite at home varys because of job working overnight. Observed both lunch and breakfast tray. Lunch had a 50% meal consumption and Breakfast was 100%. Pt states she consumes 1 Boost Glucose Control per day. Pt denies any n/v/d/c. Observed meck and leg wound but are healing well. NFPE conducted 06/19.  Nutrition Discharge Planning: Pending Clinical Course    Nutrition Risk Screen    Nutrition Risk Screen: large or nonhealing wound, burn or pressure injury, reduced oral intake over the last month    Nutrition/Diet History    Patient Reported Diet/Restrictions/Preferences: general  Typical Food/Fluid Intake: Chicken Stew, , anything from [employer]  Spiritual,  "Cultural Beliefs, Rastafari Practices, Values that Affect Care: no  Supplemental Drinks or Food Habits: Boost Glucose Control  Food Allergies: other (see comments) (Lactose Intolerant)  Factors Affecting Nutritional Intake: altered taste, depression, socio-economic    Anthropometrics    Temp: 98.6 °F (37 °C)  Height Method: Stated  Height: 5' 2" (157.5 cm)  Height (inches): 62 in  Weight Method: Bed Scale  Weight: 55.8 kg (123 lb 0.3 oz)  Weight (lb): 123.02 lb  Ideal Body Weight (IBW), Female: 110 lb  % Ideal Body Weight, Female (lb): 111.84 %  BMI (Calculated): 22.5  BMI Grade: 18.5-24.9 - normal  Usual Body Weight (UBW), k.4 kg  % Usual Body Weight: 123.16  % Weight Change From Usual Weight: 22.91 %       Lab/Procedures/Meds    Pertinent Labs Reviewed: reviewed  Pertinent Labs Comments: Creatinine 0.4, Protein 5.4  Pertinent Medications Reviewed: reviewed  Pertinent Medications Comments: ABX, enoxaparin, famotidine, polyethylene glycol, fentanyl, propofol      Estimated/Assessed Needs    Weight Used For Calorie Calculations: 55.8 kg (123 lb 0.3 oz)  Energy Calorie Requirements (kcal): 1476 kcals/day  Energy Need Method: [place]  Protein Requirements: 84 grams of protein/day (1.5 gm/kg)  Weight Used For Protein Calculations: 55.8 kg (123 lb 0.3 oz)  Fluid Requirements (mL): 1 mL/kcal or Per MD  Estimated Fluid Requirement Method: RDA Method  RDA Method (mL): 1476         Nutrition Prescription Ordered    Current Diet Order: Diet Regular (IDDSI Level 7)  Current Nutrition Support Formula Ordered: Other (Comment) (none)  Current Nutrition Support Rate Ordered: 0 (ml)  Current Nutrition Support Frequency Ordered: 0  Oral Nutrition Supplement: Boost Plus -All Meals    Evaluation of Received Nutrient/Fluid Intake    Enteral Calories (kcal): 0  Enteral Protein (gm): 0  Enteral (Free Water) Fluid (mL): 0  Lipid Calories (kcals): 0 kcals  % Kcal Needs: 0  % Protein Needs: 0  I/O: +1.29 L since admit  Energy " Calories Required: meeting needs  Protein Required: exceeds needs  Comments: LBM: 06/19  Tolerance: tolerating  % Intake of Estimated Energy Needs: 75 - 100 %  % Meal Intake: 75 - 100 %    Nutrition Risk    Level of Risk/Frequency of Follow-up: low - moderate     Monitor and Evaluation    Food and Nutrient Intake: energy intake, food and beverage intake  Food and Nutrient Adminstration: diet order  Knowledge/Beliefs/Attitudes: food and nutrition knowledge/skill, beliefs and attitudes  Physical Activity and Function: nutrition-related ADLs and IADLs, factors affecting access to physical activity  Anthropometric Measurements: height/length, weight, weight change, body mass index  Biochemical Data, Medical Tests and Procedures: gastrointestinal profile, electrolyte and renal panel, glucose/endocrine profile, inflammatory profile, lipid profile  Nutrition-Focused Physical Findings: overall appearance, extremities, muscles and bones, head and eyes, skin     Nutrition Follow-Up    RD Follow-up?: Yes    Calos Chavarria  [facility] VINNY Bermudez, Registration Eligible, Provisional LDN

## 2023-06-19 NOTE — NURSING
"      SICU PLAN OF CARE NOTE    Dx: Necrotizing fasciitis    Shift Events: Blood cultures collected    Goals of Care: Stepdown or DC home pending ID consult    Neuro: AAO x4    Vital Signs: BP (!) 150/90 (BP Location: Left arm, Patient Position: Lying)   Pulse 70   Temp 98.5 °F (36.9 °C) (Oral)   Resp (!) 42   Ht 5' 2" (1.575 m)   Wt 55.8 kg (123 lb 0.3 oz)   SpO2 98%   Breastfeeding No   BMI 22.50 kg/m²     Respiratory: Room Air    Diet: Regular Diet    Gtts: n/a    Urine Output: Voids Spontaneously 450 cc/shift    Drains: AMADO Drain, total output 23 cc / shift    Flap Checks Q4H; site CDI, strong doppler signal        Labs/Accuchecks: Daily labs.     Skin: No new skin breakdown noted. Pt on specialty bed with protective foams intact. Pt repositions independently with frequent weight shift encouraged and weight shift assistance provided.       "

## 2023-06-19 NOTE — PROGRESS NOTES
[facility]  Infectious Disease  Progress Note    Patient Name: Aditi Conway  MRN: [id number]  Admission Date: 6/8/2023  Length of Stay: 10 days  Attending Physician: Gerardo Seymour MD  Primary Care Provider: Primary Doctor No    Isolation Status: No active isolations  Assessment/Plan:      ID  * Necrotizing fasciitis  42-year-old female with h/o depression, seizure (no meds), tobacco use [place] [facility] [facility] [place]23 with an open neck wound.  Admitted for necrotizing fasciitis of her neck.  + exposure to brackish water.    Intraoperative cultures growing MSSA.  6/12/23 repeat debridement findings small amt of necrotic tissue anterior skin present otherwise no other pocket of infection.  S/p flap 6/14/23. 6/15/23 blood culture 1/4 positive GPR.  May be contaminant, but will repeat BCx to verify    Recommendations:  -continue cefazolin 2g q8h  -anticipating total 4 weeks of antibiotic therapy from last debridement, end date 7/9/23  -Repeat blood culture ordered.        Discussed with primary team.    Thank you for your consult. I will follow-up with patient. Please contact us if you have any additional questions.    Javier Lim MD  Infectious Disease  [facility]    Subjective:     Principal Problem:Necrotizing fasciitis    HPI: 42-year-old female with h/o depression, seizure (no meds), tobacco use went swimming in the [facility] on 6/4/23 while having an open wound on the right side of her neck.  Subsequently developed fevers, chills, and erythema throughout her neck and upper chest.  Initially presented to [facility] 6/6/23 and was given Bactrim.  Then went to [place] ED 6/7/23 and started on vancomycin/pip-tazo/doxycycline.  [place]gently [place] [facility] [place] continued rapid spread of infection and acute decompensation.      CT showed fairly extensive asymmetric soft tissue swelling and subcutaneous stranding  involving the right anterior, lateral and posterior neck continuing inferiorly to the anterior chest wall consistent with changes of cellulitis and possible phlegmon without clearly identified abscess formation; associated myositis suspected right sternocleidomastoid and right posterolateral musculature.  6/8/23 s/p wide debridement by ENT.    Consult: Nectrotizing fasciitis management    Interval History: patient extubated, s/p flap.    Bcx 1/4 GPR.  Remains stable.    Review of Systems   Constitutional:  Negative for chills and fever.   Respiratory:  Negative for cough and shortness of breath.    Gastrointestinal:  Negative for diarrhea, nausea and vomiting.   Objective:     Vital Signs (Most Recent):  Temp: 98.4 °F (36.9 °C) (06/18/23 1945)  Pulse: 72 (06/18/23 2115)  Resp: (!) 30 (06/18/23 2115)  BP: (!) 142/91 (06/18/23 2100)  SpO2: 100 % (06/18/23 2115) Vital Signs (24h Range):  Temp:  [98.1 °F (36.7 °C)-99.6 °F (37.6 °C)] 98.4 °F (36.9 °C)  Pulse:  [] 72  Resp:  [10-57] 30  SpO2:  [92 %-100 %] 100 %  BP: ()/() 142/91     Weight: 55.8 kg (123 lb 0.3 oz)  Body mass index is 22.5 kg/m².    Estimated Creatinine Clearance: 118.3 mL/min (based on SCr of 0.5 mg/dL).     Physical Exam  Vitals reviewed.   Constitutional:       General: She is not in acute distress.     Appearance: She is not toxic-appearing or diaphoretic.   HENT:      Head: Normocephalic and atraumatic.      Right Ear: External ear normal.      Left Ear: External ear normal.      Nose: Nose normal.      Mouth/Throat:      Mouth: Mucous membranes are moist.      Pharynx: Oropharynx is clear.   Eyes:      General: No scleral icterus.        Right eye: No discharge.         Left eye: No discharge.      Extraocular Movements: Extraocular movements intact.      Conjunctiva/sclera: Conjunctivae normal.   Neck:      Comments: S/p flap.  Erythema posterior chest present but continuous to improve  Cardiovascular:      Rate and Rhythm: Normal  rate and regular rhythm.   Pulmonary:      Effort: Pulmonary effort is normal. No respiratory distress.   Abdominal:      General: Abdomen is flat.      Palpations: Abdomen is soft.   Musculoskeletal:         General: Normal range of motion.      Cervical back: Normal range of motion.   Skin:     General: Skin is warm and dry.      Comments: Right anterior thigh surgery no erythema/dehiscence   Neurological:      Mental Status: She is alert and oriented to person, place, and time. Mental status is at baseline.   Psychiatric:         Mood and Affect: Mood normal.         Behavior: Behavior normal.         Thought Content: Thought content normal.         Judgment: Judgment normal.        Significant Labs: All pertinent labs within the past 24 hours have been reviewed.    Significant Imaging: I have reviewed all pertinent imaging results/findings within the past 24 hours.

## 2023-06-19 NOTE — PT/OT/SLP EVAL
Occupational Therapy   Co-Evaluation and Discharge Note    Name: Aditi Conway  MRN: [id number]  Admitting Diagnosis: Necrotizing fasciitis  Recent Surgery: Procedure(s) (LRB):  EXPLORATION, NECK (Right)  CREATION, FREE FLAP (N/A) 5 Days Post-Op    Recommendations:     Discharge Recommendations:    Discharge Equipment Recommendations: none  Barriers to discharge:  None    Assessment:     Aditi Conway is a 42 y.o. female with a medical diagnosis of Necrotizing fasciitis. At this time, patient is functioning at their prior level of function and does not require further acute OT services. Pt benefits from co-treatment with PT to accommodate pt's activity tolerance and progression with therapy.      Plan:     During this hospitalization, patient does not require further acute OT services.  Please re-consult if situation changes.    Plan of Care Reviewed with: patient, spouse    Subjective     Chief Complaint: frustrated over lines and not being able to move on her own  Patient/Family Comments/goals: to get better and go home    Occupational Profile:  Pt lives with  and daughter in a two story home with no NOELLE. Pt has a half bath on first floor. Pt has a tub shower.  Prior to admission, patients level of function was independent with ADLs and functional mobility including driving. Pt was working as an  at [employer].  Equipment used at home: none.  DME owned (not currently used): rolling walker and single point cane.  Upon discharge, patient will have assistance from  and best friend.    Pain/Comfort:  Pain Rating 1: 0/10  Pain Rating Post-Intervention 1: 0/10    Patients cultural, spiritual, Jain conflicts given the current situation: no    Objective:     Communicated with: RN prior to session.  Patient found supine with blood pressure cuff, pulse ox (continuous), telemetry, AMADO drain upon OT entry to room.    General Precautions: Standard, other (see comments)  (Home no OT Needs)  Orthopedic Precautions:    Braces:    Respiratory Status: Room air     Occupational Performance:    Bed Mobility:    Independent    Functional Mobility/Transfers:  Patient completed Sit <> Stand Transfer with stand by assistance  with  no assistive device   Patient completed Toilet Transfer Step Transfer and STS technique with supervision with  no AD  Functional Mobility: CGA to/from bathroom and in hallway to simulate HH distances    Activities of Daily Living:  Grooming: independence with tasks with SBA provided for static standint  Upper Body Dressing: supervision    Lower Body Dressing: stand by assistance    Toileting: stand by assistance      Cognitive/Visual Perceptual:  Oriented to: Person, Place, Time and Situation  Follows Commands/attention: Follows multistep  commands  Communication: clear/fluent  Memory:  No Deficits noted  Safety awareness/insight to disability: intact  Coping skills/emotional control: Appropriate to situation    Physical Exam:  Postural examination/scapula alignment:    -       No postural abnormalities identified  Sensation:    -       Intact  Upper Extremity Range of Motion:     -       Right Upper Extremity: WFL  -       Left Upper Extremity: WFL  Upper Extremity Strength:    -       Right Upper Extremity: WFL  -       Left Upper Extremity: WFL   Strength:    -       Right Upper Extremity: WFL  -       Left Upper Extremity: WFL  Fine Motor Coordination:    -       Intact  Gross motor coordination:   WFL    AMPAC 6 Click ADL:  AMPAC Total Score: 21    Treatment & Education:  Pt ed on OT POC  Pt ed on safety and need for assistance  Pt ed on ROM ex's 3x daily for increased overall strength and endurance    Patient left up in chair with all lines intact, call button in reach, RN notified, and spouse present    GOALS:   Multidisciplinary Problems       Occupational Therapy Goals       Not on file              Multidisciplinary Problems (Resolved)           Problem: Occupational Therapy    Goal Priority Disciplines Outcome Interventions   Occupational Therapy Goal   (Resolved)     OT, PT/OT Met                        History:     Past Medical History:   Diagnosis Date    Depression     Heart murmur     Seizures          Past Surgical History:   Procedure Laterality Date    DEBRIDEMENT, BREAST Left 11/11/2021    Procedure: DEBRIDEMENT,  BREAST;  Surgeon: Yoan Sparks MD;  Location: [facility] OR;  Service: General;  Laterality: Left;  DEBRIDEMENT, WOUND, LEFT BREAST    DILATION AND CURETTAGE OF UTERUS      FLAP PROCEDURE N/A 6/14/2023    Procedure: CREATION, FREE FLAP;  Surgeon: Gerardo Seymour MD;  Location: [facility] OR 2ND FLR;  Service: ENT;  Laterality: N/A;    INCISION AND DRAINAGE Left 7/12/2021    Procedure: INCISION AND DRAINAGE, HEMATOMA, SEROMA, OR FLUID COLLECTION ;  Surgeon: Yoan Sparks MD;  Location: [facility];  Service: General;  Laterality: Left;    NECK EXPLORATION Bilateral 6/8/2023    Procedure: EXPLORATION and debridement, NECK;  Surgeon: Jose Jules MD;  [facility];  Service: ENT;  Laterality: Bilateral;    NECK EXPLORATION Right 6/12/2023    Procedure: RIGHT NECK WOUND EXPLORATION WITH WOUND DEBRIDEMENT;  Surgeon: Gerardo Seymour MD;  Location: [facility] OR [facility] FLR;  Service: ENT;  Laterality: Right;    NECK EXPLORATION Right 6/14/2023    Procedure: EXPLORATION, NECK;  Surgeon: Gerardo Seymour MD;  [facility];  Service: ENT;  Laterality: Right;       Time Tracking:     OT Date of Treatment: 06/19/23  OT Start Time: 1113  OT Stop Time: 1129  OT Total Time (min): 16 min    Billable Minutes:Evaluation 8  Self Care/Home Management 8    6/19/2023

## 2023-06-20 LAB
ALBUMIN SERPL BCP-MCNC: 2.5 G/DL (ref 3.5–5.2)
ALP SERPL-CCNC: 86 U/L (ref 55–135)
ALT SERPL W/O P-5'-P-CCNC: 23 U/L (ref 10–44)
ANION GAP SERPL CALC-SCNC: 11 MMOL/L (ref 8–16)
AST SERPL-CCNC: 20 U/L (ref 10–40)
BASOPHILS # BLD AUTO: 0.07 K/UL (ref 0–0.2)
BASOPHILS NFR BLD: 1.1 % (ref 0–1.9)
BILIRUB SERPL-MCNC: 0.2 MG/DL (ref 0.1–1)
BUN SERPL-MCNC: 10 MG/DL (ref 6–20)
CALCIUM SERPL-MCNC: 9.2 MG/DL (ref 8.7–10.5)
CHLORIDE SERPL-SCNC: 105 MMOL/L (ref 95–110)
CO2 SERPL-SCNC: 24 MMOL/L (ref 23–29)
CREAT SERPL-MCNC: 0.4 MG/DL (ref 0.5–1.4)
DIFFERENTIAL METHOD: ABNORMAL
EOSINOPHIL # BLD AUTO: 0.2 K/UL (ref 0–0.5)
EOSINOPHIL NFR BLD: 3.5 % (ref 0–8)
ERYTHROCYTE [DISTWIDTH] IN BLOOD BY AUTOMATED COUNT: 12.1 % (ref 11.5–14.5)
EST. GFR  (NO RACE VARIABLE): >60 ML/MIN/1.73 M^2
GLUCOSE SERPL-MCNC: 100 MG/DL (ref 70–110)
HCT VFR BLD AUTO: 25.8 % (ref 37–48.5)
HGB BLD-MCNC: 8.5 G/DL (ref 12–16)
IMM GRANULOCYTES # BLD AUTO: 0.02 K/UL (ref 0–0.04)
IMM GRANULOCYTES NFR BLD AUTO: 0.3 % (ref 0–0.5)
LYMPHOCYTES # BLD AUTO: 1.8 K/UL (ref 1–4.8)
LYMPHOCYTES NFR BLD: 27.4 % (ref 18–48)
MAGNESIUM SERPL-MCNC: 1.7 MG/DL (ref 1.6–2.6)
MCH RBC QN AUTO: 29.9 PG (ref 27–31)
MCHC RBC AUTO-ENTMCNC: 32.9 G/DL (ref 32–36)
MCV RBC AUTO: 91 FL (ref 82–98)
MONOCYTES # BLD AUTO: 0.5 K/UL (ref 0.3–1)
MONOCYTES NFR BLD: 8.2 % (ref 4–15)
NEUTROPHILS # BLD AUTO: 3.9 K/UL (ref 1.8–7.7)
NEUTROPHILS NFR BLD: 59.5 % (ref 38–73)
NRBC BLD-RTO: 0 /100 WBC
PHOSPHATE SERPL-MCNC: 3.5 MG/DL (ref 2.7–4.5)
PLATELET # BLD AUTO: 813 K/UL (ref 150–450)
PMV BLD AUTO: 9.8 FL (ref 9.2–12.9)
POTASSIUM SERPL-SCNC: 3.8 MMOL/L (ref 3.5–5.1)
PROT SERPL-MCNC: 5.6 G/DL (ref 6–8.4)
RBC # BLD AUTO: 2.84 M/UL (ref 4–5.4)
SODIUM SERPL-SCNC: 140 MMOL/L (ref 136–145)
WBC # BLD AUTO: 6.57 K/UL (ref 3.9–12.7)

## 2023-06-20 PROCEDURE — 25000003 PHARM REV CODE 250: Performed by: STUDENT IN AN ORGANIZED HEALTH CARE EDUCATION/TRAINING PROGRAM

## 2023-06-20 PROCEDURE — 25000003 PHARM REV CODE 250

## 2023-06-20 PROCEDURE — A4216 STERILE WATER/SALINE, 10 ML: HCPCS | Performed by: OTOLARYNGOLOGY

## 2023-06-20 PROCEDURE — 93010 ELECTROCARDIOGRAM REPORT: CPT | Mod: ,,, | Performed by: INTERNAL MEDICINE

## 2023-06-20 PROCEDURE — 25000003 PHARM REV CODE 250: Performed by: OTOLARYNGOLOGY

## 2023-06-20 PROCEDURE — 63600175 PHARM REV CODE 636 W HCPCS: Performed by: STUDENT IN AN ORGANIZED HEALTH CARE EDUCATION/TRAINING PROGRAM

## 2023-06-20 PROCEDURE — 93005 ELECTROCARDIOGRAM TRACING: CPT

## 2023-06-20 PROCEDURE — 80053 COMPREHEN METABOLIC PANEL: CPT

## 2023-06-20 PROCEDURE — 11000001 HC ACUTE MED/SURG PRIVATE ROOM

## 2023-06-20 PROCEDURE — 99233 SBSQ HOSP IP/OBS HIGH 50: CPT | Mod: ,,, | Performed by: SURGERY

## 2023-06-20 PROCEDURE — 85025 COMPLETE CBC W/AUTO DIFF WBC: CPT

## 2023-06-20 PROCEDURE — 94760 N-INVAS EAR/PLS OXIMETRY 1: CPT

## 2023-06-20 PROCEDURE — 99233 PR SUBSEQUENT HOSPITAL CARE,LEVL III: ICD-10-PCS | Mod: ,,, | Performed by: SURGERY

## 2023-06-20 PROCEDURE — 83735 ASSAY OF MAGNESIUM: CPT

## 2023-06-20 PROCEDURE — 94761 N-INVAS EAR/PLS OXIMETRY MLT: CPT

## 2023-06-20 PROCEDURE — 93010 EKG 12-LEAD: ICD-10-PCS | Mod: ,,, | Performed by: INTERNAL MEDICINE

## 2023-06-20 PROCEDURE — 84100 ASSAY OF PHOSPHORUS: CPT

## 2023-06-20 RX ADMIN — Medication 800 MG: at 06:06

## 2023-06-20 RX ADMIN — CEFAZOLIN 2 G: 2 INJECTION, POWDER, FOR SOLUTION INTRAMUSCULAR; INTRAVENOUS at 11:06

## 2023-06-20 RX ADMIN — OXYCODONE HYDROCHLORIDE 10 MG: 10 TABLET ORAL at 08:06

## 2023-06-20 RX ADMIN — CLONIDINE HYDROCHLORIDE 0.1 MG: 0.1 TABLET ORAL at 08:06

## 2023-06-20 RX ADMIN — CEFAZOLIN 2 G: 2 INJECTION, POWDER, FOR SOLUTION INTRAMUSCULAR; INTRAVENOUS at 05:06

## 2023-06-20 RX ADMIN — Medication 10 ML: at 05:06

## 2023-06-20 RX ADMIN — OXYCODONE HYDROCHLORIDE 10 MG: 10 TABLET ORAL at 01:06

## 2023-06-20 RX ADMIN — QUETIAPINE FUMARATE 200 MG: 200 TABLET ORAL at 08:06

## 2023-06-20 RX ADMIN — ACETAMINOPHEN 1000 MG: 500 TABLET ORAL at 05:06

## 2023-06-20 RX ADMIN — CEFAZOLIN 2 G: 2 INJECTION, POWDER, FOR SOLUTION INTRAMUSCULAR; INTRAVENOUS at 12:06

## 2023-06-20 RX ADMIN — OXYCODONE HYDROCHLORIDE 10 MG: 10 TABLET ORAL at 06:06

## 2023-06-20 RX ADMIN — IBUPROFEN 400 MG: 400 TABLET, FILM COATED ORAL at 05:06

## 2023-06-20 RX ADMIN — Medication 10 ML: at 12:06

## 2023-06-20 RX ADMIN — CEFAZOLIN 2 G: 2 INJECTION, POWDER, FOR SOLUTION INTRAMUSCULAR; INTRAVENOUS at 08:06

## 2023-06-20 RX ADMIN — Medication 10 ML: at 06:06

## 2023-06-20 RX ADMIN — IBUPROFEN 400 MG: 400 TABLET, FILM COATED ORAL at 11:06

## 2023-06-20 RX ADMIN — IBUPROFEN 400 MG: 400 TABLET, FILM COATED ORAL at 12:06

## 2023-06-20 RX ADMIN — POTASSIUM BICARBONATE 50 MEQ: 978 TABLET, EFFERVESCENT ORAL at 06:06

## 2023-06-20 RX ADMIN — ACETAMINOPHEN 1000 MG: 500 TABLET ORAL at 09:06

## 2023-06-20 RX ADMIN — ENOXAPARIN SODIUM 40 MG: 40 INJECTION SUBCUTANEOUS at 05:06

## 2023-06-20 RX ADMIN — ACETAMINOPHEN 1000 MG: 500 TABLET ORAL at 01:06

## 2023-06-20 NOTE — ASSESSMENT & PLAN NOTE
42F PMH depression, seizures (no meds), tobacco use, presenting with concern for toshia's angina / nec fasc of neck s/p exploration and debridement with ENT 6/8/23. S/p 6/14 right neck debridement with right anterolateral free flap coverage.           Neuro/Psych:   -- Sedation: none  -- Pain: tylenol 1g q8, oxy 5/10 PRN  -- Seroquel 200mg BID, haldol 5mg q6h prn              Cards:   -- HDS  -- currently not requiring pressors  -- MAP > 65  -- q4h flap checks  - minimize right sided head movement       Pulm:   -- Goal O2 sat > 90%  -- Extubated 6/15  -- IS, Acapella  -- Room Air      Renal:  -- Strict  I/O  -- BUN/Cr 10/0.5      FEN / GI:   -- Replace lytes as needed  -- Nutrition: Regular diet      ID:   -- Afebrile, WBC 9.7 -> 7.49  -- Abx: cefazolin 6/13-7/9  -- ID following  -- Following OR cultures 6/8 Staph aureus, MSSA  -- Blood Cx OSH 6/7 NGTD  -- ID signed off but stated to re-consult on 6/25      Heme/Onc:   -- H/H stable 8.5/25.8  -- Daily CBC      Endo:   -- Gluc goal 140-180      PPx:   Feeding: Regular diet  Analgesia/Sedation: as above  Thromboembolic prevention: lovenox  HOB >30: Yes  Stress Ulcer ppx: n/a  Glucose control: Critical care goal 140-180 g/dl     Lines/Drains/Airway: AMADO x2, PICC      Dispo/Code Status/Palliative:   -- SICU / Full Code  -- stepdown orders in

## 2023-06-20 NOTE — PLAN OF CARE
Chart reviewed.  BCx 6/15 growing diptheroids. This is likely a contaminant.   Repeat BCx 6/18, 6/18 are NGTD.  --OK to continue cefazolin while in the hospital  --transition to augmentin 875-125mg po bid on discharge x 2 wks. Last day 7/3/23.  ID will sign off. Please call with any questions.   Wendy Lee MD  Infectious Disease

## 2023-06-20 NOTE — PLAN OF CARE
"      SICU PLAN OF CARE NOTE    Dx: Necrotizing fasciitis    Vital Signs: /75 (BP Location: Left arm, Patient Position: Lying)   Pulse 70   Temp 98.5 °F (36.9 °C) (Oral)   Resp 18   Ht 5' 2" (1.575 m)   Wt 55.8 kg (123 lb 0.3 oz)   SpO2 99%   Breastfeeding No   BMI 22.50 kg/m²     SHIFT EVENTS:  VSS / No acute events this shift.     Neuro: AAO x4, Follows Commands, and Moves All Extremities    Respiratory: Room Air    Cardiac: Normal Sinus Rhythm    Diet: Regular Diet    Urine Output: Voids Spontaneously 500 ml/shift    Drains:     AMADO Drain, total output 5 cc / shift    AMADO Drain, total output 35 cc / shift       Labs/Accuchecks: Daily Labs.    SKIN NOTE:    Skin precautions maintained including:  Sacrum and heels with foam dressing in place for pressure protection. Frequent weight shift encouraged Q2 hr to prevent breakdown. Bed plugged in and mattress inflated. Adhesive use limited. Heels elevated off bed. Pressure points protected and positioning supports utilized.  Skin-to-device areas padded. Skin-to-skin areas padded    POC reviewed with patient and family; questions and concerns addressed. See flow sheets for full assessment details.    "

## 2023-06-20 NOTE — PROGRESS NOTES
[facility] - [facility] Intensive Care  Critical Care - Surgery  Progress Note    Patient Name: Aditi Conway  MRN: [id number]  Admission Date: 6/8/2023  Hospital Length of Stay: 12 days  Code Status: Full Code  Attending Provider: Gerardo Seymour MD  Primary Care Provider: Primary Doctor No   Principal Problem: Necrotizing fasciitis    Subjective:     Hospital/ICU Course:  No notes on file    Interval History/Significant Events: Patient developed swelling at base of neck/upper chest with mild tenderness, no erythema, mild blanching, no warmth. Afebrile. WBC trending down. Possibly dependent edema vs cellulitis. Neck drain with minimal serous output. Otherwise doing well awaiting SD.     Follow-up For: Procedure(s) (LRB):  EXPLORATION, NECK (Right)  CREATION, FREE FLAP (N/A)    Post-Operative Day: 6 Days Post-Op    Objective:     Vital Signs (Most Recent):  Temp: 98.5 °F (36.9 °C) (06/19/23 1915)  Pulse: 77 (06/20/23 0645)  Resp: (!) 38 (06/20/23 0645)  BP: (!) 145/99 (06/20/23 0600)  SpO2: 97 % (06/20/23 0645) Vital Signs (24h Range):  Temp:  [98.1 °F (36.7 °C)-98.8 °F (37.1 °C)] 98.5 °F (36.9 °C)  Pulse:  [] 77  Resp:  [7-47] 38  SpO2:  [95 %-100 %] 97 %  BP: (104-166)/() 145/99     Weight: 55.8 kg (123 lb 0.3 oz)  Body mass index is 22.5 kg/m².      Intake/Output Summary (Last 24 hours) at 6/20/2023 0723  Last data filed at 6/20/2023 0700  Gross per 24 hour   Intake 101.23 ml   Output 840 ml   Net -738.77 ml          Physical Exam  Constitutional:       General: She is not in acute distress.     Appearance: Normal appearance.   HENT:      Head: Normocephalic and atraumatic.      Mouth/Throat:      Mouth: Mucous membranes are dry.      Pharynx: Oropharynx is clear.   Eyes:      Pupils: Pupils are equal, round, and reactive to light.   Neck:      Comments: Right neck with ALT flap stapled in place  Soft, appropriate color   Strong arterial and venous external doppler signal   AMADO drain with s/s  output   Swelling with minimal tenderness at base of neck/chest with mild blanching, no erythema, warmth.  Cardiovascular:      Rate and Rhythm: Normal rate and regular rhythm.      Pulses: Normal pulses.   Abdominal:      Palpations: Abdomen is soft.   Musculoskeletal:      Comments: RLE with soft to palpation, island dressing in place  AMADO drain with s/s output    Skin:     General: Skin is warm and dry.   Neurological:      General: No focal deficit present.      Mental Status: She is alert.        Vents:  Vent Mode: Spont (06/15/23 1426)  Set Rate: 18 BPM (06/15/23 1115)  Vt Set: 360 mL (06/15/23 0559)  Pressure Support: 5 cmH20 (06/15/23 1426)  PEEP/CPAP: 5 cmH20 (06/15/23 1426)  Oxygen Concentration (%): 40 (06/15/23 1426)  Peak Airway Pressure: 10 cmH20 (06/15/23 1426)  Plateau Pressure: 0 cmH20 (06/15/23 1426)  Total Ve: 7.98 L/m (06/15/23 1426)  Negative Inspiratory Force (cm H2O): -44 (06/15/23 1426)  F/VT Ratio<105 (RSBI): (!) 29.41 (06/15/23 1155)    Lines/Drains/Airways       Peripherally Inserted Central Catheter Line  Duration             PICC Double Lumen 06/09/23 1152 right brachial 10 days              Drain  Duration                  Closed/Suction Drain 06/14/23 1044 Right Neck Bulb 15 Fr. 5 days         Closed/Suction Drain 06/14/23 1044 Right Thigh Bulb 19 Fr. 5 days                    Significant Labs:    CBC/Anemia Profile:  Recent Labs   Lab 06/19/23  0311 06/20/23  0305   WBC 7.49 6.57   HGB 9.0* 8.5*   HCT 27.3* 25.8*   * 813*   MCV 93 91   RDW 12.3 12.1        Chemistries:  Recent Labs   Lab 06/19/23  0311 06/20/23  0305    140   K 4.0 3.8    105   CO2 28 24   BUN 7 10   CREATININE 0.4* 0.4*   CALCIUM 9.2 9.2   ALBUMIN 2.3* 2.5*   PROT 5.4* 5.6*   BILITOT 0.2 0.2   ALKPHOS 85 86   ALT 31 23   AST 32 20   MG 1.7 1.7   PHOS 3.6 3.5       Significant Imaging:  I have reviewed all pertinent imaging results/findings within the past 24 hours.    Assessment/Plan:     ID  *  Necrotizing fasciitis  42F PMH depression, seizures (no meds), tobacco use, presenting with concern for toshia's angina / nec fasc of neck s/p exploration and debridement with ENT 6/8/23. S/p 6/14 right neck debridement with right anterolateral free flap coverage.           Neuro/Psych:   -- Sedation: none  -- Pain: tylenol 1g q8, oxy 5/10 PRN  -- Seroquel 200mg BID, haldol 5mg q6h prn              Cards:   -- HDS  -- currently not requiring pressors  -- MAP > 65  -- q4h flap checks  - minimize right sided head movement       Pulm:   -- Goal O2 sat > 90%  -- Extubated 6/15  -- IS, Acapella  -- Room Air      Renal:  -- Strict  I/O  -- BUN/Cr 10/0.5      FEN / GI:   -- Replace lytes as needed  -- Nutrition: Regular diet      ID:   -- Afebrile, WBC 9.7 -> 7.49  -- Abx: cefazolin 6/13-7/9  -- ID following  -- Following OR cultures 6/8 Staph aureus, MSSA  -- Blood Cx OSH 6/7 NGTD  -- ID signed off but stated to re-consult on 6/25      Heme/Onc:   -- H/H stable 8.5/25.8  -- Daily CBC      Endo:   -- Gluc goal 140-180      PPx:   Feeding: Regular diet  Analgesia/Sedation: as above  Thromboembolic prevention: lovenox  HOB >30: Yes  Stress Ulcer ppx: n/a  Glucose control: Critical care goal 140-180 g/dl     Lines/Drains/Airway: AMADO x2, PICC      Dispo/Code Status/Palliative:   -- SICU / Full Code  -- stepdown orders in         Critical care was time spent personally by me on the following activities: development of treatment plan with patient or surrogate and bedside caregivers, discussions with consultants, evaluation of patient's response to treatment, examination of patient, ordering and performing treatments and interventions, ordering and review of laboratory studies, ordering and review of radiographic studies, pulse oximetry, re-evaluation of patient's condition.  This critical care time did not overlap with that of any other provider or involve time for any procedures.     Stan Ricci MD  Critical Care -  Surgery  [facility] - Surgical Intensive Care

## 2023-06-20 NOTE — SUBJECTIVE & OBJECTIVE
Interval History/Significant Events: Patient developed swelling at base of neck/upper chest with mild tenderness, no erythema, mild blanching, no warmth. Afebrile. WBC trending down. Possibly dependent edema vs cellulitis. Neck drain with minimal serous output. Otherwise doing well awaiting SD.     Follow-up For: Procedure(s) (LRB):  EXPLORATION, NECK (Right)  CREATION, FREE FLAP (N/A)    Post-Operative Day: 6 Days Post-Op    Objective:     Vital Signs (Most Recent):  Temp: 98.5 °F (36.9 °C) (06/19/23 1915)  Pulse: 77 (06/20/23 0645)  Resp: (!) 38 (06/20/23 0645)  BP: (!) 145/99 (06/20/23 0600)  SpO2: 97 % (06/20/23 0645) Vital Signs (24h Range):  Temp:  [98.1 °F (36.7 °C)-98.8 °F (37.1 °C)] 98.5 °F (36.9 °C)  Pulse:  [] 77  Resp:  [7-47] 38  SpO2:  [95 %-100 %] 97 %  BP: (104-166)/() 145/99     Weight: 55.8 kg (123 lb 0.3 oz)  Body mass index is 22.5 kg/m².      Intake/Output Summary (Last 24 hours) at 6/20/2023 0723  Last data filed at 6/20/2023 0700  Gross per 24 hour   Intake 101.23 ml   Output 840 ml   Net -738.77 ml          Physical Exam  Constitutional:       General: She is not in acute distress.     Appearance: Normal appearance.   HENT:      Head: Normocephalic and atraumatic.      Mouth/Throat:      Mouth: Mucous membranes are dry.      Pharynx: Oropharynx is clear.   Eyes:      Pupils: Pupils are equal, round, and reactive to light.   Neck:      Comments: Right neck with ALT flap stapled in place  Soft, appropriate color   Strong arterial and venous external doppler signal   AMADO drain with s/s output   Swelling with minimal tenderness at base of neck/chest with mild blanching, no erythema, warmth.  Cardiovascular:      Rate and Rhythm: Normal rate and regular rhythm.      Pulses: Normal pulses.   Abdominal:      Palpations: Abdomen is soft.   Musculoskeletal:      Comments: RLE with soft to palpation, island dressing in place  AMADO drain with s/s output    Skin:     General: Skin is warm and  dry.   Neurological:      General: No focal deficit present.      Mental Status: She is alert.        Vents:  Vent Mode: Spont (06/15/23 1426)  Set Rate: 18 BPM (06/15/23 1115)  Vt Set: 360 mL (06/15/23 0559)  Pressure Support: 5 cmH20 (06/15/23 1426)  PEEP/CPAP: 5 cmH20 (06/15/23 1426)  Oxygen Concentration (%): 40 (06/15/23 1426)  Peak Airway Pressure: 10 cmH20 (06/15/23 1426)  Plateau Pressure: 0 cmH20 (06/15/23 1426)  Total Ve: 7.98 L/m (06/15/23 1426)  Negative Inspiratory Force (cm H2O): -44 (06/15/23 1426)  F/VT Ratio<105 (RSBI): (!) 29.41 (06/15/23 1155)    Lines/Drains/Airways       Peripherally Inserted Central Catheter Line  Duration             PICC Double Lumen 06/09/23 1152 right brachial 10 days              Drain  Duration                  Closed/Suction Drain 06/14/23 1044 Right Neck Bulb 15 Fr. 5 days         Closed/Suction Drain 06/14/23 1044 Right Thigh Bulb 19 Fr. 5 days                    Significant Labs:    CBC/Anemia Profile:  Recent Labs   Lab 06/19/23  0311 06/20/23  0305   WBC 7.49 6.57   HGB 9.0* 8.5*   HCT 27.3* 25.8*   * 813*   MCV 93 91   RDW 12.3 12.1        Chemistries:  Recent Labs   Lab 06/19/23  0311 06/20/23  0305    140   K 4.0 3.8    105   CO2 28 24   BUN 7 10   CREATININE 0.4* 0.4*   CALCIUM 9.2 9.2   ALBUMIN 2.3* 2.5*   PROT 5.4* 5.6*   BILITOT 0.2 0.2   ALKPHOS 85 86   ALT 31 23   AST 32 20   MG 1.7 1.7   PHOS 3.6 3.5       Significant Imaging:  I have reviewed all pertinent imaging results/findings within the past 24 hours.

## 2023-06-20 NOTE — ASSESSMENT & PLAN NOTE
42F presenting with right neck necrotizing fasciitis with surround cellulitis s/p debridement of multiple levels of right neck on 6/8. Necrotic skin, soft tissue, and muscle removed. Cultures +staph aureus. S/P debridement in OR on 6/12. Now s/p reconstruction with R ALT free flap on 6/14.      ENT/HNS:  - Q4H flap checks  - CTM chest swelling    - Routine drain care   - Keep head neutral or turned to the left, minimize right sided head movement   -Fresh wound care, clean incision with peroxide/qtip followed by bacitracin BID     Neuro:   -Pain: Multimodality PRN regimen initiated     Cardiac:  -Remain normotensive  -On clonidine taper through Friday per SICU  -Page ENT before starting vasopressors     Pulmonary:   - CTM     Renal:   - monitor Is and Os  - Cr stable  - OK to d/c murdock  - UTI management per SICU     Infectious Disease:  - Daily CBC  - ID consulted; appreciate recs   - Kefzol   - Repeat blood cultures drawn per ID due to GNRs in last culture (unclear significance)    - Need outpatient antibiotics recommendations, pending result of blood culture     Hematology/Oncology:  - H/H stable  - Lovenox DVT prophylaxis      FEN/GI  - Replace electrolytes as needed to keep K>4, Mg>2  - Bowel reg  - Protonix for GI prophylaxis  - OK for diet      MSK  - Drain care  - Bacitracin to incision site      Dispo:  -Ok for step down   -Needs outpatient abx recommendations from ID; Augmentin 2 weeks   -Will discuss possibility of discharge while still on clonidine taper (ends Friday)

## 2023-06-20 NOTE — PROGRESS NOTES
[facility]  Otorhinolaryngology-Head & Neck Surgery  Progress Note    Subjective:     Post-Op Info:  Procedure(s) (LRB):  EXPLORATION, NECK (Right)  CREATION, FREE FLAP (N/A)   6 Days Post-Op  Hospital Day: 13     Interval History: Area of swelling and erythema on chest. Denies any pain around area. Remains afebrile and no leukocytosis.     Medications:  Continuous Infusions:  Scheduled Meds:   acetaminophen  1,000 mg Oral Q8H    ceFAZolin (ANCEF) IVPB  2 g Intravenous Q8H    cloNIDine  0.3 mg Oral Q8H    Followed by    [START ON 6/20/2023] cloNIDine  0.3 mg Oral Q12H    Followed by    [START ON 6/22/2023] cloNIDine  0.3 mg Oral Daily    enoxparin  40 mg Subcutaneous Q24H (prophylaxis, 1700)    ibuprofen  400 mg Oral Q6H    polyethylene glycol  17 g Oral Daily    QUEtiapine  200 mg Oral BID    sodium chloride 0.9%  10 mL Intravenous Q6H     PRN Meds:sodium chloride 0.9%, haloperidol lactate, magnesium oxide, magnesium oxide, ondansetron, oxyCODONE, oxyCODONE, potassium bicarbonate, potassium bicarbonate, potassium bicarbonate, potassium, sodium phosphates, potassium, sodium phosphates, potassium, sodium phosphates, sodium chloride 0.9%, sodium chloride 0.9%, Flushing PICC Protocol **AND** sodium chloride 0.9% **AND** sodium chloride 0.9%     Review of patient's allergies indicates:   Allergen Reactions    Clindamycin      Objective:     Vital Signs (24h Range):  Temp:  [98.1 °F (36.7 °C)-99.6 °F (37.6 °C)] 98.1 °F (36.7 °C)  Pulse:  [] 80  Resp:  [5-47] 20  SpO2:  [97 %-100 %] 100 %  BP: ()/(58-95) 104/62     Date 06/19/23 0700 - 06/20/23 0659   Shift 9267-3541 9064-4446 9995-9859 24 Hour Total   INTAKE   IV Piggyback 50   50   Shift Total(mL/kg) 50(0.9)   50(0.9)   OUTPUT   Shift Total(mL/kg)       Weight (kg) 55.8 55.8 55.8 55.8     Lines/Drains/Airways       Peripherally Inserted Central Catheter Line  Duration             PICC Double Lumen 06/09/23 1152 right  brachial 9 days              Drain  Duration                  Closed/Suction Drain 06/14/23 1044 Right Neck Bulb 15 Fr. 4 days         Closed/Suction Drain 06/14/23 1044 Right Thigh Bulb 19 Fr. 4 days                     Physical Exam   Awake, alert   Right neck with ALT flap stapled in place  Soft, appropriate color   Chest with soft edema and mild erythema, outlined. No tenderness to palpation. No fluctuance.  Strong arterial and venous external doppler signal   AMADO drain with s/s output   RLE with soft to palpation, Incision c/d/I with staples, no hematoma  AMADO drain with s/s output     Significant Labs:  CBC:   Recent Labs   Lab 06/19/23  0311   WBC 7.49   RBC 2.93*   HGB 9.0*   HCT 27.3*   *   MCV 93   MCH 30.7   MCHC 33.0     CMP:   Recent Labs   Lab 06/19/23 0311   GLU 96   CALCIUM 9.2   ALBUMIN 2.3*   PROT 5.4*      K 4.0   CO2 28      BUN 7   CREATININE 0.4*   ALKPHOS 85   ALT 31   AST 32   BILITOT 0.2       Significant Diagnostics:  None    Assessment/Plan:     * Necrotizing fasciitis  42F presenting with right neck necrotizing fasciitis with surround cellulitis s/p debridement of multiple levels of right neck on 6/8. Necrotic skin, soft tissue, and muscle removed. Cultures +staph aureus. S/P debridement in OR on 6/12. Now s/p reconstruction with R ALT free flap on 6/14.      ENT/HNS:  - Q4H flap checks  - CTM chest swelling    - Routine drain care   - Keep head neutral or turned to the left, minimize right sided head movement   -Fresh wound care, clean incision with peroxide/qtip followed by bacitracin BID     Neuro:   -Pain: Multimodality PRN regimen initiated     Cardiac:  -Remain normotensive  -On clonidine taper through Friday per SICU  -Page ENT before starting vasopressors     Pulmonary:   - CTM     Renal:   - monitor Is and Os  - Cr stable  - OK to d/c murdock  - UTI management per SICU     Infectious Disease:  - Daily CBC  - ID consulted; appreciate recs   - Crista   - Repeat blood  cultures drawn per ID due to GNRs in last culture (unclear significance)    - Need outpatient antibiotics recommendations, pending result of blood culture     Hematology/Oncology:  - H/H stable  - Lovenox DVT prophylaxis      FEN/GI  - Replace electrolytes as needed to keep K>4, Mg>2  - Bowel reg  - Protonix for GI prophylaxis  - OK for diet      MSK  - Drain care  - Bacitracin to incision site      Dispo:  -Ok for step down   -Needs outpatient abx recommendations from ID; Augmentin 2 weeks   -Will discuss possibility of discharge while still on clonidine taper (ends Friday)        Miranda Casas MD  Otorhinolaryngology-Head & Neck Surgery  [facility]

## 2023-06-21 VITALS
OXYGEN SATURATION: 98 % | SYSTOLIC BLOOD PRESSURE: 174 MMHG | BODY MASS INDEX: 22.63 KG/M2 | WEIGHT: 123 LBS | RESPIRATION RATE: 18 BRPM | TEMPERATURE: 98 F | DIASTOLIC BLOOD PRESSURE: 105 MMHG | HEART RATE: 88 BPM | HEIGHT: 62 IN

## 2023-06-21 LAB
ALBUMIN SERPL BCP-MCNC: 2.6 G/DL (ref 3.5–5.2)
ALP SERPL-CCNC: 86 U/L (ref 55–135)
ALT SERPL W/O P-5'-P-CCNC: 17 U/L (ref 10–44)
ANION GAP SERPL CALC-SCNC: 11 MMOL/L (ref 8–16)
AST SERPL-CCNC: 18 U/L (ref 10–40)
BACTERIA BLD CULT: NORMAL
BASOPHILS # BLD AUTO: 0.07 K/UL (ref 0–0.2)
BASOPHILS NFR BLD: 1.2 % (ref 0–1.9)
BILIRUB SERPL-MCNC: 0.2 MG/DL (ref 0.1–1)
BUN SERPL-MCNC: 10 MG/DL (ref 6–20)
CALCIUM SERPL-MCNC: 9.3 MG/DL (ref 8.7–10.5)
CHLORIDE SERPL-SCNC: 104 MMOL/L (ref 95–110)
CO2 SERPL-SCNC: 27 MMOL/L (ref 23–29)
CREAT SERPL-MCNC: 0.5 MG/DL (ref 0.5–1.4)
DIFFERENTIAL METHOD: ABNORMAL
EOSINOPHIL # BLD AUTO: 0.2 K/UL (ref 0–0.5)
EOSINOPHIL NFR BLD: 3.6 % (ref 0–8)
ERYTHROCYTE [DISTWIDTH] IN BLOOD BY AUTOMATED COUNT: 12.8 % (ref 11.5–14.5)
EST. GFR  (NO RACE VARIABLE): >60 ML/MIN/1.73 M^2
FINAL PATHOLOGIC DIAGNOSIS: NORMAL
GLUCOSE SERPL-MCNC: 94 MG/DL (ref 70–110)
GROSS: NORMAL
HCT VFR BLD AUTO: 26.3 % (ref 37–48.5)
HGB BLD-MCNC: 8.8 G/DL (ref 12–16)
IMM GRANULOCYTES # BLD AUTO: 0.02 K/UL (ref 0–0.04)
IMM GRANULOCYTES NFR BLD AUTO: 0.3 % (ref 0–0.5)
LYMPHOCYTES # BLD AUTO: 1.9 K/UL (ref 1–4.8)
LYMPHOCYTES NFR BLD: 32 % (ref 18–48)
Lab: NORMAL
MAGNESIUM SERPL-MCNC: 1.8 MG/DL (ref 1.6–2.6)
MCH RBC QN AUTO: 30.8 PG (ref 27–31)
MCHC RBC AUTO-ENTMCNC: 33.5 G/DL (ref 32–36)
MCV RBC AUTO: 92 FL (ref 82–98)
MONOCYTES # BLD AUTO: 0.5 K/UL (ref 0.3–1)
MONOCYTES NFR BLD: 9.2 % (ref 4–15)
NEUTROPHILS # BLD AUTO: 3.1 K/UL (ref 1.8–7.7)
NEUTROPHILS NFR BLD: 53.7 % (ref 38–73)
NRBC BLD-RTO: 0 /100 WBC
PHOSPHATE SERPL-MCNC: 4.2 MG/DL (ref 2.7–4.5)
PLATELET # BLD AUTO: 753 K/UL (ref 150–450)
PMV BLD AUTO: 9.3 FL (ref 9.2–12.9)
POTASSIUM SERPL-SCNC: 3.7 MMOL/L (ref 3.5–5.1)
PROT SERPL-MCNC: 5.6 G/DL (ref 6–8.4)
RBC # BLD AUTO: 2.86 M/UL (ref 4–5.4)
SODIUM SERPL-SCNC: 142 MMOL/L (ref 136–145)
WBC # BLD AUTO: 5.85 K/UL (ref 3.9–12.7)

## 2023-06-21 PROCEDURE — 25000003 PHARM REV CODE 250

## 2023-06-21 PROCEDURE — 25000003 PHARM REV CODE 250: Performed by: STUDENT IN AN ORGANIZED HEALTH CARE EDUCATION/TRAINING PROGRAM

## 2023-06-21 PROCEDURE — 85025 COMPLETE CBC W/AUTO DIFF WBC: CPT

## 2023-06-21 PROCEDURE — 99233 SBSQ HOSP IP/OBS HIGH 50: CPT | Mod: ,,, | Performed by: SURGERY

## 2023-06-21 PROCEDURE — 93010 ELECTROCARDIOGRAM REPORT: CPT | Mod: ,,, | Performed by: PEDIATRICS

## 2023-06-21 PROCEDURE — 25500020 PHARM REV CODE 255: Performed by: OTOLARYNGOLOGY

## 2023-06-21 PROCEDURE — 93005 ELECTROCARDIOGRAM TRACING: CPT

## 2023-06-21 PROCEDURE — A4216 STERILE WATER/SALINE, 10 ML: HCPCS | Performed by: OTOLARYNGOLOGY

## 2023-06-21 PROCEDURE — 84100 ASSAY OF PHOSPHORUS: CPT

## 2023-06-21 PROCEDURE — 80053 COMPREHEN METABOLIC PANEL: CPT

## 2023-06-21 PROCEDURE — 83735 ASSAY OF MAGNESIUM: CPT

## 2023-06-21 PROCEDURE — 93010 EKG 12-LEAD: ICD-10-PCS | Mod: ,,, | Performed by: PEDIATRICS

## 2023-06-21 PROCEDURE — 25000003 PHARM REV CODE 250: Performed by: OTOLARYNGOLOGY

## 2023-06-21 PROCEDURE — 99233 PR SUBSEQUENT HOSPITAL CARE,LEVL III: ICD-10-PCS | Mod: ,,, | Performed by: SURGERY

## 2023-06-21 PROCEDURE — 94760 N-INVAS EAR/PLS OXIMETRY 1: CPT

## 2023-06-21 PROCEDURE — 94761 N-INVAS EAR/PLS OXIMETRY MLT: CPT

## 2023-06-21 PROCEDURE — 63600175 PHARM REV CODE 636 W HCPCS: Performed by: STUDENT IN AN ORGANIZED HEALTH CARE EDUCATION/TRAINING PROGRAM

## 2023-06-21 RX ORDER — ACETAMINOPHEN 500 MG
500 TABLET ORAL EVERY 6 HOURS PRN
Qty: 40 TABLET | Refills: 0 | Status: SHIPPED | OUTPATIENT
Start: 2023-06-21 | End: 2023-07-01

## 2023-06-21 RX ORDER — CEPHALEXIN 500 MG/1
500 CAPSULE ORAL EVERY 12 HOURS
Qty: 28 CAPSULE | Refills: 0 | Status: SHIPPED | OUTPATIENT
Start: 2023-06-21 | End: 2023-06-21

## 2023-06-21 RX ORDER — HYDROCODONE BITARTRATE AND ACETAMINOPHEN 5; 325 MG/1; MG/1
1 TABLET ORAL EVERY 6 HOURS PRN
Qty: 12 TABLET | Refills: 0 | Status: SHIPPED | OUTPATIENT
Start: 2023-06-21 | End: 2023-06-26 | Stop reason: SDUPTHER

## 2023-06-21 RX ORDER — ALPRAZOLAM 0.5 MG/1
0.5 TABLET ORAL ONCE
Status: COMPLETED | OUTPATIENT
Start: 2023-06-21 | End: 2023-06-21

## 2023-06-21 RX ORDER — AMOXICILLIN AND CLAVULANATE POTASSIUM 875; 125 MG/1; MG/1
1 TABLET, FILM COATED ORAL EVERY 12 HOURS
Qty: 28 TABLET | Refills: 0 | Status: SHIPPED | OUTPATIENT
Start: 2023-06-21 | End: 2023-06-26 | Stop reason: SDUPTHER

## 2023-06-21 RX ADMIN — Medication 10 ML: at 11:06

## 2023-06-21 RX ADMIN — IBUPROFEN 400 MG: 400 TABLET, FILM COATED ORAL at 12:06

## 2023-06-21 RX ADMIN — IBUPROFEN 400 MG: 400 TABLET, FILM COATED ORAL at 05:06

## 2023-06-21 RX ADMIN — ALPRAZOLAM 0.5 MG: 0.5 TABLET ORAL at 09:06

## 2023-06-21 RX ADMIN — ACETAMINOPHEN 1000 MG: 500 TABLET ORAL at 05:06

## 2023-06-21 RX ADMIN — CEFAZOLIN 2 G: 2 INJECTION, POWDER, FOR SOLUTION INTRAMUSCULAR; INTRAVENOUS at 07:06

## 2023-06-21 RX ADMIN — IOHEXOL 100 ML: 350 INJECTION, SOLUTION INTRAVENOUS at 11:06

## 2023-06-21 RX ADMIN — OXYCODONE HYDROCHLORIDE 10 MG: 10 TABLET ORAL at 12:06

## 2023-06-21 RX ADMIN — POLYETHYLENE GLYCOL 3350 17 G: 17 POWDER, FOR SOLUTION ORAL at 08:06

## 2023-06-21 RX ADMIN — Medication 10 ML: at 06:06

## 2023-06-21 RX ADMIN — Medication 800 MG: at 05:06

## 2023-06-21 RX ADMIN — CLONIDINE HYDROCHLORIDE 0.1 MG: 0.1 TABLET ORAL at 08:06

## 2023-06-21 RX ADMIN — Medication 10 ML: at 12:06

## 2023-06-21 RX ADMIN — POTASSIUM BICARBONATE 50 MEQ: 978 TABLET, EFFERVESCENT ORAL at 04:06

## 2023-06-21 RX ADMIN — OXYCODONE HYDROCHLORIDE 10 MG: 10 TABLET ORAL at 04:06

## 2023-06-21 NOTE — PLAN OF CARE
11:49 AM   The SW met with the patient at bedside to follow-up at the bedside's nurse request. The patient reported that she wants to go home. The patient reported that she was told that she was getting d/c today and if she does not then she would sign out AMA. The patient reported that she was not interested in placement at this time.  The patient reported that she wants to get back to her kids.  The patient reported that her ride is on their way and she is leaving because she want to get home to her daughter.     The CM team spoke with the patient's PT and OT and their recs are for home with a RW.      The SW sent a message to the patient's MD requesting that someone from her primary spoke with the patient at bedside.    The SW will continue to follow.     Yane Conway LMSW  Case Management [facility]  Ext: [phone]

## 2023-06-21 NOTE — ASSESSMENT & PLAN NOTE
42F presenting with right neck necrotizing fasciitis with surround cellulitis s/p debridement of multiple levels of right neck on 6/8. Necrotic skin, soft tissue, and muscle removed. Cultures +staph aureus. S/P debridement in OR on 6/12. Now s/p reconstruction with R ALT free flap on 6/14.      ENT/HNS:  - Q4H flap checks  - CTM chest swelling     - CT neck/chest to evaluate area   - If WNL, ok for discharge   - Routine drain care   - Keep head neutral or turned to the left, minimize right sided head movement   -Fresh wound care, clean incision with peroxide/qtip followed by bacitracin BID     Neuro:   -Pain: Multimodality PRN regimen initiated     Cardiac:  -Remain normotensive  -On clonidine taper through Friday per SICU  -Page ENT before starting vasopressors     Pulmonary:   - CTM     Renal:   - monitor Is and Os  - Cr stable  - OK to d/c murdock  - UTI management per SICU     Infectious Disease:  - Daily CBC  - ID consulted; appreciate recs   - Kefzol   - Repeat blood cultures drawn per ID due to GNRs in last culture (unclear significance)    - Need outpatient antibiotics recommendations, pending result of blood culture     Hematology/Oncology:  - H/H stable  - Lovenox DVT prophylaxis      FEN/GI  - Replace electrolytes as needed to keep K>4, Mg>2  - Bowel reg  - Protonix for GI prophylaxis  - OK for diet      MSK  - Drain care  - Bacitracin to incision site      Dispo:  -Ok for step down   -Needs outpatient abx recommendations from ID; Augmentin 2 weeks   -Will discuss possibility of discharge while still on clonidine taper (ends Friday)

## 2023-06-21 NOTE — PLAN OF CARE
"      SICU PLAN OF CARE NOTE    Dx: Necrotizing fasciitis    Vital Signs: BP (!) 145/74 (BP Location: Left arm, Patient Position: Lying)   Pulse 69   Temp 98.6 °F (37 °C) (Oral)   Resp 16   Ht 5' 2" (1.575 m)   Wt 55.8 kg (123 lb 0.3 oz)   SpO2 97%   Breastfeeding No   BMI 22.50 kg/m²     SHIFT EVENTS:    VSS / No acute events this shift. Electrolytes replaced.     Neuro: AAO x4    Respiratory: Room Air    Cardiac: Normal Sinus Rhythm     Diet: Regular Diet    Urine Output: Voids Spontaneously 400 ml/shift    Drains:     AMADO Drain, total output 20 cc / shift    AMADO Drain, total output 20 cc / shift       Labs/Accuchecks: Daily Labs.    SKIN NOTE:    Skin precautions maintained including:  Sacrum and heels with foam dressing in place for pressure protection. Frequent weight shift encouraged Q2 hr to prevent breakdown. Bed plugged in and mattress inflated; continuous rotational turning bed feature. Adhesive use limited. Heels elevated off bed. Pressure points protected and positioning supports utilized.  Skin-to-device areas padded. Skin-to-skin areas padded    POC reviewed with patient and family; questions and concerns addressed. See flow sheets for full assessment details.    "

## 2023-06-21 NOTE — PT/OT/SLP PROGRESS
Physical Therapy Missed Treatment Note      Patient Name:  Aditi Conway   MRN:  [id number]  Admitting Diagnosis:  Necrotizing fasciitis   Recent Surgery: Procedure(s) (LRB):  EXPLORATION, NECK (Right)  CREATION, FREE FLAP (N/A) 7 Days Post-Op  Admit Date: 6/8/2023  Length of Stay: 13 days    Patient not seen today due to pt has signed AMA paperwork and is awaiting ride home. Will follow-up for progressive mobility if d/c plans change and pt remains in house. If pt discharges AMA it is recommended she d/c with RW and OPPT.    Nan Maki, PT, DPT  6/21/2023  Pager: [phone]

## 2023-06-21 NOTE — PROGRESS NOTES
MD Camelia at bedside to discuss CT results and MD Lion recommendations. Pt stated needing to get home to daughter and requesting to leave AMA. AMA paperwork completed per MD Camelia and signed by patient. Two witnesses signed form. PICC line discontinued, drains to remain until follow up appt per MD Camelia. Oral antibiotics to be called in per MD Camelia. Pt left unit with  to their ride next to ED.

## 2023-06-21 NOTE — PROGRESS NOTES
[facility]  Critical Care - Surgery  Progress Note    Patient Name: Aditi Conway  MRN: [id number]  Admission Date: 6/8/2023  Hospital Length of Stay: 13 days  Code Status: Full Code  Attending Provider: Gerardo Seymour MD  Primary Care Provider: Primary Doctor No   Principal Problem: Necrotizing fasciitis    Subjective:     Hospital/ICU Course:  No notes on file    Interval History/Significant Events: No acute events overnight. Stable for stepdown. Tolerating diet. Voiding spontaneously.     Follow-up For: Procedure(s) (LRB):  EXPLORATION, NECK (Right)  CREATION, FREE FLAP (N/A)    Post-Operative Day: 7 Days Post-Op    Objective:     Vital Signs (Most Recent):  Temp: 98.4 °F (36.9 °C) (06/21/23 0700)  Pulse: 82 (06/21/23 0815)  Resp: (!) 25 (06/21/23 0815)  BP: (!) 160/93 (06/21/23 0800)  SpO2: 99 % (06/21/23 0700) Vital Signs (24h Range):  Temp:  [98.4 °F (36.9 °C)-98.9 °F (37.2 °C)] 98.4 °F (36.9 °C)  Pulse:  [] 82  Resp:  [14-49] 25  SpO2:  [87 %-100 %] 99 %  BP: (114-175)/() 160/93     Weight: 55.8 kg (123 lb 0.3 oz)  Body mass index is 22.5 kg/m².      Intake/Output Summary (Last 24 hours) at 6/21/2023 0823  Last data filed at 6/21/2023 0610  Gross per 24 hour   Intake 385.24 ml   Output 1070 ml   Net -684.76 ml          Physical Exam       Vents:  Vent Mode: Spont (06/15/23 1426)  Set Rate: 18 BPM (06/15/23 1115)  Vt Set: 360 mL (06/15/23 0559)  Pressure Support: 5 cmH20 (06/15/23 1426)  PEEP/CPAP: 5 cmH20 (06/15/23 1426)  Oxygen Concentration (%): 40 (06/15/23 1426)  Peak Airway Pressure: 10 cmH20 (06/15/23 1426)  Plateau Pressure: 0 cmH20 (06/15/23 1426)  Total Ve: 7.98 L/m (06/15/23 1426)  Negative Inspiratory Force (cm H2O): -44 (06/15/23 1426)  F/VT Ratio<105 (RSBI): (!) 29.41 (06/15/23 1155)    Lines/Drains/Airways       Peripherally Inserted Central Catheter Line  Duration             PICC Double Lumen 06/09/23 1152 right brachial 11 days               Drain  Duration                  Closed/Suction Drain 06/14/23 1044 Right Neck Bulb 15 Fr. 6 days         Closed/Suction Drain 06/14/23 1044 Right Thigh Bulb 19 Fr. 6 days                    Significant Labs:    CBC/Anemia Profile:  Recent Labs   Lab 06/20/23  0305 06/21/23  0338   WBC 6.57 5.85   HGB 8.5* 8.8*   HCT 25.8* 26.3*   * 753*   MCV 91 92   RDW 12.1 12.8        Chemistries:  Recent Labs   Lab 06/20/23  0305 06/21/23  0338    142   K 3.8 3.7    104   CO2 24 27   BUN 10 10   CREATININE 0.4* 0.5   CALCIUM 9.2 9.3   ALBUMIN 2.5* 2.6*   PROT 5.6* 5.6*   BILITOT 0.2 0.2   ALKPHOS 86 86   ALT 23 17   AST 20 18   MG 1.7 1.8   PHOS 3.5 4.2       All pertinent labs within the past 24 hours have been reviewed.    Significant Imaging:  I have reviewed all pertinent imaging results/findings within the past 24 hours.    Assessment/Plan:     ID  * Necrotizing fasciitis  42F PMH depression, seizures (no meds), tobacco use, presenting with concern for toshia's angina / nec fasc of neck s/p exploration and debridement with ENT 6/8/23. S/p 6/14 right neck debridement with right anterolateral free flap coverage.           Neuro/Psych:   -- Sedation: none  -- Pain: tylenol 1g q8, oxy 5/10 PRN  -- Seroquel 200mg BID, haldol 5mg q6h prn              Cards:   -- HDS  -- currently not requiring pressors  -- MAP > 65  -- q4h flap checks  - minimize right sided head movement       Pulm:   -- Goal O2 sat > 90%  -- Extubated 6/15  -- IS, Acapella  -- Room Air      Renal:  -- Strict  I/O  -- BUN/Cr 10/0.5      FEN / GI:   -- Replace lytes as needed  -- Nutrition: Regular diet      ID:   -- Afebrile, WBC 9.7 -> 7.49  -- Abx: cefazolin 6/13-7/9  -- ID following  -- Following OR cultures 6/8 Staph aureus, MSSA  -- Blood Cx OSH 6/7 NGTD  -- ID signed off but stated to re-consult on 6/25      Heme/Onc:   -- H/H stable 8.8/26.3  -- Daily CBC      Endo:   -- Gluc goal 140-180      PPx:   Feeding: Regular  diet  Analgesia/Sedation: as above  Thromboembolic prevention: lovenox  HOB >30: Yes  Stress Ulcer ppx: n/a  Glucose control: Critical care goal 140-180 g/dl     Lines/Drains/Airway: AMADO x2, PICC      Dispo/Code Status/Palliative:   -- SICU / Full Code  -- stepdown orders in          Critical care was time spent personally by me on the following activities: development of treatment plan with patient or surrogate and bedside caregivers, discussions with consultants, evaluation of patient's response to treatment, examination of patient, ordering and performing treatments and interventions, ordering and review of laboratory studies, ordering and review of radiographic studies, pulse oximetry, re-evaluation of patient's condition.  This critical care time did not overlap with that of any other provider or involve time for any procedures.     Mago Block MD  Critical Care - Surgery  [facility]

## 2023-06-21 NOTE — SUBJECTIVE & OBJECTIVE
Interval History: NAEON. Area on chest remains stable. Anxious for discharge today.     Medications:  Continuous Infusions:  Scheduled Meds:   acetaminophen  1,000 mg Oral Q8H    ceFAZolin (ANCEF) IVPB  2 g Intravenous Q8H    enoxparin  40 mg Subcutaneous Q24H (prophylaxis, 1700)    ibuprofen  400 mg Oral Q6H    polyethylene glycol  17 g Oral Daily    QUEtiapine  200 mg Oral BID    sodium chloride 0.9%  10 mL Intravenous Q6H     PRN Meds:sodium chloride 0.9%, haloperidol lactate, magnesium oxide, magnesium oxide, ondansetron, oxyCODONE, oxyCODONE, potassium bicarbonate, potassium bicarbonate, potassium bicarbonate, potassium, sodium phosphates, potassium, sodium phosphates, potassium, sodium phosphates, sodium chloride 0.9%, sodium chloride 0.9%, Flushing PICC Protocol **AND** sodium chloride 0.9% **AND** sodium chloride 0.9%     Review of patient's allergies indicates:   Allergen Reactions    Clindamycin      Objective:     Vital Signs (24h Range):  Temp:  [98.4 °F (36.9 °C)-98.9 °F (37.2 °C)] 98.4 °F (36.9 °C)  Pulse:  [] 79  Resp:  [14-49] 18  SpO2:  [87 %-100 %] 99 %  BP: (114-175)/() 140/86       Lines/Drains/Airways       Peripherally Inserted Central Catheter Line  Duration             PICC Double Lumen 06/09/23 1152 right brachial 11 days              Drain  Duration                  Closed/Suction Drain 06/14/23 1044 Right Neck Bulb 15 Fr. 6 days         Closed/Suction Drain 06/14/23 1044 Right Thigh Bulb 19 Fr. 6 days                     Physical Exam   Awake, alert   Right neck with ALT flap stapled in place  Soft, appropriate color   Chest with soft edema and mild erythema, outlined. No tenderness to palpation. No fluctuance. Stable from yesterday   Strong arterial and venous external doppler signal   AMADO drain with s/s output   RLE with soft to palpation, Incision c/d/I with staples, no hematoma  AMADO drain with s/s output     Significant Labs:  CBC:   Recent Labs   Lab 06/21/23  0338   WBC  5.85   RBC 2.86*   HGB 8.8*   HCT 26.3*   *   MCV 92   MCH 30.8   MCHC 33.5     CMP:   Recent Labs   Lab 06/21/23  0338   GLU 94   CALCIUM 9.3   ALBUMIN 2.6*   PROT 5.6*      K 3.7   CO2 27      BUN 10   CREATININE 0.5   ALKPHOS 86   ALT 17   AST 18   BILITOT 0.2       Significant Diagnostics:  None

## 2023-06-21 NOTE — SUBJECTIVE & OBJECTIVE
Interval History/Significant Events: No acute events overnight. Stable for stepdown. Tolerating diet. Voiding spontaneously.     Follow-up For: Procedure(s) (LRB):  EXPLORATION, NECK (Right)  CREATION, FREE FLAP (N/A)    Post-Operative Day: 7 Days Post-Op    Objective:     Vital Signs (Most Recent):  Temp: 98.4 °F (36.9 °C) (06/21/23 0700)  Pulse: 82 (06/21/23 0815)  Resp: (!) 25 (06/21/23 0815)  BP: (!) 160/93 (06/21/23 0800)  SpO2: 99 % (06/21/23 0700) Vital Signs (24h Range):  Temp:  [98.4 °F (36.9 °C)-98.9 °F (37.2 °C)] 98.4 °F (36.9 °C)  Pulse:  [] 82  Resp:  [14-49] 25  SpO2:  [87 %-100 %] 99 %  BP: (114-175)/() 160/93     Weight: 55.8 kg (123 lb 0.3 oz)  Body mass index is 22.5 kg/m².      Intake/Output Summary (Last 24 hours) at 6/21/2023 0823  Last data filed at 6/21/2023 0610  Gross per 24 hour   Intake 385.24 ml   Output 1070 ml   Net -684.76 ml          Physical Exam       Vents:  Vent Mode: Spont (06/15/23 1426)  Set Rate: 18 BPM (06/15/23 1115)  Vt Set: 360 mL (06/15/23 0559)  Pressure Support: 5 cmH20 (06/15/23 1426)  PEEP/CPAP: 5 cmH20 (06/15/23 1426)  Oxygen Concentration (%): 40 (06/15/23 1426)  Peak Airway Pressure: 10 cmH20 (06/15/23 1426)  Plateau Pressure: 0 cmH20 (06/15/23 1426)  Total Ve: 7.98 L/m (06/15/23 1426)  Negative Inspiratory Force (cm H2O): -44 (06/15/23 1426)  F/VT Ratio<105 (RSBI): (!) 29.41 (06/15/23 1155)    Lines/Drains/Airways       Peripherally Inserted Central Catheter Line  Duration             PICC Double Lumen 06/09/23 1152 right brachial 11 days              Drain  Duration                  Closed/Suction Drain 06/14/23 1044 Right Neck Bulb 15 Fr. 6 days         Closed/Suction Drain 06/14/23 1044 Right Thigh Bulb 19 Fr. 6 days                    Significant Labs:    CBC/Anemia Profile:  Recent Labs   Lab 06/20/23  0305 06/21/23  0338   WBC 6.57 5.85   HGB 8.5* 8.8*   HCT 25.8* 26.3*   * 753*   MCV 91 92   RDW 12.1 12.8        Chemistries:  Recent Labs    Lab 06/20/23  0305 06/21/23  0338    142   K 3.8 3.7    104   CO2 24 27   BUN 10 10   CREATININE 0.4* 0.5   CALCIUM 9.2 9.3   ALBUMIN 2.5* 2.6*   PROT 5.6* 5.6*   BILITOT 0.2 0.2   ALKPHOS 86 86   ALT 23 17   AST 20 18   MG 1.7 1.8   PHOS 3.5 4.2       All pertinent labs within the past 24 hours have been reviewed.    Significant Imaging:  I have reviewed all pertinent imaging results/findings within the past 24 hours.

## 2023-06-21 NOTE — PLAN OF CARE
[facility]  Discharge Final Note    Primary Care Provider: Primary Doctor No    Expected Discharge Date: 6/21/2023    Final Discharge Note (most recent)       Final Note - 06/21/23 1346          Final Note    Assessment Type Final Discharge Note     Anticipated Discharge Disposition Left Against Medical Advice                     Important Message from Medicare             Contact Info       Gerardo Seymour MD   Specialty: Otolaryngology    [address]  [facility] [address]   Phone: [phone]       Next Steps: Follow up in 3 day(s)    Instructions: For wound check/drain removal          Yane Conway LMSW  Case Management [facility]  Ext: [phone]

## 2023-06-21 NOTE — ASSESSMENT & PLAN NOTE
42F PMH depression, seizures (no meds), tobacco use, presenting with concern for toshia's angina / nec fasc of neck s/p exploration and debridement with ENT 6/8/23. S/p 6/14 right neck debridement with right anterolateral free flap coverage.           Neuro/Psych:   -- Sedation: none  -- Pain: tylenol 1g q8, oxy 5/10 PRN  -- Seroquel 200mg BID, haldol 5mg q6h prn              Cards:   -- HDS  -- currently not requiring pressors  -- MAP > 65  -- q4h flap checks  - minimize right sided head movement       Pulm:   -- Goal O2 sat > 90%  -- Extubated 6/15  -- IS, Acapella  -- Room Air      Renal:  -- Strict  I/O  -- BUN/Cr 10/0.5      FEN / GI:   -- Replace lytes as needed  -- Nutrition: Regular diet      ID:   -- Afebrile, WBC 9.7 -> 7.49  -- Abx: cefazolin 6/13-7/9  -- ID following  -- Following OR cultures 6/8 Staph aureus, MSSA  -- Blood Cx OSH 6/7 NGTD  -- ID signed off but stated to re-consult on 6/25      Heme/Onc:   -- H/H stable 8.8/26.3  -- Daily CBC      Endo:   -- Gluc goal 140-180      PPx:   Feeding: Regular diet  Analgesia/Sedation: as above  Thromboembolic prevention: lovenox  HOB >30: Yes  Stress Ulcer ppx: n/a  Glucose control: Critical care goal 140-180 g/dl     Lines/Drains/Airway: AMADO x2, PICC      Dispo/Code Status/Palliative:   -- SICU / Full Code  -- stepdown orders in

## 2023-06-21 NOTE — DISCHARGE INSTRUCTIONS
Strip drains and record daily output. We will make follow up appointment at the end of the week or Monday for wound check/drain removal (clinic should call you to make appointment, but please call if you don't hear from us in 2-3 days). Keep incisions clean and dry. Okay to shower and let water run over incisions. Do not submerge in water.    Take antibiotics as directed.    Go to ED for worsening of chest redness or inflammation, fevers, chills, or pain.

## 2023-06-21 NOTE — PLAN OF CARE
[facility]    HOME HEALTH ORDERS  FACE TO FACE ENCOUNTER    Patient Name: Aditi Conway  YOB: 1980    PCP: Primary Doctor No   PCP Address: None  PCP Phone Number: None  PCP Fax: None    Encounter Date: 6/8/23    Admit to Home Health    Diagnoses:  Active Hospital Problems    Diagnosis  POA    *Necrotizing fasciitis [M72.6]  Yes      Resolved Hospital Problems   No resolved problems to display.     Follow Up Appointments:  Future Appointments   Date Time Provider Department Center   7/19/2023  8:15 AM [facility]     Allergies:  Review of patient's allergies indicates:   Allergen Reactions    Clindamycin      Medications: Review discharge medications with patient and family and provide education.    I have seen and examined this patient within the last 30 days. My clinical findings that support the need for the home health skilled services and home bound status are the following:no   Weakness/numbness causing balance and gait disturbance due to Surgery making it taxing to leave home.     Diet:   regular diet    Labs:  None    Referrals/ Consults  Physical Therapy to evaluate and treat. Evaluate for home safety and equipment needs; Establish/upgrade home exercise program. Perform / instruct on therapeutic exercises, gait training, transfer training, and Range of Motion.  Occupational Therapy to evaluate and treat. Evaluate home environment for safety and equipment needs. Perform/Instruct on transfers, ADL training, ROM, and therapeutic exercises.    Activities:   activity as tolerated    Nursing:   Agency to admit patient within 24 hours of hospital discharge unless specified on physician order or at patient request    SN to complete comprehensive assessment including routine vital signs. Instruct on disease process and s/s of complications to report to MD. Review/verify medication list sent home with the patient at time of discharge  and  instruct patient/caregiver as needed. Frequency may be adjusted depending on start of care date.     Skilled nurse to perform up to 3 visits PRN for symptoms related to diagnosis    Notify MD if SBP > 160 or < 90; DBP > 90 or < 50; HR > 120 or < 50; Temp > 101; O2 < 88%; Other:   None    Ok to schedule additional visits based on staff availability and patient request on consecutive days within the home health episode.    Miscellaneous   None    Home Health Aide:  Physical Therapy Three times weekly and Occupational Therapy Three times weekly    Wound Care Orders  no    I certify that this patient is confined to her home and needs physical therapy and occupational therapy.

## 2023-06-21 NOTE — PLAN OF CARE
Patient requesting to leave AMA after recommendations to stay inpatient to monitor chest erythema and swelling. Patient reports that she needs to leave for son's graduation and to see daughter.    Patient educated on risks of leaving such as worsening of condition without IV antibiotics and provided return precautions. Patient understands risks and would like to leave AMA. AMA paperwork signed and witnessed. Will prescribe 2 weeks of oral Augmentin with clinic follow up for drain removal and wound check.

## 2023-06-21 NOTE — PROGRESS NOTES
[facility]  Otorhinolaryngology-Head & Neck Surgery  Progress Note    Subjective:     Post-Op Info:  Procedure(s) (LRB):  EXPLORATION, NECK (Right)  CREATION, FREE FLAP (N/A)   7 Days Post-Op  Hospital Day: 14     Interval History: NAEON. Area on chest remains stable. Anxious for discharge today.     Medications:  Continuous Infusions:  Scheduled Meds:   acetaminophen  1,000 mg Oral Q8H    ceFAZolin (ANCEF) IVPB  2 g Intravenous Q8H    enoxparin  40 mg Subcutaneous Q24H (prophylaxis, 1700)    ibuprofen  400 mg Oral Q6H    polyethylene glycol  17 g Oral Daily    QUEtiapine  200 mg Oral BID    sodium chloride 0.9%  10 mL Intravenous Q6H     PRN Meds:sodium chloride 0.9%, haloperidol lactate, magnesium oxide, magnesium oxide, ondansetron, oxyCODONE, oxyCODONE, potassium bicarbonate, potassium bicarbonate, potassium bicarbonate, potassium, sodium phosphates, potassium, sodium phosphates, potassium, sodium phosphates, sodium chloride 0.9%, sodium chloride 0.9%, Flushing PICC Protocol **AND** sodium chloride 0.9% **AND** sodium chloride 0.9%     Review of patient's allergies indicates:   Allergen Reactions    Clindamycin      Objective:     Vital Signs (24h Range):  Temp:  [98.4 °F (36.9 °C)-98.9 °F (37.2 °C)] 98.4 °F (36.9 °C)  Pulse:  [] 79  Resp:  [14-49] 18  SpO2:  [87 %-100 %] 99 %  BP: (114-175)/() 140/86       Lines/Drains/Airways       Peripherally Inserted Central Catheter Line  Duration             PICC Double Lumen 06/09/23 1152 right brachial 11 days              Drain  Duration                  Closed/Suction Drain 06/14/23 1044 Right Neck Bulb 15 Fr. 6 days         Closed/Suction Drain 06/14/23 1044 Right Thigh Bulb 19 Fr. 6 days                     Physical Exam   Awake, alert   Right neck with ALT flap stapled in place  Soft, appropriate color   Chest with soft edema and mild erythema, outlined. No tenderness to palpation. No fluctuance. Stable from yesterday    Strong arterial and venous external doppler signal   AMADO drain with s/s output   RLE with soft to palpation, Incision c/d/I with staples, no hematoma  AMADO drain with s/s output     Significant Labs:  CBC:   Recent Labs   Lab 06/21/23  0338   WBC 5.85   RBC 2.86*   HGB 8.8*   HCT 26.3*   *   MCV 92   MCH 30.8   MCHC 33.5     CMP:   Recent Labs   Lab 06/21/23  0338   GLU 94   CALCIUM 9.3   ALBUMIN 2.6*   PROT 5.6*      K 3.7   CO2 27      BUN 10   CREATININE 0.5   ALKPHOS 86   ALT 17   AST 18   BILITOT 0.2       Significant Diagnostics:  None    Assessment/Plan:     * Necrotizing fasciitis  42F presenting with right neck necrotizing fasciitis with surround cellulitis s/p debridement of multiple levels of right neck on 6/8. Necrotic skin, soft tissue, and muscle removed. Cultures +staph aureus. S/P debridement in OR on 6/12. Now s/p reconstruction with R ALT free flap on 6/14.      ENT/HNS:  - Q4H flap checks  - CTM chest swelling     - CT neck/chest to evaluate area   - If WNL, ok for discharge   - Routine drain care   - Keep head neutral or turned to the left, minimize right sided head movement   -Fresh wound care, clean incision with peroxide/qtip followed by bacitracin BID     Neuro:   -Pain: Multimodality PRN regimen initiated     Cardiac:  -Remain normotensive  -On clonidine taper through Friday per SICU  -Page ENT before starting vasopressors     Pulmonary:   - CTM     Renal:   - monitor Is and Os  - Cr stable  - OK to d/c murdock  - UTI management per SICU     Infectious Disease:  - Daily CBC  - ID consulted; appreciate recs   - Kefzol   - Repeat blood cultures drawn per ID due to GNRs in last culture (unclear significance)    - Need outpatient antibiotics recommendations, pending result of blood culture     Hematology/Oncology:  - H/H stable  - Lovenox DVT prophylaxis      FEN/GI  - Replace electrolytes as needed to keep K>4, Mg>2  - Bowel reg  - Protonix for GI prophylaxis  - OK for diet       MSK  - Drain care  - Bacitracin to incision site      Dispo:  -Ok for step down   -Needs outpatient abx recommendations from ID; Augmentin 2 weeks   -Will discuss possibility of discharge while still on clonidine taper (ends Friday)        Miranda Casas MD  Otorhinolaryngology-Head & Neck Surgery  [facility]

## 2023-06-22 PROBLEM — M60.9 MYOSITIS: Status: ACTIVE | Noted: 2023-06-22

## 2023-06-22 PROBLEM — E44.1 MALNUTRITION OF MILD DEGREE: Status: ACTIVE | Noted: 2023-06-22

## 2023-06-23 LAB — BACTERIA BLD CULT: NORMAL

## 2023-06-23 NOTE — HOSPITAL COURSE
43 yo F who presented with right neck necrotizing fascitis with surround cellulitis s/p debridement of multiple levels of right neck on 6/8 where necrotic skin, soft tissue, and muscle removed. She was taken back to the OR for debridement on 6/12, and again on 6/14 with reconstruction with R ALT free flap. Patient admitted to ICU post operatively. She progressed appropriately post operatively and had good flap checks. She then developed chest erythema and tenderness. CT scan was obtained which did not show any fluid collection, although there was inflammation.  It was recommended that patient remained inpatient for monitoring and IV antibiotics if indicated. Patient elected to leave AMA.

## 2023-06-24 LAB — BACTERIA BLD CULT: NORMAL

## 2023-06-26 ENCOUNTER — OFFICE VISIT (OUTPATIENT)
Dept: OTOLARYNGOLOGY | Facility: CLINIC | Age: 43
End: 2023-06-26
Payer: MEDICAID

## 2023-06-26 VITALS
DIASTOLIC BLOOD PRESSURE: 77 MMHG | SYSTOLIC BLOOD PRESSURE: 142 MMHG | BODY MASS INDEX: 18.7 KG/M2 | HEIGHT: 62 IN | HEART RATE: 78 BPM | WEIGHT: 101.63 LBS

## 2023-06-26 DIAGNOSIS — M72.6 NECROTIZING FASCIITIS: Primary | ICD-10-CM

## 2023-06-26 PROCEDURE — 99999 PR PBB SHADOW E&M-EST. PATIENT-LVL II: ICD-10-PCS | Mod: PBBFAC,,, | Performed by: NURSE PRACTITIONER

## 2023-06-26 PROCEDURE — 99212 OFFICE O/P EST SF 10 MIN: CPT | Mod: PBBFAC | Performed by: NURSE PRACTITIONER

## 2023-06-26 PROCEDURE — 99024 PR POST-OP FOLLOW-UP VISIT: ICD-10-PCS | Mod: ,,, | Performed by: NURSE PRACTITIONER

## 2023-06-26 PROCEDURE — 99024 POSTOP FOLLOW-UP VISIT: CPT | Mod: ,,, | Performed by: NURSE PRACTITIONER

## 2023-06-26 PROCEDURE — 99999 PR PBB SHADOW E&M-EST. PATIENT-LVL II: CPT | Mod: PBBFAC,,, | Performed by: NURSE PRACTITIONER

## 2023-06-26 RX ORDER — CEPHALEXIN 500 MG/1
500 CAPSULE ORAL EVERY 12 HOURS
COMMUNITY
Start: 2023-06-21 | End: 2023-06-26

## 2023-06-26 RX ORDER — FLUCONAZOLE 150 MG/1
150 TABLET ORAL DAILY
Qty: 1 TABLET | Refills: 0 | Status: SHIPPED | OUTPATIENT
Start: 2023-06-26 | End: 2023-06-27

## 2023-06-26 RX ORDER — HYDROCODONE BITARTRATE AND ACETAMINOPHEN 5; 325 MG/1; MG/1
1 TABLET ORAL EVERY 6 HOURS PRN
Qty: 20 TABLET | Refills: 0 | Status: SHIPPED | OUTPATIENT
Start: 2023-06-26 | End: 2023-09-13

## 2023-06-26 RX ORDER — AMOXICILLIN AND CLAVULANATE POTASSIUM 875; 125 MG/1; MG/1
1 TABLET, FILM COATED ORAL EVERY 12 HOURS
Qty: 28 TABLET | Refills: 0 | Status: SHIPPED | OUTPATIENT
Start: 2023-06-26 | End: 2023-06-27

## 2023-06-26 NOTE — PROGRESS NOTES
Subjective     Patient ID: Aditi Conway is a 42 y.o. female.    Chief Complaint: post op     HPI    Aditi Conway returns for a post op visit. She is doing well. She never picked up her Augmentin prescription. No complaints.    Past Medical History:   Diagnosis Date    Depression     Heart murmur     Seizures        Past Surgical History:   Procedure Laterality Date    DEBRIDEMENT, BREAST Left 11/11/2021    Procedure: DEBRIDEMENT,  BREAST;  Surgeon: Yoan Sparks MD;  Location: [facility] OR;  Service: General;  Laterality: Left;  DEBRIDEMENT, WOUND, LEFT BREAST    DILATION AND CURETTAGE OF UTERUS      FLAP PROCEDURE N/A 6/14/2023    Procedure: CREATION, FREE FLAP;  Surgeon: Gerardo Seymour MD;  [facility];  Service: ENT;  Laterality: N/A;    INCISION AND DRAINAGE Left 7/12/2021    Procedure: INCISION AND DRAINAGE, HEMATOMA, SEROMA, OR FLUID COLLECTION ;  Surgeon: Yoan Sparks MD;  Location: [facility] OR;  Service: General;  Laterality: Left;    NECK EXPLORATION Bilateral 6/8/2023    Procedure: EXPLORATION and debridement, NECK;  Surgeon: Jose Jules MD;  Location: [facility] OR 2ND FLR;  Service: ENT;  Laterality: Bilateral;    NECK EXPLORATION Right 6/12/2023    Procedure: RIGHT NECK WOUND EXPLORATION WITH WOUND DEBRIDEMENT;  Surgeon: Gerardo Seymour MD;  Location: [facility] OR [facility] FLR;  Service: ENT;  Laterality: Right;    NECK EXPLORATION Right 6/14/2023    Procedure: EXPLORATION, NECK;  Surgeon: Gerardo Seymour MD;  [facility];  Service: ENT;  Laterality: Right;         Current Outpatient Medications:     acetaminophen (TYLENOL) 500 MG tablet, Take 1 tablet (500 mg total) by mouth every 6 (six) hours as needed for Pain., Disp: 40 tablet, Rfl: 0    ibuprofen (ADVIL,MOTRIN) 600 MG tablet, Take 1 tablet (600 mg total) by mouth every 6 (six) hours as needed for Pain., Disp: 20 tablet, Rfl: 0    amoxicillin-clavulanate 875-125mg (AUGMENTIN) 875-125 mg per  tablet, Take 1 tablet by mouth every 12 (twelve) hours. for 14 days, Disp: 28 tablet, Rfl: 0    HYDROcodone-acetaminophen (NORCO) 5-325 mg per tablet, Take 1 tablet by mouth every 6 (six) hours as needed for Pain., Disp: 20 tablet, Rfl: 0    ibuprofen (ADVIL,MOTRIN) 800 MG tablet, Take 1 tablet (800 mg total) by mouth every 6 (six) hours as needed for Pain. (Patient not taking: Reported on 1/18/2023), Disp: 20 tablet, Rfl: 0    Review of patient's allergies indicates:   Allergen Reactions    Clindamycin        Social History     Socioeconomic History    Marital status:     Years of education: 9th   Occupational History     Employer: [id number]   Tobacco Use    Smoking status: Every Day     Packs/day: 1.00     Years: 20.00     Pack years: 20.00     Types: Cigarettes    Smokeless tobacco: Never    Tobacco comments:     Informed about classes in smoking cessation. States will call.   Substance and Sexual Activity    Alcohol use: Yes     Comment: Socially    Drug use: No    Sexual activity: Yes     Partners: Male     Birth control/protection: None     Social Determinants of Health     Financial Resource Strain: Low Risk     Difficulty of Paying Living Expenses: Not hard at all   Food Insecurity: No Food Insecurity    Worried About Running Out of Food in the Last Year: Never true    Ran Out of Food in the Last Year: Never true   Transportation Needs: Unknown    Lack of Transportation (Non-Medical): No   Physical Activity: Sufficiently Active    Days of Exercise per Week: 6 days    Minutes of Exercise per Session: 30 min   Stress: No Stress Concern Present    Feeling of Stress : Not at all   Social Connections: Moderately Isolated    Frequency of Communication with Friends and Family: More than three times a week    Frequency of Social Gatherings with Friends and Family: More than three times a week    Attends Yazidi Services: Never    Active Member of Clubs or Organizations: No    Attends Club or  Organization Meetings: Never    Marital Status:    Housing Stability: Unknown    Unable to Pay for Housing in the Last Year: No    Unstable Housing in the Last Year: No       Family History   Problem Relation Age of Onset    Hypertension Mother     Heart disease Father         pacemaker    Diabetes Father     Hypertension Father     Hyperlipidemia Father     Breast cancer Neg Hx     Ovarian cancer Neg Hx     Colon cancer Neg Hx          Review of Systems   Constitutional:  Negative for appetite change, chills, diaphoresis, fatigue, fever and unexpected weight change.   HENT:  Negative for nasal congestion, dental problem, drooling, ear discharge, ear pain, facial swelling, hearing loss, mouth sores, nosebleeds, postnasal drip, rhinorrhea, sinus pressure/congestion, sneezing, sore throat, tinnitus, trouble swallowing and voice change.    Eyes:  Negative for pain, discharge, redness and itching.   Respiratory:  Negative for cough and shortness of breath.    Cardiovascular:  Negative for chest pain.   Gastrointestinal:  Negative for abdominal distention, abdominal pain, diarrhea, nausea and vomiting.   Endocrine: Negative for cold intolerance and heat intolerance.   Genitourinary:  Negative for difficulty urinating.   Musculoskeletal:  Negative for neck pain and neck stiffness.   Integumentary:  Positive for wound. Negative for rash.   Neurological:  Negative for dizziness, weakness and headaches.   Hematological:  Negative for adenopathy.        Objective     Physical Exam  Constitutional:       Appearance: Normal appearance.   HENT:      Head: Normocephalic and atraumatic.      Right Ear: External ear normal.      Left Ear: External ear normal.   Neck:     Pulmonary:      Effort: Pulmonary effort is normal. No respiratory distress.   Skin:         Neurological:      General: No focal deficit present.      Mental Status: She is alert.   Psychiatric:         Mood and Affect: Mood normal.         Behavior:  Behavior normal.         Thought Content: Thought content normal.          Assessment and Plan     1. Necrotizing fasciitis  Assessment & Plan:  Healing well. Staples and drains removed. Augmentin prescribed. RTC in 3 weeks, sooner if needed.        Other orders  -     amoxicillin-clavulanate 875-125mg (AUGMENTIN) 875-125 mg per tablet; Take 1 tablet by mouth every 12 (twelve) hours. for 14 days  Dispense: 28 tablet; Refill: 0  -     HYDROcodone-acetaminophen (NORCO) 5-325 mg per tablet; Take 1 tablet by mouth every 6 (six) hours as needed for Pain.  Dispense: 20 tablet; Refill: 0            No follow-ups on file.

## 2023-06-26 NOTE — ASSESSMENT & PLAN NOTE
Healing well. Staples and drains removed. Augmentin prescribed. RTC in 3 weeks, sooner if needed.

## 2023-06-27 ENCOUNTER — TELEPHONE (OUTPATIENT)
Dept: OTOLARYNGOLOGY | Facility: CLINIC | Age: 43
End: 2023-06-27
Payer: COMMERCIAL

## 2023-06-27 RX ORDER — DOXYCYCLINE 100 MG/1
100 CAPSULE ORAL 2 TIMES DAILY
Qty: 28 CAPSULE | Refills: 0 | Status: SHIPPED | OUTPATIENT
Start: 2023-06-27 | End: 2023-07-07 | Stop reason: SDUPTHER

## 2023-06-27 NOTE — TELEPHONE ENCOUNTER
----- Message from Raysa Hoffmann NP sent at 6/27/2023  2:10 PM CDT -----  Regarding: RE: medication  Contact: @ [phone]  She can stop the Augmentin. ID told me to change it to doxy bid (sent to her pharmacy).  Please let her know to space for at least 2 hrs from dairy products or vit supplements and avoid being in the sunlight for prolonged periods of time.     Thanks!    ----- Message -----  From: Precious Crow RN  Sent: 6/27/2023   1:09 PM CDT  To: Raysa Hoffmann NP  Subject: FW: medication                                   How to advise?  ----- Message -----  From: Isis Shea  Sent: 6/27/2023  11:50 AM CDT  To: [facility] Tabares Staff  Subject: medication                                       Pt   called in regards to amoxicillin-clavulanate 875-125mg (AUGMENTIN) 875-125 mg per tablet the  it is making her itch like crazy and would like to know what he needs to do   .....Please call and adv @ [phone]

## 2023-06-28 PROBLEM — Z99.11 ENCOUNTER FOR WEANING FROM VENTILATOR: Status: ACTIVE | Noted: 2023-06-28

## 2023-07-07 ENCOUNTER — TELEPHONE (OUTPATIENT)
Dept: OTOLARYNGOLOGY | Facility: CLINIC | Age: 43
End: 2023-07-07
Payer: MEDICAID

## 2023-07-07 NOTE — TELEPHONE ENCOUNTER
Spoke with pt. She reports she is taking her NORCO about every 7-8hrs. Advised her to take med as prescribed which is every 6 hrs as need and not wait til pain gets out of control. She verbalized understanding.

## 2023-07-07 NOTE — TELEPHONE ENCOUNTER
----- Message from Liz Matson sent at 7/7/2023 11:05 AM CDT -----  Regarding: Pt Advice  Contact: pt [phone]  Bienvenido/ is calling to speak with nurse/provider, pt is have pain and redness at surgery site, would like to know what can be done or should he bring pt to ED. Please call @[phone]

## 2023-07-07 NOTE — DISCHARGE SUMMARY
[facility]  Otorhinolaryngology-Head & Neck Surgery  Discharge Summary      Patient Name: Aditi Conway  MRN: [id number]  Admission Date: 6/8/2023  Hospital Length of Stay: 13 days  Discharge Date and Time: 6/21/2023  1:14 PM  Attending Physician: Johnna att. providers found   Discharging Provider: Blanca Denise MD  Primary Care Provider: Primary Doctor No    HPI:   42F with history of breast abscess presents with acutely worsening neck edema, erythema, and severe pain. It started last Saturday after popping a pimple on her posterior right neck. There was localized pain and swelling in the area that night. She went swimming in the ocean Sunday, and after that her pain, swelling and redness worsened significantly daily. This prompted her to go to the outside ED yesterday and she was admitted to the Medicine service and was started on vanc and zosyn. This morning, the medicine team noticed significant worsening of her exam therefore she was transferred to the [facility] ED for ENT evaluation. Her exam was concerning for necrotizing fasciitis, therefore she was taken to the OR emergently for debridement.       Procedure(s) (LRB):  EXPLORATION, NECK (Right)  CREATION, FREE FLAP (N/A)      Indwelling Lines/Drains at time of discharge:   Lines/Drains/Airways     Drain  Duration                Closed/Suction Drain 06/14/23 1044 Right Neck Bulb 15 Fr. 22 days         Closed/Suction Drain 06/14/23 1044 Right Thigh Bulb 19 Fr. 22 days              Hospital Course: 43 yo F who presented with right neck necrotizing fascitis with surround cellulitis s/p debridement of multiple levels of right neck on 6/8 where necrotic skin, soft tissue, and muscle removed. She was taken back to the OR for debridement on 6/12, and again on 6/14 with reconstruction with R ALT free flap. Patient admitted to ICU post operatively. She progressed appropriately post operatively and had good flap checks. She then developed chest  erythema and tenderness. CT scan was obtained on 6/21 which did not show any fluid collection, although there was inflammation and edema.  It was recommended that patient remained inpatient for monitoring and IV antibiotics if indicated. Patient elected to leave AMA.      Goals of Care Treatment Preferences:  Code Status: Full Code      Consults:   Consults (From admission, onward)        Status Ordering Provider     Inpatient consult to Infectious Diseases  Once        Provider:  (Not yet assigned)    Completed PAMELA TORRES     Inpatient consult to PICC team (NIAS)  Once        Provider:  (Not yet assigned)    Completed LILLI WALLACE     Inpatient consult to Registered Dietitian/Nutritionist  Once        Provider:  (Not yet assigned)    Completed LILLI WALLACE     Inpatient consult to Infectious Diseases  Once        Provider:  (Not yet assigned)    Completed PAMELA TORRES     Inpatient consult to ENT  Once        Provider:  (Not yet assigned)    Completed MARLENA WATKINS          Significant Diagnostic Studies:     CT neck 6/21  Postsurgical changes are identified status post right sided neck debridement, dissection and free flap reconstruction.  Prominent edema along the margins of the flap versus small fluid collection is detailed above however no large fluid collection is identified with otherwise expected postsurgical changes.    CT chest 6/21  Partially visualized postoperative changes of right neck debridement free flap reconstruction, further evaluated on same day CT soft tissue neck.  There is adjacent subcutaneous edema without focal fluid collection in the chest.     Mildly enlarged bilateral axillary lymph nodes, likely reactive.    Pending Diagnostic Studies:     None        Final Active Diagnoses:    Diagnosis Date Noted POA    PRINCIPAL PROBLEM:  Necrotizing fasciitis [M72.6] 06/08/2023 Yes    Encounter for weaning from ventilator [Z99.11] 06/28/2023 Not  Applicable    Myositis [M60.9] 06/22/2023 Yes    Malnutrition of mild degree [E44.1] 06/22/2023 Yes      Problems Resolved During this Admission:      Discharged Condition: fair    Disposition: Home or Self Care    Follow Up:   Follow-up Information     Gerardo Seymour MD Follow up in 3 day(s).    Specialty: Otolaryngology  Why: For wound check/drain removal  Contact information:  [address]  [facility] [address]  [phone]                       Patient Instructions:   No discharge procedures on file.  Medications:  Reconciled Home Medications:      Medication List      ASK your doctor about these medications    * ibuprofen 800 MG tablet  Commonly known as: ADVIL,MOTRIN  Take 1 tablet (800 mg total) by mouth every 6 (six) hours as needed for Pain.     * ibuprofen 600 MG tablet  Commonly known as: ADVIL,MOTRIN  Take 1 tablet (600 mg total) by mouth every 6 (six) hours as needed for Pain.         * This list has 2 medication(s) that are the same as other medications prescribed for you. Read the directions carefully, and ask your doctor or other care provider to review them with you.              Time spent on the discharge of patient: 20 minutes    Blanca Denise MD  Otorhinolaryngology-Head & Neck Surgery  [facility]

## 2023-07-10 ENCOUNTER — TELEPHONE (OUTPATIENT)
Dept: OTOLARYNGOLOGY | Facility: CLINIC | Age: 43
End: 2023-07-10
Payer: MEDICAID

## 2023-07-10 LAB — FUNGUS SPEC CULT: NORMAL

## 2023-07-10 NOTE — TELEPHONE ENCOUNTER
called in to ask for a refill of medication. Advocating for 10/325mg instead of the Norco 5/325. States that she has been out for a week. I informed spouse that I spoke with pt on Friday and she was not out at that time and told me she was only taking the 5/325 every 7-8hrs due to her falling asleep bw doses. Pt was not at his current locatio. Informed spouse I will need to speak with her.     5pm. I reached the patient. She states her neck pain is increasing and the swelling remains unchanged since 7/7 ED visit. Pt states she ran out of 5/325 on Friday. Advised pt to return to ED for evaluation of pain and swelling. Pt acknowledged.

## 2023-07-10 NOTE — TELEPHONE ENCOUNTER
----- Message from Trudi Pearson sent at 7/10/2023 12:12 PM CDT -----  Regarding: Urgent for pt/ Pt needs call back, pain medication, has called before- grateful for care  Contact: @[phone]  Leg everything was taken out of is painful, swelling in neck. Norco 5's has been out, and they are not strong enough for pain experienced.      called had neck surgery Norco 10's are more effective. Pt is willing to try something else to help with pain, please call and advise @[phone] (Bienvenido  calling)    HYDROcodone-acetaminophen (NORCO) 5-325 mg per tablet      [facility] [place] [address] [address]  [address] [place] [id number]  Phone: [phone] Fax: [phone]      Also unclear on when Physical Therapy  starts

## 2023-07-12 PROBLEM — E87.8 ELECTROLYTE ABNORMALITY: Status: ACTIVE | Noted: 2023-07-12

## 2023-07-12 PROBLEM — E43 SEVERE PROTEIN-CALORIE MALNUTRITION: Status: ACTIVE | Noted: 2023-06-22

## 2023-07-12 PROBLEM — R94.31 QT PROLONGATION: Status: ACTIVE | Noted: 2023-07-12

## 2023-07-17 ENCOUNTER — OFFICE VISIT (OUTPATIENT)
Dept: OTOLARYNGOLOGY | Facility: CLINIC | Age: 43
End: 2023-07-17
Payer: MEDICAID

## 2023-07-17 VITALS
HEART RATE: 103 BPM | DIASTOLIC BLOOD PRESSURE: 81 MMHG | BODY MASS INDEX: 18.25 KG/M2 | SYSTOLIC BLOOD PRESSURE: 136 MMHG | HEIGHT: 62 IN | WEIGHT: 99.19 LBS

## 2023-07-17 DIAGNOSIS — M54.2 NECK PAIN: ICD-10-CM

## 2023-07-17 DIAGNOSIS — M79.604 PAIN OF RIGHT LOWER EXTREMITY: ICD-10-CM

## 2023-07-17 DIAGNOSIS — M72.6 NECROTIZING FASCIITIS: Primary | ICD-10-CM

## 2023-07-17 PROCEDURE — 99999 PR PBB SHADOW E&M-EST. PATIENT-LVL III: CPT | Mod: PBBFAC,,, | Performed by: NURSE PRACTITIONER

## 2023-07-17 PROCEDURE — 3079F PR MOST RECENT DIASTOLIC BLOOD PRESSURE 80-89 MM HG: ICD-10-PCS | Mod: CPTII,,, | Performed by: NURSE PRACTITIONER

## 2023-07-17 PROCEDURE — 3075F PR MOST RECENT SYSTOLIC BLOOD PRESS GE 130-139MM HG: ICD-10-PCS | Mod: CPTII,,, | Performed by: NURSE PRACTITIONER

## 2023-07-17 PROCEDURE — 3075F SYST BP GE 130 - 139MM HG: CPT | Mod: CPTII,,, | Performed by: NURSE PRACTITIONER

## 2023-07-17 PROCEDURE — 3079F DIAST BP 80-89 MM HG: CPT | Mod: CPTII,,, | Performed by: NURSE PRACTITIONER

## 2023-07-17 PROCEDURE — 3008F PR BODY MASS INDEX (BMI) DOCUMENTED: ICD-10-PCS | Mod: CPTII,,, | Performed by: NURSE PRACTITIONER

## 2023-07-17 PROCEDURE — 99213 OFFICE O/P EST LOW 20 MIN: CPT | Mod: PBBFAC | Performed by: NURSE PRACTITIONER

## 2023-07-17 PROCEDURE — 1159F MED LIST DOCD IN RCRD: CPT | Mod: CPTII,,, | Performed by: NURSE PRACTITIONER

## 2023-07-17 PROCEDURE — 99999 PR PBB SHADOW E&M-EST. PATIENT-LVL III: ICD-10-PCS | Mod: PBBFAC,,, | Performed by: NURSE PRACTITIONER

## 2023-07-17 PROCEDURE — 3008F BODY MASS INDEX DOCD: CPT | Mod: CPTII,,, | Performed by: NURSE PRACTITIONER

## 2023-07-17 PROCEDURE — 99024 POSTOP FOLLOW-UP VISIT: CPT | Mod: ,,, | Performed by: NURSE PRACTITIONER

## 2023-07-17 PROCEDURE — 99024 PR POST-OP FOLLOW-UP VISIT: ICD-10-PCS | Mod: ,,, | Performed by: NURSE PRACTITIONER

## 2023-07-17 PROCEDURE — 1159F PR MEDICATION LIST DOCUMENTED IN MEDICAL RECORD: ICD-10-PCS | Mod: CPTII,,, | Performed by: NURSE PRACTITIONER

## 2023-07-17 RX ORDER — GABAPENTIN 300 MG/1
300 CAPSULE ORAL 3 TIMES DAILY
Qty: 90 CAPSULE | Refills: 1 | Status: SHIPPED | OUTPATIENT
Start: 2023-07-17 | End: 2023-08-31

## 2023-07-17 NOTE — ASSESSMENT & PLAN NOTE
Gabapentin prescribed. Instructions given to slowly increase dose. PT referral, case management referral, and family medicine referrals placed. Questions answered.

## 2023-07-17 NOTE — PROGRESS NOTES
Subjective     Patient ID: Aditi Conway is a 42 y.o. female.    Chief Complaint: post op     HPI    Aditi Conway returns for a post op visit. She reports shooting pain to her right leg and neck. It keeps her up at night. She has not had any fever or drainage. There has been mild edema of her neck.     Past Medical History:   Diagnosis Date    Depression     Heart murmur     Seizures        Past Surgical History:   Procedure Laterality Date    DEBRIDEMENT, BREAST Left 11/11/2021    Procedure: DEBRIDEMENT,  BREAST;  Surgeon: Yoan Sparks MD;  Location: [facility] OR;  Service: General;  Laterality: Left;  DEBRIDEMENT, WOUND, LEFT BREAST    DILATION AND CURETTAGE OF UTERUS      FLAP PROCEDURE N/A 6/14/2023    Procedure: CREATION, FREE FLAP;  Surgeon: Gerardo Seymour MD;  Location: [facility] OR 2ND FLR;  Service: ENT;  Laterality: N/A;    INCISION AND DRAINAGE Left 7/12/2021    Procedure: INCISION AND DRAINAGE, HEMATOMA, SEROMA, OR FLUID COLLECTION ;  Surgeon: Yoan Sparks MD;  Location: [facility] OR;  Service: General;  Laterality: Left;    NECK EXPLORATION Bilateral 6/8/2023    Procedure: EXPLORATION and debridement, NECK;  Surgeon: Jose Jules MD;  Location: [facility] OR 2ND FLR;  Service: ENT;  Laterality: Bilateral;    NECK EXPLORATION Right 6/12/2023    Procedure: RIGHT NECK WOUND EXPLORATION WITH WOUND DEBRIDEMENT;  Surgeon: Gerardo Seymour MD;  Location: [facility] OR 2ND FLR;  Service: ENT;  Laterality: Right;    NECK EXPLORATION Right 6/14/2023    Procedure: EXPLORATION, NECK;  Surgeon: Gerardo Seymour MD;  Location: [facility] OR 2ND FLR;  Service: ENT;  Laterality: Right;         Current Outpatient Medications:     cephALEXin (KEFLEX) 500 MG capsule, Take 500 mg by mouth every 12 (twelve) hours., Disp: , Rfl:     doxycycline (VIBRAMYCIN) 100 MG Cap, Take 1 capsule (100 mg total) by mouth 2 (two) times daily. space for at least 2 hrs from dairy products or vit supplements and avoid being in  the sunlight for prolonged periods of time. for 14 days, Disp: 28 capsule, Rfl: 0    HYDROcodone-acetaminophen (NORCO) 5-325 mg per tablet, Take 1 tablet by mouth every 6 (six) hours as needed for Pain., Disp: 20 tablet, Rfl: 0    ibuprofen (ADVIL,MOTRIN) 600 MG tablet, Take 1 tablet (600 mg total) by mouth every 6 (six) hours as needed for Pain., Disp: 20 tablet, Rfl: 0    gabapentin (NEURONTIN) 300 MG capsule, Take 1 capsule (300 mg total) by mouth 3 (three) times daily., Disp: 90 capsule, Rfl: 1    ibuprofen (ADVIL,MOTRIN) 800 MG tablet, Take 1 tablet (800 mg total) by mouth every 6 (six) hours as needed for Pain. (Patient not taking: Reported on 1/18/2023), Disp: 20 tablet, Rfl: 0    Review of patient's allergies indicates:   Allergen Reactions    Amoxicillin-pot clavulanate Hives    Clindamycin        Social History     Socioeconomic History    Marital status:     Years of education: 9th   Occupational History     Employer: [id number]   Tobacco Use    Smoking status: Every Day     Packs/day: 1.00     Years: 20.00     Pack years: 20.00     Types: Cigarettes    Smokeless tobacco: Never    Tobacco comments:     Informed about classes in smoking cessation. States will call.   Substance and Sexual Activity    Alcohol use: Not Currently    Drug use: No    Sexual activity: Yes     Partners: Male     Birth control/protection: None     Social Determinants of Health     Financial Resource Strain: Low Risk     Difficulty of Paying Living Expenses: Not hard at all   Food Insecurity: No Food Insecurity    Worried About Running Out of Food in the Last Year: Never true    Ran Out of Food in the Last Year: Never true   Transportation Needs: Unknown    Lack of Transportation (Non-Medical): No   Physical Activity: Sufficiently Active    Days of Exercise per Week: 6 days    Minutes of Exercise per Session: 30 min   Stress: No Stress Concern Present    Feeling of Stress : Not at all   Social Connections: Moderately  Isolated    Frequency of Communication with Friends and Family: More than three times a week    Frequency of Social Gatherings with Friends and Family: More than three times a week    Attends [employer] Services: Never    Active Member of Clubs or Organizations: No    Attends Club or Organization Meetings: Never    Marital Status:    Housing Stability: Unknown    Unable to Pay for Housing in the Last Year: No    Unstable Housing in the Last Year: No       Family History   Problem Relation Age of Onset    Hypertension Mother     Heart disease Father         pacemaker    Diabetes Father     Hypertension Father     Hyperlipidemia Father     Breast cancer Neg Hx     Ovarian cancer Neg Hx     Colon cancer Neg Hx          Review of Systems   Constitutional:  Negative for appetite change, chills, diaphoresis, fatigue, fever and unexpected weight change.   HENT:  Negative for nasal congestion, dental problem, drooling, ear discharge, ear pain, facial swelling, hearing loss, mouth sores, nosebleeds, postnasal drip, rhinorrhea, sinus pressure/congestion, sneezing, sore throat, tinnitus, trouble swallowing and voice change.    Eyes:  Negative for pain, discharge, redness and itching.   Respiratory:  Negative for cough and shortness of breath.    Cardiovascular:  Negative for chest pain.   Gastrointestinal:  Negative for abdominal distention, abdominal pain, diarrhea, nausea and vomiting.   Endocrine: Negative for cold intolerance and heat intolerance.   Genitourinary:  Negative for difficulty urinating.   Musculoskeletal:  Negative for neck pain and neck stiffness.   Integumentary:  Positive for wound. Negative for rash.   Neurological:  Negative for dizziness, weakness and headaches.   Hematological:  Negative for adenopathy.        Objective     Physical Exam  Constitutional:       Appearance: Normal appearance.   HENT:      Head: Normocephalic and atraumatic.      Right Ear: External ear normal.      Left Ear:  External ear normal.   Neck:     Pulmonary:      Effort: Pulmonary effort is normal. No respiratory distress.   Skin:         Neurological:      General: No focal deficit present.      Mental Status: She is alert.   Psychiatric:         Mood and Affect: Mood normal.         Behavior: Behavior normal.         Thought Content: Thought content normal.          Assessment and Plan     1. Necrotizing fasciitis  Assessment & Plan:  Gabapentin prescribed. Instructions given to slowly increase dose. PT referral, case management referral, and family medicine referrals placed. Questions answered.     Orders:  -     gabapentin (NEURONTIN) 300 MG capsule; Take 1 capsule (300 mg total) by mouth 3 (three) times daily.  Dispense: 90 capsule; Refill: 1  -     Ambulatory referral/consult to Outpatient Case Management  -     Ambulatory referral/consult to Family Practice; Future; Expected date: 07/24/2023  -     Ambulatory referral/consult to Physical/Occupational Therapy; Future; Expected date: 07/24/2023    2. Pain of right lower extremity  -     Ambulatory referral/consult to Physical/Occupational Therapy; Future; Expected date: 07/24/2023    3. Neck pain  -     Ambulatory referral/consult to Physical/Occupational Therapy; Future; Expected date: 07/24/2023            No follow-ups on file.

## 2023-07-20 ENCOUNTER — PATIENT OUTREACH (OUTPATIENT)
Dept: ADMINISTRATIVE | Facility: OTHER | Age: 43
End: 2023-07-20
Payer: MEDICAID

## 2023-07-20 NOTE — PROGRESS NOTES
CHW - Initial Contact    This Community Health Worker updated the Social Determinant of Health questionnaire with caregiver via telephone today.    Pt identified barriers of most importance are: finances  Referrals to community agencies completed with patient/caregiver consent outside of [facility] include: [facility] GARCIA  Referrals were put through [facility] - No  Support and Services: on leave for a year, spouse is on SS  Other information discussed the patient needs / wants help with: we agreed to a follow up in 2 weeks.     Follow-up Outreach - Due: 8/4/2023

## 2023-07-24 ENCOUNTER — TELEPHONE (OUTPATIENT)
Dept: FAMILY MEDICINE | Facility: CLINIC | Age: 43
End: 2023-07-24
Payer: MEDICAID

## 2023-07-24 NOTE — TELEPHONE ENCOUNTER
----- Message from Yefri España sent at 7/24/2023 10:15 AM CDT -----  Good Morning,     Pt., is requesting to be seen sooner than her appt. She stated that she is trying to be seen so she can get released so she can return back to work. Please give pt a callback at [phone].     Thanks,   REBECCA

## 2023-07-28 LAB
ACID FAST MOD KINY STN SPEC: NORMAL
MYCOBACTERIUM SPEC QL CULT: NORMAL

## 2023-08-15 ENCOUNTER — HOSPITAL ENCOUNTER (EMERGENCY)
Facility: HOSPITAL | Age: 43
Discharge: HOME | End: 2023-08-15
Attending: SURGERY
Payer: MEDICAID

## 2023-08-15 VITALS
SYSTOLIC BLOOD PRESSURE: 127 MMHG | WEIGHT: 107.38 LBS | OXYGEN SATURATION: 96 % | HEART RATE: 95 BPM | RESPIRATION RATE: 18 BRPM | TEMPERATURE: 98 F | DIASTOLIC BLOOD PRESSURE: 85 MMHG | BODY MASS INDEX: 19.64 KG/M2

## 2023-08-15 DIAGNOSIS — L23.7 POISON IVY: Primary | ICD-10-CM

## 2023-08-15 PROCEDURE — 96372 THER/PROPH/DIAG INJ SC/IM: CPT | Performed by: NURSE PRACTITIONER

## 2023-08-15 PROCEDURE — 63600175 PHARM REV CODE 636 W HCPCS: Performed by: NURSE PRACTITIONER

## 2023-08-15 PROCEDURE — 99284 EMERGENCY DEPT VISIT MOD MDM: CPT

## 2023-08-15 RX ORDER — DIPHENHYDRAMINE HCL 25 MG
25 CAPSULE ORAL EVERY 6 HOURS PRN
Qty: 20 CAPSULE | Refills: 0 | COMMUNITY
Start: 2023-08-15

## 2023-08-15 RX ORDER — DEXAMETHASONE SODIUM PHOSPHATE 4 MG/ML
8 INJECTION, SOLUTION INTRA-ARTICULAR; INTRALESIONAL; INTRAMUSCULAR; INTRAVENOUS; SOFT TISSUE
Status: COMPLETED | OUTPATIENT
Start: 2023-08-15 | End: 2023-08-15

## 2023-08-15 RX ORDER — PREDNISONE 20 MG/1
20 TABLET ORAL 2 TIMES DAILY
Qty: 10 TABLET | Refills: 0 | Status: SHIPPED | OUTPATIENT
Start: 2023-08-15 | End: 2023-08-20

## 2023-08-15 RX ORDER — TRIAMCINOLONE ACETONIDE 1 MG/G
CREAM TOPICAL 2 TIMES DAILY
Qty: 45 G | Refills: 0 | Status: SHIPPED | OUTPATIENT
Start: 2023-08-15 | End: 2023-09-13

## 2023-08-15 RX ORDER — DIPHENHYDRAMINE HYDROCHLORIDE 50 MG/ML
25 INJECTION INTRAMUSCULAR; INTRAVENOUS
Status: COMPLETED | OUTPATIENT
Start: 2023-08-15 | End: 2023-08-15

## 2023-08-15 RX ADMIN — DIPHENHYDRAMINE HYDROCHLORIDE 25 MG: 50 INJECTION, SOLUTION INTRAMUSCULAR; INTRAVENOUS at 02:08

## 2023-08-15 RX ADMIN — DEXAMETHASONE SODIUM PHOSPHATE 8 MG: 4 INJECTION, SOLUTION INTRA-ARTICULAR; INTRALESIONAL; INTRAMUSCULAR; INTRAVENOUS; SOFT TISSUE at 02:08

## 2023-08-15 NOTE — ED PROVIDER NOTES
Encounter Date: 8/15/2023       History     Chief Complaint   Patient presents with    Rash     Patient to ER CC of rashes all over her body after cutting grass     Aditi Conway is a 43 y.o. female with PMH of depression, seizure disorder, necrotizing fasciitis who presents the ED for evaluation poison ivy.  Patient reports that she was working in the [employer] several days ago and developed a rash to her bilateral arms.  Rash is itchy, crusting.  Rash is not painful.  She has been applying calamine lotion at home with only little relief of symptoms.    The history is provided by the patient.     Review of patient's allergies indicates:   Allergen Reactions    Amoxicillin-pot clavulanate Hives    Clindamycin      Past Medical History:   Diagnosis Date    Depression     Heart murmur     Seizures      Past Surgical History:   Procedure Laterality Date    DEBRIDEMENT, BREAST Left 11/11/2021    Procedure: DEBRIDEMENT,  BREAST;  Surgeon: Yoan Sparks MD;  Location: [facility];  Service: General;  Laterality: Left;  DEBRIDEMENT, WOUND, LEFT BREAST    DILATION AND CURETTAGE OF UTERUS      FLAP PROCEDURE N/A 6/14/2023    Procedure: CREATION, FREE FLAP;  Surgeon: Gerardo Seymour MD;  Location: [facility] OR 2ND FLR;  Service: ENT;  Laterality: N/A;    INCISION AND DRAINAGE Left 7/12/2021    Procedure: INCISION AND DRAINAGE, HEMATOMA, SEROMA, OR FLUID COLLECTION ;  Surgeon: Yoan Sparks MD;  Location: [facility] OR;  Service: General;  Laterality: Left;    NECK EXPLORATION Bilateral 6/8/2023    Procedure: EXPLORATION and debridement, NECK;  Surgeon: Jose Jules MD;  Location: [facility] OR 2ND FLR;  Service: ENT;  Laterality: Bilateral;    NECK EXPLORATION Right 6/12/2023    Procedure: RIGHT NECK WOUND EXPLORATION WITH WOUND DEBRIDEMENT;  Surgeon: Gerardo Seymour MD;  Location: [facility] OR 2ND FLR;  Service: ENT;  Laterality: Right;    NECK EXPLORATION Right 6/14/2023    Procedure: EXPLORATION, NECK;  Surgeon: Gerardo SHORT  MD Lion;  Location: [facility] OR [address] [place] [address];  Service: ENT;  Laterality: Right;     Family History   Problem Relation Age of Onset    Hypertension Mother     Heart disease Father         pacemaker    Diabetes Father     Hypertension Father     Hyperlipidemia Father     Breast cancer Neg Hx     Ovarian cancer Neg Hx     Colon cancer Neg Hx      Social History     Tobacco Use    Smoking status: Every Day     Current packs/day: 1.00     Average packs/day: 1 pack/day for 20.0 years (20.0 ttl pk-yrs)     Types: Cigarettes    Smokeless tobacco: Never    Tobacco comments:     Informed about classes in smoking cessation. States will call.   Substance Use Topics    Alcohol use: Not Currently    Drug use: No     Review of Systems   Constitutional:  Negative for fever.   HENT:  Negative for sore throat.    Respiratory:  Negative for chest tightness and shortness of breath.    Cardiovascular:  Negative for chest pain.   Gastrointestinal:  Negative for abdominal pain and nausea.   Genitourinary:  Negative for dysuria, frequency and urgency.   Musculoskeletal:  Negative for back pain.   Skin:  Positive for rash (Bilateral arms).   Neurological:  Negative for dizziness, weakness, light-headedness and numbness.   Hematological:  Does not bruise/bleed easily.       Physical Exam     Initial Vitals [08/15/23 1352]   BP Pulse Resp Temp SpO2   (!) 137/99 95 18 97.9 °F (36.6 °C) 96 %      MAP       --         Physical Exam    Nursing note and vitals reviewed.  Constitutional: She appears well-developed and well-nourished.   HENT:   Head: Normocephalic and atraumatic.   Right Ear: Tympanic membrane, external ear and ear canal normal. Tympanic membrane is not erythematous. No middle ear effusion.   Left Ear: Tympanic membrane, external ear and ear canal normal. Tympanic membrane is not erythematous.  No middle ear effusion.   Nose: Nose normal.   Mouth/Throat: Uvula is midline, oropharynx is clear and moist and mucous membranes are normal.  Mucous membranes are not pale and not dry.   Eyes: Conjunctivae and EOM are normal. Pupils are equal, round, and reactive to light.   Neck: Neck supple.   Normal range of motion.  Cardiovascular:  Normal rate, regular rhythm, normal heart sounds and intact distal pulses.           Pulmonary/Chest: Effort normal and breath sounds normal. She has no decreased breath sounds. She has no wheezes. She has no rhonchi. She has no rales.   Abdominal: Abdomen is soft. Bowel sounds are normal. There is no abdominal tenderness.   Musculoskeletal:         General: Normal range of motion.      Cervical back: Normal range of motion and neck supple.     Neurological: She is alert and oriented to person, place, and time. She has normal strength. She displays normal reflexes. No cranial nerve deficit or sensory deficit.   Skin: Skin is warm and dry. Capillary refill takes less than 2 seconds. Rash noted. Rash is maculopapular (Bilateral arms).   Psychiatric: She has a normal mood and affect. Her behavior is normal. Judgment and thought content normal.         ED Course   Procedures  Labs Reviewed - No data to display       Imaging Results    None          Medications   dexAMETHasone injection 8 mg (8 mg Intramuscular Given 8/15/23 1411)   diphenhydrAMINE injection 25 mg (25 mg Intramuscular Given 8/15/23 1409)     Medical Decision Making:   Differential Diagnosis:   Poison ivy, contact dermatitis, eczema, impetigo  ED Management:  Evaluation of a 43-year-old female with an itchy rash with poison ivy exposure.  Patient with maculopapular rash to bilateral arms with vesicles.  Decadron given in the ED in addition to Benadryl.  Discharged home with prednisone, continue Benadryl at home. Patient/caregiver voices understanding and feels comfortable with discharge plan.      The patient acknowledges that close follow up with medical provider is required. Instructed to follow up with PCP within 2 days. Patient was given specific return  precautions. The patient agrees to comply with all instruction and directions given in the ER.                            Clinical Impression:   Final diagnoses:  [L23.7] Poison ivy (Primary)        ED Disposition Condition    Discharge Stable          ED Prescriptions       Medication Sig Dispense Start Date End Date Auth. Provider    predniSONE (DELTASONE) 20 MG tablet Take 1 tablet (20 mg total) by mouth 2 (two) times daily. for 5 days 10 tablet 8/15/2023 8/20/2023 Velma Garcia NP    diphenhydrAMINE (BENADRYL) 25 mg capsule Take 1 capsule (25 mg total) by mouth every 6 (six) hours as needed for Itching or Allergies. 20 capsule 8/15/2023 -- Velma Garcia NP    triamcinolone acetonide 0.1% (KENALOG) 0.1 % cream Apply topically 2 (two) times daily. for 7 days 45 g 8/15/2023 8/22/2023 Velma Garcia NP          Follow-up Information       Follow up With Specialties Details Why Contact Info    Lisseth Woodruff  Schedule an appointment as soon as possible for a visit in 2 days  [address] [facility].  [place] [id number]  [phone]               Velma Garcia NP  08/15/23 [id number]

## 2023-08-17 ENCOUNTER — CLINICAL SUPPORT (OUTPATIENT)
Dept: REHABILITATION | Facility: HOSPITAL | Age: 43
End: 2023-08-17
Payer: MEDICAID

## 2023-08-17 DIAGNOSIS — M54.2 NECK PAIN: ICD-10-CM

## 2023-08-17 DIAGNOSIS — R26.89 IMPAIRMENT OF BALANCE: ICD-10-CM

## 2023-08-17 DIAGNOSIS — R26.9 GAIT ABNORMALITY: ICD-10-CM

## 2023-08-17 DIAGNOSIS — M25.611 DECREASED RANGE OF MOTION OF RIGHT SHOULDER: ICD-10-CM

## 2023-08-17 DIAGNOSIS — R68.89 DECREASED ACTIVITY TOLERANCE: ICD-10-CM

## 2023-08-17 DIAGNOSIS — M25.511 ACUTE PAIN OF RIGHT SHOULDER: Primary | ICD-10-CM

## 2023-08-17 DIAGNOSIS — R29.898 DECREASED STRENGTH OF UPPER EXTREMITY: ICD-10-CM

## 2023-08-17 DIAGNOSIS — M79.604 PAIN OF RIGHT LOWER EXTREMITY: ICD-10-CM

## 2023-08-17 DIAGNOSIS — R29.3 BAD POSTURE: ICD-10-CM

## 2023-08-17 DIAGNOSIS — R29.898 DECREASED STRENGTH OF LOWER EXTREMITY: ICD-10-CM

## 2023-08-17 DIAGNOSIS — M72.6 NECROTIZING FASCIITIS: ICD-10-CM

## 2023-08-17 DIAGNOSIS — R29.898 DECREASED RANGE OF MOTION OF NECK: ICD-10-CM

## 2023-08-17 PROCEDURE — 97163 PT EVAL HIGH COMPLEX 45 MIN: CPT | Mod: PN

## 2023-08-17 PROCEDURE — 97530 THERAPEUTIC ACTIVITIES: CPT | Mod: PN

## 2023-08-17 NOTE — PROGRESS NOTES
[facility]   Physical Therapy Initial Evaluation     Date: 8/17/2023   Name: Aditi Payneman  Clinic Number: [id number]    Therapy Diagnosis:   Encounter Diagnoses   Name Primary?    Necrotizing fasciitis     Pain of right lower extremity     Neck pain      Physician: Raysa Hoffmann NP    Physician Orders: {AMB PT KNEE ORDERS:16567} ***  Medical Diagnosis from Referral: ***  Evaluation Date: 8/17/2023  Authorization Period Expiration: ***  Plan of Care Expiration: ***  Progress Note Due: ***  Visit # / Visits authorized: ***/ ***   FOTO: ***/***    Precautions: {IP WOUND PRECAUTIONS OHS:39752}     Time In: 08:05  Time Out: 08:45  Total Appointment Time (timed & untimed codes): 40 minutes      SUBJECTIVE     Date of onset: ***    History of current condition - dAiti reports: she had a flesh eating bacteria, 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 She reports she had 3 surgeries about 2 months ago. She has random shooting pain that goes from her surgical site to her right shoulder and ear, and it limits her range of motion of her right shoulder. She has pain in her right knee/flap site, and reports that her knee idalia and has caused 5 falls since surgery. She has good days and bad days, where her knee will give out. She has some difficulty with stairs.    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  ***  Hx of Cancer?  ***  Trauma?  ***  Sudden bowel/bladder changes?  ***  Bone Density compromise?  ***    Patients goals: ***     Medical History:   Past Medical History:   Diagnosis Date    Depression     Heart murmur     Seizures        Surgical History:   Aditi Conway  has a past surgical history that includes Dilation and curettage of uterus; Incision and drainage (Left, 7/12/2021); debridement, breast (Left, 11/11/2021); Neck exploration (Bilateral, 6/8/2023); Neck exploration (Right, 6/12/2023); Neck exploration (Right, 6/14/2023); and Flap procedure (N/A, 6/14/2023).    Medications:   Aditi has a current medication list which includes the following prescription(s): cephalexin, diphenhydramine, gabapentin, hydrocodone-acetaminophen, ibuprofen, ibuprofen, prednisone, and triamcinolone acetonide 0.1%.    Allergies:   Review of patient's allergies indicates:   Allergen Reactions    Amoxicillin-pot clavulanate Hives    Clindamycin         OBJECTIVE   OBSERVATIONS: Patient is a *** year old female who ambulates with no AD and no apparent signs of distress.  No signs of atrophy at the SCM, traps, deltoids, supraspinatus.    POSTURE:  Patient is significant for moderate forward head and internally rotated shoulders. Upper t-spine flexion also observed.    Scapular Posture Vik (measures hands on hips Inferior angle to t-spine > 1.5cm abnormal): R L  Anterior tipping YES *** /NO ***  Winging YES *** /NO ***  Superior YES *** /NO ***    CERVICAL ACTIVE RANGE OF MOTION   Flexion ***   Extension ***   Right Side Bend ***   Left Side Bend ***   Right Rotation ***   Left Rotation ***   Craniocervical Angle ***     SHOULDER ACTIVE RANGE OF MOTION    LEFT RIGHT   Flexion *** ***   Scaption *** ***   External Rotation *** ***   Internal Rotation  (Apley Scratch Test) *** ***   Extension *** ***     UPPER LOWER EXTREMITY STRENGTH    LEFT RIGHT   Shoulder Flexion {AMB PT VESTIBULAR STRENGTH:32140} {AMB PT VESTIBULAR STRENGTH:96328}   Shoulder Abduction {AMB PT VESTIBULAR STRENGTH:58108} {AMB PT VESTIBULAR STRENGTH:42306}   Shoulder External Rotation {AMB PT VESTIBULAR STRENGTH:78222} {AMB PT VESTIBULAR STRENGTH:70321}   Shoulder Internal Rotation {AMB PT VESTIBULAR STRENGTH:08066} {AMB PT VESTIBULAR STRENGTH:03369}   Elbow Flexion {AMB PT VESTIBULAR STRENGTH:97236} {AMB PT VESTIBULAR STRENGTH:63901}   Elbow Extension {AMB PT VESTIBULAR STRENGTH:53880} {AMB PT VESTIBULAR STRENGTH:93829}    Strength *** ***     PRONE SCAPULAR STRENGTH    LEFT RIGHT   Flexion {AMB PT VESTIBULAR STRENGTH:57725} {AMB PT VESTIBULAR STRENGTH:06110}   Abduction {AMB PT VESTIBULAR STRENGTH:73372} {AMB PT VESTIBULAR STRENGTH:26617}   Extension {AMB PT VESTIBULAR STRENGTH:61720} {AMB PT VESTIBULAR STRENGTH:72827}     DERMATOMES: Sensation: Light Touch: {INTACT:97835}  MYOTOMES: ***    SPECIAL TESTS   Vertbral Artery Screen Negative   Sharps Andria Test Negative   Alar Ligament Test Negative   Transverse Ligament Test Negative   Sprulings Test ***   Distraction Test ***   Deep Neck Flexor Endurance Test ***   ULNT Median Nerve Bias ***   ULNT Ulnar Nerve Bias ***   ULNT Radial Nerve Bias ***     PALPATION:  Patient reports primary pain region along ****  (***) Upper Trapezius  (***) Levator Scapulae  (***) Sub-Occipital  (***) Paraspainals    Prone PA segmental testing:   Cervical Spine: Moderate hypomobility throughout with concordant symptoms ***  Thoracic Spine: Moderate hypomobility throughout with concordant symptoms ***    Passive IV Motion Testing:  OA (rotation with head nod): ***  AA (maximum flexion/rotation): ***  Mid Cervical (segmental sideglides): ***  Lower Cervical (segmental sideglides): ***    Due to Limited Assessment Time, will assess her Lower Extremity more next  "visit.    Limitation/Restriction for FOTO *** Survey    Therapist reviewed FOTO scores for Aditi Conway on 8/17/2023.   FOTO documents entered into [employer] - see Media section.    Limitation Score: ***%       TREATMENT     Total Treatment time (time-based codes) separate from Evaluation: *** minutes      Aditi received the treatments listed below:      therapeutic exercises to develop {AMB PT PROGRESS OBJECTIVE:77600} for *** minutes including:  ***    manual therapy techniques: {AMB PT PROGRESS MANUAL THERAPY:51798} were applied to the: *** for *** minutes, including:  ***    neuromuscular re-education activities to improve: {AMB PT PROGRESS NEURO RE-ED:04898} for *** minutes. The following activities were included:  ***    therapeutic activities to improve functional performance for ***  minutes, including:  ***    gait training to improve functional mobility and safety for ***  minutes, including:  ***    direct contact modalities after being cleared for contraindications: {AMB PT PROGRESS DIRECT CONTACT MODES:19382}    supervised modalities after being cleared for contradictions: {AMB PT SUPERVISED MODES:04390}    hot pack for *** minutes to ***.    cold pack for *** minutes to ***.      PATIENT EDUCATION AND HOME EXERCISES     Education provided:   - ***  - Pt/family was provided educational information, including: role of PT, goals for PT, scheduling - pt verbalized understanding. Discussed insurance limitations with pt.     Written Home Exercises Provided: {Blank single:48402::"yes","Patient instructed to cont prior HEP"}. Exercises were reviewed and Aditi was able to demonstrate them prior to the end of the session.  Aditi demonstrated {Desc; good/fair/poor:20153} understanding of the education provided. See EMR under Patient Instructions for exercises provided during therapy sessions.    ASSESSMENT     Aditi is a 43 y.o. female referred to outpatient Physical Therapy with a medical " diagnosis of ***. Patient presents with ***    Medical necessity is demonstrated by the following IMPAIRMENTS/PROBLEMS:  -Decreased function (NDI)   -Increased pain (NPS)   -Decreased pain free cervical AROM   -Decreased UE strength (MMT)   -Impaired sitting posture (Craniocervical angle)  -Decreased participation in regular exercise routine  - (+) TTP: ***    Intervention strategies designed to address these impairments will be instrumental in achieving the stated functional goals, including her ability to safely perform mobility tasks. Based on the examination, the patient's rehabilitation potential to achieve functional goals is good.    Patient prognosis is {REHAB PROGNOSIS OHS:66483}.   Patient will benefit from skilled outpatient Physical Therapy to address the deficits stated above and in the chart below, provide patient /family education, and to maximize patientt's level of independence.     Plan of care discussed with patient: {YES:48122}  Patient's spiritual, cultural and educational needs considered and patient is agreeable to the plan of care and goals as stated below:     Anticipated Barriers for therapy: ***    Medical Necessity is demonstrated by the following  History  Co-morbidities and personal factors that may impact the plan of care Co-morbidities:   {Co-morbidities:99402}    Personal Factors:   {Personal Factors:07056}     {Desc; low/moderate/high:756069}   Examination  Body Structures and Functions, activity limitations and participation restrictions that may impact the plan of care Body Regions:   {Body Regions:11372}    Body Systems:    {Body Systems:25080}    Participation Restrictions:   ***    Activity limitations:   Learning and applying knowledge  {Learning and applying knowledge:20182}    General Tasks and Commands  {Gen tasks and commands:88915}    Communication  {Communication:95681}    Mobility  {Mobility:97512}    Self care  {Self Care:34967}    Domestic Life  {Domestic  "Life:83571}    Interactions/Relationships  {Interactions/Relationships:64353}    Life Areas  {Life Areas:59737}    Community and Social Life  {Community/Social Life:70336}         {Desc; low/moderate/high:228584}   Clinical Presentation {Clinical Presentation :94123} {Desc; low/moderate/high:720086}   Decision Making/ Complexity Score: {Desc; low/moderate/high:970777}     Goals:  Short Term Goals: *** weeks   1. Patient demonstrates independence with HEP.   2. Patient demonstrates independence with Postural Awareness.   3. Patient demonstrates independence with body mechanics.   4. ***    Long Term Goals: *** weeks   1. Patient demonstrates increased *** to improve tolerance to functional activities.   2. Patient demonstrates increased strength BUE's to ***/5 or greater to improve tolerance to functional activities.   3. Patient demonstrates improved overall function per NDI to ***% or less.   4. ***    PLAN   Plan of care Certification: 8/17/2023 to ***.    Outpatient Physical Therapy {NUMBERS 1-5:28392} times weekly for {0-10:15690::"0"} weeks to include the following interventions: {TX PLAN:88433}.     Maritza Su, PT    Pt may be seen by PTA as part of the rehabilitation team.     Therapist: Maritza Su PT  8/17/2023    I CERTIFY THE NEED FOR THESE SERVICES FURNISHED UNDER THIS PLAN OF TREATMENT AND WHILE UNDER MY CARE   Physician's comments:     Physician's Signature: ___________________________________________________           "

## 2023-08-17 NOTE — PLAN OF CARE
[facility]   Physical Therapy Initial Evaluation     Date: 8/17/2023   [id number]    Therapy Diagnosis:   Encounter Diagnoses   Name Primary?    Acute pain of right shoulder Yes    Neck pain     Pain of right lower extremity     Necrotizing fasciitis     Decreased strength of lower extremity     Decreased strength of upper extremity     Decreased range of motion of neck     Decreased range of motion of right shoulder     Bad posture     Gait abnormality     Impairment of balance     Decreased activity tolerance      Physician: Raysa Hoffmann, NP    Physician Orders: PT Eval and Treat   Medical Diagnosis from Referral: M72.6 (ICD-10-CM) - Necrotizing fasciitis M79.604 (ICD-10-CM) - Pain of right lower extremity M54.2 (ICD-10-CM) - Neck pain   Evaluation Date: 8/17/2023  Authorization Period Expiration: Pending  Plan of Care Expiration: 10/12/2023  Progress Note Due: 9/14/2023  Visit # / Visits authorized: 1/ 1   FOTO: 1/3    Precautions: Standard and seizures     Time In: 08:05  Time Out: 09:15  Total Appointment Time (timed & untimed codes): 70 minutes      SUBJECTIVE     Date of onset: June 2023    History of current condition - Aditi reports: she had a flesh eating bacteria, 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 She reports she had 3 surgeries about 2 months ago. She has random shooting pain that goes from her surgical site to her right shoulder and ear, and she is limited in her range of motion of her right shoulder. She has pain in her right knee/thigh/donor site, and reports that her knee idalia and has caused 5 falls since surgery. She does not remember the circumstances of her falls, and cannot report whether she suffered any injuries from the falls. She has good days and bad days, where her knee will give out. She has some difficulty with stairs.    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  Yes  Hx of Cancer?  None  Trauma?  No  Sudden bowel/bladder changes?  No  Bone Density compromise?  No    Patients goals: to be able to get back to where she used to be, get back to work     Medical History:   Past Medical History:   Diagnosis Date    Depression     Heart murmur     Seizures        Surgical History:   Aditi Conway  has a past surgical history that includes Dilation and curettage of uterus; Incision and drainage (Left, 7/12/2021); debridement, breast (Left, 11/11/2021); Neck exploration (Bilateral, 6/8/2023); Neck exploration (Right, 6/12/2023); Neck exploration (Right, 6/14/2023); and Flap procedure (N/A, 6/14/2023).    Medications:   Aditi has a current medication list which includes the following prescription(s): cephalexin, diphenhydramine, gabapentin, hydrocodone-acetaminophen, ibuprofen, ibuprofen, prednisone, and triamcinolone acetonide 0.1%.    Allergies:   Review of patient's allergies indicates:   Allergen Reactions    Amoxicillin-pot clavulanate Hives    Clindamycin          OBJECTIVE   OBSERVATIONS: Patient is a 43 year old female who ambulates with no AD and no apparent signs of distress.  No signs of atrophy at the SCM, traps, deltoids, supraspinatus. Skin Flap from right ear to crease of neck and shoulder.    POSTURE:  Patient is significant for moderate forward head and internally rotated shoulders. Upper t-spine flexion also observed.  Anterior tipping NO   Winging NO   Superior YES     CERVICAL ACTIVE RANGE OF MOTION   Flexion 60   Extension 40   Right Side Bend 40   Left Side Bend 30   Right Rotation 60   Left Rotation 60     SHOULDER ACTIVE RANGE OF MOTION    LEFT RIGHT   Flexion 180 140 (painful arc )   Scaption 180 90 (painful arc 60-90)   External Rotation T5 C4-5*   Internal Rotation (Apley Scratch Test) T12 T12*   Extension 40 40*     UPPER LOWER EXTREMITY STRENGTH    LEFT RIGHT   Shoulder Flexion 4+/5 3+/5*   Shoulder Abduction 4+/5 3+/5*   Shoulder External Rotation 4/5 3+/5   Shoulder Internal Rotation 4/5 3+/5   Elbow Flexion 4+/5 4/5   Elbow Extension 4+/5 4/5     PRONE SCAPULAR STRENGTH    LEFT RIGHT   Flexion NT NT   Abduction NT NT   Extension NT NT       SPECIAL TESTS   Vertbral Artery Screen Negative   Sharps [place]ria Test Negative   Alar Ligament Test Negative   Transverse Ligament Test Negative   Sprulings Test NT   Distraction Test Pain   Deep Neck Flexor Endurance Test NT   ULNT Median Nerve Bias NT   ULNT Ulnar Nerve Bias NT   ULNT Radial Nerve Bias NT   Full Can Positive   Empty Can Positive     PALPATION:  Patient reports primary pain region along right upper trapezius and deltoid and anterior deep shoulder  (Right Side TTP) Upper Trapezius  (TTP) Levator Scapulae  (Right Side TTP) Sub-Occipital  (Bilateral TTP) Paraspainals    Prone PA segmental testing:   Cervical Spine: Moderate hypomobility throughout with concordant symptoms   Thoracic Spine: Moderate hypomobility throughout with concordant symptoms     Passive IV Motion Testing:  OA (rotation  with head nod): Normal  AA (maximum flexion/rotation): Normal  Mid Cervical (segmental sideglides): Hypomobile with pain  Lower Cervical (segmental sideglides): Hypomobile with pain    Strength of Lower Extremity:  Hip flexion 4-/5*, left 4+/5  Knee Extension - right 3-/5, left 4+/5  Knee Flexion - right 3+/5*, left 4/5  Ankle Dorsiflexion - right 4/5, left 4+/5  Ankle Plantar Flexion - right 4/5, left 4/5  Hip Extension - right 3/5*, Left 3/5  Hip Abduction - Right 3-/5*, Left 4/5    Special Tests for Lower Extremity:  Scour - Negative Bilaterally  SONJA - Negative Bilaterally  FADIR - Negative Bilaterally  Knee Anterior Drawer - Negative Bilaterally  Knee Posterior Drawer - Negative Bilaterally  Knee Valgus Stress - Negative Bilaterally  Knee Varus Stress - Negative Bilaterally  Patellar Apprehension - Negative Bilaterally  Thessaly - Negative Bilaterally     Palpation of Lower Extremity:  TTP - Patellar Tendon, Incision site/Flap Site      Limitation/Restriction for FOTO Neck Survey    Therapist reviewed FOTO scores for Aditi Conway on 8/17/2023.   FOTO documents entered into [employer] - see Media section.    Limitation Score: 66%       Limitation/Restriction for FOTO Lower Extremity Survey    Therapist reviewed FOTO scores for Aditi Conway on 8/17/2023.   FOTO documents entered into [employer] - see Media section.    Limitation Score: 71%         TREATMENT     Total Treatment time (time-based codes) separate from Evaluation: 10 minutes     Therapeutic Activity for 10 minutes:  PT discussed with patient, continuing to move her shoulder as much as tolerated to keep from having increasing limitations in her range of motion, and massaging her incisions for continued desensitization training.      PATIENT EDUCATION AND HOME EXERCISES     Education provided:   - PT discussed with patient, her diagnosis, her prognosis, her POC, and her HEP.  - Pt/family was provided educational information, including:  role of PT, goals for PT, scheduling - pt verbalized understanding. Discussed insurance limitations with pt.     Written Home Exercises Provided: Due to limited time of evaluation, no exercises were provided this date. PT recommended to patient to keep moving her right shoulder as much as tolerated.    ASSESSMENT     Aditi is a 43 y.o. female referred to outpatient Physical Therapy with a medical diagnosis of M72.6 (ICD-10-CM) - Necrotizing fasciitis M79.604 (ICD-10-CM) - Pain of right lower extremity M54.2 (ICD-10-CM) - Neck pain. Patient presents with pain in right shoulder, pain in neck, pain in right thigh, decreased range of motion of neck and right shoulder, decreased strength of right upper extremity and right lower extremity, decreased tolerance to activity, deficits in balance, gait abnormality and deficits in posture.    Medical necessity is demonstrated by the following IMPAIRMENTS/PROBLEMS:  -Decreased function (NDI)   -Increased pain (NPS)   -Decreased pain free cervical AROM   -Decreased UE strength (MMT)   -Decreased LE strength (MMT)  -Impaired sitting posture (Craniocervical angle)  -Decreased participation in regular exercise routine  - (+) TTP: right shoulder anterior deep tissue, right deltoid, right trapezius, right suboccipitals, right patellar tendon, surgical sites in neck and thigh    Intervention strategies designed to address these impairments will be instrumental in achieving the stated functional goals, including her ability to safely perform mobility tasks. Based on the examination, the patient's rehabilitation potential to achieve functional goals is good.    Patient prognosis is Good.   Patient will benefit from skilled outpatient Physical Therapy to address the deficits stated above and in the chart below, provide patient /family education, and to maximize patientt's level of independence.     Plan of care discussed with patient: Yes  Patient's spiritual, cultural and educational  needs considered and patient is agreeable to the plan of care and goals as stated below:     Anticipated Barriers for therapy: severity of infection and reconstructive surgery    Medical Necessity is demonstrated by the following  History  Co-morbidities and personal factors that may impact the plan of care Co-morbidities:   depression and heart murmur, seizures, and reconstructive surgery after necrotizing fasciitis infection of neck     Personal Factors:   coping style     moderate   Examination  Body Structures and Functions, activity limitations and participation restrictions that may impact the plan of care Body Regions:   neck  lower extremities  upper extremities    Body Systems:    gross symmetry  ROM  strength  balance  gait  motor control  edema  scar formation    Participation Restrictions:   none    Activity limitations:   Learning and applying knowledge  no deficits    General Tasks and Commands  no deficits    Communication  no deficits    Mobility  lifting and carrying objects  walking  driving (bike, car, motorcycle)    Self care  no deficits    Domestic Life  shopping  cooking  doing house work (cleaning house, washing dishes, laundry)    Interactions/Relationships  no deficits    Life Areas  employment    Community and Social Life  community life  recreation and leisure         high   Clinical Presentation unstable clinical presentation with unpredictable characteristics high   Decision Making/ Complexity Score: high     Goals:  Short Term Goals: 4 weeks   1. Patient demonstrates independence with HEP.   2. Patient demonstrates independence with Postural Awareness.   3. Patient demonstrates independence with body mechanics.   4. Patient will demonstrate improved pain-free range of motion of right shoulder to at least 10 degrees of left shoulder.  5. Patient will report no instances of falls, in order to decrease risk of further injuries.  6. Patient demonstrates increased strength BLE's to 4/5 or  greater to improve tolerance to functional activities.     Long Term Goals: 8 weeks   1. Patient demonstrates increased pain-free cervical motion to improve tolerance to functional activities.   2. Patient demonstrates increased strength BUE's to 4/5 or greater to improve tolerance to functional activities.   3. Patient demonstrates improved overall function per Neck FOTO to 54% or less.   4. Patient demonstrates improved overall function per Lower Extremity FOTO to 50% or less.  5. Patient demonstrates increased strength BLE's to 4+/5 or greater to improve tolerance to functional activities.   6. Patient will report return to work with no increase in pain.    PLAN   Plan of care Certification: 8/17/2023 to 10/12/2023.    Outpatient Physical Therapy 2 times weekly for 8 weeks to include the following interventions: Manual Therapy, Moist Heat/ Ice, Neuromuscular Re-ed, Patient Education, Therapeutic Activities, and Therapeutic Exercise.     Maritza Su, PT    Pt may be seen by PTA as part of the rehabilitation team.     Therapist: Maritza Su, PT  8/17/2023    I CERTIFY THE NEED FOR THESE SERVICES FURNISHED UNDER THIS PLAN OF TREATMENT AND WHILE UNDER MY CARE   Physician's comments:     Physician's Signature: ___________________________________________________

## 2023-08-30 ENCOUNTER — PATIENT OUTREACH (OUTPATIENT)
Dept: ADMINISTRATIVE | Facility: OTHER | Age: 43
End: 2023-08-30
Payer: MEDICAID

## 2023-09-05 ENCOUNTER — OFFICE VISIT (OUTPATIENT)
Dept: FAMILY MEDICINE | Facility: CLINIC | Age: 43
End: 2023-09-05
Payer: MEDICAID

## 2023-09-05 VITALS
RESPIRATION RATE: 15 BRPM | DIASTOLIC BLOOD PRESSURE: 82 MMHG | OXYGEN SATURATION: 98 % | SYSTOLIC BLOOD PRESSURE: 124 MMHG | HEART RATE: 80 BPM | HEIGHT: 62 IN | WEIGHT: 109.81 LBS | BODY MASS INDEX: 20.21 KG/M2

## 2023-09-05 DIAGNOSIS — Z72.0 TOBACCO ABUSE: ICD-10-CM

## 2023-09-05 DIAGNOSIS — R26.89 IMPAIRMENT OF BALANCE: ICD-10-CM

## 2023-09-05 DIAGNOSIS — Z86.69 HISTORY OF EPILEPSY: Primary | ICD-10-CM

## 2023-09-05 DIAGNOSIS — M72.6 NECROTIZING FASCIITIS: ICD-10-CM

## 2023-09-05 DIAGNOSIS — M54.2 NECK PAIN: ICD-10-CM

## 2023-09-05 DIAGNOSIS — M79.2 NEUROPATHIC PAIN: ICD-10-CM

## 2023-09-05 PROCEDURE — 1159F MED LIST DOCD IN RCRD: CPT | Mod: CPTII,,, | Performed by: FAMILY MEDICINE

## 2023-09-05 PROCEDURE — 99214 OFFICE O/P EST MOD 30 MIN: CPT | Mod: PBBFAC | Performed by: FAMILY MEDICINE

## 2023-09-05 PROCEDURE — 99999 PR PBB SHADOW E&M-EST. PATIENT-LVL IV: CPT | Mod: PBBFAC,,, | Performed by: FAMILY MEDICINE

## 2023-09-05 PROCEDURE — 3079F PR MOST RECENT DIASTOLIC BLOOD PRESSURE 80-89 MM HG: ICD-10-PCS | Mod: CPTII,,, | Performed by: FAMILY MEDICINE

## 2023-09-05 PROCEDURE — 3008F PR BODY MASS INDEX (BMI) DOCUMENTED: ICD-10-PCS | Mod: CPTII,,, | Performed by: FAMILY MEDICINE

## 2023-09-05 PROCEDURE — 1159F PR MEDICATION LIST DOCUMENTED IN MEDICAL RECORD: ICD-10-PCS | Mod: CPTII,,, | Performed by: FAMILY MEDICINE

## 2023-09-05 PROCEDURE — 99999 PR PBB SHADOW E&M-EST. PATIENT-LVL IV: ICD-10-PCS | Mod: PBBFAC,,, | Performed by: FAMILY MEDICINE

## 2023-09-05 PROCEDURE — 3008F BODY MASS INDEX DOCD: CPT | Mod: CPTII,,, | Performed by: FAMILY MEDICINE

## 2023-09-05 PROCEDURE — 99214 OFFICE O/P EST MOD 30 MIN: CPT | Mod: S$PBB,,, | Performed by: FAMILY MEDICINE

## 2023-09-05 PROCEDURE — 99214 PR OFFICE/OUTPT VISIT, EST, LEVL IV, 30-39 MIN: ICD-10-PCS | Mod: S$PBB,,, | Performed by: FAMILY MEDICINE

## 2023-09-05 PROCEDURE — 3074F SYST BP LT 130 MM HG: CPT | Mod: CPTII,,, | Performed by: FAMILY MEDICINE

## 2023-09-05 PROCEDURE — 1160F PR REVIEW ALL MEDS BY PRESCRIBER/CLIN PHARMACIST DOCUMENTED: ICD-10-PCS | Mod: CPTII,,, | Performed by: FAMILY MEDICINE

## 2023-09-05 PROCEDURE — 3074F PR MOST RECENT SYSTOLIC BLOOD PRESSURE < 130 MM HG: ICD-10-PCS | Mod: CPTII,,, | Performed by: FAMILY MEDICINE

## 2023-09-05 PROCEDURE — 3079F DIAST BP 80-89 MM HG: CPT | Mod: CPTII,,, | Performed by: FAMILY MEDICINE

## 2023-09-05 PROCEDURE — 1160F RVW MEDS BY RX/DR IN RCRD: CPT | Mod: CPTII,,, | Performed by: FAMILY MEDICINE

## 2023-09-05 RX ORDER — GABAPENTIN 300 MG/1
300 CAPSULE ORAL 3 TIMES DAILY
Qty: 90 CAPSULE | Refills: 5 | Status: SHIPPED | OUTPATIENT
Start: 2023-09-05 | End: 2023-09-13 | Stop reason: SDUPTHER

## 2023-09-11 ENCOUNTER — CLINICAL SUPPORT (OUTPATIENT)
Dept: REHABILITATION | Facility: HOSPITAL | Age: 43
End: 2023-09-11
Attending: NURSE PRACTITIONER
Payer: MEDICAID

## 2023-09-11 DIAGNOSIS — M79.604 PAIN OF RIGHT LOWER EXTREMITY: ICD-10-CM

## 2023-09-11 DIAGNOSIS — M25.511 ACUTE PAIN OF RIGHT SHOULDER: ICD-10-CM

## 2023-09-11 DIAGNOSIS — M54.2 NECK PAIN: ICD-10-CM

## 2023-09-11 DIAGNOSIS — M25.611 DECREASED RANGE OF MOTION OF RIGHT SHOULDER: ICD-10-CM

## 2023-09-11 DIAGNOSIS — R29.898 DECREASED RANGE OF MOTION OF NECK: ICD-10-CM

## 2023-09-11 DIAGNOSIS — R29.898 DECREASED STRENGTH OF UPPER EXTREMITY: ICD-10-CM

## 2023-09-11 DIAGNOSIS — R29.898 DECREASED STRENGTH OF LOWER EXTREMITY: Primary | ICD-10-CM

## 2023-09-11 PROCEDURE — 97110 THERAPEUTIC EXERCISES: CPT | Mod: PN

## 2023-09-11 NOTE — PROGRESS NOTES
[facility]   Physical Therapy Treatment Note      Name: Aditi Conway  Clinic Number: [id number]    Therapy Diagnosis:   Encounter Diagnoses   Name Primary?    Decreased strength of lower extremity Yes    Pain of right lower extremity     Decreased strength of upper extremity     Decreased range of motion of neck     Decreased range of motion of right shoulder     Acute pain of right shoulder     Neck pain      Physician: Raysa Hoffmann NP    Visit Date: 9/11/2023  Physician Orders: PT Eval and Treat   Medical Diagnosis from Referral: M72.6 (ICD-10-CM) - Necrotizing fasciitis M79.604 (ICD-10-CM) - Pain of right lower extremity M54.2 (ICD-10-CM) - Neck pain   Evaluation Date: 8/17/2023  Authorization Period Expiration: Pending  Plan of Care Expiration: 10/12/2023  Progress Note Due: 9/14/2023  Visit # / Visits authorized: 1/ 1   FOTO: 1/3     Precautions: Standard and seizures      Time In: 0930  Time Out:1015   Total Appointment Time (timed & untimed codes): 45 minutes    PTA Visit #: -/5       Subjective     Patient reports: she says she has good days and bad days. She has been doing exercises on her leg, not really her arm .  She  not given an home exercise program to be   compliant.  Response to previous treatment: -  Functional change: -    Pain: 4/10  Location: right shoulder      Objective      UPDATE COMPLETED 9/11  OBSERVATIONS: Patient is a 43 year old female who ambulates with no AD and no apparent signs of distress.  No signs of atrophy at the SCM, traps, deltoids, supraspinatus. Skin Flap from right ear to crease of neck and shoulder.     POSTURE:  Patient is significant for moderate forward head and internally rotated shoulders. Upper t-spine flexion also observed.  Anterior tipping NO   Winging NO   Superior YES          CERVICAL ACTIVE RANGE OF MOTION   Flexion 60   Extension 50   Right Side Bend 45   Left Side Bend 30   Right Rotation 60   Left Rotation 60            SHOULDER ACTIVE RANGE OF MOTION     LEFT RIGHT   Flexion 180 140 (painful arc )   Scaption 180 90 (painful arc 60-90)   External Rotation T5 C4-5*   Internal Rotation (Apley Scratch Test) T12 T12*   Extension 40 40*           UPPER LOWER EXTREMITY STRENGTH     LEFT RIGHT   Shoulder Flexion 4+/5 4/5*   Shoulder Abduction 4+/5 3+/5*   Shoulder External Rotation 4/5 4/5   Shoulder Internal Rotation 4/5 4/5   Elbow Flexion 4+/5 4/5   Elbow Extension 4+/5 4/5     Strength of Lower Extremity:  Hip flexion 4-/5*, left 4+/5  Knee Extension - right 4/5, left 4+/5  Knee Flexion - right 3+/5*, left 4/5  Ankle Dorsiflexion - right 4/5, left 4+/5  Ankle Plantar Flexion - right 4/5, left 4/5  Hip Extension - right 3/5*, Left 3/5  Hip Abduction - Right 3-/5*, Left 4/5      Limitation/Restriction for FOTO Neck Survey     Therapist reviewed FOTO scores for Aditi Conway on 8/17/2023.   FOTO documents entered into [employer] - see Media section.     Limitation Score: 25%            Treatment     Aditi received the treatments listed below:      therapeutic exercises to develop strength, endurance, ROM, flexibility, and posture for 45 minutes including:    Re-assessment performed on this date for 10 min and issued home exercise program for patient with theraband.   Lower extremity strengthening:   Bridges 2 x 10   Sit to stand with bicep curl using red theraband 2 x 10   Cervical stretching   Open books for right upper extremity 2 x 10   Side bending left and right x 10   Upper extremity strengthening:   Chest press with 3# dowel 2 x 10   Shoulder rows with 3# dowel 2 x 10   ER with red theraband 2 x 10   Scapular rows with red theraband 2 x 10     manual therapy techniques: Joint mobilizations, Manual traction, and Soft tissue Mobilization were applied to the: - for - minutes, including:  -    therapeutic activities to improve functional performance for -  minutes, including:  -    gait training to improve  functional mobility and safety for -  minutes, including:  -    hot pack for 10 minutes to right shoulder.    Patient Education and Home Exercises       Education provided:   - Physical therapy plan of care and goals  - Importance of range of motion and posture in shoulder pain     Written Home Exercises Provided: yes. Exercises were reviewed and Aditi was able to demonstrate them prior to the end of the session.  Aditi demonstrated good  understanding of the education provided. See Electronic Medical Record under Patient Instructions for exercises provided during therapy sessions    Assessment     Aditi was limited in progress because she had not been consistent with follow up. She was rescheduled to hopefully accommodate her caregivers to bring her to therapy more consistently. She is very tentative in therapy but eager to improve. She was given an home exercise program today to help with strengthening in right shoulder. She has not met many goals, but with more consistent participation hopefully she begins to experience a change.     Aditi Is not progressing well towards her goals.   Patient prognosis is Excellent.     Patient will continue to benefit from skilled outpatient physical therapy to address the deficits listed in the problem list box on initial evaluation, provide pt/family education and to maximize pt's level of independence in the home and community environment.     Patient's spiritual, cultural and educational needs considered and pt agreeable to plan of care and goals.     Anticipated barriers to physical therapy: dependence on caregivers for transportation     Goals: Short Term Goals: 4 weeks   1. Patient demonstrates independence with HEP. ISSUED ON THIS DATE 9/11  2. Patient demonstrates independence with Postural Awareness. PROGRESSING   3. Patient demonstrates independence with body mechanics.  PROGRESSING   4. Patient will demonstrate improved pain-free range of motion of  right shoulder to at least 10 degrees of left shoulder. PROGRESSING   5. Patient will report no instances of falls, in order to decrease risk of further injuries. GOAL Met - does have close calls   6. Patient demonstrates increased strength BLE's to 4/5 or greater to improve tolerance to functional activities.  PROGRESSING      Long Term Goals: 8 weeks   1. Patient demonstrates increased pain-free cervical motion to improve tolerance to functional activities.   2. Patient demonstrates increased strength BUE's to 4/5 or greater to improve tolerance to functional activities.   3. Patient demonstrates improved overall function per Neck FOTO to 54% or less. GOAL MET   4. Patient demonstrates improved overall function per Lower Extremity FOTO to 50% or less.  5. Patient demonstrates increased strength BLE's to 4+/5 or greater to improve tolerance to functional activities.   6. Patient will report return to work with no increase in pain.    Plan     Plan of care Certification: 8/17/2023 to 10/12/2023.     Outpatient Physical Therapy 2 times weekly for 8 weeks to include the following interventions: Manual Therapy, Moist Heat/ Ice, Neuromuscular Re-ed, Patient Education, Therapeutic Activities, and Therapeutic Exercise.     Nidia Mckay, PT

## 2023-09-13 ENCOUNTER — CLINICAL SUPPORT (OUTPATIENT)
Dept: REHABILITATION | Facility: HOSPITAL | Age: 43
End: 2023-09-13
Payer: MEDICAID

## 2023-09-13 ENCOUNTER — OFFICE VISIT (OUTPATIENT)
Dept: NEUROLOGY | Facility: CLINIC | Age: 43
End: 2023-09-13
Payer: MEDICAID

## 2023-09-13 VITALS
OXYGEN SATURATION: 99 % | HEIGHT: 62 IN | RESPIRATION RATE: 16 BRPM | SYSTOLIC BLOOD PRESSURE: 128 MMHG | DIASTOLIC BLOOD PRESSURE: 70 MMHG | HEART RATE: 78 BPM | WEIGHT: 109.38 LBS | BODY MASS INDEX: 20.13 KG/M2

## 2023-09-13 DIAGNOSIS — G40.909 SEIZURE DISORDER: Primary | ICD-10-CM

## 2023-09-13 DIAGNOSIS — M25.511 ACUTE PAIN OF RIGHT SHOULDER: Primary | ICD-10-CM

## 2023-09-13 DIAGNOSIS — M25.611 DECREASED RANGE OF MOTION OF RIGHT SHOULDER: ICD-10-CM

## 2023-09-13 DIAGNOSIS — M79.2 NEUROPATHIC PAIN: ICD-10-CM

## 2023-09-13 DIAGNOSIS — R29.898 DECREASED STRENGTH OF UPPER EXTREMITY: ICD-10-CM

## 2023-09-13 DIAGNOSIS — R29.898 DECREASED STRENGTH OF LOWER EXTREMITY: ICD-10-CM

## 2023-09-13 DIAGNOSIS — M54.2 NECK PAIN: ICD-10-CM

## 2023-09-13 DIAGNOSIS — M79.604 PAIN OF RIGHT LOWER EXTREMITY: ICD-10-CM

## 2023-09-13 DIAGNOSIS — M72.6 NECROTIZING FASCIITIS: ICD-10-CM

## 2023-09-13 DIAGNOSIS — R29.898 DECREASED RANGE OF MOTION OF NECK: ICD-10-CM

## 2023-09-13 DIAGNOSIS — Z86.69 HISTORY OF EPILEPSY: ICD-10-CM

## 2023-09-13 PROCEDURE — 1159F PR MEDICATION LIST DOCUMENTED IN MEDICAL RECORD: ICD-10-PCS | Mod: CPTII,,, | Performed by: PHYSICIAN ASSISTANT

## 2023-09-13 PROCEDURE — 99213 OFFICE O/P EST LOW 20 MIN: CPT | Mod: PBBFAC | Performed by: PHYSICIAN ASSISTANT

## 2023-09-13 PROCEDURE — 1160F RVW MEDS BY RX/DR IN RCRD: CPT | Mod: CPTII,,, | Performed by: PHYSICIAN ASSISTANT

## 2023-09-13 PROCEDURE — 3078F PR MOST RECENT DIASTOLIC BLOOD PRESSURE < 80 MM HG: ICD-10-PCS | Mod: CPTII,,, | Performed by: PHYSICIAN ASSISTANT

## 2023-09-13 PROCEDURE — 3008F PR BODY MASS INDEX (BMI) DOCUMENTED: ICD-10-PCS | Mod: CPTII,,, | Performed by: PHYSICIAN ASSISTANT

## 2023-09-13 PROCEDURE — 97110 THERAPEUTIC EXERCISES: CPT | Mod: PN,CQ

## 2023-09-13 PROCEDURE — 99204 PR OFFICE/OUTPT VISIT, NEW, LEVL IV, 45-59 MIN: ICD-10-PCS | Mod: S$PBB,,, | Performed by: PHYSICIAN ASSISTANT

## 2023-09-13 PROCEDURE — 99999 PR PBB SHADOW E&M-EST. PATIENT-LVL III: CPT | Mod: PBBFAC,,, | Performed by: PHYSICIAN ASSISTANT

## 2023-09-13 PROCEDURE — 99999 PR PBB SHADOW E&M-EST. PATIENT-LVL III: ICD-10-PCS | Mod: PBBFAC,,, | Performed by: PHYSICIAN ASSISTANT

## 2023-09-13 PROCEDURE — 3074F SYST BP LT 130 MM HG: CPT | Mod: CPTII,,, | Performed by: PHYSICIAN ASSISTANT

## 2023-09-13 PROCEDURE — 1160F PR REVIEW ALL MEDS BY PRESCRIBER/CLIN PHARMACIST DOCUMENTED: ICD-10-PCS | Mod: CPTII,,, | Performed by: PHYSICIAN ASSISTANT

## 2023-09-13 PROCEDURE — 3074F PR MOST RECENT SYSTOLIC BLOOD PRESSURE < 130 MM HG: ICD-10-PCS | Mod: CPTII,,, | Performed by: PHYSICIAN ASSISTANT

## 2023-09-13 PROCEDURE — 3078F DIAST BP <80 MM HG: CPT | Mod: CPTII,,, | Performed by: PHYSICIAN ASSISTANT

## 2023-09-13 PROCEDURE — 1159F MED LIST DOCD IN RCRD: CPT | Mod: CPTII,,, | Performed by: PHYSICIAN ASSISTANT

## 2023-09-13 PROCEDURE — 99204 OFFICE O/P NEW MOD 45 MIN: CPT | Mod: S$PBB,,, | Performed by: PHYSICIAN ASSISTANT

## 2023-09-13 PROCEDURE — 3008F BODY MASS INDEX DOCD: CPT | Mod: CPTII,,, | Performed by: PHYSICIAN ASSISTANT

## 2023-09-13 RX ORDER — GABAPENTIN 300 MG/1
CAPSULE ORAL
Qty: 180 CAPSULE | Refills: 6 | Status: SHIPPED | OUTPATIENT
Start: 2023-09-13

## 2023-09-13 NOTE — PROGRESS NOTES
[facility]   Physical Therapy Treatment Note      Name: Aditi Conway  Clinic Number: [id number]    Therapy Diagnosis:   Encounter Diagnoses   Name Primary?    Decreased strength of lower extremity Yes    Pain of right lower extremity     Decreased strength of upper extremity     Decreased range of motion of neck     Decreased range of motion of right shoulder     Acute pain of right shoulder     Neck pain      Physician: Raysa Hoffmann NP    Visit Date: 9/13/2023  Physician Orders: PT Eval and Treat   Medical Diagnosis from Referral: M72.6 (ICD-10-CM) - Necrotizing fasciitis M79.604 (ICD-10-CM) - Pain of right lower extremity M54.2 (ICD-10-CM) - Neck pain   Evaluation Date: 8/17/2023  Authorization Period Expiration: Pending  Plan of Care Expiration: 10/12/2023  Progress Note Due: 9/14/2023  Visit # / Visits authorized: 2/20  FOTO: 1/3     Precautions: Standard and seizures      Time In: 1425  Time Out:1510    Total Appointment Time (timed & untimed codes): 45 minutes    PTA Visit #: 1/5       Subjective     Patient reports: Patient states R shoulder pain today.   She not given an home exercise program to be  compliant.  Response to previous treatment: -  Functional change: -    Pain: 5/10  Location: right shoulder      Objective      UPDATE COMPLETED 9/11  OBSERVATIONS: Patient is a 43 year old female who ambulates with no AD and no apparent signs of distress.  No signs of atrophy at the SCM, traps, deltoids, supraspinatus. Skin Flap from right ear to crease of neck and shoulder.     POSTURE:  Patient is significant for moderate forward head and internally rotated shoulders. Upper t-spine flexion also observed.  Anterior tipping NO   Winging NO   Superior YES          CERVICAL ACTIVE RANGE OF MOTION   Flexion 60   Extension 50   Right Side Bend 45   Left Side Bend 30   Right Rotation 60   Left Rotation 60           SHOULDER ACTIVE RANGE OF MOTION     LEFT RIGHT   Flexion  180 140 (painful arc )   Scaption 180 90 (painful arc 60-90)   External Rotation T5 C4-5*   Internal Rotation (Apley Scratch Test) T12 T12*   Extension 40 40*           UPPER LOWER EXTREMITY STRENGTH     LEFT RIGHT   Shoulder Flexion 4+/5 4/5*   Shoulder Abduction 4+/5 3+/5*   Shoulder External Rotation 4/5 4/5   Shoulder Internal Rotation 4/5 4/5   Elbow Flexion 4+/5 4/5   Elbow Extension 4+/5 4/5     Strength of Lower Extremity:  Hip flexion 4-/5*, left 4+/5  Knee Extension - right 4/5, left 4+/5  Knee Flexion - right 3+/5*, left 4/5  Ankle Dorsiflexion - right 4/5, left 4+/5  Ankle Plantar Flexion - right 4/5, left 4/5  Hip Extension - right 3/5*, Left 3/5  Hip Abduction - Right 3-/5*, Left 4/5      Limitation/Restriction for FOTO Neck Survey     Therapist reviewed FOTO scores for Aditi Conway on 8/17/2023.   FOTO documents entered into [employer] - see Media section.     Limitation Score: 25%            Treatment     Aditi received the treatments listed below:      therapeutic exercises to develop strength, endurance, ROM, flexibility, and posture for 45 minutes including:    Re-assessment performed on this date for 10 min and issued home exercise program for patient with theraband.   Lower extremity strengthening:   Bridges 2 x 10 with ball squeeze    Cervical stretching   Open books for right upper extremity 5x with 5 sec hold  Side bending left and right x 10   Upper extremity strengthening:   UBE 3 minutes forward and back total  Chest press with 3# dowel 2 x 10   Shoulder rows with 3# dowel 2 x 10   ER with red theraband 2 x 10  with no resistance  Scapular rows with red theraband 2 x 10   AAROM with 1# dowel for shoulder x10  Horizontal abduction         Not today:   - Sit to stand with bicep curl using red theraband 2 x 10       manual therapy techniques: Joint mobilizations, Manual traction, and Soft tissue Mobilization were applied to the: - for - minutes, including:  -    therapeutic  activities to improve functional performance for -  minutes, including:  -    gait training to improve functional mobility and safety for -  minutes, including:  -    hot pack for 00 minutes to right shoulder.    Patient Education and Home Exercises       Education provided:   - Physical therapy plan of care and goals  - Importance of range of motion and posture in shoulder pain, Issued HEP for R shoulders, and pec stretching in supine position. Demonstration provided during PT session for addition of HEP    HEP Education: Exercises were reviewed and Aditi was able to demonstrate them prior to the end of the session.  Aditi demonstrated good  understanding of the education provided. See Electronic Medical Record under Patient Instructions for exercises provided during therapy sessions    Assessment      She was given an home exercise program today to help with strengthening in right shoulder. Patient given additional exercises today for R shoulder. Patient R shoulder pain increased to a 7-8/10. Patient educated on use of heat for 10 minutes for pain relief 3-4x a day as needed.     Aditi Is not progressing well towards her goals.   Patient prognosis is Excellent.     Patient will continue to benefit from skilled outpatient physical therapy to address the deficits listed in the problem list box on initial evaluation, provide pt/family education and to maximize pt's level of independence in the home and community environment.     Patient's spiritual, cultural and educational needs considered and pt agreeable to plan of care and goals.     Anticipated barriers to physical therapy: dependence on caregivers for transportation     Goals: Short Term Goals: 4 weeks   1. Patient demonstrates independence with HEP. ISSUED ON THIS DATE 9/11  2. Patient demonstrates independence with Postural Awareness. PROGRESSING   3. Patient demonstrates independence with body mechanics.  PROGRESSING   4. Patient will demonstrate  improved pain-free range of motion of right shoulder to at least 10 degrees of left shoulder. PROGRESSING   5. Patient will report no instances of falls, in order to decrease risk of further injuries. GOAL Met - does have close calls   6. Patient demonstrates increased strength BLE's to 4/5 or greater to improve tolerance to functional activities.  PROGRESSING      Long Term Goals: 8 weeks   1. Patient demonstrates increased pain-free cervical motion to improve tolerance to functional activities.   2. Patient demonstrates increased strength BUE's to 4/5 or greater to improve tolerance to functional activities.   3. Patient demonstrates improved overall function per Neck FOTO to 54% or less. GOAL MET   4. Patient demonstrates improved overall function per Lower Extremity FOTO to 50% or less.  5. Patient demonstrates increased strength BLE's to 4+/5 or greater to improve tolerance to functional activities.   6. Patient will report return to work with no increase in pain.    Plan     Plan of care Certification: 8/17/2023 to 10/12/2023.     Outpatient Physical Therapy 2 times weekly for 8 weeks to include the following interventions: Manual Therapy, Moist Heat/ Ice, Neuromuscular Re-ed, Patient Education, Therapeutic Activities, and Therapeutic Exercise.     Renae Goodell, PTA

## 2023-09-13 NOTE — PROGRESS NOTES
Subjective:       Patient ID: Aditi Conway is a 43 y.o. female.    Chief Complaint: Seizures (History of epilepsy )    HPI  Seizure Disorder  Aditi Conway is a 43 y.o. female is here for first evaluation for seizures.  First seizure - high school  Last seizure - unknown, maybe about 4-6 months ago. She never stopped having seizures since high school. Less frequent and only in sleep since adult.  Average frequency per month - not every month, maybe 4-6 times a year  Birth or developmental abnormality - No  Head injury or brain injury - No  Alcohol or drug abuse history - No  Family history of seizure - No  Medications intolerance/ineffective - started AED at 22 yrs old. Cannot remember what. Recalls seizures were worse. She stopped it. No AED since. No follow up with neuro for many years  Compliant with medications - Yes  Aura - Yes,she feels really bad and fearful just before, she then becomes tremulous and goes in to seizure very quickly following aura. No Aura for many years. Since 20's, all seizures in sleep.  Description of seizure -  says she becomes very tonic. She has urinary incontinence and biting in her mouth. Patient does not recall seizures, but she feels very sore and tired the following day.  She has noticed muscles twitching for years, especially before bed. She has not noticed this to be worse since on gabapentin these last few months.    She was diagnosed with necrotizing fasciitis this past summer. Extensive surgery required. She has a large skin flap on right of neck grafted from right thigh. She has paresthesias and some stabbing pains still from the surgeries. She was given gabapentin 300 mg TID for some of the pain. It is helpful for paresthesias. She tolerates the  medication very well. At first, she was somnolent, but she is now tolerating and is willing to increase if necessary.    Past Medical History:   Diagnosis Date    Depression     Heart murmur      Seizures        Past Surgical History:   Procedure Laterality Date    DEBRIDEMENT, BREAST Left 11/11/2021    Procedure: DEBRIDEMENT,  BREAST;  Surgeon: Yoan Sparks MD;  Location: [facility] OR;  Service: General;  Laterality: Left;  DEBRIDEMENT, WOUND, LEFT BREAST    DILATION AND CURETTAGE OF UTERUS      FLAP PROCEDURE N/A 6/14/2023    Procedure: CREATION, FREE FLAP;  Surgeon: Gerardo Seymour MD;  [facility];  Service: ENT;  Laterality: N/A;    INCISION AND DRAINAGE Left 7/12/2021    Procedure: INCISION AND DRAINAGE, HEMATOMA, SEROMA, OR FLUID COLLECTION ;  Surgeon: Yoan Sparks MD;  Location: [facility] OR;  Service: General;  Laterality: Left;    NECK EXPLORATION Bilateral 6/8/2023    Procedure: EXPLORATION and debridement, NECK;  Surgeon: Jose Jules MD;  Location: [facility] OR 2ND FLR;  Service: ENT;  Laterality: Bilateral;    NECK EXPLORATION Right 6/12/2023    Procedure: RIGHT NECK WOUND EXPLORATION WITH WOUND DEBRIDEMENT;  Surgeon: Gerardo Seymour MD;  [facility];  Service: ENT;  Laterality: Right;    NECK EXPLORATION Right 6/14/2023    Procedure: EXPLORATION, NECK;  Surgeon: Gerardo Seymour MD;  [facility];  Service: ENT;  Laterality: Right;       Family History   Problem Relation Age of Onset    Hypertension Mother     Heart disease Father         pacemaker    Diabetes Father     Hypertension Father     Hyperlipidemia Father     Breast cancer Neg Hx     Ovarian cancer Neg Hx     Colon cancer Neg Hx        Social History     Socioeconomic History    Marital status:     Years of education: 9th   Occupational History     Employer: [id number]   Tobacco Use    Smoking status: Every Day     Current packs/day: 1.00     Average packs/day: 1 pack/day for 20.0 years (20.0 ttl pk-yrs)     Types: Cigarettes    Smokeless tobacco: Never    Tobacco comments:     Informed about classes in smoking cessation. States will call.   Substance and Sexual  Activity    Alcohol use: Not Currently     Comment: occasinally    Drug use: No    Sexual activity: Yes     Partners: Male     Birth control/protection: None     Social Determinants of Health     Financial Resource Strain: High Risk (7/20/2023)    Overall Financial Resource Strain (CARDIA)     Difficulty of Paying Living Expenses: Very hard   Food Insecurity: Food Insecurity Present (7/20/2023)    Hunger Vital Sign     Worried About Running Out of Food in the Last Year: Sometimes true     Ran Out of Food in the Last Year: Sometimes true   Transportation Needs: Unknown (6/9/2023)    PRAPARE - Transportation     Lack of Transportation (Non-Medical): No   Physical Activity: Sufficiently Active (6/9/2023)    Exercise Vital Sign     Days of Exercise per Week: 6 days     Minutes of Exercise per Session: 30 min   Stress: No Stress Concern Present (6/9/2023)    [place] [employer] - Occupational Stress Questionnaire     Feeling of Stress : Not at all   Social Connections: Moderately Isolated (6/9/2023)    Social Connection and Isolation Panel [NHANES]     Frequency of Communication with Friends and Family: More than three times a week     Frequency of Social Gatherings with Friends and Family: More than three times a week     Attends Alevism Services: Never     Active Member of Clubs or Organizations: No     Attends Club or Organization Meetings: Never     Marital Status:    Housing Stability: Unknown (6/9/2023)    Housing Stability Vital Sign     Unable to Pay for Housing in the Last Year: No     Unstable Housing in the Last Year: No       Current Outpatient Medications   Medication Sig Dispense Refill    diphenhydrAMINE (BENADRYL) 25 mg capsule Take 1 capsule (25 mg total) by mouth every 6 (six) hours as needed for Itching or Allergies. 20 capsule 0    gabapentin (NEURONTIN) 300 MG capsule Take 2 capsules by mouth every 8 hours for pain and seizures. 180 capsule 6     No current  facility-administered medications for this visit.       Review of patient's allergies indicates:   Allergen Reactions    Amoxicillin-pot clavulanate Hives    Clindamycin        Review of Systems   Constitutional:  Negative for appetite change and fever.   HENT:  Negative for sore throat.    Eyes:  Negative for visual disturbance.   Respiratory:  Negative for cough and shortness of breath.    Cardiovascular:  Negative for chest pain.   Gastrointestinal:  Negative for nausea and vomiting.   Endocrine: Negative for cold intolerance and heat intolerance.   Genitourinary:  Negative for difficulty urinating.   Musculoskeletal:  Positive for neck pain and neck stiffness. Negative for arthralgias and back pain.   Skin:  Negative for rash.   Allergic/Immunologic: Negative for food allergies.   Neurological:  Positive for seizures (in her sleep) and numbness (paresthesias). Negative for dizziness, tremors, speech difficulty, weakness and headaches.   Hematological:  Does not bruise/bleed easily.   Psychiatric/Behavioral:  Negative for agitation, decreased concentration and sleep disturbance. The patient is nervous/anxious.        Objective:      Neurologic Exam     Cranial Nerves     CN III, IV, VI   Pupils are equal, round, and reactive to light.    Gait, Coordination, and Reflexes     Gait  Gait: normal    Reflexes   Right brachioradialis: 2+  Left brachioradialis: 2+  Right biceps: 2+  Left biceps: 2+  Right triceps: 2+  Left triceps: 2+  Right patellar: 2+  Left patellar: 2+  Right achilles: 2+  Left achilles: 2+    Physical Exam  Vitals and nursing note reviewed.   Constitutional:       Appearance: She is obese.   HENT:      Head: Normocephalic and atraumatic.      Nose: Nose normal.      Mouth/Throat:      Mouth: Mucous membranes are moist.      Pharynx: Oropharynx is clear.   Eyes:      General: No visual field deficit.     Extraocular Movements: Extraocular movements intact.      Pupils: Pupils are equal, round, and  reactive to light.   Neck:      Comments: Large skin flap right lateral neck  Cardiovascular:      Rate and Rhythm: Normal rate and regular rhythm.   Pulmonary:      Effort: Pulmonary effort is normal.      Breath sounds: Normal breath sounds.   Abdominal:      General: Abdomen is flat.      Palpations: Abdomen is soft.   Musculoskeletal:         General: Tenderness (distal right thigh below surgical graft, allodynia) present.      Cervical back: Neck supple. Tenderness present.   Skin:     General: Skin is warm and dry.   Neurological:      General: No focal deficit present.      Mental Status: She is alert.      Cranial Nerves: No cranial nerve deficit, dysarthria or facial asymmetry.      Motor: Motor function is intact.      Coordination: Coordination is intact.      Gait: Gait is intact.      Deep Tendon Reflexes:      Reflex Scores:       Tricep reflexes are 2+ on the right side and 2+ on the left side.       Bicep reflexes are 2+ on the right side and 2+ on the left side.       Brachioradialis reflexes are 2+ on the right side and 2+ on the left side.       Patellar reflexes are 2+ on the right side and 2+ on the left side.       Achilles reflexes are 2+ on the right side and 2+ on the left side.        Assessment:       1. Seizure disorder    2. History of epilepsy    3. Neuropathic pain    4. Necrotizing fasciitis        Plan:   Seizure disorder  -     EEG; Future  Reflex to AVEEG 72 hour if normal.  She has done well on gabapentin.  However, if generalized epilepsy (myoclonic), may become worse with increased GPN, discussed  Advised to stop GPN if seizures increase with increased dose.  She will call to let us know.  Only in her sleep now.  None since the summer, since gabapentin.  Stop benadryl use.     Neuropathic pain with personal history of necrotizing fasciitis this past summer requiring extensive surgery and prolonged hospitalization  -     gabapentin (NEURONTIN) 300 MG capsule; Take 2 capsules by  mouth every 8 hours for pain and seizures.  Dispense: 180 capsule; Refill: 6        We discussed occupational risks and hazards, driving restrictions and limitations, and general safety in patients with epilepsy and patients with episodes of loss of consciousness from any etiology. I specifically pointed out how  patients can put themselves and others at serious risks (leading to death and/or injury) if they have a seizure (or episode of loss of consciousness)  while driving. Patient verbalized understanding.    An Gloria, PA

## 2023-09-28 ENCOUNTER — HOSPITAL ENCOUNTER (OUTPATIENT)
Dept: NEUROLOGY | Facility: HOSPITAL | Age: 43
Discharge: HOME | End: 2023-09-28
Attending: FAMILY MEDICINE
Payer: MEDICAID

## 2023-09-28 DIAGNOSIS — G40.909 SEIZURE DISORDER: ICD-10-CM

## 2023-09-28 PROCEDURE — 95819 EEG AWAKE AND ASLEEP: CPT

## 2023-10-02 ENCOUNTER — CLINICAL SUPPORT (OUTPATIENT)
Dept: REHABILITATION | Facility: HOSPITAL | Age: 43
End: 2023-10-02
Attending: NURSE PRACTITIONER
Payer: MEDICAID

## 2023-10-02 DIAGNOSIS — R29.898 DECREASED STRENGTH OF LOWER EXTREMITY: ICD-10-CM

## 2023-10-02 DIAGNOSIS — R29.898 DECREASED RANGE OF MOTION OF NECK: ICD-10-CM

## 2023-10-02 DIAGNOSIS — M54.2 NECK PAIN: ICD-10-CM

## 2023-10-02 DIAGNOSIS — M79.604 PAIN OF RIGHT LOWER EXTREMITY: ICD-10-CM

## 2023-10-02 DIAGNOSIS — M25.611 DECREASED RANGE OF MOTION OF RIGHT SHOULDER: ICD-10-CM

## 2023-10-02 DIAGNOSIS — R29.898 DECREASED STRENGTH OF UPPER EXTREMITY: ICD-10-CM

## 2023-10-02 DIAGNOSIS — M25.511 ACUTE PAIN OF RIGHT SHOULDER: Primary | ICD-10-CM

## 2023-10-02 PROCEDURE — 97110 THERAPEUTIC EXERCISES: CPT | Mod: PN,CQ

## 2023-10-02 NOTE — PROGRESS NOTES
[facility]   Physical Therapy Treatment Note      Name: Aditi Payneman  Clinic Number: [id number]    Therapy Diagnosis:   Encounter Diagnoses   Name Primary?    Decreased strength of lower extremity Yes    Pain of right lower extremity     Decreased strength of upper extremity     Decreased range of motion of neck     Decreased range of motion of right shoulder     Acute pain of right shoulder     Neck pain      Physician: Raysa Hoffmann NP    Visit Date: 10/02/2023  Physician Orders: PT Eval and Treat   Medical Diagnosis from Referral: M72.6 (ICD-10-CM) - Necrotizing fasciitis M79.604 (ICD-10-CM) - Pain of right lower extremity M54.2 (ICD-10-CM) - Neck pain   Evaluation Date: 8/17/2023  Authorization Period Expiration: Pending  Plan of Care Expiration: 10/12/2023  Progress Note Due: 9/14/2023  Visit # / Visits authorized: 2/20  FOTO: 1/3     Precautions: Standard and seizures      Time In: 1515  Time Out:1555    Total Appointment Time (timed & untimed codes): 40 minutes    PTA Visit #: 2/5       Subjective     Patient reports: Patient states that her R shoulder is feeling a little better today. Patient indicates pain behind her R ear today.   She not given an home exercise program to be  compliant.  Response to previous treatment: -  Functional change: -    Pain: 3-4/10  Location: right shoulder  and behind R ear    Objective         Treatment     Aditi received the treatments listed below:      therapeutic exercises to develop strength, endurance, ROM, flexibility, and posture for 45 minutes including:  Lower extremity strengthening:   Bridges 2 x 10 with ball squeeze  Supine SLR 1#AW bilaterally 2x10 reps   Standing hip abduction with red band 2x10 reps    stretching   Open books for right upper extremity/side x20  with 5 sec hold  Cervical lateral stretchleft and right 4x10 sec hold  Gentle stretch for SCM muscles bilaterally 4x10 sec hold  Cervical rotation 4x10 sec  hold bilaterally   Upper extremity strengthening:   UBE 4 minutes forward and back total  Chest press with 2# wts. 2 x 10   rows with 7# on freemotion  2 x 10   UE punch up for serratus anterior 2x10 with 2# wts  ER RUE with red band 2x10 reps        Not today:   - Sit to stand with bicep curl using red theraband   - 2 x 10 Scapular rows with red theraband 2 x 10   - AAROM with 1# dowel for shoulder x10  - Horizontal abduction     manual therapy techniques: Joint mobilizations, Manual traction, and Soft tissue Mobilization were applied to the: - for - minutes, including:  -    therapeutic activities to improve functional performance for -  minutes, including:  -    gait training to improve functional mobility and safety for -  minutes, including:  -    hot pack for 00 minutes to right shoulder.    Patient Education and Home Exercises       Education provided:   - Physical therapy plan of care and goals  - Importance of range of motion and posture in shoulder pain, Issued HEP for R shoulders, and pec stretching in supine position. Demonstration provided during PT session for addition of HEP    HEP Education: Exercises were reviewed and Aditi was able to demonstrate them prior to the end of the session.  Aditi demonstrated good  understanding of the education provided. See Electronic Medical Record under Patient Instructions for exercises provided during therapy sessions    Assessment   Patient tolerated PT session well with progression of therapeutic exercises. Patient required tactile and verbal cues for progression of exercises for improving muscle recruitment. Patient indicates not having an increase in RUE/back of ear pain on the R but states that she had no change in pain levels after PT session.      Aditi Is not progressing well towards her goals.   Patient prognosis is Excellent.     Patient will continue to benefit from skilled outpatient physical therapy to address the deficits listed in the  problem list box on initial evaluation, provide pt/family education and to maximize pt's level of independence in the home and community environment.     Patient's spiritual, cultural and educational needs considered and pt agreeable to plan of care and goals.     Anticipated barriers to physical therapy: dependence on caregivers for transportation     Goals: Short Term Goals: 4 weeks   1. Patient demonstrates independence with HEP. ISSUED ON THIS DATE 9/11  2. Patient demonstrates independence with Postural Awareness. PROGRESSING   3. Patient demonstrates independence with body mechanics.  PROGRESSING   4. Patient will demonstrate improved pain-free range of motion of right shoulder to at least 10 degrees of left shoulder. PROGRESSING   5. Patient will report no instances of falls, in order to decrease risk of further injuries. GOAL Met - does have close calls   6. Patient demonstrates increased strength BLE's to 4/5 or greater to improve tolerance to functional activities.  PROGRESSING      Long Term Goals: 8 weeks   1. Patient demonstrates increased pain-free cervical motion to improve tolerance to functional activities.   2. Patient demonstrates increased strength BUE's to 4/5 or greater to improve tolerance to functional activities.   3. Patient demonstrates improved overall function per Neck FOTO to 54% or less. GOAL MET   4. Patient demonstrates improved overall function per Lower Extremity FOTO to 50% or less.  5. Patient demonstrates increased strength BLE's to 4+/5 or greater to improve tolerance to functional activities.   6. Patient will report return to work with no increase in pain.    Plan     Plan of care Certification: 8/17/2023 to 10/12/2023.     Outpatient Physical Therapy 2 times weekly for 8 weeks to include the following interventions: Manual Therapy, Moist Heat/ Ice, Neuromuscular Re-ed, Patient Education, Therapeutic Activities, and Therapeutic Exercise.     Renae Goodell, PTA

## 2023-10-04 ENCOUNTER — CLINICAL SUPPORT (OUTPATIENT)
Dept: REHABILITATION | Facility: HOSPITAL | Age: 43
End: 2023-10-04
Attending: NURSE PRACTITIONER
Payer: MEDICAID

## 2023-10-04 DIAGNOSIS — R29.898 DECREASED STRENGTH OF LOWER EXTREMITY: ICD-10-CM

## 2023-10-04 DIAGNOSIS — M25.611 DECREASED RANGE OF MOTION OF RIGHT SHOULDER: ICD-10-CM

## 2023-10-04 DIAGNOSIS — M79.604 PAIN OF RIGHT LOWER EXTREMITY: ICD-10-CM

## 2023-10-04 DIAGNOSIS — R29.898 DECREASED STRENGTH OF UPPER EXTREMITY: ICD-10-CM

## 2023-10-04 DIAGNOSIS — M54.2 NECK PAIN: ICD-10-CM

## 2023-10-04 DIAGNOSIS — M25.511 ACUTE PAIN OF RIGHT SHOULDER: Primary | ICD-10-CM

## 2023-10-04 DIAGNOSIS — R29.898 DECREASED RANGE OF MOTION OF NECK: ICD-10-CM

## 2023-10-04 PROCEDURE — 97110 THERAPEUTIC EXERCISES: CPT | Mod: PN,CQ

## 2023-10-04 NOTE — PROGRESS NOTES
[facility]   Physical Therapy Treatment Note      Name: Aditi Payneman  Clinic Number: [id number]    Therapy Diagnosis:   Encounter Diagnoses   Name Primary?    Decreased strength of lower extremity Yes    Pain of right lower extremity     Decreased strength of upper extremity     Decreased range of motion of neck     Decreased range of motion of right shoulder     Acute pain of right shoulder     Neck pain      Physician: Raysa Hoffmann NP    Visit Date: 10/04/2023  Physician Orders: PT Eval and Treat   Medical Diagnosis from Referral: M72.6 (ICD-10-CM) - Necrotizing fasciitis M79.604 (ICD-10-CM) - Pain of right lower extremity M54.2 (ICD-10-CM) - Neck pain   Evaluation Date: 8/17/2023  Authorization Period Expiration: Pending  Plan of Care Expiration: 10/12/2023  Progress Note Due: 9/14/2023  Visit # / Visits authorized: 4/20  FOTO: 1/3     Precautions: Standard and seizures      Time In: 1515  Time Out:1600  Total Appointment Time (timed & untimed codes):  45 minutes    PTA Visit #: 3/5       Subjective     Patient reports: she is feeling better and feels like her R shoulder is improving and has decreased pain. Patient states she is having some pain and itchy feeling behind her R ear at graft site.   .   Patient states she is compliant with her HEP  Response to previous treatment: -  Functional change: -    Pain:4-5/10  Location: behind back of R ear       Objective         Treatment     Aditi received the treatments listed below:      therapeutic exercises to develop strength, endurance, ROM, flexibility, and posture for 45 minutes including:  Lower extremity strengthening:     Step up on 6 inch step 2x10 reps each way  Squats with resistance with red band around LE's  Standing hip abduction, extension and flexion at hemibar 2x10 reps each with red thera band  Airex pad tall kneel to genuflect bilaterally 2x10 reps     Upper extremity strengthening:   UBE 3 forward and  3 backward minutes forward and back total  Seated Shoulder flexion 2x10 with 1#bar  2x10   1 arm row standing bilaterally with 5#hand wt 2x10 reps     Not addressed today:   - Sit to stand with bicep curl using red theraband   - 2 x 10 Scapular rows with red theraband 2 x 10   - AAROM with 1# dowel for shoulder x10  - Horizontal abduction  - Supine SLR 1#AW bilaterally 2x10 reps rows with 7# on freemotion    -2 x 10 Open books for right upper extremity/side x20  with 5 sec hold  -Chest press with 2# wts. 2 x 10   -UE punch up for serratus anterior 2x10 with 2# wts  -ER RUE with red band 2x10 repsBridges 2 x 10 with ball squeeze  - Standing hip abduction with red band 2x10 reps Cervical lateral  Left and right 4x10 sec hold  Cervical rotation 4x10 sec hold bilaterally     manual therapy techniques: Joint mobilizations, Manual traction, and Soft tissue Mobilization were applied to the: - for - minutes, including:    -Not addressed today:   Gentle stretch for SCM muscles bilaterally 4x10 sec hold    therapeutic activities to improve functional performance for 00 minutes, including:    gait training to improve functional mobility and safety for 00 minutes, including:    hot pack for 00 minutes to right shoulder.    Patient Education and Home Exercises       Education provided:   - Physical therapy plan of care and goals  - Importance of range of motion and posture in shoulder pain and doing her HEP for improving strength and decreasing pain in R shoulder/ R side of neck. Issued green theraband for standing HEP.     HEP Education: Exercises were reviewed and Aditi was able to demonstrate them prior to the end of the session.  Aditi demonstrated good  understanding of the education provided. See Electronic Medical Record under Patient Instructions for exercises provided during therapy sessions    Assessment   Patient tolerated PT session well with progression of therapeutic exercises for UE and LE.  Patient able  to tolerate UE exercises without increase in R shoulder pain and indicates no pain behind her R ear at graft site after PT session.  Patient motivated to improve strength, ROM in R shoulder and neck and strength in LE's to be able to return to work.   Aditi Is not progressing well towards her goals.   Patient prognosis is Excellent.     Patient will continue to benefit from skilled outpatient physical therapy to address the deficits listed in the problem list box on initial evaluation, provide pt/family education and to maximize pt's level of independence in the home and community environment.     Patient's spiritual, cultural and educational needs considered and pt agreeable to plan of care and goals.     Anticipated barriers to physical therapy: dependence on caregivers for transportation     Goals: Short Term Goals: 4 weeks   1. Patient demonstrates independence with HEP. ISSUED ON THIS DATE 9/11  2. Patient demonstrates independence with Postural Awareness. PROGRESSING   3. Patient demonstrates independence with body mechanics.  PROGRESSING   4. Patient will demonstrate improved pain-free range of motion of right shoulder to at least 10 degrees of left shoulder. PROGRESSING   5. Patient will report no instances of falls, in order to decrease risk of further injuries. GOAL Met - does have close calls   6. Patient demonstrates increased strength BLE's to 4/5 or greater to improve tolerance to functional activities.  PROGRESSING      Long Term Goals: 8 weeks   1. Patient demonstrates increased pain-free cervical motion to improve tolerance to functional activities.   2. Patient demonstrates increased strength BUE's to 4/5 or greater to improve tolerance to functional activities.   3. Patient demonstrates improved overall function per Neck FOTO to 54% or less. GOAL MET   4. Patient demonstrates improved overall function per Lower Extremity FOTO to 50% or less.  5. Patient demonstrates increased strength BLE's to  4+/5 or greater to improve tolerance to functional activities.   6. Patient will report return to work with no increase in pain.    Plan     Plan of care Certification: 8/17/2023 to 10/12/2023.     Outpatient Physical Therapy 2 times weekly for 8 weeks to include the following interventions: Manual Therapy, Moist Heat/ Ice, Neuromuscular Re-ed, Patient Education, Therapeutic Activities, and Therapeutic Exercise.     Renae Goodell, PTA

## 2023-10-11 ENCOUNTER — CLINICAL SUPPORT (OUTPATIENT)
Dept: REHABILITATION | Facility: HOSPITAL | Age: 43
End: 2023-10-11
Attending: NURSE PRACTITIONER
Payer: MEDICAID

## 2023-10-11 DIAGNOSIS — M79.604 PAIN OF RIGHT LOWER EXTREMITY: ICD-10-CM

## 2023-10-11 DIAGNOSIS — M25.611 DECREASED RANGE OF MOTION OF RIGHT SHOULDER: ICD-10-CM

## 2023-10-11 DIAGNOSIS — M25.511 ACUTE PAIN OF RIGHT SHOULDER: Primary | ICD-10-CM

## 2023-10-11 DIAGNOSIS — R29.898 DECREASED RANGE OF MOTION OF NECK: ICD-10-CM

## 2023-10-11 DIAGNOSIS — M54.2 NECK PAIN: ICD-10-CM

## 2023-10-11 DIAGNOSIS — R29.898 DECREASED STRENGTH OF UPPER EXTREMITY: ICD-10-CM

## 2023-10-11 DIAGNOSIS — R29.898 DECREASED STRENGTH OF LOWER EXTREMITY: ICD-10-CM

## 2023-10-11 PROCEDURE — 97110 THERAPEUTIC EXERCISES: CPT | Mod: PN

## 2023-10-11 NOTE — PROGRESS NOTES
[facility]  Physical Therapy Discharge Note    Name: Aditi Venegas [facility] Number: [id number]    Therapy Diagnosis:   Encounter Diagnoses   Name Primary?    Acute pain of right shoulder Yes    Neck pain     Pain of right lower extremity     Decreased strength of lower extremity     Decreased strength of upper extremity     Decreased range of motion of neck     Decreased range of motion of right shoulder      Physician: Raysa Hoffmann NP    Visit Date: 9/11/2023  Physician Orders: PT Eval and Treat   Medical Diagnosis from Referral: M72.6 (ICD-10-CM) - Necrotizing fasciitis M79.604 (ICD-10-CM) - Pain of right lower extremity M54.2 (ICD-10-CM) - Neck pain   Evaluation Date: 8/17/2023      Date of Last visit: 10/11/2023  Total Visits Received: 7    ASSESSMENT      OBSERVATIONS: Patient is a 43 year old female who ambulates with no AD and no apparent signs of distress.  No signs of atrophy at the SCM, traps, deltoids, supraspinatus. Skin Flap from right ear to crease of neck and shoulder.     POSTURE:  Patient is significant for moderate forward head and internally rotated shoulders. Upper t-spine flexion also observed.           CERVICAL ACTIVE RANGE OF MOTION   Flexion 70   Extension 65   Right Side Bend 60   Left Side Bend 60   Right Rotation 60   Left Rotation 60              SHOULDER ACTIVE RANGE OF MOTION     LEFT RIGHT   Flexion 180 180   Scaption 180 180   External Rotation T5 T5   Internal Rotation (Apley Scratch Test) T12 T12*   Extension 40 40*              UPPER LOWER EXTREMITY STRENGTH     LEFT RIGHT   Shoulder Flexion 5/5 4+/5   Shoulder Abduction 4+/5 4+/5   Shoulder External Rotation 4/5 4/5   Shoulder Internal Rotation 4/5 4/5   Elbow Flexion 4+/5 4/5   Elbow Extension 4+/5 4/5      Strength of Lower Extremity:  Hip flexion 4+/5, left 4+/5  Knee Extension - right 4/5, left 4+/5  Knee Flexion - right 4/5, left 4/5  Ankle Dorsiflexion - right 4/5, left 4+/5  Ankle  Plantar Flexion - right 4/5, left 4/5  Hip Extension - right 4/5, Left 4/5  Hip Abduction - Right 4/5, Left 4/5      Limitation/Restriction for FOTO Neck Survey     Therapist reviewed FOTO scores for Aditi Conway on 8/17/2023.   FOTO documents entered into [employer] - see Media section.     Limitation Score: 38%           Discharge reason: Patient is now asymptomatic, Patient has met all of his/her goals, and Patient requested discharge    Discharge FOTO Score: 38%    Goals: Short Term Goals: 4 weeks   1. Patient demonstrates independence with HEP. ISSUED ON THIS DATE 9/11  2. Patient demonstrates independence with Postural Awareness. PROGRESSING   3. Patient demonstrates independence with body mechanics.  PROGRESSING   4. Patient will demonstrate improved pain-free range of motion of right shoulder to at least 10 degrees of left shoulder. GOAL MET    5. Patient will report no instances of falls, in order to decrease risk of further injuries. GOAL Met - does have close calls   6. Patient demonstrates increased strength BLE's to 4/5 or greater to improve tolerance to functional activities.  GOAL MET       Long Term Goals: 8 weeks   1. Patient demonstrates increased pain-free cervical motion to improve tolerance to functional activities. GOAL MET   2. Patient demonstrates increased strength BUE's to 4/5 or greater to improve tolerance to functional activities. GOAL MET  3. Patient demonstrates improved overall function per Neck FOTO to 54% or less. GOAL MET   4. Patient demonstrates increased strength BLE's to 4+/5 or greater to improve tolerance to functional activities.  GOAL MET   5. Patient will report return to work with no increase in pain.FEELS READY TO WORK     PLAN   This patient is discharged from Physical Therapy      Nidia Mckay, PT

## 2023-10-12 ENCOUNTER — TELEPHONE (OUTPATIENT)
Dept: FAMILY MEDICINE | Facility: CLINIC | Age: 43
End: 2023-10-12
Payer: MEDICAID

## 2023-10-12 NOTE — TELEPHONE ENCOUNTER
----- Message from Lexy Humphrey MA sent at 10/11/2023  2:44 PM CDT -----  Aditi Conway  MRN: [id number]  : 1980  PCP: Charles Huff  Home Phone      [phone]  Work Phone      Not on file.  Mobile          [phone]      MESSAGE: Pt is being released from PT and needs soonest available apt so pt doesn't loose job    Please call pt to schedule    #[phone]

## 2023-10-12 NOTE — TELEPHONE ENCOUNTER
Spoke to pt, no earlier appts were available. I advised pt to continually check her Mychart for appt openings. Pt verbalized understanding.     Joseph

## 2023-10-17 ENCOUNTER — TELEPHONE (OUTPATIENT)
Dept: NEUROLOGY | Facility: CLINIC | Age: 43
End: 2023-10-17
Payer: MEDICAID

## 2023-10-17 NOTE — TELEPHONE ENCOUNTER
Attempted to contact patient.  Daughter instructed to have patient call clinic at earliest convenience.

## 2023-10-19 ENCOUNTER — OFFICE VISIT (OUTPATIENT)
Dept: FAMILY MEDICINE | Facility: CLINIC | Age: 43
End: 2023-10-19
Payer: MEDICAID

## 2023-10-19 VITALS
RESPIRATION RATE: 16 BRPM | DIASTOLIC BLOOD PRESSURE: 74 MMHG | WEIGHT: 106.56 LBS | OXYGEN SATURATION: 98 % | BODY MASS INDEX: 19.61 KG/M2 | HEART RATE: 92 BPM | SYSTOLIC BLOOD PRESSURE: 118 MMHG | HEIGHT: 62 IN

## 2023-10-19 DIAGNOSIS — M79.604 PAIN OF RIGHT LOWER EXTREMITY: ICD-10-CM

## 2023-10-19 DIAGNOSIS — F51.4 NIGHT TERRORS: ICD-10-CM

## 2023-10-19 DIAGNOSIS — R29.898 DECREASED RANGE OF MOTION OF NECK: ICD-10-CM

## 2023-10-19 DIAGNOSIS — R68.89 DECREASED ACTIVITY TOLERANCE: ICD-10-CM

## 2023-10-19 DIAGNOSIS — M72.6 NECROTIZING FASCIITIS: Primary | ICD-10-CM

## 2023-10-19 DIAGNOSIS — M25.611 DECREASED RANGE OF MOTION OF RIGHT SHOULDER: ICD-10-CM

## 2023-10-19 DIAGNOSIS — R29.898 DECREASED STRENGTH OF UPPER EXTREMITY: ICD-10-CM

## 2023-10-19 DIAGNOSIS — M54.2 NECK PAIN: ICD-10-CM

## 2023-10-19 DIAGNOSIS — R45.89 DEPRESSED MOOD: ICD-10-CM

## 2023-10-19 DIAGNOSIS — R29.898 DECREASED STRENGTH OF LOWER EXTREMITY: ICD-10-CM

## 2023-10-19 DIAGNOSIS — R26.89 IMPAIRMENT OF BALANCE: ICD-10-CM

## 2023-10-19 PROCEDURE — 1160F RVW MEDS BY RX/DR IN RCRD: CPT | Mod: CPTII,,, | Performed by: FAMILY MEDICINE

## 2023-10-19 PROCEDURE — 99213 OFFICE O/P EST LOW 20 MIN: CPT | Mod: PBBFAC | Performed by: FAMILY MEDICINE

## 2023-10-19 PROCEDURE — 3008F BODY MASS INDEX DOCD: CPT | Mod: CPTII,,, | Performed by: FAMILY MEDICINE

## 2023-10-19 PROCEDURE — 99999 PR PBB SHADOW E&M-EST. PATIENT-LVL III: CPT | Mod: PBBFAC,,, | Performed by: FAMILY MEDICINE

## 2023-10-19 PROCEDURE — 1159F PR MEDICATION LIST DOCUMENTED IN MEDICAL RECORD: ICD-10-PCS | Mod: CPTII,,, | Performed by: FAMILY MEDICINE

## 2023-10-19 PROCEDURE — 1160F PR REVIEW ALL MEDS BY PRESCRIBER/CLIN PHARMACIST DOCUMENTED: ICD-10-PCS | Mod: CPTII,,, | Performed by: FAMILY MEDICINE

## 2023-10-19 PROCEDURE — 3074F SYST BP LT 130 MM HG: CPT | Mod: CPTII,,, | Performed by: FAMILY MEDICINE

## 2023-10-19 PROCEDURE — 3074F PR MOST RECENT SYSTOLIC BLOOD PRESSURE < 130 MM HG: ICD-10-PCS | Mod: CPTII,,, | Performed by: FAMILY MEDICINE

## 2023-10-19 PROCEDURE — 99999 PR PBB SHADOW E&M-EST. PATIENT-LVL III: ICD-10-PCS | Mod: PBBFAC,,, | Performed by: FAMILY MEDICINE

## 2023-10-19 PROCEDURE — 3078F PR MOST RECENT DIASTOLIC BLOOD PRESSURE < 80 MM HG: ICD-10-PCS | Mod: CPTII,,, | Performed by: FAMILY MEDICINE

## 2023-10-19 PROCEDURE — 3078F DIAST BP <80 MM HG: CPT | Mod: CPTII,,, | Performed by: FAMILY MEDICINE

## 2023-10-19 PROCEDURE — 99214 PR OFFICE/OUTPT VISIT, EST, LEVL IV, 30-39 MIN: ICD-10-PCS | Mod: S$PBB,,, | Performed by: FAMILY MEDICINE

## 2023-10-19 PROCEDURE — 3008F PR BODY MASS INDEX (BMI) DOCUMENTED: ICD-10-PCS | Mod: CPTII,,, | Performed by: FAMILY MEDICINE

## 2023-10-19 PROCEDURE — 99214 OFFICE O/P EST MOD 30 MIN: CPT | Mod: S$PBB,,, | Performed by: FAMILY MEDICINE

## 2023-10-19 PROCEDURE — 1159F MED LIST DOCD IN RCRD: CPT | Mod: CPTII,,, | Performed by: FAMILY MEDICINE

## 2023-10-19 RX ORDER — PRAZOSIN HYDROCHLORIDE 1 MG/1
1 CAPSULE ORAL NIGHTLY
Qty: 30 CAPSULE | Refills: 5 | Status: SHIPPED | OUTPATIENT
Start: 2023-10-19 | End: 2024-02-12

## 2023-10-19 RX ORDER — DULOXETIN HYDROCHLORIDE 30 MG/1
30 CAPSULE, DELAYED RELEASE ORAL DAILY
Qty: 30 CAPSULE | Refills: 5 | Status: SHIPPED | OUTPATIENT
Start: 2023-10-19 | End: 2024-02-12

## 2023-10-19 NOTE — LETTER
[address] [address]  Phone: [phone]  Fax: [phone] October 19, 2023     Patient: Aditi Conway   YOB: 1980   Date of Visit: 10/19/2023       To Whom It May Concern:    I am not able to clear Aditi to return to work at this time. I need more information from her therapist. I am told, as of today, that her therapist will not be available to speak with me until late next week. I would kindly ask you to extend her medical leave until then, so I can give you a good estimation on IF and when she could return to her previous job. Thank you.     If you have any questions or concerns, please don't hesitate to contact my office.    Sincerely,        Charles Huff MD

## 2023-10-19 NOTE — PROGRESS NOTES
Subjective:       Patient ID: Aditi Conway is a 43 y.o. female.    Chief Complaint: Consult (Pt would like to discuss options to physical therapy /Pt states she has been having swelling in her knee)    Pt is a 43 y.o. female who presents for evaluation and management of   Encounter Diagnoses   Name Primary?    Necrotizing fasciitis Yes    Impairment of balance     Neck pain     Night terrors     Depressed mood     Pain of right lower extremity     Decreased strength of lower extremity     Decreased strength of upper extremity     Decreased range of motion of neck     Decreased range of motion of right shoulder     Decreased activity tolerance    .PT released her on 10/11  She is still falling at home per friend and    She drops cases of water at home  Right leg gives out unexpectedly at times.   She wants to go back to work TOMORROW.  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 Her friend previously worked along side her at [employer]     Doing well on current meds. Denies any side effects. Prevention is up to date.    Review of Systems   Musculoskeletal:  Positive for arthralgias.   Neurological:  Positive for weakness and numbness.   Psychiatric/Behavioral:  Positive for dysphoric mood.        Objective:      Physical Exam  Constitutional:       Appearance: She is well-developed.   HENT:      Head: Normocephalic and atraumatic.      Right Ear: External ear normal.      Left Ear: External ear normal.      Nose: Nose normal.   Eyes:      Pupils: Pupils are equal, round, and reactive to light.   Neck:      Thyroid: No thyromegaly.      Vascular: No JVD.      Trachea: No tracheal deviation.      Comments: Well healed flap right neck   Cardiovascular:      Rate and Rhythm: Normal rate.      Heart sounds: Normal heart sounds. No murmur heard.  Pulmonary:      Effort: Pulmonary effort is normal. No respiratory distress.      Breath sounds: Normal breath sounds.  No wheezing or rales.   Chest:      Chest wall: No tenderness.   Abdominal:      General: Bowel sounds are normal. There is no distension.      Palpations: Abdomen is soft. There is no mass.      Tenderness: There is no abdominal tenderness. There is no guarding or rebound.   Musculoskeletal:         General: No tenderness. Normal range of motion.      Cervical back: Neck supple.      Comments: Negative Spurling's test     pos impingement signs 1 and 2  3-4/5 supraspinatous and pain  5/5 subscapularis  4/5 teres minor and infraspinatous  Haile equivocal---effort is poor        Lymphadenopathy:      Cervical: No cervical adenopathy.   Skin:     General: Skin is warm and dry.      Coloration: Skin is not pale.      Findings: No erythema or rash.   Neurological:      Mental Status: She is alert and oriented to person, place, and time.      Cranial Nerves: No cranial nerve deficit.      Motor: No abnormal muscle tone.      Coordination: Coordination normal.      Deep Tendon Reflexes: Reflexes are normal and symmetric. Reflexes normal.      Comments: Allodynia right thigh    Psychiatric:         Behavior: Behavior normal.         Thought Content: Thought content normal.         Judgment: Judgment normal.         Assessment:       1. Necrotizing fasciitis    2. Impairment of balance    3. Neck pain    4. Night terrors    5. Depressed mood    6. Pain of right lower extremity    7. Decreased strength of lower extremity    8. Decreased strength of upper extremity    9. Decreased range of motion of neck    10. Decreased range of motion of right shoulder    11. Decreased activity tolerance        Plan:   1. Necrotizing fasciitis  Overview:  Due to staph infection 6/2023   Had extensive debridement and subsequent Skin/muscle graft         2. Impairment of balance  Overview:  Weak in her right leg since her removal of part of her quadriceps for grafting to her neck. 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  Will follow with PT. Might need an FCE to determine whether or not she can return to work...       3. Neck pain  Overview:  Radiates from her right neck into her upper right arm.       Orders:  -     DULoxetine (CYMBALTA) 30 MG capsule; Take 1 capsule (30 mg total) by mouth once daily.  Dispense: 30 capsule; Refill: 5    4. Night terrors  -     prazosin (MINIPRESS) 1 MG Cap; Take 1 capsule (1 mg total) by mouth every evening.  Dispense: 30 capsule; Refill: 5    5. Depressed mood  -     DULoxetine (CYMBALTA) 30 MG capsule; Take 1 capsule (30 mg total) by mouth once daily.  Dispense: 30 capsule; Refill: 5    6. Pain of right lower extremity    7. Decreased strength of lower extremity    8. Decreased strength of upper extremity    9. Decreased range of motion of neck    10. Decreased range of motion of right shoulder    11. Decreased activity tolerance    With my limited knowledge of her condition and going from her PT notes, it does not seem to me right now that she will be able to return as a anushka at [employer]. I am not going to clear her today. I need to speak to her PT---put in a call to speak with Nidia but she is off until next week. I left a message with the office for her to call me at her convenience when she returns Tuesday next week.     Adding above for mood and night terrors     RTC 1 month       No follow-ups on file.

## 2023-10-19 NOTE — LETTER
October 19, 2023    Aditi Conway  [address] [facility] [id number] [place] [address]  Phone: [phone]  Fax: [phone] Dear {MR/MRS/MS/DR:51449} Man:    ***      If you have any questions or concerns, please don't hesitate to call.    Sincerely,        Charles Huff MD

## 2023-10-30 ENCOUNTER — TELEPHONE (OUTPATIENT)
Dept: FAMILY MEDICINE | Facility: CLINIC | Age: 43
End: 2023-10-30

## 2023-10-30 DIAGNOSIS — M72.6 NECROTIZING FASCIITIS: Primary | ICD-10-CM

## 2023-10-30 DIAGNOSIS — M54.2 NECK PAIN: ICD-10-CM

## 2023-10-30 DIAGNOSIS — R26.89 IMPAIRMENT OF BALANCE: ICD-10-CM

## 2023-10-30 NOTE — LETTER
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 I am not going to clear her today. I need to speak to her PT---put in a call to speak with Nidia. I left a message with the office for her to call me at her convenience when she returns. Therefore she is unable to return to work until further notice.    Please excuse her from any classes or work missed.    If you have any questions or concerns, please don't hesitate to call.    Sincerely,                   Charles Huff MD

## 2023-10-30 NOTE — TELEPHONE ENCOUNTER
Ok. I spoke to her PT. We both decided it would be best if she had an FCE to determine exactly what she can do as the therapist suspects she was just telling her she could do all the things needed to go back to her job. To really determine if she can go back to work, she needs an FCE and I also need a physical requirements of her position at [employer]. Once I have both of these I will be michele to determine if she can work as she has reached maximal medical improvement from the therapists point of view. I am putting in a referral for [facility]---Im pretty sure they still do FCE's. Marialuisa

## 2023-10-30 NOTE — LETTER
[address] [address]  Phone: [phone]  Fax: [phone] October 30, 2023     Patient: Aditi Conway   YOB: 1980   Date of Visit: 10/30/2023       To Whom It May Concern:    I finally got in touch with Aditi's therapist. Based on my conversation with her, we will need to get an FCE (functional capacity evaluation) to objectively determine what she can physically do. She has reached maximal medical improvement. Patient says she can do her job, but I need objective evidence of that to clear her to return to work. Once I have an FCE on her, all I need is a list of physical requirements for her current position at [employer]. She can't work until I have both of these documents. This will help me determine if she can go back to work. Thank you.     If you have any questions or concerns, please don't hesitate to contact my office.    Sincerely,        Charles Huff MD      Mars Dickerson is a 73y.o. year old who presents for elective outpatient ophthalmic surgery with Jase Sands MD.  The patient complains of decreased vision, glare and halos around lights, and trouble with vision for activities of daily living.       Past Medical History:   Diagnosis Date    Cancer [facility])     prostate low grade     CKD (chronic kidney disease)     Diabetes mellitus (HCC)     Hyperlipidemia     Hypertension     UTI (urinary tract infection)        Past Surgical History:   Procedure Laterality Date    APPENDECTOMY      COLONOSCOPY      INTRACAPSULAR CATARACT EXTRACTION Right 4/22/2022    PHACOEMULSIFICATION WITH INTRAOCULAR LENS IMPLANT - RIGHT EYE performed by Jodie Valentine MD at [place] [id number] Left 4/29/2022    PHACOEMULSIFICATION WITH INTRAOCULAR LENS IMPLANT - LEFT EYE performed by Jodie Valentine MD at [address] [place] [address] Right 10/19/2021    PROSTATE SURGERY           Current Facility-Administered Medications:     0.9 % sodium chloride infusion, , IntraVENous, Continuous, Shayna Robertson MD    sodium chloride flush 0.9 % injection 5-40 mL, 5-40 mL, IntraVENous, 2 times per day, Shayna Robertson MD    sodium chloride flush 0.9 % injection 5-40 mL, 5-40 mL, IntraVENous, PRN, Shayna Robertson MD    0.9 % sodium chloride infusion, , IntraVENous, PRN, Shayna Robertson MD    glucose chewable tablet 16 g, 4 tablet, Oral, PRN, Shayna Robertson MD    dextrose bolus 10% 125 mL, 125 mL, IntraVENous, PRN **OR** dextrose bolus 10% 250 mL, 250 mL, IntraVENous, PRN, Shayna Robertson MD    glucagon (rDNA) injection 1 mg, 1 mg, IntraMUSCular, PRN, Shayna Robertson MD    dextrose 5 % solution, 100 mL/hr, IntraVENous, PRN, Shayna Robertson MD    ciprofloxacin (CILOXAN) 0.3 % ophthalmic solution 1 drop, 1 drop, Right Eye, See Admin Instructions, Jodie Valentine MD    ketorolac (ACULAR) 0.5 % ophthalmic solution 1 drop, 1 drop, Right Eye, See Admin Instructions, Latasha Crespo MD    phenylephrine (MYDFRIN) 2.5 % ophthalmic solution 1 drop, 1 drop, Right Eye, See Admin Instructions, Latasha Crespo MD    povidone-iodine 5 % ophthalmic solution, , Right Eye, See Admin Instructions, Latasha Crespo MD    tetracaine (TETRAVISC) 0.5 % ophthalmic solution 1 drop, 1 drop, Right Eye, See Admin Instructions, Latasha Crespo MD    tropicamide (MYDRIACYL) 1 % ophthalmic solution 1 drop, 1 drop, Right Eye, See Admin Instructions, Latasha Crespo MD    Scheduled Meds:   sodium chloride flush  5-40 mL IntraVENous 2 times per day    ciprofloxacin  1 drop Right Eye See Admin Instructions    ketorolac  1 drop Right Eye See Admin Instructions    phenylephrine  1 drop Right Eye See Admin Instructions    povidone-iodine   Right Eye See Admin Instructions    tetracaine  1 drop Right Eye See Admin Instructions    tropicamide  1 drop Right Eye See Admin Instructions       PRN Meds:.sodium chloride flush, sodium chloride, glucose, dextrose bolus **OR** dextrose bolus, glucagon (rDNA), dextrose    No Known Allergies      PHYSICAL EXAMINATION    Vitals:    06/02/22 1023   BP: (!) 147/67   Pulse: 51   Resp: 11   Temp: (!) 96.5 °F (35.8 °C)   SpO2: 100%         Gen: NAD  HEENT: OP clear, TM clear bilaterally; see outpatient ophthalmology record  Pulm: CTA B  Heart: RRR, no C/M/R/G  Abd: S/NT/ND  Ext: WWP, no C/C/E  Neuro: no focal defecits    Assessment:   1. No significant changes since recent HP. 2. See preoperative ophthalmology notes    Plan:   1. Risks, benefits, alternatives to surgery discussed with the patient and family. 2. All questions were answered to their satisfaction. 3. Ok to proceed with surgery as planned.     Eliza Callahan MD

## 2023-10-30 NOTE — TELEPHONE ENCOUNTER
Patient states her leave of absence has  at work on 10/27/2023 and she needs something discharging her back to work or another extention to save her current job.    Please advise asap.    [phone]

## 2023-11-06 ENCOUNTER — TELEPHONE (OUTPATIENT)
Dept: FAMILY MEDICINE | Facility: CLINIC | Age: 43
End: 2023-11-06
Payer: MEDICAID

## 2023-11-19 NOTE — TELEPHONE ENCOUNTER
Thank you. The only other thing I need is an FCE ----- I put in a referral for one before vacation. Has she had a chance to have that done?? Thanks !

## 2023-12-08 ENCOUNTER — HOSPITAL ENCOUNTER (EMERGENCY)
Facility: HOSPITAL | Age: 43
Discharge: HOME | End: 2023-12-08
Attending: STUDENT IN AN ORGANIZED HEALTH CARE EDUCATION/TRAINING PROGRAM
Payer: MEDICAID

## 2023-12-08 VITALS
BODY MASS INDEX: 19.96 KG/M2 | OXYGEN SATURATION: 97 % | DIASTOLIC BLOOD PRESSURE: 60 MMHG | WEIGHT: 109.13 LBS | HEART RATE: 91 BPM | RESPIRATION RATE: 20 BRPM | TEMPERATURE: 98 F | SYSTOLIC BLOOD PRESSURE: 101 MMHG

## 2023-12-08 DIAGNOSIS — H00.014 HORDEOLUM EXTERNUM OF LEFT UPPER EYELID: Primary | ICD-10-CM

## 2023-12-08 PROCEDURE — 99283 EMERGENCY DEPT VISIT LOW MDM: CPT

## 2023-12-08 RX ORDER — ERYTHROMYCIN 5 MG/G
OINTMENT OPHTHALMIC 3 TIMES DAILY
Qty: 1 EACH | Refills: 0 | Status: SHIPPED | OUTPATIENT
Start: 2023-12-08 | End: 2024-02-12

## 2023-12-08 NOTE — ED PROVIDER NOTES
Encounter Date: 12/8/2023       History     Chief Complaint   Patient presents with    Eye Problem     Patient to ER CC of pain and swelling to her left lid      43-year-old female with history of seizures presenting with left eye swelling.  Patient denies vision changes.  Reports that yesterday she noted a small pain to the medial portion of her upper left eyelid.  Patient reports she woke up this morning and the swelling had continued throughout her upper eyelid.  No foreign body sensation.  No other complaints.  No fever.      Review of patient's allergies indicates:   Allergen Reactions    Amoxicillin-pot clavulanate Hives    Clindamycin      Past Medical History:   Diagnosis Date    Depression     Heart murmur     Seizures      Past Surgical History:   Procedure Laterality Date    DEBRIDEMENT, BREAST Left 11/11/2021    Procedure: DEBRIDEMENT,  BREAST;  Surgeon: Yoan Sparks MD;  Location: [facility] OR;  Service: General;  Laterality: Left;  DEBRIDEMENT, WOUND, LEFT BREAST    DILATION AND CURETTAGE OF UTERUS      FLAP PROCEDURE N/A 6/14/2023    Procedure: CREATION, FREE FLAP;  Surgeon: Gerardo Seymour MD;  Location: [facility] OR [address] [place] [address];  Service: ENT;  Laterality: N/A;    INCISION AND DRAINAGE Left 7/12/2021    Procedure: INCISION AND DRAINAGE, HEMATOMA, SEROMA, OR FLUID COLLECTION ;  Surgeon: Yoan Sparks MD;  Location: [facility] OR;  Service: General;  Laterality: Left;    NECK EXPLORATION Bilateral 6/8/2023    Procedure: EXPLORATION and debridement, NECK;  Surgeon: Jose Jules MD;  [facility];  Service: ENT;  Laterality: Bilateral;    NECK EXPLORATION Right 6/12/2023    Procedure: RIGHT NECK WOUND EXPLORATION WITH WOUND DEBRIDEMENT;  Surgeon: Gerardo Seymour MD;  [facility];  Service: ENT;  Laterality: Right;    NECK EXPLORATION Right 6/14/2023    Procedure: EXPLORATION, NECK;  Surgeon: Gerardo Seymour MD;  Location: [facility] OR [address] [place] [address];  Service: ENT;  Laterality: Right;      Family History   Problem Relation Age of Onset    Hypertension Mother     Heart disease Father         pacemaker    Diabetes Father     Hypertension Father     Hyperlipidemia Father     Breast cancer Neg Hx     Ovarian cancer Neg Hx     Colon cancer Neg Hx      Social History     Tobacco Use    Smoking status: Every Day     Current packs/day: 1.00     Average packs/day: 1 pack/day for 20.0 years (20.0 ttl pk-yrs)     Types: Cigarettes    Smokeless tobacco: Never    Tobacco comments:     Informed about classes in smoking cessation. States will call.   Substance Use Topics    Alcohol use: Not Currently     Comment: occasinally    Drug use: No     Review of Systems   Constitutional:  Negative for fever.   HENT:  Negative for sore throat.    Eyes:         Eyelid swelling   Respiratory:  Negative for shortness of breath.    Cardiovascular:  Negative for chest pain.   Gastrointestinal:  Negative for nausea.   Genitourinary:  Negative for dysuria.   Musculoskeletal:  Negative for back pain.   Skin:  Negative for rash.   Neurological:  Negative for weakness.   Hematological:  Does not bruise/bleed easily.       Physical Exam     Initial Vitals [12/08/23 1312]   BP Pulse Resp Temp SpO2   101/60 91 20 97.9 °F (36.6 °C) 97 %      MAP       --         Physical Exam    Nursing note and vitals reviewed.  Constitutional: She appears well-developed.   HENT:   Head: Normocephalic.   Swelling to left upper eyelid.  Pupils 3 mm reactive bilaterally.  No sign of trauma to globe.  Pupils normal.  No further edema than the more medial portion of the upper left eyelid.  Extraocular movements intact and painless.   Eyes: Pupils are equal, round, and reactive to light.   Neck:   Normal range of motion.  Cardiovascular:            No murmur heard.  Pulmonary/Chest: No respiratory distress.   Abdominal: Abdomen is soft.   Musculoskeletal:         General: No edema.      Cervical back: Normal range of motion.     Neurological: She is  alert.   Skin: Skin is warm.   Psychiatric: She has a normal mood and affect.         ED Course   Procedures  Labs Reviewed - No data to display       Imaging Results    None          Medications - No data to display  Medical Decision Making  DDX:  Stye.  Do not suspect orbital cellulitis or periorbital cellulitis.  DX:  None  TX:  Erythromycin ointment  Dispo:  Discharge with PCP follow-up.  Instructions to return if infection appears to worsen.        Risk  Prescription drug management.                                      Clinical Impression:  Final diagnoses:  [H00.014] Hordeolum externum of left upper eyelid (Primary)          ED Disposition Condition    Discharge Stable          ED Prescriptions       Medication Sig Dispense Start Date End Date Auth. Provider    erythromycin (ROMYCIN) ophthalmic ointment Place into the left eye 3 (three) times daily. 1 each 12/8/2023 -- Walter Giron MD          Follow-up Information       Follow up With Specialties Details Why Contact Info    Charles Huff MD Family Medicine Schedule an appointment as soon as possible for a visit in 2 days  [address]  [facility] [address]  [phone]      [facility] - Emergency Dept Emergency Medicine  If symptoms worsen [address] [facility] [address]  [phone]             Walter Giron MD  12/08/23 3099

## 2023-12-14 NOTE — TELEPHONE ENCOUNTER
Patient stated that she was not contacted. She did receive a new phone number. [phone]. Faxed referral with patient's new number to [facility]. Pt notified referral was sent.

## 2023-12-21 ENCOUNTER — TELEPHONE (OUTPATIENT)
Dept: FAMILY MEDICINE | Facility: CLINIC | Age: 43
End: 2023-12-21
Payer: MEDICAID

## 2023-12-21 NOTE — TELEPHONE ENCOUNTER
Spoke with patient and she states that she has still not been contacted for an appt with [facility]. Gave her [facility] phone number for her to contact them to see what is going on with the referral. Patient verbalized understanding and will contact them. Patient also had questions regarding a test that she was called about. Patient couldn't remember the name of the test. Instructed patient to contact the people that called her regarding the test to see what test they are needing and if there is something that we need to do. Pt verbalized understanding.

## 2023-12-21 NOTE — TELEPHONE ENCOUNTER
----- Message from Yvette Alex sent at 2023 11:42 AM CST -----  Contact: self  Aditi Conway  MRN: [id number]  : 1980  PCP: Charles Huff  Home Phone      [phone]  Work Phone      Not on file.  [employer]          [phone]      MESSAGE:   Patient is requesting a call back      [phone]

## 2023-12-29 ENCOUNTER — OFFICE VISIT (OUTPATIENT)
Dept: FAMILY MEDICINE | Facility: CLINIC | Age: 43
End: 2023-12-29
Payer: MEDICAID

## 2023-12-29 VITALS
HEART RATE: 119 BPM | BODY MASS INDEX: 20.18 KG/M2 | DIASTOLIC BLOOD PRESSURE: 64 MMHG | RESPIRATION RATE: 18 BRPM | HEIGHT: 62 IN | WEIGHT: 109.69 LBS | SYSTOLIC BLOOD PRESSURE: 102 MMHG | OXYGEN SATURATION: 96 %

## 2023-12-29 DIAGNOSIS — M54.2 NECK PAIN: Primary | ICD-10-CM

## 2023-12-29 DIAGNOSIS — R26.89 IMPAIRMENT OF BALANCE: ICD-10-CM

## 2023-12-29 DIAGNOSIS — R29.898 DECREASED STRENGTH OF UPPER EXTREMITY: ICD-10-CM

## 2023-12-29 PROCEDURE — 3078F DIAST BP <80 MM HG: CPT | Mod: CPTII,,, | Performed by: FAMILY MEDICINE

## 2023-12-29 PROCEDURE — 1159F MED LIST DOCD IN RCRD: CPT | Mod: CPTII,,, | Performed by: FAMILY MEDICINE

## 2023-12-29 PROCEDURE — 3074F SYST BP LT 130 MM HG: CPT | Mod: CPTII,,, | Performed by: FAMILY MEDICINE

## 2023-12-29 PROCEDURE — 3078F PR MOST RECENT DIASTOLIC BLOOD PRESSURE < 80 MM HG: ICD-10-PCS | Mod: CPTII,,, | Performed by: FAMILY MEDICINE

## 2023-12-29 PROCEDURE — 99999 PR PBB SHADOW E&M-EST. PATIENT-LVL III: ICD-10-PCS | Mod: PBBFAC,,, | Performed by: FAMILY MEDICINE

## 2023-12-29 PROCEDURE — 99213 OFFICE O/P EST LOW 20 MIN: CPT | Mod: PBBFAC | Performed by: FAMILY MEDICINE

## 2023-12-29 PROCEDURE — 3074F PR MOST RECENT SYSTOLIC BLOOD PRESSURE < 130 MM HG: ICD-10-PCS | Mod: CPTII,,, | Performed by: FAMILY MEDICINE

## 2023-12-29 PROCEDURE — 99999 PR PBB SHADOW E&M-EST. PATIENT-LVL III: CPT | Mod: PBBFAC,,, | Performed by: FAMILY MEDICINE

## 2023-12-29 PROCEDURE — 3008F PR BODY MASS INDEX (BMI) DOCUMENTED: ICD-10-PCS | Mod: CPTII,,, | Performed by: FAMILY MEDICINE

## 2023-12-29 PROCEDURE — 99213 OFFICE O/P EST LOW 20 MIN: CPT | Mod: S$PBB,,, | Performed by: FAMILY MEDICINE

## 2023-12-29 PROCEDURE — 99213 PR OFFICE/OUTPT VISIT, EST, LEVL III, 20-29 MIN: ICD-10-PCS | Mod: S$PBB,,, | Performed by: FAMILY MEDICINE

## 2023-12-29 PROCEDURE — 1159F PR MEDICATION LIST DOCUMENTED IN MEDICAL RECORD: ICD-10-PCS | Mod: CPTII,,, | Performed by: FAMILY MEDICINE

## 2023-12-29 PROCEDURE — 3008F BODY MASS INDEX DOCD: CPT | Mod: CPTII,,, | Performed by: FAMILY MEDICINE

## 2023-12-29 NOTE — PROGRESS NOTES
Subjective:       Patient ID: Aditi Conway is a 43 y.o. female.    Chief Complaint: Follow-up (Pt here for 2 mth f/u. )    Pt is a 43 y.o. female who presents for evaluation and management of   Encounter Diagnoses   Name Primary?    Neck pain Yes    Decreased strength of upper extremity     Impairment of balance    .She has started a home exercise regimen since her last visit 2 months ago   Strength and balance have improved. She does fall occasionally (3-4 times in last 2 months), but that has been when she is running.  She really would like to go back to work     Doing well on current meds. Denies any side effects. Prevention is up to date.  Review of Systems   Musculoskeletal:  Negative for myalgias and neck pain.   Neurological:  Negative for weakness.       Objective:      Physical Exam  Constitutional:       Appearance: She is well-developed.   HENT:      Head: Normocephalic and atraumatic.      Right Ear: External ear normal.      Left Ear: External ear normal.      Nose: Nose normal.   Eyes:      Pupils: Pupils are equal, round, and reactive to light.   Neck:      Thyroid: No thyromegaly.      Vascular: No JVD.      Trachea: No tracheal deviation.      Comments: Well healed flap right neck   Cardiovascular:      Rate and Rhythm: Normal rate.      Heart sounds: Normal heart sounds. No murmur heard.  Pulmonary:      Effort: Pulmonary effort is normal. No respiratory distress.      Breath sounds: Normal breath sounds. No wheezing or rales.   Chest:      Chest wall: No tenderness.   Abdominal:      General: Bowel sounds are normal. There is no distension.      Palpations: Abdomen is soft. There is no mass.      Tenderness: There is no abdominal tenderness. There is no guarding or rebound.   Musculoskeletal:         General: No tenderness. Normal range of motion.      Cervical back: Neck supple.      Comments: Negative Spurling's test     5/5 supraspinatous   5/5 subscapularis  5/5 teres minor and  infraspinatous  Haile negative     5/5 strength bilateral biceps, triceps, deltoid, and hand .    5/5 strength bilateral quads, hamstrings, dorsiflexion and plantar flexion     Normal duck walk     Tandem gait intact        Lymphadenopathy:      Cervical: No cervical adenopathy.   Skin:     General: Skin is warm and dry.      Coloration: Skin is not pale.      Findings: No erythema or rash.   Neurological:      Mental Status: She is alert and oriented to person, place, and time.      Cranial Nerves: No cranial nerve deficit.      Motor: No abnormal muscle tone.      Coordination: Coordination normal.      Deep Tendon Reflexes: Reflexes are normal and symmetric. Reflexes normal.      Comments: Negative Rhomberg    Psychiatric:         Behavior: Behavior normal.         Thought Content: Thought content normal.         Judgment: Judgment normal.         Assessment:       1. Neck pain    2. Decreased strength of upper extremity    3. Impairment of balance        Plan:   1. Neck pain  Overview:  Radiates from her right neck into her upper right arm.   Resolved         2. Decreased strength of upper extremity  Overview:  Resolved       3. Impairment of balance  Overview:  Weak in her right leg since her removal of part of her quadriceps for grafting to her neck.   Her strength and blalance has improved tremendously. She is cleared to go back to work x no ladders or working from heights due to her slight fall risk, balance issues. 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

## 2023-12-29 NOTE — LETTER
[address] [address]  Phone: [phone]  Fax: [phone] December 29, 2023     Patient: Aditi Conway   YOB: 1980   Date of Visit: 12/29/2023       To Whom It May Concern:    Aditi is cleared to return to work as an  at [employer]. Her only restriction is that she is not allowed to work at heights or on ladders.     If you have any questions or concerns, please don't hesitate to contact my office.    Sincerely,        Charles Huff MD

## 2024-01-09 ENCOUNTER — TELEPHONE (OUTPATIENT)
Dept: FAMILY MEDICINE | Facility: CLINIC | Age: 44
End: 2024-01-09
Payer: MEDICAID

## 2024-01-09 NOTE — TELEPHONE ENCOUNTER
Spoke to pt's  (Bienvenido), states pt was given a work release letter on 12/29 with a restriction of not being allowed to work at heights or on ladders.  States pt will lose job is any restrictions are given. Bienvenido requesting restriction to be taken off letter by provider.     Please advise. Thanks

## 2024-01-09 NOTE — TELEPHONE ENCOUNTER
----- Message from Gene Rebollar sent at 2024  2:22 PM CST -----  Contact: pt  Aditi Conway  MRN: [id number]  : 1980  PCP: Charles Huff  Home Phone      [phone]  Work Phone      Not on file.  Mobile          [phone]      MESSAGE:     Pt called stating she can't return to work because she has a restriction to return. Pt is requesting that Dr. Huff raises that restriction in order for pt to return to work. Pt is requesting another release paper for work as well.        Please advise   [phone]

## 2024-01-12 ENCOUNTER — TELEPHONE (OUTPATIENT)
Dept: FAMILY MEDICINE | Facility: CLINIC | Age: 44
End: 2024-01-12
Payer: MEDICAID

## 2024-01-31 ENCOUNTER — TELEPHONE (OUTPATIENT)
Dept: FAMILY MEDICINE | Facility: CLINIC | Age: 44
End: 2024-01-31
Payer: MEDICAID

## 2024-01-31 NOTE — TELEPHONE ENCOUNTER
Contacted Sammi at number given below. States she is calling on behalf of [place] [facility] office and wanting to know when restrictions from work release letter given on to pt on  was set to . Notified Sammi , pt was scheduled an appt on  for re evaluation but had cancelled appt. Pt has scheduled soonest available via the [employer]. Sammi gave understanding.

## 2024-01-31 NOTE — TELEPHONE ENCOUNTER
----- Message from Gene Rebollar sent at 2024 10:47 AM CST -----  Contact: Sammi  Aditi Conway  MRN: [id number]  : 1980  PCP: Charles Huff  Home Phone      [phone]  Work Phone      Not on file.  Mobile          [phone]      MESSAGE:     Sammi called in regards to pt wanting to speak with nurse about pts visit note that was sent over in order for pt to return to work. Sammi wanted to know when the restrictions for the note expires.        Please advise  Sammi  [phone]

## 2024-02-12 ENCOUNTER — OFFICE VISIT (OUTPATIENT)
Dept: FAMILY MEDICINE | Facility: CLINIC | Age: 44
End: 2024-02-12
Payer: MEDICAID

## 2024-02-12 VITALS
RESPIRATION RATE: 14 BRPM | HEIGHT: 62 IN | DIASTOLIC BLOOD PRESSURE: 60 MMHG | HEART RATE: 84 BPM | BODY MASS INDEX: 19.66 KG/M2 | OXYGEN SATURATION: 97 % | SYSTOLIC BLOOD PRESSURE: 110 MMHG | WEIGHT: 106.81 LBS

## 2024-02-12 DIAGNOSIS — R26.89 IMPAIRMENT OF BALANCE: ICD-10-CM

## 2024-02-12 DIAGNOSIS — M79.604 PAIN OF RIGHT LOWER EXTREMITY: ICD-10-CM

## 2024-02-12 DIAGNOSIS — R29.898 DECREASED STRENGTH OF UPPER EXTREMITY: Primary | ICD-10-CM

## 2024-02-12 PROCEDURE — 1160F RVW MEDS BY RX/DR IN RCRD: CPT | Mod: CPTII,,, | Performed by: FAMILY MEDICINE

## 2024-02-12 PROCEDURE — 99999 PR PBB SHADOW E&M-EST. PATIENT-LVL III: CPT | Mod: PBBFAC,,, | Performed by: FAMILY MEDICINE

## 2024-02-12 PROCEDURE — 3008F BODY MASS INDEX DOCD: CPT | Mod: CPTII,,, | Performed by: FAMILY MEDICINE

## 2024-02-12 PROCEDURE — 1159F MED LIST DOCD IN RCRD: CPT | Mod: CPTII,,, | Performed by: FAMILY MEDICINE

## 2024-02-12 PROCEDURE — 3074F SYST BP LT 130 MM HG: CPT | Mod: CPTII,,, | Performed by: FAMILY MEDICINE

## 2024-02-12 PROCEDURE — 99213 OFFICE O/P EST LOW 20 MIN: CPT | Mod: PBBFAC | Performed by: FAMILY MEDICINE

## 2024-02-12 PROCEDURE — 99213 OFFICE O/P EST LOW 20 MIN: CPT | Mod: S$PBB,,, | Performed by: FAMILY MEDICINE

## 2024-02-12 PROCEDURE — 3078F DIAST BP <80 MM HG: CPT | Mod: CPTII,,, | Performed by: FAMILY MEDICINE

## 2024-02-12 NOTE — PROGRESS NOTES
"Subjective:       Patient ID: Aditi Conway is a 43 y.o. female.    Chief Complaint: Consult (Pt here for release to work)    Pt is a 43 y.o. female who presents for evaluation and management of   Encounter Diagnoses   Name Primary?    Decreased strength of upper extremity Yes    Impairment of balance     Pain of right lower extremity    .  [employer] will not allow her to go back to work unless all restrictions are lifted.   She was restricted due to fall risk (no working from heights or ladders). This is due to RLE weakness and balance issues   She insists that she is "fine" to work without restrictions       Doing well on current meds. Denies any side effects. Prevention is up to date.  Review of Systems   Neurological:         3 falls since last visit          Objective:      Physical Exam  Constitutional:       Appearance: She is well-developed.   HENT:      Head: Normocephalic and atraumatic.      Right Ear: External ear normal.      Left Ear: External ear normal.      Nose: Nose normal.   Eyes:      Pupils: Pupils are equal, round, and reactive to light.   Neck:      Thyroid: No thyromegaly.      Vascular: No JVD.      Trachea: No tracheal deviation.      Comments: Well healed flap right neck   Cardiovascular:      Rate and Rhythm: Normal rate.      Heart sounds: Normal heart sounds. No murmur heard.  Pulmonary:      Effort: Pulmonary effort is normal. No respiratory distress.      Breath sounds: Normal breath sounds. No wheezing or rales.   Chest:      Chest wall: No tenderness.   Abdominal:      General: Bowel sounds are normal. There is no distension.      Palpations: Abdomen is soft. There is no mass.      Tenderness: There is no abdominal tenderness. There is no guarding or rebound.   Musculoskeletal:         General: No tenderness. Normal range of motion.      Cervical back: Neck supple.      Comments: Negative Spurling's test     5/5 supraspinatous   5/5 subscapularis  5/5 teres minor and " infraspinatous  Haile negative     5/5 strength bilateral biceps, triceps, deltoid, and hand .    5/5 strength bilateral quads, hamstrings, dorsiflexion and plantar flexion     Normal duck walk     Tandem gait intact        Lymphadenopathy:      Cervical: No cervical adenopathy.   Skin:     General: Skin is warm and dry.      Coloration: Skin is not pale.      Findings: No erythema or rash.   Neurological:      Mental Status: She is alert and oriented to person, place, and time.      Cranial Nerves: No cranial nerve deficit.      Motor: No weakness or abnormal muscle tone.      Coordination: Coordination normal.      Deep Tendon Reflexes: Reflexes are normal and symmetric. Reflexes normal.      Comments: Negative Rhomberg   A little wobbly with tandem gait, but no stumbling   5/5 strength throughout BLE      Psychiatric:         Behavior: Behavior normal.         Thought Content: Thought content normal.         Judgment: Judgment normal.         Assessment:       1. Decreased strength of upper extremity    2. Impairment of balance    3. Pain of right lower extremity        Plan:   1. Decreased strength of upper extremity  Overview:  Resolved       2. Impairment of balance  Overview:  Weak in her right leg since her removal of part of her quadriceps for grafting to her neck.   Her strength and blalance has improved tremendously. She is cleared to go back to work x no ladders or working from heights due to her slight fall risk, balance issues. I would anticipate this to continue to improve however       3. Pain of right lower extremity    Her exam is essentially normal today in clinic   Will get FCE to be certain she can meet the demands of her job requirements     No follow-ups on file.

## 2024-02-16 ENCOUNTER — TELEPHONE (OUTPATIENT)
Dept: FAMILY MEDICINE | Facility: CLINIC | Age: 44
End: 2024-02-16
Payer: MEDICAID

## 2024-02-16 NOTE — TELEPHONE ENCOUNTER
Contacted and spoke to pt, states [employer] is needing a letter stating pt's medical leave is currently on hold due to pt needing to see PT for restrictions to be lifted in order for [employer] to re open pt's claim. Letter given and left at  for pt .    Pt also states [facility] does not accept Medicaid insurance and requesting referral to be sent to [facility]. Referral sent and pt notified to call and scheduled appt. Pt verbalized understanding.

## 2024-02-16 NOTE — LETTER
February 16, 2024      [address] [address]  Phone: [phone]  Fax: [phone]       Patient: Aditi Conway   YOB: 1980  Date of Visit: 02/16/2024    To Whom It May Concern:    Aditi is not able to be cleared to return to work at this time. More information is needed for a determination.  She is currently waiting to get scheduled with Physical Therapy for an FCE. I would kindly ask you to extend her medical leave until then, so a good estimation on IF and when she could return to her previous job. Thank you.     If you have any questions or concerns, please don't hesitate to contact my office.    Sincerely,    Sincerely,    JUAN Soto MD

## 2024-02-16 NOTE — TELEPHONE ENCOUNTER
Spoke with pt, she is contact with medicaid to find out where she can go have the test done and will be call us back when medicaid tells her where she can go so that a referral can be sent.

## 2024-02-16 NOTE — TELEPHONE ENCOUNTER
----- Message from Yvette Alex sent at 2024 11:43 AM CST -----  Contact: self  Aditi Conway  MRN: [id number]  : 1980  PCP: Charles Huff  Home Phone      [phone]  Work Phone      Not on file.  [employer]          [phone]      MESSAGE:   Patient is requesting another call  And asking for paperwork      [phone]

## 2024-02-16 NOTE — TELEPHONE ENCOUNTER
----- Message from Elton Morales sent at 2/15/2024  1:15 PM CST -----  Contact: Patient  Aditi Conway  MRN: [id number]  : 1980  PCP: Charles Huff  Home Phone      [phone]  Work Phone      Not on file.  Mobile          [phone]      MESSAGE: Medical leave paperwork lists restrictions -- states the paperwork she has was only good until 24 -- was told Dr Huff would not lift restrictions without a capacity test -- she needs paperwork for [place] ASAP to re-open her claim -- requesting to speak with either Dr Huff or nurse ASAP - her job is in jeopardy    Call [phone]    PCP: Daria

## 2024-02-26 ENCOUNTER — HOSPITAL ENCOUNTER (OUTPATIENT)
Dept: RADIOLOGY | Facility: HOSPITAL | Age: 44
Discharge: HOME | End: 2024-02-26
Attending: SURGERY
Payer: MEDICAID

## 2024-02-26 VITALS — BODY MASS INDEX: 19.51 KG/M2 | HEIGHT: 62 IN | WEIGHT: 106 LBS

## 2024-02-26 DIAGNOSIS — N61.1 LEFT BREAST ABSCESS: ICD-10-CM

## 2024-02-26 DIAGNOSIS — N61.1 BREAST ABSCESS: ICD-10-CM

## 2024-02-26 DIAGNOSIS — F17.200 SMOKING: ICD-10-CM

## 2024-02-26 PROCEDURE — 77062 BREAST TOMOSYNTHESIS BI: CPT | Mod: TC

## 2024-02-26 PROCEDURE — 77066 DX MAMMO INCL CAD BI: CPT | Mod: 26,,, | Performed by: RADIOLOGY

## 2024-02-26 PROCEDURE — 77062 BREAST TOMOSYNTHESIS BI: CPT | Mod: 26,,, | Performed by: RADIOLOGY

## 2024-04-16 NOTE — PROGRESS NOTES
Subjective:       Patient ID: Aditi Conway is a 43 y.o. female.    Chief Complaint: Establish Care (Pt states she has been having severe pain in her legs and right shoulder)    Pt is a 43 y.o. female who presents for evaluation and management of   Encounter Diagnoses   Name Primary?    Necrotizing fasciitis     History of epilepsy Yes    Impairment of balance     Tobacco abuse     Neck pain     Neuropathic pain    .  Doing well on current meds. Denies any side effects. Prevention is up to date.  Review of Systems   Constitutional:  Negative for chills and fever.   Respiratory:  Negative for shortness of breath.    Cardiovascular:  Negative for chest pain and palpitations.   Gastrointestinal:  Negative for abdominal pain, blood in stool, constipation and nausea.   Genitourinary:  Negative for difficulty urinating.   Musculoskeletal:  Positive for arthralgias, gait problem and neck pain.   Psychiatric/Behavioral:  Negative for dysphoric mood, sleep disturbance and suicidal ideas. The patient is not nervous/anxious.        Objective:      Physical Exam  Constitutional:       Appearance: She is well-developed.   HENT:      Head: Normocephalic and atraumatic.      Right Ear: External ear normal.      Left Ear: External ear normal.      Nose: Nose normal.   Eyes:      Pupils: Pupils are equal, round, and reactive to light.   Neck:      Thyroid: No thyromegaly.      Vascular: No JVD.      Trachea: No tracheal deviation.      Comments: Well healed flap right neck   Cardiovascular:      Rate and Rhythm: Normal rate.      Heart sounds: Normal heart sounds. No murmur heard.  Pulmonary:      Effort: Pulmonary effort is normal. No respiratory distress.      Breath sounds: Normal breath sounds. No wheezing or rales.   Chest:      Chest wall: No tenderness.   Abdominal:      General: Bowel sounds are normal. There is no distension.      Palpations: Abdomen is soft. There is no mass.      Tenderness: There is no  "abdominal tenderness. There is no guarding or rebound.   Musculoskeletal:         General: No tenderness. Normal range of motion.      Cervical back: Neck supple.      Comments: Negative Spurling's test     pos impingement signs 1 and 2  3-4/5 supraspinatous and pain  5/5 subscapularis  4/5 teres minor and infraspinatous  Haile equivocal---effort is poor        Lymphadenopathy:      Cervical: No cervical adenopathy.   Skin:     General: Skin is warm and dry.      Coloration: Skin is not pale.      Findings: No erythema or rash.   Neurological:      Mental Status: She is alert and oriented to person, place, and time.      Cranial Nerves: No cranial nerve deficit.      Motor: No abnormal muscle tone.      Coordination: Coordination normal.      Deep Tendon Reflexes: Reflexes are normal and symmetric. Reflexes normal.      Comments: Allodynia right thigh    Psychiatric:         Behavior: Behavior normal.         Thought Content: Thought content normal.         Judgment: Judgment normal.         Assessment:       1. History of epilepsy    2. Necrotizing fasciitis    3. Impairment of balance    4. Tobacco abuse    5. Neck pain    6. Neuropathic pain        Plan:   1. History of epilepsy  Overview:  Used to take medication that started "with a D"  Has not taken meds in over 16 years...  Her  says she has seizures in her sleep???  No driving until she is evaluated by neuro       Orders:  -     Ambulatory referral/consult to Neurology; Future; Expected date: 09/12/2023    2. Necrotizing fasciitis  Overview:  Due to staph infection 6/2023   Had extensive debridement and subsequent Skin/muscle graft       Orders:  -     Ambulatory referral/consult to Family Practice    3. Impairment of balance  Overview:  Weak in her right leg since her removal of part of her quadriceps for grafting to her neck. Has fallen several times    Continue PT outpt [facility]       4. Tobacco abuse  Overview:  1ppd since 26 pk year hx. (1ppd " since 1997)      5. Neck pain  Overview:  Radiates from her right neck into her upper right arm.         6. Neuropathic pain  -     gabapentin (NEURONTIN) 300 MG capsule; Take 1 capsule (300 mg total) by mouth 3 (three) times daily.  Dispense: 90 capsule; Refill: 5    RTC 2 months    No follow-ups on file.       91

## 2024-05-07 DIAGNOSIS — M79.2 NEUROPATHIC PAIN: ICD-10-CM

## 2024-05-08 RX ORDER — GABAPENTIN 300 MG/1
CAPSULE ORAL
Qty: 180 CAPSULE | Refills: 6 | Status: SHIPPED | OUTPATIENT
Start: 2024-05-08

## 2024-05-10 ENCOUNTER — OFFICE VISIT (OUTPATIENT)
Dept: NEUROLOGY | Facility: CLINIC | Age: 44
End: 2024-05-10
Payer: COMMERCIAL

## 2024-05-10 VITALS
HEIGHT: 62 IN | SYSTOLIC BLOOD PRESSURE: 140 MMHG | RESPIRATION RATE: 14 BRPM | DIASTOLIC BLOOD PRESSURE: 88 MMHG | WEIGHT: 104.75 LBS | HEART RATE: 104 BPM | BODY MASS INDEX: 19.27 KG/M2

## 2024-05-10 DIAGNOSIS — M79.2 NEUROPATHIC PAIN: ICD-10-CM

## 2024-05-10 DIAGNOSIS — G40.909 SEIZURE DISORDER: Primary | ICD-10-CM

## 2024-05-10 PROCEDURE — 3008F BODY MASS INDEX DOCD: CPT | Mod: CPTII,S$GLB,, | Performed by: PHYSICIAN ASSISTANT

## 2024-05-10 PROCEDURE — 1160F RVW MEDS BY RX/DR IN RCRD: CPT | Mod: CPTII,S$GLB,, | Performed by: PHYSICIAN ASSISTANT

## 2024-05-10 PROCEDURE — 1159F MED LIST DOCD IN RCRD: CPT | Mod: CPTII,S$GLB,, | Performed by: PHYSICIAN ASSISTANT

## 2024-05-10 PROCEDURE — 3079F DIAST BP 80-89 MM HG: CPT | Mod: CPTII,S$GLB,, | Performed by: PHYSICIAN ASSISTANT

## 2024-05-10 PROCEDURE — 3077F SYST BP >= 140 MM HG: CPT | Mod: CPTII,S$GLB,, | Performed by: PHYSICIAN ASSISTANT

## 2024-05-10 PROCEDURE — 99999 PR PBB SHADOW E&M-EST. PATIENT-LVL III: CPT | Mod: PBBFAC,,, | Performed by: PHYSICIAN ASSISTANT

## 2024-05-10 PROCEDURE — 99213 OFFICE O/P EST LOW 20 MIN: CPT | Mod: S$GLB,,, | Performed by: PHYSICIAN ASSISTANT

## 2024-05-10 RX ORDER — GABAPENTIN 300 MG/1
CAPSULE ORAL
Qty: 180 CAPSULE | Refills: 11 | Status: SHIPPED | OUTPATIENT
Start: 2024-05-10

## 2024-05-10 NOTE — PROGRESS NOTES
Subjective:       Patient ID: Aditi Conway is a 43 y.o. female.    Chief Complaint: Seizures (Follow up)    HPI  Seizure Disorder  Aditi Conway is a 43 y.o. female is here for follow up evaluation for seizures.  First seizure - high school  Last seizure - No seizures since on gabapentin 300 mg TID. Almost a year since last episode per  who accompanies her today.  She has had seizures since high school. Less frequent and only in sleep since adult. Started on GPN for nerve pain and no seizures since.  Average frequency per month - not every month, maybe 4-6 times a year prior to GPN  Birth or developmental abnormality - No  Head injury or brain injury - No  Alcohol or drug abuse history - No  Family history of seizure - No  Medications intolerance/ineffective - started AED at 22 yrs old. Cannot remember what. Recalls seizures were worse. She stopped it. No AED since. No follow up with neuro for many years  Compliant with medications - Yes  Aura - Yes,she feels really bad and fearful just before, she then becomes tremulous and goes in to seizure very quickly following aura. No Aura for many years. Since 20's, all seizures in sleep.  Description of seizure -  says she becomes very tonic. She has urinary incontinence and biting in her mouth. Patient does not recall seizures, but she feels very sore and tired the following day.  She has noticed muscles twitching for years, especially before bed. She has not noticed this to be worse since on gabapentin these last few months.    She was diagnosed with necrotizing fasciitis this past summer. Extensive surgery required. She has a large skin flap on right of neck grafted from right thigh. She has paresthesias and some stabbing pains still from the surgeries.   She has chronic pain in the right neck and shoulder since surgery, pulling sensation, increased with range of motion and activity.  She was given gabapentin 300 mg TID for some of  the pain. It is helpful for paresthesias but not pain.   She tolerates the  medication very well. At first, she was somnolent, but she is now tolerating     Past Medical History:   Diagnosis Date    Depression     Heart murmur     Seizures        Past Surgical History:   Procedure Laterality Date    DEBRIDEMENT, BREAST Left 11/11/2021    Procedure: DEBRIDEMENT,  BREAST;  Surgeon: Yoan Sparks MD;  Location: [facility];  Service: General;  Laterality: Left;  DEBRIDEMENT, WOUND, LEFT BREAST    DILATION AND CURETTAGE OF UTERUS      FLAP PROCEDURE N/A 6/14/2023    Procedure: CREATION, FREE FLAP;  Surgeon: Gerardo Seymour MD;  Location: [facility] OR 2ND FLR;  Service: ENT;  Laterality: N/A;    INCISION AND DRAINAGE Left 7/12/2021    Procedure: INCISION AND DRAINAGE, HEMATOMA, SEROMA, OR FLUID COLLECTION ;  Surgeon: Yoan Sparks MD;  Location: [facility];  Service: General;  Laterality: Left;    NECK EXPLORATION Bilateral 6/8/2023    Procedure: EXPLORATION and debridement, NECK;  Surgeon: Jose Jules MD;  Location: [facility] OR 2ND FLR;  Service: ENT;  Laterality: Bilateral;    NECK EXPLORATION Right 6/12/2023    Procedure: RIGHT NECK WOUND EXPLORATION WITH WOUND DEBRIDEMENT;  Surgeon: Gerardo Seymour MD;  Location: [facility] OR 2ND FLR;  Service: ENT;  Laterality: Right;    NECK EXPLORATION Right 6/14/2023    Procedure: EXPLORATION, NECK;  Surgeon: Gerardo Seymour MD;  Location: [facility] OR 2ND FLR;  Service: ENT;  Laterality: Right;       Family History   Problem Relation Name Age of Onset    Hypertension Mother      Heart disease Father          pacemaker    Diabetes Father      Hypertension Father      Hyperlipidemia Father      Breast cancer Maternal Aunt      Ovarian cancer Neg Hx      Colon cancer Neg Hx         Social History     Socioeconomic History    Marital status:      Spouse name: Bienvenido Conway    Number of children: 2    Years of education: 9th   Occupational History     Employer: [id number]   Tobacco Use    Smoking status: Every Day     Current packs/day: 1.00     Average packs/day: 1 pack/day for 20.0 years (20.0 ttl pk-yrs)     Types: Cigarettes    Smokeless tobacco: Never    Tobacco comments:     Informed about classes in smoking cessation. States will call.   Substance and Sexual Activity    Alcohol use: Yes     Comment: occasinally    Drug use: No    Sexual activity: Yes     Partners: Male     Birth control/protection: None     Social Determinants of Health     Financial Resource Strain: High Risk (7/20/2023)    Overall Financial Resource Strain (CARDIA)     Difficulty of Paying Living Expenses: Very hard   Food Insecurity: Food Insecurity Present (7/20/2023)    Hunger Vital Sign     Worried About Running Out of Food in the Last Year: Sometimes true     Ran Out of Food in the Last Year: Sometimes true   Transportation Needs: Unknown (6/9/2023)    PRAPARE - Transportation     Lack of Transportation (Non-Medical): No   Physical Activity: Sufficiently Active (6/9/2023)    Exercise Vital Sign     Days of Exercise per Week: 6 days     Minutes of Exercise per Session: 30 min   Stress: No Stress Concern Present (6/9/2023)    [employer] - Occupational Stress Questionnaire     Feeling of Stress : Not at all   Housing Stability: Unknown (6/9/2023)    Housing Stability Vital Sign     Unable to Pay for Housing in the Last Year: No     Unstable Housing in the Last Year: No       Current Outpatient Medications   Medication Sig Dispense Refill    gabapentin (NEURONTIN) 300 MG capsule Take 2 capsules by mouth every 8 hours for pain and seizures. 180 capsule 11     No current facility-administered medications for this visit.       Review of patient's allergies indicates:   Allergen Reactions    Amoxicillin-pot clavulanate Hives    Clindamycin        Review of Systems   Constitutional:  Negative for appetite change and fever.   HENT:  Negative for sore throat.    Eyes:  Negative  for visual disturbance.   Respiratory:  Negative for cough and shortness of breath.    Cardiovascular:  Negative for chest pain.   Gastrointestinal:  Negative for nausea and vomiting.   Endocrine: Negative for cold intolerance and heat intolerance.   Genitourinary:  Negative for difficulty urinating.   Musculoskeletal:  Positive for neck pain and neck stiffness. Negative for arthralgias and back pain.   Skin:  Negative for rash.   Allergic/Immunologic: Negative for food allergies.   Neurological:  Positive for seizures (in her sleep) and numbness (paresthesias). Negative for dizziness, tremors, speech difficulty, weakness and headaches.   Hematological:  Does not bruise/bleed easily.   Psychiatric/Behavioral:  Negative for agitation, decreased concentration and sleep disturbance. The patient is nervous/anxious.        Objective:      Neurologic Exam     Cranial Nerves     CN III, IV, VI   Pupils are equal, round, and reactive to light.    Gait, Coordination, and Reflexes     Gait  Gait: normal    Reflexes   Right brachioradialis: 2+  Left brachioradialis: 2+  Right biceps: 2+  Left biceps: 2+  Right triceps: 2+  Left triceps: 2+  Right patellar: 2+  Left patellar: 2+  Right achilles: 2+  Left achilles: 2+    Physical Exam  Vitals and nursing note reviewed.   Constitutional:       Appearance: She is obese.   HENT:      Head: Normocephalic and atraumatic.      Nose: Nose normal.      Mouth/Throat:      Mouth: Mucous membranes are moist.      Pharynx: Oropharynx is clear.   Eyes:      General: No visual field deficit.     Extraocular Movements: Extraocular movements intact.      Pupils: Pupils are equal, round, and reactive to light.   Neck:      Comments: Large skin flap right lateral neck  Cardiovascular:      Rate and Rhythm: Normal rate and regular rhythm.   Pulmonary:      Effort: Pulmonary effort is normal.      Breath sounds: Normal breath sounds.   Abdominal:      General: Abdomen is flat.      Palpations:  Abdomen is soft.   Musculoskeletal:         General: Tenderness (distal right thigh below surgical graft, allodynia) present.      Cervical back: Neck supple. Tenderness present.   Skin:     General: Skin is warm and dry.   Neurological:      General: No focal deficit present.      Mental Status: She is alert.      Cranial Nerves: No cranial nerve deficit, dysarthria or facial asymmetry.      Motor: Motor function is intact.      Coordination: Coordination is intact.      Gait: Gait is intact.      Deep Tendon Reflexes:      Reflex Scores:       Tricep reflexes are 2+ on the right side and 2+ on the left side.       Bicep reflexes are 2+ on the right side and 2+ on the left side.       Brachioradialis reflexes are 2+ on the right side and 2+ on the left side.       Patellar reflexes are 2+ on the right side and 2+ on the left side.       Achilles reflexes are 2+ on the right side and 2+ on the left side.        Assessment:       1. Seizure disorder    2. Neuropathic pain        Plan:   Seizure disorder  EEG normal, AVEEG not performed  Seizures are very stable, none in almost a year  She has done well on gabapentin.  Rarely uses benadry.      Neuropathic pain with personal history of necrotizing fasciitis this past summer requiring extensive surgery and prolonged hospitalization  -     gabapentin (NEURONTIN) 300 MG capsule; Take 2 capsules by mouth every 8 hours for pain and seizures.  Dispense: 180 capsule; Refill: 6        We discussed occupational risks and hazards, driving restrictions and limitations, and general safety in patients with epilepsy and patients with episodes of loss of consciousness from any etiology. I specifically pointed out how  patients can put themselves and others at serious risks (leading to death and/or injury) if they have a seizure (or episode of loss of consciousness)  while driving. Patient verbalized understanding.    JOSE Armstrong

## 2024-06-10 ENCOUNTER — OFFICE VISIT (OUTPATIENT)
Dept: FAMILY MEDICINE | Facility: CLINIC | Age: 44
End: 2024-06-10
Payer: COMMERCIAL

## 2024-06-10 VITALS
HEIGHT: 62 IN | WEIGHT: 108.69 LBS | HEART RATE: 92 BPM | BODY MASS INDEX: 20 KG/M2 | DIASTOLIC BLOOD PRESSURE: 78 MMHG | RESPIRATION RATE: 16 BRPM | SYSTOLIC BLOOD PRESSURE: 114 MMHG

## 2024-06-10 DIAGNOSIS — R26.89 IMPAIRMENT OF BALANCE: ICD-10-CM

## 2024-06-10 DIAGNOSIS — Z02.89 EXAMINATION, PHYSICAL, EMPLOYEE: Primary | ICD-10-CM

## 2024-06-10 PROCEDURE — 3008F BODY MASS INDEX DOCD: CPT | Mod: CPTII,S$GLB,, | Performed by: FAMILY MEDICINE

## 2024-06-10 PROCEDURE — 3078F DIAST BP <80 MM HG: CPT | Mod: CPTII,S$GLB,, | Performed by: FAMILY MEDICINE

## 2024-06-10 PROCEDURE — 3074F SYST BP LT 130 MM HG: CPT | Mod: CPTII,S$GLB,, | Performed by: FAMILY MEDICINE

## 2024-06-10 PROCEDURE — 99213 OFFICE O/P EST LOW 20 MIN: CPT | Mod: S$GLB,,, | Performed by: FAMILY MEDICINE

## 2024-06-10 PROCEDURE — 1160F RVW MEDS BY RX/DR IN RCRD: CPT | Mod: CPTII,S$GLB,, | Performed by: FAMILY MEDICINE

## 2024-06-10 PROCEDURE — 99999 PR PBB SHADOW E&M-EST. PATIENT-LVL III: CPT | Mod: PBBFAC,,, | Performed by: FAMILY MEDICINE

## 2024-06-10 PROCEDURE — 1159F MED LIST DOCD IN RCRD: CPT | Mod: CPTII,S$GLB,, | Performed by: FAMILY MEDICINE

## 2024-06-10 NOTE — LETTER
[address] [address]  Phone: [phone]  Fax: [phone] Coco 10, 2024     Patient: Aditi Conway   YOB: 1980   Date of Visit: 6/10/2024       To Whom It May Concern:    Aditi is cleared to return to work as an  without any restrictions.     If you have any questions or concerns, please don't hesitate to contact my office.    Sincerely,        Charles Huff MD

## 2024-06-10 NOTE — PROGRESS NOTES
Subjective:       Patient ID: Aditi Conway is a 43 y.o. female.    Chief Complaint: Follow-up (Patient financially unable to take test so she can be released to go back to work)    Pt is a 43 y.o. female who presents for evaluation and management of   Encounter Diagnoses   Name Primary?    Examination, physical, employee Yes    Impairment of balance    .she is doing well.   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     Doing well on current meds. Denies any side effects. Prevention is up to date.    Review of Systems   Constitutional:  Negative for chills and fever.   Respiratory:  Negative for shortness of breath.    Cardiovascular:  Negative for chest pain and palpitations.   Gastrointestinal:  Negative for abdominal pain, blood in stool, constipation and nausea.   Genitourinary:  Negative for difficulty urinating.   Neurological:  Negative for weakness.   Psychiatric/Behavioral:  Negative for dysphoric mood, sleep disturbance and suicidal ideas. The patient is not nervous/anxious.        Objective:      Physical Exam  Constitutional:       Appearance: She is well-developed.   HENT:      Head: Normocephalic and atraumatic.      Right Ear: External ear normal.      Left Ear: External ear normal.      Nose: Nose normal.   Eyes:      Pupils: Pupils are equal, round, and reactive to light.   Neck:      Thyroid: No thyromegaly.      Vascular: No JVD.      Trachea: No tracheal deviation.      Comments: Well healed flap right neck   Cardiovascular:      Rate and Rhythm: Normal rate.      Heart sounds: Normal heart sounds. No murmur heard.  Pulmonary:      Effort: Pulmonary effort is normal. No respiratory distress.      Breath sounds: Normal breath sounds. No wheezing or rales.   Chest:      Chest wall: No tenderness.   Abdominal:      General: Bowel sounds are normal. There is no distension.      Palpations: Abdomen is  soft. There is no mass.      Tenderness: There is no abdominal tenderness. There is no guarding or rebound.   Musculoskeletal:         General: No tenderness. Normal range of motion.      Cervical back: Neck supple.      Comments: Negative Spurling's test     5/5 supraspinatous   5/5 subscapularis  5/5 teres minor and infraspinatous  Haile negative     5/5 strength bilateral biceps, triceps, deltoid, and hand .    5/5 strength bilateral quads, hamstrings, dorsiflexion and plantar flexion     Normal duck walk     Tandem gait intact     Balance demonstrated by standing on one leg for each leg.        Lymphadenopathy:      Cervical: No cervical adenopathy.   Skin:     General: Skin is warm and dry.      Coloration: Skin is not pale.      Findings: No erythema or rash.   Neurological:      Mental Status: She is alert and oriented to person, place, and time.      Cranial Nerves: No cranial nerve deficit.      Motor: No weakness or abnormal muscle tone.      Coordination: Coordination normal.      Deep Tendon Reflexes: Reflexes are normal and symmetric. Reflexes normal.      Comments: Negative Rhomberg   Tandem gait is intact  5/5 strength throughout BLE      Psychiatric:         Behavior: Behavior normal.         Thought Content: Thought content normal.         Judgment: Judgment normal.         Assessment:       1. Examination, physical, employee    2. Impairment of balance        Plan:   1. Examination, physical, employee    2. Impairment of balance  Overview:  Weak in her right leg since her removal of part of her quadriceps for grafting to her neck.   Her strength and blalance has improved tremendously. She is cleared to go back to work. She has no restrictions.           No follow-ups on file.

## 2025-01-07 DIAGNOSIS — M79.2 NEUROPATHIC PAIN: ICD-10-CM

## 2025-01-07 RX ORDER — GABAPENTIN 300 MG/1
CAPSULE ORAL
Qty: 180 CAPSULE | Refills: 4 | Status: SHIPPED | OUTPATIENT
Start: 2025-01-07 | End: 2025-02-08

## 2025-01-07 NOTE — TELEPHONE ENCOUNTER
----- Message from Erin sent at 2025 11:08 AM CST -----  Contact: PATIENT  Aditi Conway  MRN: [id number]  : 1980  PCP: Charles Huff  Home Phone      [phone]  Work Phone      Not on file.  Mobile          [phone]      MESSAGE: Patient needs a refill on Gabapentin 300 mg 2 every 8 hours sent to [facility].        Phone: [phone]

## 2025-01-07 NOTE — TELEPHONE ENCOUNTER
Former [facility] patient scheduled for neuro follow up in May requesting refills of Gabapentin. Rx pended.   negative

## 2025-01-13 DIAGNOSIS — M79.2 NEUROPATHIC PAIN: ICD-10-CM

## 2025-01-13 RX ORDER — GABAPENTIN 300 MG/1
CAPSULE ORAL
Qty: 180 CAPSULE | Refills: 4 | Status: SHIPPED | OUTPATIENT
Start: 2025-01-13 | End: 2025-01-15

## 2025-01-13 NOTE — TELEPHONE ENCOUNTER
Former [facility] patient needs refills of Gabapentin. Has neuro follow up scheduled for May 2025.   Partial Purse String (Simple) Text: Given the location of the defect and the characteristics of the surrounding skin a simple purse string closure was deemed most appropriate.  Undermining was performed circumferentially around the surgical defect.  A purse string suture was then placed and tightened. Wound tension of the circular defect prevented complete closure of the wound.

## 2025-01-13 NOTE — TELEPHONE ENCOUNTER
----- Message from Erin sent at 2025  3:42 PM CST -----  Contact: PATIENT  Aditi Conway  MRN: [id number]  : 1980  PCP: Charles Huff  Home Phone      [phone]  Work Phone      Not on file.  Mobile          [phone]      MESSAGE: Patient is needing a refill on Gabapentin 300 mg, 2 TID sent to [facility].  She has an appointment with Dr. Callahan on 25.        Phone: [phone]

## 2025-06-23 ENCOUNTER — HOSPITAL ENCOUNTER (EMERGENCY)
Facility: HOSPITAL | Age: 45
Discharge: HOME | End: 2025-06-23
Payer: COMMERCIAL

## 2025-06-23 VITALS
HEART RATE: 75 BPM | OXYGEN SATURATION: 98 % | DIASTOLIC BLOOD PRESSURE: 65 MMHG | SYSTOLIC BLOOD PRESSURE: 119 MMHG | TEMPERATURE: 99 F | WEIGHT: 109.88 LBS | RESPIRATION RATE: 18 BRPM | BODY MASS INDEX: 20.22 KG/M2 | HEIGHT: 62 IN

## 2025-06-23 DIAGNOSIS — H66.002 ACUTE SUPPURATIVE OTITIS MEDIA OF LEFT EAR WITHOUT SPONTANEOUS RUPTURE OF TYMPANIC MEMBRANE, RECURRENCE NOT SPECIFIED: Primary | ICD-10-CM

## 2025-06-23 DIAGNOSIS — J06.9 VIRAL URI WITH COUGH: ICD-10-CM

## 2025-06-23 LAB
B-HCG UR QL: NEGATIVE
GROUP A STREP MOLECULAR (OHS): NEGATIVE
INFLUENZA A MOLECULAR (OHS): NEGATIVE
INFLUENZA B MOLECULAR (OHS): NEGATIVE
SARS-COV-2 RDRP RESP QL NAA+PROBE: NEGATIVE

## 2025-06-23 PROCEDURE — U0002 COVID-19 LAB TEST NON-CDC: HCPCS

## 2025-06-23 PROCEDURE — 87502 INFLUENZA DNA AMP PROBE: CPT

## 2025-06-23 PROCEDURE — 81025 URINE PREGNANCY TEST: CPT | Performed by: NURSE PRACTITIONER

## 2025-06-23 PROCEDURE — 99284 EMERGENCY DEPT VISIT MOD MDM: CPT | Mod: 25

## 2025-06-23 PROCEDURE — 87651 STREP A DNA AMP PROBE: CPT

## 2025-06-23 RX ORDER — AZITHROMYCIN 250 MG/1
250 TABLET, FILM COATED ORAL DAILY
Qty: 6 TABLET | Refills: 0 | Status: SHIPPED | OUTPATIENT
Start: 2025-06-23

## 2025-06-23 RX ORDER — PROMETHAZINE HYDROCHLORIDE AND DEXTROMETHORPHAN HYDROBROMIDE 6.25; 15 MG/5ML; MG/5ML
5 SYRUP ORAL EVERY 6 HOURS PRN
Qty: 100 ML | Refills: 0 | Status: SHIPPED | OUTPATIENT
Start: 2025-06-23

## 2025-06-23 NOTE — ED TRIAGE NOTES
"Pt arrived to ED c/o "popping" left ear pain, cough, and throat pain that started yesterday. Pt denies vomiting, diarrhea. Pt rates pain 6/10  "

## 2025-06-23 NOTE — ED PROVIDER NOTES
"Encounter Date: 6/23/2025       History     Chief Complaint   Patient presents with    Otalgia     Pt arrived to ED c/o "popping" left ear pain, cough, and throat pain that started yesterday. Pt denies vomiting, diarrhea. Pt rates pain 6/10     Aditi Conway is a 44 y.o. female with PMH of depression, seizures, Necrotizing fascitis presenting to the ED for evaluation of L sided head pain. Patient reports that for the past two days she has had a throbbing pain in the left side of her head. She is unsure if pain is coming from her ear but it is causing the whole left side of her head to hurt. Patient had necrotizing fasciitis in June of 2023 requiring extensive debridement with soft tissue muscle flap from her right thigh to her neck and she is concerned that an infection has spread to her brain. She currently rates pain 6/10 in severity. She denies decreased hearing, ear drainage, or lesions.  Denies fever.  She does endorse a cough that is chronic. Denies sick contacts at home.     The history is provided by the patient.     Review of patient's allergies indicates:   Allergen Reactions    Amoxicillin-pot clavulanate Hives    Clindamycin      Past Medical History:   Diagnosis Date    Depression     Heart murmur     Seizures      Past Surgical History:   Procedure Laterality Date    DEBRIDEMENT, BREAST Left 11/11/2021    Procedure: DEBRIDEMENT,  BREAST;  Surgeon: Yoan Sparks MD;  Location: [facility];  Service: General;  Laterality: Left;  DEBRIDEMENT, WOUND, LEFT BREAST    DILATION AND CURETTAGE OF UTERUS      FLAP PROCEDURE N/A 6/14/2023    Procedure: CREATION, FREE FLAP;  Surgeon: Gerardo Seymour MD;  Location: [address];  Service: ENT;  Laterality: N/A;    INCISION AND DRAINAGE Left 7/12/2021    Procedure: INCISION AND DRAINAGE, HEMATOMA, SEROMA, OR FLUID COLLECTION ;  Surgeon: Yoan Sparks MD;  Location: [facility];  Service: General;  Laterality: Left;    NECK EXPLORATION Bilateral 6/8/2023 "    Procedure: EXPLORATION and debridement, NECK;  Surgeon: Jose Jules MD;  Location: [facility] OR [address] [place] [address];  Service: ENT;  Laterality: Bilateral;    NECK EXPLORATION Right 6/12/2023    Procedure: RIGHT NECK WOUND EXPLORATION WITH WOUND DEBRIDEMENT;  Surgeon: Gerardo Seymour MD;  Location: [facility] OR [address] [place] [address];  Service: ENT;  Laterality: Right;    NECK EXPLORATION Right 6/14/2023    Procedure: EXPLORATION, NECK;  Surgeon: Gerardo Seymour MD;  [facility];  Service: ENT;  Laterality: Right;     Family History   Problem Relation Name Age of Onset    Hypertension Mother      Heart disease Father          pacemaker    Diabetes Father      Hypertension Father      Hyperlipidemia Father      Breast cancer Maternal Aunt      Ovarian cancer Neg Hx      Colon cancer Neg Hx       Social History[1]  Review of Systems   Constitutional:  Negative for activity change, chills and fever.   HENT:  Positive for ear pain (L ear) and sore throat. Negative for congestion, ear discharge, postnasal drip, rhinorrhea, sinus pressure and sinus pain.    Respiratory:  Positive for cough. Negative for chest tightness and shortness of breath.    Cardiovascular:  Negative for chest pain.   Gastrointestinal:  Negative for abdominal distention, abdominal pain and nausea.   Genitourinary:  Negative for dysuria, frequency and urgency.   Musculoskeletal:  Negative for back pain.   Skin: Negative.  Negative for rash.   Neurological:  Positive for headaches (L sided). Negative for dizziness, weakness, light-headedness and numbness.   Hematological:  Does not bruise/bleed easily.       Physical Exam     Initial Vitals   BP Pulse Resp Temp SpO2   06/23/25 0815 06/23/25 0815 06/23/25 0815 06/23/25 0815 06/23/25 1027   135/63 85 18 98.8 °F (37.1 °C) 98 %      MAP       --                Physical Exam    Nursing note and vitals reviewed.  Constitutional: She appears well-developed and well-nourished.   HENT:   Head: Normocephalic and  atraumatic.   Right Ear: Tympanic membrane, external ear and ear canal normal.   Left Ear: External ear and ear canal normal. Tympanic membrane is bulging (dull).   No mastoid TTP    Eyes: Conjunctivae and EOM are normal. Pupils are equal, round, and reactive to light.   Neck: Neck supple.   Cardiovascular:  Normal rate, regular rhythm, normal heart sounds and intact distal pulses.           Pulmonary/Chest: Breath sounds normal.   Abdominal: Abdomen is soft. Bowel sounds are normal.   Musculoskeletal:         General: Normal range of motion.      Cervical back: Neck supple.     Neurological: She is alert and oriented to person, place, and time. She has normal strength.   Skin: Skin is warm and dry.   Psychiatric: She has a normal mood and affect. Her behavior is normal. Judgment and thought content normal.         ED Course   Procedures  Labs Reviewed   INFLUENZA A & B BY MOLECULAR - Normal       Result Value    INFLUENZA A MOLECULAR Negative      INFLUENZA B MOLECULAR  Negative     GROUP A STREP, MOLECULAR - Normal    Group A Strep Molecular Negative      Narrative:     Arcanobacterium haemolyticum and Beta Streptococcus group C and G will not be detected by this test method.  Please order Throat Culture ([id number]) if suspected.       SARS-COV-2 RNA AMPLIFICATION, QUAL - Normal    SARS COV-2 Molecular Negative     PREGNANCY TEST, URINE RAPID - Normal    hCG Qualitative, Urine Negative            Imaging Results              CT Head Without Contrast (Final result)  Result time 06/23/25 10:43:55      Final result by Renee Duncan MD (06/23/25 10:43:55)                   Impression:      No acute abnormality.      Electronically signed by: Renee Duncan MD  Date:    06/23/2025  Time:    10:43               Narrative:    EXAMINATION:  CT HEAD WITHOUT CONTRAST    CLINICAL HISTORY:  Headache, sudden, severe;    TECHNIQUE:  Low dose axial CT images obtained throughout the head without intravenous contrast. Sagittal  and coronal reconstructions were performed.    COMPARISON:  None.    FINDINGS:  Intracranial compartment:    Ventricles and sulci are normal in size for age without evidence of hydrocephalus. No extra-axial blood or fluid collections.    The brain parenchyma appears normal. No parenchymal mass, hemorrhage, edema or major vascular distribution infarct.    Skull/extracranial contents (limited evaluation): No fracture. Mastoid air cells are clear.Paranasal sinuses are essentially clear.                                       Medications - No data to display  Medical Decision Making  Evaluation of a 44-year-old female presenting with left-sided head pain  Presents nontoxic appearing with stable vital signs  Physical exam with multiple sores noted to face; patient reports that it is acne and that she picks at her pimples.   L TM is bulging and dull. No EAC erythema or tragal TTP.   No mastoid TTP  No neuro deficits on exam.     Diff dx includes otitis media, otitis externa, mastoiditis, flu, covid    Amount and/or Complexity of Data Reviewed  Labs: ordered. Decision-making details documented in ED Course.  Radiology: ordered. Decision-making details documented in ED Course.    Risk  Prescription drug management.  Risk Details: Stable for discharge home.  Patient presented with left-sided head pain with concern for possible infection given hx of nec fascitis.  She tested negative for influenza, strep, and COVID. CT imaging of the head obtained that shows not acute findings. L TM is dull and erythematous; will tx with Azithromycin. Tylenol/ibuprofen for pain control as directed. Patient/caregiver voices understanding and feels comfortable with discharge plan.      The patient acknowledges that close follow up with medical provider is required. Instructed to follow up with PCP within 2 days. Patient was given specific return precautions. The patient agrees to comply with all instruction and directions given in the ER.                                         Clinical Impression:  Final diagnoses:  [H66.002] Acute suppurative otitis media of left ear without spontaneous rupture of tympanic membrane, recurrence not specified (Primary)  [J06.9] Viral URI with cough          ED Disposition Condition    Discharge Stable          ED Prescriptions       Medication Sig Dispense Start Date End Date Auth. Provider    azithromycin (Z-CHEO) 250 MG tablet Take first 2 tablets together, then 1 every day until finished. 6 tablet 6/23/2025 -- Velma Garcia NP    promethazine-dextromethorphan (PROMETHAZINE-DM) 6.25-15 mg/5 mL Syrp Take 5 mLs by mouth every 6 (six) hours as needed (cough). 100 mL 6/23/2025 -- Velma Garcia NP          Follow-up Information       Follow up With Specialties Details Why Contact Info    Charles Huff MD Family Medicine Schedule an appointment as soon as possible for a visit in 2 days  [address]  [facility] [id number] [phone]                     [1]   Social History  Tobacco Use    Smoking status: Every Day     Current packs/day: 1.00     Average packs/day: 1 pack/day for 20.0 years (20.0 ttl pk-yrs)     Types: Cigarettes    Smokeless tobacco: Never    Tobacco comments:     Informed about classes in smoking cessation. States will call.   Substance Use Topics    Alcohol use: Yes     Comment: occasinally    Drug use: No        Velma Garcia NP  06/23/25 [id number]

## 2025-06-26 ENCOUNTER — OFFICE VISIT (OUTPATIENT)
Dept: NEUROLOGY | Facility: CLINIC | Age: 45
End: 2025-06-26
Payer: COMMERCIAL

## 2025-06-26 ENCOUNTER — OFFICE VISIT (OUTPATIENT)
Dept: FAMILY MEDICINE | Facility: CLINIC | Age: 45
End: 2025-06-26
Payer: COMMERCIAL

## 2025-06-26 VITALS
HEIGHT: 62 IN | SYSTOLIC BLOOD PRESSURE: 96 MMHG | OXYGEN SATURATION: 97 % | RESPIRATION RATE: 14 BRPM | HEART RATE: 82 BPM | WEIGHT: 109.81 LBS | BODY MASS INDEX: 20.21 KG/M2 | DIASTOLIC BLOOD PRESSURE: 70 MMHG

## 2025-06-26 VITALS
BODY MASS INDEX: 20.06 KG/M2 | OXYGEN SATURATION: 99 % | SYSTOLIC BLOOD PRESSURE: 100 MMHG | RESPIRATION RATE: 16 BRPM | HEART RATE: 76 BPM | DIASTOLIC BLOOD PRESSURE: 70 MMHG | WEIGHT: 109 LBS | HEIGHT: 62 IN

## 2025-06-26 DIAGNOSIS — R53.82 CHRONIC FATIGUE: ICD-10-CM

## 2025-06-26 DIAGNOSIS — H66.002 ACUTE SUPPURATIVE OTITIS MEDIA OF LEFT EAR WITHOUT SPONTANEOUS RUPTURE OF TYMPANIC MEMBRANE, RECURRENCE NOT SPECIFIED: Primary | ICD-10-CM

## 2025-06-26 DIAGNOSIS — G40.909 SEIZURE DISORDER: Primary | ICD-10-CM

## 2025-06-26 DIAGNOSIS — F17.200 SMOKER: ICD-10-CM

## 2025-06-26 DIAGNOSIS — M79.2 NEUROPATHIC PAIN: ICD-10-CM

## 2025-06-26 PROBLEM — Z99.11 ENCOUNTER FOR WEANING FROM VENTILATOR: Status: RESOLVED | Noted: 2023-06-28 | Resolved: 2025-06-26

## 2025-06-26 PROCEDURE — 99999 PR PBB SHADOW E&M-EST. PATIENT-LVL III: CPT | Mod: PBBFAC,,, | Performed by: NURSE PRACTITIONER

## 2025-06-26 PROCEDURE — 99213 OFFICE O/P EST LOW 20 MIN: CPT | Mod: S$GLB,,, | Performed by: NURSE PRACTITIONER

## 2025-06-26 PROCEDURE — 99999 PR PBB SHADOW E&M-EST. PATIENT-LVL III: CPT | Mod: PBBFAC,,, | Performed by: PSYCHIATRY & NEUROLOGY

## 2025-06-26 PROCEDURE — 3074F SYST BP LT 130 MM HG: CPT | Mod: CPTII,S$GLB,, | Performed by: NURSE PRACTITIONER

## 2025-06-26 PROCEDURE — 3008F BODY MASS INDEX DOCD: CPT | Mod: CPTII,S$GLB,, | Performed by: NURSE PRACTITIONER

## 2025-06-26 PROCEDURE — 1159F MED LIST DOCD IN RCRD: CPT | Mod: CPTII,S$GLB,, | Performed by: NURSE PRACTITIONER

## 2025-06-26 PROCEDURE — 1160F RVW MEDS BY RX/DR IN RCRD: CPT | Mod: CPTII,S$GLB,, | Performed by: NURSE PRACTITIONER

## 2025-06-26 PROCEDURE — 3078F DIAST BP <80 MM HG: CPT | Mod: CPTII,S$GLB,, | Performed by: NURSE PRACTITIONER

## 2025-06-26 RX ORDER — GABAPENTIN 300 MG/1
CAPSULE ORAL
Qty: 180 CAPSULE | Refills: 4 | Status: SHIPPED | OUTPATIENT
Start: 2025-06-26 | End: 2025-06-28

## 2025-06-26 NOTE — PROGRESS NOTES
Subjective:       Patient ID: Aditi Conway is a 44 y.o. female.    Chief Complaint: Follow-up (Pt here for ER follow up on 6/23/25 for left ear infection. Pain has subsided and almost finished with abx that was given )    Follow-up  Pertinent negatives include no abdominal pain, arthralgias, congestion, coughing, fatigue, fever, headaches, myalgias, nausea, sore throat or vomiting.     Review of Systems   Constitutional: Negative.  Negative for appetite change, fatigue and fever.   HENT: Negative.  Negative for congestion, ear pain and sore throat.    Eyes: Negative.  Negative for visual disturbance.   Respiratory: Negative.  Negative for cough, shortness of breath and wheezing.    Cardiovascular: Negative.    Gastrointestinal: Negative.  Negative for abdominal pain, diarrhea, nausea and vomiting.   Endocrine: Negative.    Genitourinary: Negative.  Negative for difficulty urinating and urgency.   Musculoskeletal: Negative.  Negative for arthralgias and myalgias.   Skin: Negative.  Negative for color change.   Allergic/Immunologic: Negative.    Neurological: Negative.  Negative for dizziness and headaches.   Hematological: Negative.    Psychiatric/Behavioral: Negative.  Negative for sleep disturbance.    All other systems reviewed and are negative.      Objective:      Physical Exam  Vitals and nursing note reviewed.   Constitutional:       Appearance: Normal appearance. She is well-developed.   HENT:      Head: Normocephalic and atraumatic.      Right Ear: Tympanic membrane and external ear normal.      Left Ear: Tympanic membrane and external ear normal.      Ears:      Comments: TM's are normal today     Nose: Nose normal.      Mouth/Throat:      Mouth: Mucous membranes are moist.   Eyes:      Conjunctiva/sclera: Conjunctivae normal.      Pupils: Pupils are equal, round, and reactive to light.   Neck:      Thyroid: No thyromegaly.   Cardiovascular:      Rate and Rhythm: Normal rate and regular rhythm.       Pulses: Normal pulses.      Heart sounds: Normal heart sounds. No murmur heard.  Pulmonary:      Effort: Pulmonary effort is normal. No respiratory distress.      Breath sounds: Normal breath sounds.   Abdominal:      Palpations: Abdomen is soft.   Musculoskeletal:         General: Normal range of motion.      Cervical back: Normal range of motion and neck supple.   Lymphadenopathy:      Cervical: No cervical adenopathy.   Skin:     General: Skin is warm and dry.      Findings: No rash.   Neurological:      Mental Status: She is alert and oriented to person, place, and time.      Deep Tendon Reflexes: Reflexes are normal and symmetric.   Psychiatric:         Mood and Affect: Mood normal.         Assessment:       1. Acute suppurative otitis media of left ear without spontaneous rupture of tympanic membrane, recurrence not specified        Plan:     1. Acute suppurative otitis media of left ear without spontaneous rupture of tympanic membrane, recurrence not specified    RTC PRN

## 2025-06-26 NOTE — PROGRESS NOTES
HPI: Aditi Conway is a 44 y.o. female with seizure.     She previously saw An in this clinic, [place], who is now retired.    I will be taking over her care.     Seizures first began in her late teen years.    She has been on Gabapentin and seizure free on 600mg TID for a few years      Aura is noted with feeling funny everywhre. She twitches and bites her tongue and is confused after. She does not recall the spell    Gabapentin was actually given for neuropathic pain for a skin infection    Patient had a recent CT head  in the ER due to pain in the ear / treated with antibiotic    Her brother has epilepsy    She does drive.    She does not recall what AEDs she took when she was younger.     She does not currently work     + smoker    Reports chronic fatigue.     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 She had seizures more when she was on drugs but they persisted off of drugs.     Review of Systems   Constitutional:  Negative for fever.   HENT:  Negative for nosebleeds.    Eyes:  Negative for double vision.   Respiratory:  Negative for hemoptysis.    Cardiovascular:  Negative for leg swelling.   Gastrointestinal:  Negative for blood in stool.   Genitourinary:  Negative for hematuria.   Skin:  Negative for rash.   Neurological:  Negative for seizures.   Endo/Heme/Allergies:  Does not bruise/bleed easily.         I have reviewed all of this patient's past medical and surgical histories as well as family and social histories and active allergies and medications as documented in the electronic medical record.        Exam:  Gen Appearance, well developed/nourished in no apparent distress  CV: 2+ distal pulses with no edema or swelling  Neuro:  MS: Awake, alert, Sustains attention. Recent/remote memory intact, Language is full to spontaneous speech/repetition/naming/comprehension. Fund of Knowledge is full  CN: Optic discs are flat with normal vasculature, PERRL,  Extraoccular movements and visual fields are full. Normal facial sensation and strength, Hearing symmetric, Tongue and Palate are midline and strong. Shoulder Shrug symmetric and strong.  Motor: Normal bulk, tone, no abnormal movements. 5/5 strength bilateral upper/lower extremities with 2+ reflexes and no clonus  Sensory: symmetric to temp, and vibration, romberg negative  Cerebellar: Finger-nose,Heal-shin, Rapid alternating movements intact  Gait: Normal stance, no ataxia    Imagin2025 CT head:     No acute abnormality.     Routine EEG normal    Labs:    Assessment/Plan: Aditi Conway is a 44 y.o. female with seizure disorder since her teenage years  I recommend:      CT head normal.    Routine EEG normal  MRI brain not found/ not needed as seizures are well controlled on Gabapentin  800mg TID for 2 years now. She also uses this med for neuropathic pain after skin surgery  3. Will need to stop driving and notify me if any further seizures. The patient should be brought to the ER for any seizure activity lasting longer than 10 minutes or if two seizures are occurring back to back without full return to baseline mental status.  -The patient was counseled on the teratogenic effects of anti-epileptic mediations including likely doubled rate of birth defects in infants born to mothers taking AEDs.  4. Smoker urged to quit  5. Suggested she see PCP about chronic fatigue complaint    RTC 1 year

## 2025-07-29 ENCOUNTER — HOSPITAL ENCOUNTER (EMERGENCY)
Facility: HOSPITAL | Age: 45
Discharge: HOME | End: 2025-07-29
Attending: SURGERY
Payer: COMMERCIAL

## 2025-07-29 VITALS
SYSTOLIC BLOOD PRESSURE: 119 MMHG | HEIGHT: 62 IN | OXYGEN SATURATION: 100 % | BODY MASS INDEX: 20.14 KG/M2 | DIASTOLIC BLOOD PRESSURE: 90 MMHG | RESPIRATION RATE: 17 BRPM | WEIGHT: 109.44 LBS | TEMPERATURE: 98 F | HEART RATE: 85 BPM

## 2025-07-29 DIAGNOSIS — T16.1XXA EAR FOREIGN BODY, RIGHT, INITIAL ENCOUNTER: Primary | ICD-10-CM

## 2025-07-29 PROCEDURE — 94760 N-INVAS EAR/PLS OXIMETRY 1: CPT

## 2025-07-29 PROCEDURE — 99900035 HC TECH TIME PER 15 MIN (STAT)

## 2025-07-29 PROCEDURE — 25000003 PHARM REV CODE 250: Performed by: SURGERY

## 2025-07-29 PROCEDURE — 99283 EMERGENCY DEPT VISIT LOW MDM: CPT | Mod: 25

## 2025-07-29 PROCEDURE — 25000242 PHARM REV CODE 250 ALT 637 W/ HCPCS: Performed by: SURGERY

## 2025-07-29 PROCEDURE — 99900031 HC PATIENT EDUCATION (STAT)

## 2025-07-29 PROCEDURE — 94640 AIRWAY INHALATION TREATMENT: CPT

## 2025-07-29 RX ORDER — HYDROCODONE BITARTRATE AND ACETAMINOPHEN 5; 325 MG/1; MG/1
1 TABLET ORAL
Refills: 0 | Status: COMPLETED | OUTPATIENT
Start: 2025-07-29 | End: 2025-07-29

## 2025-07-29 RX ORDER — IBUPROFEN 800 MG/1
800 TABLET, FILM COATED ORAL EVERY 6 HOURS PRN
Qty: 20 TABLET | Refills: 0 | Status: SHIPPED | OUTPATIENT
Start: 2025-07-29

## 2025-07-29 RX ORDER — CIPROFLOXACIN HYDROCHLORIDE AND HYDROCORTISONE 2; 10 MG/ML; MG/ML
3 SUSPENSION/ DROPS AURICULAR (OTIC) 2 TIMES DAILY
Qty: 10 ML | Refills: 0 | Status: SHIPPED | OUTPATIENT
Start: 2025-07-29 | End: 2025-08-08

## 2025-07-29 RX ORDER — LEVALBUTEROL 1.25 MG/.5ML
1.25 SOLUTION, CONCENTRATE RESPIRATORY (INHALATION)
Status: COMPLETED | OUTPATIENT
Start: 2025-07-29 | End: 2025-07-29

## 2025-07-29 RX ADMIN — HYDROCODONE BITARTRATE AND ACETAMINOPHEN 1 TABLET: 5; 325 TABLET ORAL at 01:07

## 2025-07-29 RX ADMIN — LEVALBUTEROL 1.25 MG: 1.25 SOLUTION, CONCENTRATE RESPIRATORY (INHALATION) at 01:07

## 2025-07-29 NOTE — ED TRIAGE NOTES
"44 y.o. female presents to ER ED 01/ED 01B   Chief Complaint   Patient presents with    Foreign Body in Ear     Pt presents to ED with c/o possible foreign body in R ear. Pt states, "I woke up about 15 minutes ago with the sensation that something was in my ear."      "

## 2025-07-31 NOTE — ED PROVIDER NOTES
"Encounter Date: 7/29/2025       History     Chief Complaint   Patient presents with    Foreign Body in Ear     Pt presents to ED with c/o possible foreign body in R ear. Pt states, "I woke up about 15 minutes ago with the sensation that something was in my ear."     Cough     History of Present Illness  Aditi Conway is a 44 y.o. female that presents with a right ear pain  Patient woke up this morning with a acute right ear pain, insect in her ER on exam  Patient has a an insect in her right ear canal, 10/10 pain & anxiety on arrival tonight  Long discussion with the patient & she agrees to sterile water flush to right ear canal    The history is provided by the patient.     Review of patient's allergies indicates:   Allergen Reactions    Amoxicillin-pot clavulanate Hives    Clindamycin      Past Medical History:   Diagnosis Date    Depression     Heart murmur     Seizures      Past Surgical History:   Procedure Laterality Date    DEBRIDEMENT, BREAST Left 11/11/2021    Procedure: DEBRIDEMENT,  BREAST;  Surgeon: Yoan Sparks MD;  Location: [facility] OR;  Service: General;  Laterality: Left;  DEBRIDEMENT, WOUND, LEFT BREAST    DILATION AND CURETTAGE OF UTERUS      FLAP PROCEDURE N/A 6/14/2023    Procedure: CREATION, FREE FLAP;  Surgeon: Gerardo Seymour MD;  [facility];  Service: ENT;  Laterality: N/A;    INCISION AND DRAINAGE Left 7/12/2021    Procedure: INCISION AND DRAINAGE, HEMATOMA, SEROMA, OR FLUID COLLECTION ;  Surgeon: Yoan Sparks MD;  Location: [facility] OR;  Service: General;  Laterality: Left;    NECK EXPLORATION Bilateral 6/8/2023    Procedure: EXPLORATION and debridement, NECK;  Surgeon: Jose Jules MD;  Location: [facility] OR 2ND FLR;  Service: ENT;  Laterality: Bilateral;    NECK EXPLORATION Right 6/12/2023    Procedure: RIGHT NECK WOUND EXPLORATION WITH WOUND DEBRIDEMENT;  Surgeon: Gerardo Seymour MD;  [facility];  Service: ENT;  Laterality: Right;    NECK " EXPLORATION Right 6/14/2023    Procedure: EXPLORATION, NECK;  Surgeon: Gerardo Seymour MD;  Location: [facility] OR [address] [place] [address];  Service: ENT;  Laterality: Right;     Family History   Problem Relation Name Age of Onset    Hypertension Mother      Heart disease Father          pacemaker    Diabetes Father      Hypertension Father      Hyperlipidemia Father      Breast cancer Maternal Aunt      Ovarian cancer Neg Hx      Colon cancer Neg Hx       Social History[1]    Review of Systems   Constitutional: Negative.    HENT:  Positive for ear pain.    Eyes: Negative.    Respiratory: Negative.     Cardiovascular: Negative.    Gastrointestinal: Negative.    Genitourinary: Negative.    Musculoskeletal: Negative.    Skin: Negative.    Neurological: Negative.    Psychiatric/Behavioral: Negative.       Physical Exam     Initial Vitals [07/29/25 0134]   BP Pulse Resp Temp SpO2   (!) 125/95 100 16 97.9 °F (36.6 °C) 99 %     Physical Exam    Nursing note and vitals reviewed.  Constitutional: Vital signs are normal. She appears well-developed and well-nourished. She is cooperative.   HENT:   Head: Normocephalic and atraumatic.   (+) insect in the right ear canal   Eyes: Conjunctivae, EOM and lids are normal. Pupils are equal, round, and reactive to light.   Neck: Trachea normal and phonation normal. Neck supple. No JVD present.   Normal range of motion.   Full passive range of motion without pain.     Cardiovascular:  Normal rate, regular rhythm, S1 normal, S2 normal, normal heart sounds, intact distal pulses and normal pulses.           Pulmonary/Chest: Effort normal and breath sounds normal.   Abdominal: Abdomen is soft and flat. Bowel sounds are normal.   Musculoskeletal:         General: Normal range of motion.      Cervical back: Full passive range of motion without pain, normal range of motion and neck supple.     Neurological: She is alert and oriented to person, place, and time. She has normal strength.   Skin: Skin is warm,  dry and intact. Capillary refill takes less than 2 seconds.         ED Course     Medications   levalbuterol nebulizer solution 1.25 mg (1.25 mg Nebulization Given 7/29/25 0148)   HYDROcodone-acetaminophen 5-325 mg per tablet 1 tablet (1 tablet Oral Given 7/29/25 0147)     Medical Decision Making  Differential includes otitis media, otitis externa, foreign body right ear canal    Problems Addressed:  Ear foreign body, right, initial encounter: complicated acute illness or injury    ED Management & Risks of Complication, Morbidity, & Mortality:  Sterile water flushes, insect crawled out of the right ear  Will place patient on otic drops & ibuprofen, canal inflamed  Suggest PCP & ENT follow-up as an outpatient  Pt/Family counseled to return to the ER with any concerns on DC    Need for Hospitalization or Surgery with Social Determinants of Health:  This patient does not need to be hospitalized on ER evaluation today  The patient's diagnosis is not limited by social determinants of health  Does not require surgery or procedure (major or minor), no risk factors    Final diagnoses:  [T16.1XXA] Ear foreign body, right, initial encounter (Primary)          ED Disposition Condition    Discharge Stable          ED Prescriptions       Medication Sig Dispense Start Date End Date Auth. Provider    ciprofloxacin-hydrocortisone 0.2-1% (CIPRO HC) otic suspension Place 3 drops into the right ear 2 (two) times daily. for 10 days 10 mL 7/29/2025 8/8/2025 Brady Johnson MD    ibuprofen (ADVIL,MOTRIN) 800 MG tablet Take 1 tablet (800 mg total) by mouth every 6 (six) hours as needed for Pain. 20 tablet 7/29/2025 -- Brady Johnson MD          Follow-up Information       Follow up With Specialties Details Why Contact Info    Ana Taylor, NP Family Medicine Schedule an appointment as soon as possible for a visit in 2 days  [address] [address] [phone]                     [1]   Social History  Tobacco  Use    Smoking status: Every Day     Current packs/day: 1.00     Average packs/day: 1 pack/day for 20.0 years (20.0 ttl pk-yrs)     Types: Cigarettes    Smokeless tobacco: Never    Tobacco comments:     Informed about classes in smoking cessation. States will call.   Substance Use Topics    Alcohol use: Yes     Comment: occasinally    Drug use: No        Brady Johnson MD  07/31/25 0702

## (undated) DEVICE — DRAPE STERI INSTRUMENT 1018

## (undated) DEVICE — SUT VICRYL PLUS 3-0 SH 18IN

## (undated) DEVICE — SPONGE LAP 18X18 PREWASHED

## (undated) DEVICE — TRAY SKIN SCRUB WET PREMIUM

## (undated) DEVICE — COVER LIGHT HANDLE 80/CA

## (undated) DEVICE — SKINMARKER & RULER REGULAR X-F

## (undated) DEVICE — SUT COATED VICRYL 4/0 27IN

## (undated) DEVICE — GOWN SURGICAL X-LARGE

## (undated) DEVICE — LUBRICANT SURGILUBE 2 OZ

## (undated) DEVICE — DRAPE TOP 53X102IN

## (undated) DEVICE — ELECTRODE BLADE INSULATED 1 IN

## (undated) DEVICE — SUT LIGACLIP SMALL XTRA

## (undated) DEVICE — CLIP MED TICALL

## (undated) DEVICE — DRESSING ABSRBNT ISLAND 3.6X8

## (undated) DEVICE — BANDAGE ROLL COTTN 4.5INX4.1YD

## (undated) DEVICE — SUT ETHILON 3-0 PS2 18 BLK

## (undated) DEVICE — CORD BIPOLAR 12 FOOT

## (undated) DEVICE — GAUZE SPONGE PEANUT STRL

## (undated) DEVICE — SUT 2-0 12-18IN SILK

## (undated) DEVICE — SPONGE KERLIX ANTIMIC 6X6.75IN

## (undated) DEVICE — BOOT AIR FLUID HEEL ADLT STD

## (undated) DEVICE — SEE MEDLINE ITEM 157194

## (undated) DEVICE — SUT MCRYL PLUS 4-0 PS2 27IN

## (undated) DEVICE — DRAPE EENT SPLIT STERILE

## (undated) DEVICE — ADHESIVE DERMABOND ADVANCED

## (undated) DEVICE — NDL HYPO REG 25G X 1 1/2

## (undated) DEVICE — HOOK LONE STAR BLUNT 12MM

## (undated) DEVICE — PACK UNIVERSAL SPLIT II

## (undated) DEVICE — STAPLER SKIN PROXIMATE WIDE

## (undated) DEVICE — KIT SAHARA DRAPE DRAW/LIFT

## (undated) DEVICE — PAD K-THERMIA 24IN X 60IN

## (undated) DEVICE — DRAPE HALF SURGICAL 40X58IN

## (undated) DEVICE — DRESSING AQUACEL SACRAL 9 X 9

## (undated) DEVICE — TRAY MINOR GEN SURG OMC

## (undated) DEVICE — KIT URINARY CATH URINE METER

## (undated) DEVICE — CONTAINER SPECIMEN STRL 4OZ

## (undated) DEVICE — SUT 3-0 12-18IN SILK

## (undated) DEVICE — PROBE CATH TEMP 16 FRFOLEY 400

## (undated) DEVICE — TOWEL OR DISP STRL BLUE 4/PK

## (undated) DEVICE — DISSECTOR LIGASURE EXACT 21CM

## (undated) DEVICE — DRAPE CORETEMP FLD WRM 56X62IN

## (undated) DEVICE — SUT SILK 2-0 PS 18IN BLACK

## (undated) DEVICE — TRAY CATH FOL SIL URIMTR 16FR

## (undated) DEVICE — ELECTRODE REM PLYHSV RETURN 9

## (undated) DEVICE — BLADE SURG #15 CARBON STEEL